# Patient Record
Sex: MALE | Race: WHITE | NOT HISPANIC OR LATINO | Employment: FULL TIME | URBAN - METROPOLITAN AREA
[De-identification: names, ages, dates, MRNs, and addresses within clinical notes are randomized per-mention and may not be internally consistent; named-entity substitution may affect disease eponyms.]

---

## 2017-01-20 ENCOUNTER — OFFICE VISIT (OUTPATIENT)
Dept: FAMILY MEDICINE | Facility: OTHER | Age: 47
End: 2017-01-20
Attending: NURSE PRACTITIONER
Payer: COMMERCIAL

## 2017-01-20 VITALS
TEMPERATURE: 97.8 F | HEIGHT: 72 IN | WEIGHT: 277 LBS | DIASTOLIC BLOOD PRESSURE: 80 MMHG | BODY MASS INDEX: 37.52 KG/M2 | HEART RATE: 100 BPM | RESPIRATION RATE: 14 BRPM | SYSTOLIC BLOOD PRESSURE: 106 MMHG

## 2017-01-20 DIAGNOSIS — I10 BENIGN ESSENTIAL HYPERTENSION: ICD-10-CM

## 2017-01-20 DIAGNOSIS — F17.200 TOBACCO DEPENDENCE SYNDROME: ICD-10-CM

## 2017-01-20 DIAGNOSIS — Z71.89 ACP (ADVANCE CARE PLANNING): Primary | ICD-10-CM

## 2017-01-20 DIAGNOSIS — Z01.818 PREOP GENERAL PHYSICAL EXAM: ICD-10-CM

## 2017-01-20 DIAGNOSIS — K21.9 GASTROESOPHAGEAL REFLUX DISEASE, ESOPHAGITIS PRESENCE NOT SPECIFIED: ICD-10-CM

## 2017-01-20 PROBLEM — M51.26 HERNIATED INTERVERTEBRAL DISC OF LUMBAR SPINE: Status: ACTIVE | Noted: 2017-01-20

## 2017-01-20 LAB
ALBUMIN SERPL-MCNC: 4.1 G/DL (ref 3.4–5)
ALP SERPL-CCNC: 113 U/L (ref 40–150)
ALT SERPL W P-5'-P-CCNC: 202 U/L (ref 0–70)
ANION GAP SERPL CALCULATED.3IONS-SCNC: 11 MMOL/L (ref 3–14)
AST SERPL W P-5'-P-CCNC: 83 U/L (ref 0–45)
BASOPHILS # BLD AUTO: 0.1 10E9/L (ref 0–0.2)
BASOPHILS NFR BLD AUTO: 0.5 %
BILIRUB SERPL-MCNC: 0.7 MG/DL (ref 0.2–1.3)
BUN SERPL-MCNC: 19 MG/DL (ref 7–30)
CALCIUM SERPL-MCNC: 9.3 MG/DL (ref 8.5–10.1)
CHLORIDE SERPL-SCNC: 109 MMOL/L (ref 94–109)
CO2 SERPL-SCNC: 21 MMOL/L (ref 20–32)
CREAT SERPL-MCNC: 0.91 MG/DL (ref 0.66–1.25)
DIFFERENTIAL METHOD BLD: NORMAL
EOSINOPHIL # BLD AUTO: 0.4 10E9/L (ref 0–0.7)
EOSINOPHIL NFR BLD AUTO: 3.9 %
ERYTHROCYTE [DISTWIDTH] IN BLOOD BY AUTOMATED COUNT: 12.2 % (ref 10–15)
GFR SERPL CREATININE-BSD FRML MDRD: 90 ML/MIN/1.7M2
GLUCOSE SERPL-MCNC: 129 MG/DL (ref 70–99)
HCT VFR BLD AUTO: 49.2 % (ref 40–53)
HGB BLD-MCNC: 17.2 G/DL (ref 13.3–17.7)
LYMPHOCYTES # BLD AUTO: 1.7 10E9/L (ref 0.8–5.3)
LYMPHOCYTES NFR BLD AUTO: 16 %
MCH RBC QN AUTO: 31.5 PG (ref 26.5–33)
MCHC RBC AUTO-ENTMCNC: 35 G/DL (ref 31.5–36.5)
MCV RBC AUTO: 90 FL (ref 78–100)
MONOCYTES # BLD AUTO: 1.1 10E9/L (ref 0–1.3)
MONOCYTES NFR BLD AUTO: 10 %
NEUTROPHILS # BLD AUTO: 7.5 10E9/L (ref 1.6–8.3)
NEUTROPHILS NFR BLD AUTO: 69.6 %
PLATELET # BLD AUTO: 261 10E9/L (ref 150–450)
POTASSIUM SERPL-SCNC: 4.3 MMOL/L (ref 3.4–5.3)
PROT SERPL-MCNC: 8.2 G/DL (ref 6.8–8.8)
RBC # BLD AUTO: 5.46 10E12/L (ref 4.4–5.9)
SODIUM SERPL-SCNC: 141 MMOL/L (ref 133–144)
WBC # BLD AUTO: 10.8 10E9/L (ref 4–11)

## 2017-01-20 PROCEDURE — 99214 OFFICE O/P EST MOD 30 MIN: CPT | Performed by: NURSE PRACTITIONER

## 2017-01-20 PROCEDURE — 36415 COLL VENOUS BLD VENIPUNCTURE: CPT | Performed by: NURSE PRACTITIONER

## 2017-01-20 PROCEDURE — 85025 COMPLETE CBC W/AUTO DIFF WBC: CPT | Performed by: NURSE PRACTITIONER

## 2017-01-20 PROCEDURE — 71020 ZZHC CHEST TWO VIEWS, FRONT/LAT: CPT | Mod: TC | Performed by: RADIOLOGY

## 2017-01-20 PROCEDURE — 80053 COMPREHEN METABOLIC PANEL: CPT | Performed by: NURSE PRACTITIONER

## 2017-01-20 PROCEDURE — 93000 ELECTROCARDIOGRAM COMPLETE: CPT | Performed by: INTERNAL MEDICINE

## 2017-01-20 RX ORDER — OMEPRAZOLE 40 MG/1
40 CAPSULE, DELAYED RELEASE ORAL DAILY
Qty: 30 CAPSULE | Refills: 1 | Status: SHIPPED | OUTPATIENT
Start: 2017-01-20 | End: 2017-08-15

## 2017-01-20 RX ORDER — POLYETHYLENE GLYCOL 3350 17 G
2 POWDER IN PACKET (EA) ORAL
Qty: 360 LOZENGE | Refills: 1 | Status: SHIPPED | OUTPATIENT
Start: 2017-01-20 | End: 2017-08-15

## 2017-01-20 ASSESSMENT — ANXIETY QUESTIONNAIRES
1. FEELING NERVOUS, ANXIOUS, OR ON EDGE: MORE THAN HALF THE DAYS
GAD7 TOTAL SCORE: 9
6. BECOMING EASILY ANNOYED OR IRRITABLE: SEVERAL DAYS
2. NOT BEING ABLE TO STOP OR CONTROL WORRYING: NEARLY EVERY DAY
7. FEELING AFRAID AS IF SOMETHING AWFUL MIGHT HAPPEN: NOT AT ALL
IF YOU CHECKED OFF ANY PROBLEMS ON THIS QUESTIONNAIRE, HOW DIFFICULT HAVE THESE PROBLEMS MADE IT FOR YOU TO DO YOUR WORK, TAKE CARE OF THINGS AT HOME, OR GET ALONG WITH OTHER PEOPLE: NOT DIFFICULT AT ALL
3. WORRYING TOO MUCH ABOUT DIFFERENT THINGS: MORE THAN HALF THE DAYS
5. BEING SO RESTLESS THAT IT IS HARD TO SIT STILL: NOT AT ALL

## 2017-01-20 ASSESSMENT — PATIENT HEALTH QUESTIONNAIRE - PHQ9: 5. POOR APPETITE OR OVEREATING: SEVERAL DAYS

## 2017-01-20 ASSESSMENT — PAIN SCALES - GENERAL: PAINLEVEL: MILD PAIN (3)

## 2017-01-20 NOTE — MR AVS SNAPSHOT
After Visit Summary   1/20/2017    Rojelio Juárez    MRN: [id number]           Patient Information     Date Of Birth          1970        Visit Information        Provider Department      1/20/2017 10:30 AM Marion Davis NP [facility]        Today's Diagnoses     ACP (advance care planning)    -  1     Preop general physical exam         Benign essential hypertension         Tobacco dependence syndrome         Gastroesophageal reflux disease, esophagitis presence not specified           Care Instructions      Before Your Surgery      Call your surgeon if there is any change in your health. This includes signs of a cold or flu (such as a sore throat, runny nose, cough, rash or fever).    Do not smoke, drink alcohol or take over the counter medicine (unless your surgeon or primary care doctor tells you to) for the 24 hours before and after surgery.    If you take prescribed drugs: Follow your doctor s orders about which medicines to take and which to stop until after surgery.    Eating and drinking prior to surgery: follow the instructions from your surgeon    Take a shower or bath the night before surgery. Use the soap your surgeon gave you to gently clean your skin. If you do not have soap from your surgeon, use your regular soap. Do not shave or scrub the surgery site.  Wear clean pajamas and have clean sheets on your bed.         Follow-ups after your visit        Who to contact     If you have questions or need follow up information about today's clinic visit or your schedule please contact [facility] directly at [phone].  Normal or non-critical lab and imaging results will be communicated to you by MyChart, letter or phone within 4 business days after the clinic has received the results. If you do not hear from us within 7 days, please contact the clinic through MyChart or phone. If you have a critical or abnormal lab result, we will notify you by  "phone as soon as possible.  Submit refill requests through [employer] or call your pharmacy and they will forward the refill request to us. Please allow 3 business days for your refill to be completed.          Additional Information About Your Visit        [employer] Information     [employer] lets you send messages to your doctor, view your test results, renew your prescriptions, schedule appointments and more. To sign up, go to [email or web address] [employer] . Click on \"Log in\" on the left side of the screen, which will take you to the Welcome page. Then click on \"Sign up Now\" on the right side of the page.     You will be asked to enter the access code listed below, as well as some personal information. Please follow the directions to create your username and password.     Your access code is: [email or web address]  Expires: 3/15/2017  1:57 PM     Your access code will  in 90 days. If you need help or a new code, please call your [place] clinic or [phone].        Care EveryWhere ID     This is your Care EveryWhere ID. This could be used by other organizations to access your [place] medical records  [id number]        Your Vitals Were     Pulse Temperature Respirations Height BMI (Body Mass Index)       100 97.8  F (36.6  C) (Tympanic) 14 6' (1.829 m) 37.56 kg/m2        Blood Pressure from Last 3 Encounters:   17 106/80   16 154/94   12/15/16 160/90    Weight from Last 3 Encounters:   17 277 lb (125.646 kg)   16 270 lb (122.471 kg)   12/15/16 270 lb (122.471 kg)              We Performed the Following     CBC with platelets and differential     Comprehensive metabolic panel     EKG 12-lead complete w/read - (Clinic Performed)     XR CHEST 2 VW (Clinic Performed)          Today's Medication Changes          These changes are accurate as of: 17 10:59 AM.  If you have any questions, ask your nurse or doctor.               Start taking these medicines.        Dose/Directions    nicotine polacrilex 2 " MG lozenge   Commonly known as:  EQL NICOTINE   Used for:  Tobacco dependence syndrome   Started by:  Marion Davis NP        Dose:  2 mg   Place 1 lozenge (2 mg) inside cheek every hour as needed for smoking cessation   Quantity:  360 lozenge   Refills:  1       omeprazole 40 MG capsule   Commonly known as:  priLOSEC   Used for:  Gastroesophageal reflux disease, esophagitis presence not specified   Started by:  Marion Davis NP        Dose:  40 mg   Take 1 capsule (40 mg) by mouth daily Take 30-60 minutes before a meal.   Quantity:  30 capsule   Refills:  1         Stop taking these medicines if you haven't already. Please contact your care team if you have questions.     predniSONE 20 MG tablet   Commonly known as:  DELTASONE   Stopped by:  Marion Davis NP                Where to get your medicines      These medications were sent to [facility] [address] [place] [address]  AT [facility] [address]  [address] [place] , [facility] [address]     Phone:  [phone]    - nicotine polacrilex 2 MG lozenge  - omeprazole 40 MG capsule             Primary Care Provider Office Phone # Fax #    Marion Davis [phone]       [facility] [address] [facility] [address]        Thank you!     Thank you for choosing [facility]  for your care. Our goal is always to provide you with excellent care. Hearing back from our patients is one way we can continue to improve our services. Please take a few minutes to complete the written survey that you may receive in the mail after your visit with us. Thank you!             Your Updated Medication List - Protect others around you: Learn how to safely use, store and throw away your medicines at www.disposemymeds.org.          This list is accurate as of: 1/20/17 10:59 AM.  Always use your most recent med list.                   Brand Name Dispense Instructions  for use    atenolol 50 MG tablet    TENORMIN    30 tablet    Take 1 tablet (50 mg) by mouth daily       cyclobenzaprine 10 MG tablet    FLEXERIL    90 tablet    Take 1 tablet (10 mg) by mouth 3 times daily as needed for muscle spasms       gabapentin 300 MG capsule    NEURONTIN    90 capsule    Take 1 tablet (300 mg) every night for 1-3 days, then 1 tablet twice daily for 1-3 days, then 1 tablet three times daily       ibuprofen 800 MG tablet    ADVIL/MOTRIN    90 tablet    Take 1 tablet (800 mg) by mouth every 8 hours as needed for moderate pain       nicotine polacrilex 2 MG lozenge    EQL NICOTINE    360 lozenge    Place 1 lozenge (2 mg) inside cheek every hour as needed for smoking cessation       omeprazole 40 MG capsule    priLOSEC    30 capsule    Take 1 capsule (40 mg) by mouth daily Take 30-60 minutes before a meal.       oxyCODONE-acetaminophen 5-325 MG per tablet    PERCOCET    90 tablet    Take 1 tablet by mouth 3 times daily as needed for moderate to severe pain (must last one month, no early refills)

## 2017-01-20 NOTE — PROGRESS NOTES
[facility]  [address]  [place] [address]  [phone]  Dept: [phone]    PRE-OP EVALUATION:  Today's date: 2017    Rojelio Juárez (: 1970) presents for pre-operative evaluation assessment as requested by Dr. Hemphill.  He requires evaluation and anesthesia risk assessment prior to undergoing surgery/procedure for treatment of low back pain with right sided radicular pain.  Proposed procedure: right L5-S1 microdiscectomy     Date of Surgery/ Procedure: 17  Time of Surgery/ Procedure: To be determined  Hospital/Surgical Facility: [place], [place]  Fax number for surgical facility:   Primary Physician: Marion Davis  Type of Anesthesia Anticipated: General    Patient has a Health Care Directive or Living Will:  NO    1. NO - Do you have a history of heart attack, stroke, stent, bypass or surgery on an artery in the head, neck, heart or legs?  2. NO - Do you ever have any pain or discomfort in your chest?  3. NO - Do you have a history of  Heart Failure?  4. NO - Are you troubled by shortness of breath when: walking on the level, up a slight hill or at night?  5. NO - Do you currently have a cold, bronchitis or other respiratory infection?  6. YES - DO YOU HAVE A COUGH, SHORTNESS OF BREATH OR WHEEZING? Stopped smoking Monday 1/15/2017.   7. YES - Do you sometimes get pains in the calves of your legs when you walk? due to back discomfort  8. NO - Do you or anyone in your family have previous history of blood clots?  9. NO - Do you or does anyone in your family have a serious bleeding problem such as prolonged bleeding following surgeries or cuts?  10. NO - Have you ever had problems with anemia or been told to take iron pills?  11. NO - Have you had any abnormal blood loss such as black, tarry or bloody stools, or abnormal vaginal bleeding?  12. NO - Have you ever had a blood transfusion?  13. NO - Have you or any of your relatives ever had  problems with anesthesia?  14. YES - DO YOU HAVE SLEEP APNEA, EXCESSIVE SNORING OR DAYTIME DROWSINESS? Snoring, sleep apnea but does not use c-pap machine.   15. NO - Do you have any prosthetic heart valves?  16. NO - Do you have prosthetic joints?  17. NO - Is there any chance that you may be pregnant?      HPI:                                                      Brief HPI related to upcoming procedure: acute low back pain with right sided numbness, tingling and weakness.  MRI was completed with the following results: large disc herniation of right L5S1 causing compression of right S1 nerve root.       HYPERTENSION - new onset of mild-severe HTN most likely related to increased pain, currently denies any symptoms referable to elevated blood pressure. Specifically denies chest pain, palpitations, dyspnea, orthopnea, PND or peripheral edema. Blood pressure readings have been in normal range. Current medication regimen is as listed below. Patient denies any side effects of medication.                                                                                                                                                                                  MEDICAL HISTORY:                                                      Patient Active Problem List    Diagnosis Date Noted     ACP (advance care planning) 01/20/2017     Priority: Medium     Advance Care Planning 1/20/2017: ACP Review of Chart / Resources Provided:  Reviewed chart for advance care plan.  Rojelio Juárez has no plan or code status on file. Discussed available resources and provided with information. Confirmed code status reflects current choices pending further ACP discussions.  Confirmed/documented legally designated decision makers.  Added by LUCIANO OLIVAS               Herniated intervertebral disc of lumbar spine 01/20/2017     Priority: Medium     Benign essential hypertension 01/20/2017     Priority: Medium      Past Medical History    Diagnosis Date     Herniated intervertebral disc of lumbar spine 1/20/2017     Benign essential hypertension 1/20/2017     History reviewed. No pertinent past surgical history.  Current Outpatient Prescriptions   Medication Sig Dispense Refill     oxyCODONE-acetaminophen (PERCOCET) 5-325 MG per tablet Take 1 tablet by mouth 3 times daily as needed for moderate to severe pain (must last one month, no early refills) 90 tablet 0     atenolol (TENORMIN) 50 MG tablet Take 1 tablet (50 mg) by mouth daily 30 tablet 5     ibuprofen (ADVIL/MOTRIN) 800 MG tablet Take 1 tablet (800 mg) by mouth every 8 hours as needed for moderate pain 90 tablet 1     cyclobenzaprine (FLEXERIL) 10 MG tablet Take 1 tablet (10 mg) by mouth 3 times daily as needed for muscle spasms 90 tablet 1     gabapentin (NEURONTIN) 300 MG capsule Take 1 tablet (300 mg) every night for 1-3 days, then 1 tablet twice daily for 1-3 days, then 1 tablet three times daily 90 capsule 0     OTC products: None, except as noted above, no recent use of OTC ASA, NSAIDS or Steroids and no use of herbal medications or other supplements    No Known Allergies   Latex Allergy: NO    Social History   Substance Use Topics     Smoking status: Former Smoker     Types: Cigarettes     Start date: 01/15/2017     Smokeless tobacco: Never Used     Alcohol Use: 0.0 oz/week     0 Standard drinks or equivalent per week     History   Drug Use No       REVIEW OF SYSTEMS:                                                    C: NEGATIVE for fever, chills, change in weight  I: NEGATIVE for worrisome rashes, moles or lesions  E: NEGATIVE for vision changes or irritation  E/M: NEGATIVE for ear, mouth and throat problems  R: NEGATIVE for significant cough or SOB  CV: NEGATIVE for chest pain, palpitations or peripheral edema  GI: NEGATIVE for nausea, abdominal pain, heartburn, or change in bowel habits  : NEGATIVE for frequency, dysuria, or hematuria  M: POSITIVE:  Low back pain as above  N:  POSITIVE:  Radicular pain right leg  E: NEGATIVE for temperature intolerance, skin/hair changes  H: NEGATIVE for bleeding problems  P: NEGATIVE for changes in mood or affect    EXAM:                                                    /80 mmHg  Pulse 100  Temp(Src) 97.8  F (36.6  C) (Tympanic)  Resp 14  Ht 6' (1.829 m)  Wt 277 lb (125.646 kg)  BMI 37.56 kg/m2    GENERAL APPEARANCE: healthy, alert and no distress     HENT: ear canals and TM's normal and nose and mouth without ulcers or lesions     NECK: no adenopathy, no asymmetry, masses, or scars and thyroid normal to palpation     RESP: lungs clear to auscultation - no rales, rhonchi or wheezes     CV: regular rates and rhythm, normal S1 S2, no S3 or S4 and no murmur, click or rub -     ABDOMEN:  soft, nontender, no HSM or masses and bowel sounds normal     MS: extremities normal- no gross deformities noted, no evidence of inflammation in joints, FROM in all extremities.     SKIN: no suspicious lesions or rashes     NEURO: Normal strength and tone, sensory exam grossly normal, mentation intact and speech normal     PSYCH: mentation appears normal. and affect normal/bright     LYMPHATICS: No axillary, cervical, inguinal, or supraclavicular nodes    DIAGNOSTICS:                                                      Results for orders placed or performed in visit on 01/20/17   XR CHEST 2 VW (Clinic Performed)    Narrative    CHEST TWO VIEWS    FINDINGS:  Lungs are clear.  Heart and pulmonary vessels are within  normal limits.    IMPRESSION:  NO EVIDENCE OF ACTIVE CARDIOPULMONARY DISEASE.  Exam Date: Jan 20, 2017 11:07:00 AM  Author: MICHAEL ANTONIO  This report is final and null     Comprehensive metabolic panel   Result Value Ref Range    Sodium 141 133 - 144 mmol/L    Potassium 4.3 3.4 - 5.3 mmol/L    Chloride 109 94 - 109 mmol/L    Carbon Dioxide 21 20 - 32 mmol/L    Anion Gap 11 3 - 14 mmol/L    Glucose 129 (H) 70 - 99 mg/dL    Urea Nitrogen 19 7 - 30  mg/dL    Creatinine 0.91 0.66 - 1.25 mg/dL    GFR Estimate 90 >60 mL/min/1.7m2    GFR Estimate If Black >90   GFR Calc   >60 mL/min/1.7m2    Calcium 9.3 8.5 - 10.1 mg/dL    Bilirubin Total 0.7 0.2 - 1.3 mg/dL    Albumin 4.1 3.4 - 5.0 g/dL    Protein Total 8.2 6.8 - 8.8 g/dL    Alkaline Phosphatase 113 40 - 150 U/L     (H) 0 - 70 U/L    AST 83 (H) 0 - 45 U/L   CBC with platelets and differential   Result Value Ref Range    WBC 10.8 4.0 - 11.0 10e9/L    RBC Count 5.46 4.4 - 5.9 10e12/L    Hemoglobin 17.2 13.3 - 17.7 g/dL    Hematocrit 49.2 40.0 - 53.0 %    MCV 90 78 - 100 fl    MCH 31.5 26.5 - 33.0 pg    MCHC 35.0 31.5 - 36.5 g/dL    RDW 12.2 10.0 - 15.0 %    Platelet Count 261 150 - 450 10e9/L    Diff Method Automated Method     % Neutrophils 69.6 %    % Lymphocytes 16.0 %    % Monocytes 10.0 %    % Eosinophils 3.9 %    % Basophils 0.5 %    Absolute Neutrophil 7.5 1.6 - 8.3 10e9/L    Absolute Lymphocytes 1.7 0.8 - 5.3 10e9/L    Absolute Monocytes 1.1 0.0 - 1.3 10e9/L    Absolute Eosinophils 0.4 0.0 - 0.7 10e9/L    Absolute Basophils 0.1 0.0 - 0.2 10e9/L          EKG Interpretation:      Interpreted by Marion Davis    Symptoms at time of EKG: None   Rhythm: Normal sinus   Rate: Normal  Axis: Normal  Ectopy: None  Conduction: Normal  ST Segments/ T Waves: No ST-T wave changes and No acute ischemic changes  Q Waves: None  Comparison to prior: No old EKG available    Clinical Impression: normal EKG        No results for input(s): HGB, PLT, INR, NA, POTASSIUM, CR, A1C in the last 07346 hours.     IMPRESSION:                                                    Reason for surgery/procedure: low back pain with right sided radicular pain  Diagnosis/reason for consult: anesthesia risk assessment.      The proposed surgical procedure is considered INTERMEDIATE risk.    REVISED CARDIAC RISK INDEX  The patient has the following serious cardiovascular risks for perioperative complications such as  (MI, PE, VFib and 3  AV Block):  No serious cardiac risks  INTERPRETATION: 0 risks: Class I (very low risk - 0.4% complication rate)    The patient has the following additional risks for perioperative complications:  Elevated Liver function, most likely due to tylenol use.  Will follow-up postoperatively.        ICD-10-CM    1. ACP (advance care planning) Z71.89    2. Preop general physical exam Z01.818    3. Benign essential hypertension I10        RECOMMENDATIONS:                                                        Cardiovascular Risk  Performs 4 METs exercise without symptoms (Walk on level ground at 15 minutes per mile (4 miles/hour)) .       --Patient is to take all scheduled medications on the day of surgery EXCEPT for modifications listed below.  Stop all tylenol use due to elevated LFT's.  Will follow-up postoperatively as above    APPROVAL GIVEN to proceed with proposed procedure, without further diagnostic evaluation       Signed Electronically by: Marion Davis NP    Copy of this evaluation report is provided to requesting physician.    [facility] Preop Guidelines

## 2017-01-20 NOTE — NURSING NOTE
Chief Complaint   Patient presents with     Pre-Op Exam     1/25/17 discectomy with Dr. Hemphill at [facility]     *_* Health Care Directive *_*     declined        Initial /80 mmHg  Pulse 100  Temp(Src) 97.8  F (36.6  C) (Tympanic)  Resp 14  Ht 6' (1.829 m)  Wt 277 lb (125.646 kg)  BMI 37.56 kg/m2 Estimated body mass index is 37.56 kg/(m^2) as calculated from the following:    Height as of this encounter: 6' (1.829 m).    Weight as of this encounter: 277 lb (125.646 kg).  BP completed using cuff size: vannessa OLIVAS

## 2017-01-21 ASSESSMENT — ANXIETY QUESTIONNAIRES: GAD7 TOTAL SCORE: 9

## 2017-01-21 ASSESSMENT — PATIENT HEALTH QUESTIONNAIRE - PHQ9: SUM OF ALL RESPONSES TO PHQ QUESTIONS 1-9: 7

## 2017-08-15 ENCOUNTER — OFFICE VISIT (OUTPATIENT)
Dept: FAMILY MEDICINE | Facility: OTHER | Age: 47
End: 2017-08-15
Attending: NURSE PRACTITIONER
Payer: COMMERCIAL

## 2017-08-15 VITALS
BODY MASS INDEX: 38.74 KG/M2 | WEIGHT: 286 LBS | HEIGHT: 72 IN | TEMPERATURE: 96.8 F | HEART RATE: 74 BPM | DIASTOLIC BLOOD PRESSURE: 78 MMHG | SYSTOLIC BLOOD PRESSURE: 128 MMHG

## 2017-08-15 DIAGNOSIS — F41.9 ANXIETY: ICD-10-CM

## 2017-08-15 DIAGNOSIS — M54.41 LOW BACK PAIN WITH RIGHT-SIDED SCIATICA: Primary | ICD-10-CM

## 2017-08-15 DIAGNOSIS — Z72.0 TOBACCO ABUSE: Primary | ICD-10-CM

## 2017-08-15 DIAGNOSIS — M54.41 RIGHT-SIDED LOW BACK PAIN WITH RIGHT-SIDED SCIATICA, UNSPECIFIED CHRONICITY: ICD-10-CM

## 2017-08-15 DIAGNOSIS — M54.41 ACUTE RIGHT-SIDED LOW BACK PAIN WITH RIGHT-SIDED SCIATICA: ICD-10-CM

## 2017-08-15 PROBLEM — I10 BENIGN ESSENTIAL HYPERTENSION: Status: RESOLVED | Noted: 2017-01-20 | Resolved: 2017-08-15

## 2017-08-15 PROCEDURE — 99214 OFFICE O/P EST MOD 30 MIN: CPT | Performed by: NURSE PRACTITIONER

## 2017-08-15 RX ORDER — IBUPROFEN 800 MG/1
800 TABLET, FILM COATED ORAL EVERY 8 HOURS PRN
Qty: 90 TABLET | Refills: 1 | Status: SHIPPED | OUTPATIENT
Start: 2017-08-15 | End: 2017-12-08

## 2017-08-15 RX ORDER — DIAZEPAM 5 MG
5 TABLET ORAL EVERY 12 HOURS PRN
Qty: 30 TABLET | Refills: 1 | Status: SHIPPED | OUTPATIENT
Start: 2017-08-15 | End: 2018-06-22

## 2017-08-15 RX ORDER — PREDNISONE 20 MG/1
20 TABLET ORAL 2 TIMES DAILY
Qty: 10 TABLET | Refills: 0 | Status: SHIPPED | OUTPATIENT
Start: 2017-08-15 | End: 2017-09-14

## 2017-08-15 ASSESSMENT — ANXIETY QUESTIONNAIRES
3. WORRYING TOO MUCH ABOUT DIFFERENT THINGS: MORE THAN HALF THE DAYS
IF YOU CHECKED OFF ANY PROBLEMS ON THIS QUESTIONNAIRE, HOW DIFFICULT HAVE THESE PROBLEMS MADE IT FOR YOU TO DO YOUR WORK, TAKE CARE OF THINGS AT HOME, OR GET ALONG WITH OTHER PEOPLE: VERY DIFFICULT
1. FEELING NERVOUS, ANXIOUS, OR ON EDGE: MORE THAN HALF THE DAYS
7. FEELING AFRAID AS IF SOMETHING AWFUL MIGHT HAPPEN: MORE THAN HALF THE DAYS
6. BECOMING EASILY ANNOYED OR IRRITABLE: MORE THAN HALF THE DAYS
2. NOT BEING ABLE TO STOP OR CONTROL WORRYING: NEARLY EVERY DAY

## 2017-08-15 ASSESSMENT — PATIENT HEALTH QUESTIONNAIRE - PHQ9
SUM OF ALL RESPONSES TO PHQ QUESTIONS 1-9: 10
5. POOR APPETITE OR OVEREATING: MORE THAN HALF THE DAYS

## 2017-08-15 NOTE — PROGRESS NOTES
SUBJECTIVE:                                                    Rojelio Juárez is a 47 year old male who presents to clinic today for the following health issues:      Lumbar back pain, Right SI joint pain, RLE sciatica, and lateral neuropathy.  L-4 - L-5 discectomy, Dr. Hemphill, [facility], 1/25/17.      Onset:  Pain really never went away post op in terms of sciatica and neuropathy.  Last Friday, he began to feel a pain similar to when he herniated a disc previously.  He had twisted to the left, and felt a pop - low lumbar. Pain steady since that time.        Description:   Location: right lumbar - L4, 5-S1  Character: Sharp and Dull ache, radiates from hip to ankle, outer aspect.  Plantar fascitis also, both feet.    Intensity: severe    Progression of Symptoms: worse    Accompanying Signs & Symptoms:  Other symptoms: radiation of pain to ankle and weakness of right leg    History:   Previous similar pain: YES - but not at the exact same spot / level    Precipitating factors:   Trauma or overuse: no     Alleviating factors:  Improved by: nothing    Therapies Tried and outcome: Ibuprofen, Ice, rest, stretches, and percocet      Anxeity  Symptoms have been present for -  Long term, worse over 1 year  Aggravating factors  - back pain  Relieving factors -  no  Recent stressors  - work, pain  Drug or alcohol use -  no  Past medications  - no  Therapy  - no  Psychiatric history -  no  Prior hospitalization for mental health concerns -  no  Suicidal Ideation or plan  - no    Mental Status Assessment:  -Appearance/Behavior:No apparent distress and Casually groomed, attitude:pleasant and cooperative  -Motor: normal or unremarkable.  -Abnormal involuntary movements: None.  -Mood: description consistent with anxiety  -Affect: Reactive/Full range.  -Speech: Normal, clear, regular rate and volume.   -Thought process/associations: Logical and Goal directed.  -Thought content: normal .  -Perceptual disturbances: No  hallucinations..   -Suicidal/Homicidal Ideation: Denies  -Judgment: Good.  -Insight: Good.  *Orientation: time, place and person.  *Memory: intact immediate, short and long term.  *Attention: Adequate for interview  *Language: fluent, no aphasias, able to repeat phrases and name objects. Vocab intact.  *Fund of information: appropriate for education  *Cognitive functioning estimate: 0 - independent.    Problem list and histories reviewed & adjusted, as indicated.  Additional history: as documented    Patient Active Problem List   Diagnosis     ACP (advance care planning)     Herniated intervertebral disc of lumbar spine     Tobacco dependence syndrome     No past surgical history on file.    Social History   Substance Use Topics     Smoking status: Current Every Day Smoker     Types: Cigarettes     Start date: 1/15/2017     Smokeless tobacco: Never Used     Alcohol use 0.0 oz/week     0 Standard drinks or equivalent per week     No family history on file.      Current Outpatient Prescriptions   Medication Sig Dispense Refill     oxyCODONE-acetaminophen (PERCOCET) 5-325 MG per tablet Take 1 tablet by mouth 3 times daily as needed for moderate to severe pain (must last one month, no early refills) 90 tablet 0     ibuprofen (ADVIL/MOTRIN) 800 MG tablet Take 1 tablet (800 mg) by mouth every 8 hours as needed for moderate pain 90 tablet 1     No Known Allergies  Recent Labs   Lab Test  01/20/17   1101   ALT  202*   CR  0.91   GFRESTIMATED  90   GFRESTBLACK  >90   GFR Calc     POTASSIUM  4.3      BP Readings from Last 3 Encounters:   08/15/17 128/78   01/20/17 106/80   12/19/16 (!) 154/94    Wt Readings from Last 3 Encounters:   08/15/17 286 lb (129.7 kg)   01/20/17 277 lb (125.6 kg)   12/19/16 270 lb (122.5 kg)                  Labs reviewed in EPIC          Reviewed and updated as needed this visit by clinical staffTobacco  Allergies  Meds  Soc Hx      Reviewed and updated as needed this visit by  Provider         ROS:  Constitutional, HEENT, cardiovascular, pulmonary, gi and gu systems are negative, except as otherwise noted.      OBJECTIVE:   /78 (BP Location: Right arm, Patient Position: Sitting, Cuff Size: Adult Large)  Pulse 74  Temp 96.8  F (36  C) (Tympanic)  Ht 6' (1.829 m)  Wt 286 lb (129.7 kg)  BMI 38.79 kg/m2  Body mass index is 38.79 kg/(m^2).     GENERAL: healthy, alert and no distress  NECK: no adenopathy, no asymmetry, masses, or scars and thyroid normal to palpation  RESP: lungs clear to auscultation - no rales, rhonchi or wheezes  CV: regular rate and rhythm, normal S1 S2, no S3 or S4, no murmur, click or rub, no peripheral edema and peripheral pulses strong  MS:    SKIN: no suspicious lesions or rashes  PSYCH: see above      Visit time:   30 Minutes  Time spent with patient, including examination, face to face patient education - 15 mn  Visit Content: During our face to face time, patient education was provided regarding diagnosis, and treatment pan. Patient counseled regarding disease process  All diagnosis and treatment plan are reviewed with the patient, 50 % of face to face time is directed at patient education  Record review completed        ASSESSMENT/PLAN:     Tobacco Cessation:   reports that he has been smoking Cigarettes.  He started smoking about 6 months ago. He has never used smokeless tobacco.  Tobacco Cessation Action Plan: Information offered: Patient not interested at this time        1. Acute right-sided low back pain with right-sided sciatica  - ibuprofen (ADVIL/MOTRIN) 800 MG tablet; Take 1 tablet (800 mg) by mouth every 8 hours as needed for moderate pain  Dispense: 90 tablet; Refill: 1  - MR Lumbar Spine w/o Contrast; Future  - predniSONE (DELTASONE) 20 MG tablet; Take 1 tablet (20 mg) by mouth 2 times daily  Dispense: 10 tablet; Refill: 0  - MR Lumbar Spine w/o Contrast  - Ice  - Rest  - Side sleep with pillow between knees  - Asper cream with  lidocaine    2. Right-sided low back pain with right-sided sciatica, unspecified chronicity  - ibuprofen (ADVIL/MOTRIN) 800 MG tablet; Take 1 tablet (800 mg) by mouth every 8 hours as needed for moderate pain  Dispense: 90 tablet; Refill: 1  - MR Lumbar Spine w/o Contrast; Future  - MR Lumbar Spine w/o Contrast    3. Anxiety  - diazepam (VALIUM) 5 MG tablet; Take 1 tablet (5 mg) by mouth every 12 hours as needed for anxiety or sleep  Dispense: 30 tablet; Refill: 1    4. Tobacco abuse  - Tobacco Cessation - for Health Maintenance  - TOBACCO CESSATION - FOR HEALTH MAINTENANCE        Dania Watson NP  [facility]

## 2017-08-15 NOTE — MR AVS SNAPSHOT
After Visit Summary   8/15/2017    Rojelio Juárez    MRN: [id number]           Patient Information     Date Of Birth          1970        Visit Information        Provider Department      8/15/2017 1:30 PM Dania Watson NP [facility]        Today's Diagnoses     Tobacco abuse    -  1    Acute right-sided low back pain with right-sided sciatica        Right-sided low back pain with right-sided sciatica, unspecified chronicity        Anxiety          Care Instructions        1. Acute right-sided low back pain with right-sided sciatica  - ibuprofen (ADVIL/MOTRIN) 800 MG tablet; Take 1 tablet (800 mg) by mouth every 8 hours as needed for moderate pain  Dispense: 90 tablet; Refill: 1  - MR Lumbar Spine w/o Contrast; Future  - predniSONE (DELTASONE) 20 MG tablet; Take 1 tablet (20 mg) by mouth 2 times daily  Dispense: 10 tablet; Refill: 0  - MR Lumbar Spine w/o Contrast  - Ice  - Rest  - Side sleep with pillow between knees  - Asper cream with lidocaine    2. Right-sided low back pain with right-sided sciatica, unspecified chronicity  - ibuprofen (ADVIL/MOTRIN) 800 MG tablet; Take 1 tablet (800 mg) by mouth every 8 hours as needed for moderate pain  Dispense: 90 tablet; Refill: 1  - MR Lumbar Spine w/o Contrast; Future  - MR Lumbar Spine w/o Contrast    3. Anxiety  - diazepam (VALIUM) 5 MG tablet; Take 1 tablet (5 mg) by mouth every 12 hours as needed for anxiety or sleep  Dispense: 30 tablet; Refill: 1    4. Tobacco abuse  - Tobacco Cessation - for Health Maintenance  - TOBACCO CESSATION - FOR HEALTH MAINTENANCE        Dania Watson NP  [facility]      HOW TO QUIT SMOKING  Smoking is one of the hardest habits to break. About half of all those who have ever smoked have been able to quit, and most of those (about 70%) who still smoke want to quit. Here are some of the best ways to stop smoking.     KEEP TRYING:  It takes most smokers about 8 tries before they are finally able to  fully quit. So, the more often you try and fail, the better your chance of quitting the next time! So, don't give up!    GO COLD TURKEY:  Most ex-smokers quit cold turkey. Trying to cut back gradually doesn't seem to work as well, perhaps because it continues the smoking habit. Also, it is possible to fool yourself by inhaling more while smoking fewer cigarettes. This results in the same amount of nicotine in your body!    GET SUPPORT:  Support programs can make an important difference, especially for the heavy smoker. These groups offer lectures, methods to change your behavior and peer support. Call the free national Quitline for more information. 800-QUIT-NOW (696-720-8313). Low-cost or free programs are offered by many hospitals, local chapters of the American Lung Association (890-139-6177) and the American Cancer Society (880-511-4588). Support at home is important too. Non-smokers can help by offering praise and encouragement. If the smoker fails to quit, encourage them to try again!    OVER-THE-COUNTER MEDICINES:  For those who can't quit on their own, Nicotine Replacement Therapy (NRT) may make quitting much easier. Certain aids such as the nicotine patch, gum and lozenge are available without a prescription. However, it is best to use these under the guidance of your doctor. The skin patch provides a steady supply of nicotine to the body. Nicotine gum and lozenge gives temporary bursts of low levels of nicotine. Both methods take the edge off the craving for cigarettes. WARNING: If you feel symptoms of nicotine overdose, such as nausea, vomiting, dizziness, weakness, or fast heartbeat, stop using these and see your doctor.    PRESCRIPTION MEDICINES:  After evaluating your smoking patterns and prior attempts at quitting, your doctor may offer a prescription medicine such as bupropion (Zyban, Wellbutrin), varenicline (Chantix, Champix), a niocotine inhaler or nasal spray. Each has its unique advantage and  side effects which your doctor can review with you.    HEALTH BENEFITS OF QUITTING:  The benefits of quitting start right away and keep improving the longer you go without smokin minutes: blood pressure and pulse return to normal  8 hours: oxygen levels return to normal  2 days: ability to smell and taste begins to improve as damaged nerves start to regrow  2-3 weeks: circulation and lung function improves  1-9 months: decreased cough, congestion and shortness of breath; less tired  1 year: risk of heart attack decreases by half  5 years: risk of lung cancer decreases by half; risk of stroke becomes the same as a non-smoker  For information about how to quit smoking, visit the following links:  National Cancer [place] ,   Clearing the Air, Quit Smoking Today   - an online booklet. http://www.smokefree.gov/pubs/clearing_the_air.pdf  Smokefree.gov http://smokefree.gov/  QuitNet http://www.quitnet.com/    6660-4881 [place] [address]. All rights reserved. This information is not intended as a substitute for professional medical care. Always follow your healthcare professional's instructions.    The Benefits of Living Smoke Free  What do you want to gain from quitting? Check off some reasons to quit.  Health Benefits  ___ Reduce my risk of lung cancer, heart disease, chronic lung disease  ___ Have fewer wrinkles and softer skin  ___ Improve my sense of taste and smell  ___ For pregnant women--reduce the risk of having a miscarriage, stillbirth, premature birth, or low-birth-weight baby  Personal Benefits  ___ Feel more in control of my life  ___ Have better-smelling hair, breath, clothes, home, and car  ___ Save time by not having to take smoke breaks, buy cigarettes, or hunt for a light  ___ Have whiter teeth  Family Benefits  ___ Reduce my children s respiratory tract infections  ___ Set a good example for my children  ___ Reduce my family s cancer risk  Financial  Benefits  ___ Save hundreds of dollars each year that would be spent on cigarettes  ___ Save money on medical bills  ___ Save on life, health, and car insurance premiums    Those Dollars Add Up!  Cigarettes are expensive, and getting more expensive all the time. Do you realize how much money you are spending on cigarettes per year? What is the average amount you spend on a pack of cigarettes? What is the average number of packs that you smoke per day? Using your answers to these questions, fill in this formula to help you find out:  ($ _____ per pack) ×  ( _____ number of packs per day) × (365 days) =  $ _____ yearly cost of smoking  Besides tobacco, there are other costs, including extra cleaning bills and replacement costs for clothing and furniture; medical expenses for smoking-related illnesses; and higher health, life, and car insurance premiums.    Cigars and Pipes Count Too!  Cigars and pipes are also dangerous. So are smokeless (chewing) tobacco and snuff. All of these products contain nicotine, a highly addictive substance that has harmful effects on your body. Quitting smoking means giving up all tobacco products.      5934-7731 [facility], [address] [place] [address], [place] [address]. All rights reserved. This information is not intended as a substitute for professional medical care. Always follow your healthcare professional's instructions.          Follow-ups after your visit        Who to contact     If you have questions or need follow up information about today's clinic visit or your schedule please contact [facility] directly at [phone].  Normal or non-critical lab and imaging results will be communicated to you by MyChart, letter or phone within 4 business days after the clinic has received the results. If you do not hear from us within 7 days, please contact the clinic through MyChart or phone. If you have a critical or abnormal lab result, we will notify you by phone as soon  "as possible.  Submit refill requests through bookletmobile or call your pharmacy and they will forward the refill request to us. Please allow 3 business days for your refill to be completed.          Additional Information About Your Visit        [employer] Information     bookletmobile lets you send messages to your doctor, view your test results, renew your prescriptions, schedule appointments and more. To sign up, go to [email or web address] . Click on \"Log in\" on the left side of the screen, which will take you to the Welcome page. Then click on \"Sign up Now\" on the right side of the page.     You will be asked to enter the access code listed below, as well as some personal information. Please follow the directions to create your username and password.     Your access code is: [email or web address]  Expires: 2017  2:38 PM     Your access code will  in 90 days. If you need help or a new code, please call your [place] clinic or [phone].        Care EveryWhere ID     This is your Care EveryWhere ID. This could be used by other organizations to access your [place] medical records  [id number]        Your Vitals Were     Pulse Temperature Height BMI (Body Mass Index)          74 96.8  F (36  C) (Tympanic) 6' (1.829 m) 38.79 kg/m2         Blood Pressure from Last 3 Encounters:   08/15/17 128/78   17 106/80   16 (!) 154/94    Weight from Last 3 Encounters:   08/15/17 286 lb (129.7 kg)   17 277 lb (125.6 kg)   16 270 lb (122.5 kg)              We Performed the Following     Tobacco Cessation - for Health Maintenance     TOBACCO CESSATION - FOR HEALTH MAINTENANCE          Today's Medication Changes          These changes are accurate as of: 8/15/17  2:38 PM.  If you have any questions, ask your nurse or doctor.               Start taking these medicines.        Dose/Directions    diazepam 5 MG tablet   Commonly known as:  VALIUM   Used for:  Anxiety   Started by:  Dania Watson NP        Dose:  5 mg "   Take 1 tablet (5 mg) by mouth every 12 hours as needed for anxiety or sleep   Quantity:  30 tablet   Refills:  1       predniSONE 20 MG tablet   Commonly known as:  DELTASONE   Used for:  Acute right-sided low back pain with right-sided sciatica   Started by:  Dania Watson NP        Dose:  20 mg   Take 1 tablet (20 mg) by mouth 2 times daily   Quantity:  10 tablet   Refills:  0            Where to get your medicines      These medications were sent to [facility] [address] [place] [facility] [address] [place] [address]     Phone:  [phone]     ibuprofen 800 MG tablet    predniSONE 20 MG tablet         Some of these will need a paper prescription and others can be bought over the counter.  Ask your nurse if you have questions.     Bring a paper prescription for each of these medications     diazepam 5 MG tablet                Primary Care Provider Office Phone # Fax #    Marion Davis [phone]       [facility] [address]  [facility] [address]        Equal Access to Services     [facility] HospitalRAHEEL AH: Hadii aad ku hadasho Soomaali, waaxda luqadaha, qaybta kaalmada adeegyada, waxay jeff holly. So [facility] [phone].    ATENCIÓN: Si habla español, tiene a gruber disposición servicios gratuitos de asistencia lingüística. Curt al [phone].    We comply with applicable federal civil rights laws and [place] laws. We do not discriminate on the basis of race, color, national origin, age, disability sex, sexual orientation or gender identity.            Thank you!     Thank you for choosing [facility]  for your care. Our goal is always to provide you with excellent care. Hearing back from our patients is one way we can continue to improve our services. Please take a few minutes to complete the written survey that you may receive in the mail after your visit with us.  Thank you!             Your Updated Medication List - Protect others around you: Learn how to safely use, store and throw away your medicines at www.disposemymeds.org.          This list is accurate as of: 8/15/17  2:38 PM.  Always use your most recent med list.                   Brand Name Dispense Instructions for use Diagnosis    diazepam 5 MG tablet    VALIUM    30 tablet    Take 1 tablet (5 mg) by mouth every 12 hours as needed for anxiety or sleep    Anxiety       ibuprofen 800 MG tablet    ADVIL/MOTRIN    90 tablet    Take 1 tablet (800 mg) by mouth every 8 hours as needed for moderate pain    Acute right-sided low back pain with right-sided sciatica, Right-sided low back pain with right-sided sciatica, unspecified chronicity       oxyCODONE-acetaminophen 5-325 MG per tablet    PERCOCET    90 tablet    Take 1 tablet by mouth 3 times daily as needed for moderate to severe pain (must last one month, no early refills)    Right-sided low back pain with right-sided sciatica, unspecified chronicity       predniSONE 20 MG tablet    DELTASONE    10 tablet    Take 1 tablet (20 mg) by mouth 2 times daily    Acute right-sided low back pain with right-sided sciatica

## 2017-08-15 NOTE — LETTER
[facility]  [address]  [facility] [address]  Phone: [phone]    August 15, 2017        Rojelio Juárez  [address] [place] [id number]          To whom it may concern:    RE: Rojelio Juárez    Please excuse from work the remainder of this week due to health concern.    Please contact me for questions or concerns.      Sincerely,        KATYA Méndez

## 2017-08-15 NOTE — NURSING NOTE
Chief Complaint   Patient presents with     Back Pain     Patient had back surgery last winter and now believes his disc may have slipped again.       Initial /78 (BP Location: Right arm, Patient Position: Sitting, Cuff Size: Adult Large)  Pulse 74  Temp 96.8  F (36  C) (Tympanic)  Ht 6' (1.829 m)  Wt 286 lb (129.7 kg)  BMI 38.79 kg/m2 Estimated body mass index is 38.79 kg/(m^2) as calculated from the following:    Height as of this encounter: 6' (1.829 m).    Weight as of this encounter: 286 lb (129.7 kg).  Medication Reconciliation: complete   Pavithra Koroma

## 2017-08-15 NOTE — PATIENT INSTRUCTIONS
1. Acute right-sided low back pain with right-sided sciatica  - ibuprofen (ADVIL/MOTRIN) 800 MG tablet; Take 1 tablet (800 mg) by mouth every 8 hours as needed for moderate pain  Dispense: 90 tablet; Refill: 1  - MR Lumbar Spine w/o Contrast; Future  - predniSONE (DELTASONE) 20 MG tablet; Take 1 tablet (20 mg) by mouth 2 times daily  Dispense: 10 tablet; Refill: 0  - MR Lumbar Spine w/o Contrast  - Ice  - Rest  - Side sleep with pillow between knees  - Asper cream with lidocaine    2. Right-sided low back pain with right-sided sciatica, unspecified chronicity  - ibuprofen (ADVIL/MOTRIN) 800 MG tablet; Take 1 tablet (800 mg) by mouth every 8 hours as needed for moderate pain  Dispense: 90 tablet; Refill: 1  - MR Lumbar Spine w/o Contrast; Future  - MR Lumbar Spine w/o Contrast    3. Anxiety  - diazepam (VALIUM) 5 MG tablet; Take 1 tablet (5 mg) by mouth every 12 hours as needed for anxiety or sleep  Dispense: 30 tablet; Refill: 1    4. Tobacco abuse  - Tobacco Cessation - for Health Maintenance  - TOBACCO CESSATION - FOR HEALTH MAINTENANCE        Dania Watson NP  [facility]      HOW TO QUIT SMOKING  Smoking is one of the hardest habits to break. About half of all those who have ever smoked have been able to quit, and most of those (about 70%) who still smoke want to quit. Here are some of the best ways to stop smoking.     KEEP TRYING:  It takes most smokers about 8 tries before they are finally able to fully quit. So, the more often you try and fail, the better your chance of quitting the next time! So, don't give up!    GO COLD TURKEY:  Most ex-smokers quit cold turkey. Trying to cut back gradually doesn't seem to work as well, perhaps because it continues the smoking habit. Also, it is possible to fool yourself by inhaling more while smoking fewer cigarettes. This results in the same amount of nicotine in your body!    GET SUPPORT:  Support programs can make an important difference, especially for  the heavy smoker. These groups offer lectures, methods to change your behavior and peer support. Call the free national Quitline for more information. 800-QUIT-NOW (706-213-8903). Low-cost or free programs are offered by many hospitals, local chapters of the American Lung Association (693-678-3716) and the American Cancer Society (303-640-2158). Support at home is important too. Non-smokers can help by offering praise and encouragement. If the smoker fails to quit, encourage them to try again!    OVER-THE-COUNTER MEDICINES:  For those who can't quit on their own, Nicotine Replacement Therapy (NRT) may make quitting much easier. Certain aids such as the nicotine patch, gum and lozenge are available without a prescription. However, it is best to use these under the guidance of your doctor. The skin patch provides a steady supply of nicotine to the body. Nicotine gum and lozenge gives temporary bursts of low levels of nicotine. Both methods take the edge off the craving for cigarettes. WARNING: If you feel symptoms of nicotine overdose, such as nausea, vomiting, dizziness, weakness, or fast heartbeat, stop using these and see your doctor.    PRESCRIPTION MEDICINES:  After evaluating your smoking patterns and prior attempts at quitting, your doctor may offer a prescription medicine such as bupropion (Zyban, Wellbutrin), varenicline (Chantix, Champix), a niocotine inhaler or nasal spray. Each has its unique advantage and side effects which your doctor can review with you.    HEALTH BENEFITS OF QUITTING:  The benefits of quitting start right away and keep improving the longer you go without smokin minutes: blood pressure and pulse return to normal  8 hours: oxygen levels return to normal  2 days: ability to smell and taste begins to improve as damaged nerves start to regrow  2-3 weeks: circulation and lung function improves  1-9 months: decreased cough, congestion and shortness of breath; less tired  1 year: risk of  heart attack decreases by half  5 years: risk of lung cancer decreases by half; risk of stroke becomes the same as a non-smoker  For information about how to quit smoking, visit the following links:  National Cancer [place] ,   Clearing the Air, Quit Smoking Today   - an online booklet. http://www.smokefree.gov/pubs/clearing_the_air.pdf  Smokefree.gov http://smokefree.gov/  QuitNet http://www.quitnet.com/    4828-2050 [employer]ukumar Dhillon, [address] [place] [address], [place] [id number]. All rights reserved. This information is not intended as a substitute for professional medical care. Always follow your healthcare professional's instructions.    The Benefits of Living Smoke Free  What do you want to gain from quitting? Check off some reasons to quit.  Health Benefits  ___ Reduce my risk of lung cancer, heart disease, chronic lung disease  ___ Have fewer wrinkles and softer skin  ___ Improve my sense of taste and smell  ___ For pregnant women reduce the risk of having a miscarriage, stillbirth, premature birth, or low-birth-weight baby  Personal Benefits  ___ Feel more in control of my life  ___ Have better-smelling hair, breath, clothes, home, and car  ___ Save time by not having to take smoke breaks, buy cigarettes, or hunt for a light  ___ Have whiter teeth  Family Benefits  ___ Reduce my children s respiratory tract infections  ___ Set a good example for my children  ___ Reduce my family s cancer risk  Financial Benefits  ___ Save hundreds of dollars each year that would be spent on cigarettes  ___ Save money on medical bills  ___ Save on life, health, and car insurance premiums    Those Dollars Add Up!  Cigarettes are expensive, and getting more expensive all the time. Do you realize how much money you are spending on cigarettes per year? What is the average amount you spend on a pack of cigarettes? What is the average number of packs that you smoke per day? Using your answers to these questions, fill in this formula  to help you find out:  ($ _____ per pack) ×  ( _____ number of packs per day) × (365 days) =  $ _____ yearly cost of smoking  Besides tobacco, there are other costs, including extra cleaning bills and replacement costs for clothing and furniture; medical expenses for smoking-related illnesses; and higher health, life, and car insurance premiums.    Cigars and Pipes Count Too!  Cigars and pipes are also dangerous. So are smokeless (chewing) tobacco and snuff. All of these products contain nicotine, a highly addictive substance that has harmful effects on your body. Quitting smoking means giving up all tobacco products.      9505-0598 [facility], [address] [place] [address], [place] [address]. All rights reserved. This information is not intended as a substitute for professional medical care. Always follow your healthcare professional's instructions.

## 2017-08-15 NOTE — Clinical Note
Here with back pain, severe, sees you - I ordered a MRI - he had surgery last January with Joby. Ill be out, can you watch for MRI and address it?  Dania ALEJANDRO [phone]

## 2017-08-16 ENCOUNTER — HOSPITAL ENCOUNTER (OUTPATIENT)
Dept: MRI IMAGING | Facility: HOSPITAL | Age: 47
Discharge: HOME | End: 2017-08-16
Attending: NURSE PRACTITIONER | Admitting: NURSE PRACTITIONER
Payer: COMMERCIAL

## 2017-08-16 PROCEDURE — 72158 MRI LUMBAR SPINE W/O & W/DYE: CPT | Mod: TC

## 2017-08-16 PROCEDURE — A9585 GADOBUTROL INJECTION: HCPCS | Mod: TC

## 2017-08-16 RX ORDER — GADOBUTROL 604.72 MG/ML
10 INJECTION INTRAVENOUS ONCE
Status: COMPLETED | OUTPATIENT
Start: 2017-08-16 | End: 2017-08-16

## 2017-08-16 RX ADMIN — GADOBUTROL 10 ML: 604.72 INJECTION INTRAVENOUS at 12:23

## 2017-08-16 NOTE — PROGRESS NOTES
It appears that his S1 nerve root is impinged.    Please copy his note, this report, and have images pushed to the surgeon who operated on his back previously.  We can find out what the surgeon thinks and go from there.    Dania ALDANA  [phone]

## 2017-09-14 ENCOUNTER — OFFICE VISIT (OUTPATIENT)
Dept: FAMILY MEDICINE | Facility: OTHER | Age: 47
End: 2017-09-14
Attending: NURSE PRACTITIONER
Payer: COMMERCIAL

## 2017-09-14 VITALS
DIASTOLIC BLOOD PRESSURE: 80 MMHG | WEIGHT: 282 LBS | BODY MASS INDEX: 38.25 KG/M2 | HEART RATE: 100 BPM | TEMPERATURE: 99.6 F | RESPIRATION RATE: 14 BRPM | SYSTOLIC BLOOD PRESSURE: 106 MMHG

## 2017-09-14 DIAGNOSIS — M25.572 LEFT ANKLE PAIN, UNSPECIFIED CHRONICITY: Primary | ICD-10-CM

## 2017-09-14 DIAGNOSIS — L30.9 ECZEMA, UNSPECIFIED TYPE: ICD-10-CM

## 2017-09-14 DIAGNOSIS — J01.00 ACUTE NON-RECURRENT MAXILLARY SINUSITIS: ICD-10-CM

## 2017-09-14 PROCEDURE — 99214 OFFICE O/P EST MOD 30 MIN: CPT | Performed by: NURSE PRACTITIONER

## 2017-09-14 PROCEDURE — 73610 X-RAY EXAM OF ANKLE: CPT | Mod: TC | Performed by: RADIOLOGY

## 2017-09-14 RX ORDER — CODEINE PHOSPHATE AND GUAIFENESIN 10; 100 MG/5ML; MG/5ML
1-2 SOLUTION ORAL EVERY 6 HOURS PRN
Qty: 180 ML | Refills: 0 | Status: SHIPPED | OUTPATIENT
Start: 2017-09-14 | End: 2018-06-22

## 2017-09-14 RX ORDER — TRIAMCINOLONE ACETONIDE 1 MG/G
CREAM TOPICAL
Qty: 80 G | Refills: 0 | Status: SHIPPED | OUTPATIENT
Start: 2017-09-14 | End: 2018-06-22

## 2017-09-14 RX ORDER — ALBUTEROL SULFATE 90 UG/1
2 AEROSOL, METERED RESPIRATORY (INHALATION) EVERY 4 HOURS PRN
Qty: 1 INHALER | Refills: 0 | Status: SHIPPED | OUTPATIENT
Start: 2017-09-14 | End: 2017-10-03

## 2017-09-14 ASSESSMENT — ANXIETY QUESTIONNAIRES
GAD7 TOTAL SCORE: 19
1. FEELING NERVOUS, ANXIOUS, OR ON EDGE: NEARLY EVERY DAY
6. BECOMING EASILY ANNOYED OR IRRITABLE: NEARLY EVERY DAY
2. NOT BEING ABLE TO STOP OR CONTROL WORRYING: NEARLY EVERY DAY
IF YOU CHECKED OFF ANY PROBLEMS ON THIS QUESTIONNAIRE, HOW DIFFICULT HAVE THESE PROBLEMS MADE IT FOR YOU TO DO YOUR WORK, TAKE CARE OF THINGS AT HOME, OR GET ALONG WITH OTHER PEOPLE: VERY DIFFICULT
4. TROUBLE RELAXING: MORE THAN HALF THE DAYS
5. BEING SO RESTLESS THAT IT IS HARD TO SIT STILL: MORE THAN HALF THE DAYS
7. FEELING AFRAID AS IF SOMETHING AWFUL MIGHT HAPPEN: NEARLY EVERY DAY
3. WORRYING TOO MUCH ABOUT DIFFERENT THINGS: NEARLY EVERY DAY

## 2017-09-14 ASSESSMENT — PATIENT HEALTH QUESTIONNAIRE - PHQ9: SUM OF ALL RESPONSES TO PHQ QUESTIONS 1-9: 15

## 2017-09-14 NOTE — PATIENT INSTRUCTIONS
ASSESSMENT/PLAN:       1. Left ankle pain, unspecified chronicity  symptomatic  - XR ANKLE LT G/E 3 VW (Clinic Performed); Future - appears normal, will await final report from radiology.    - XR ANKLE LT G/E 3 VW (Clinic Performed)  - MR Ankle Left w/o Contrast; Future  - MR Ankle Left w/o Contrast    2. Eczema, unspecified type  symptomatic  - triamcinolone (KENALOG) 0.1 % cream; Apply sparingly to affected area three times daily as needed  Dispense: 80 g; Refill: 0    3. Acute non-recurrent maxillary sinusitis  symptomatic  - albuterol (PROAIR HFA/PROVENTIL HFA/VENTOLIN HFA) 108 (90 BASE) MCG/ACT Inhaler; Inhale 2 puffs into the lungs every 4 hours as needed  Dispense: 1 Inhaler; Refill: 0  - amoxicillin-clavulanate (AUGMENTIN) 875-125 MG per tablet; Take 1 tablet by mouth 2 times daily  Dispense: 28 tablet; Refill: 0  - guaiFENesin-codeine (ROBITUSSIN AC) 100-10 MG/5ML SOLN solution; Take 5-10 mLs by mouth every 6 hours as needed  Dispense: 180 mL; Refill: 0    FUTURE APPOINTMENTS:       - Follow-up visit in 1-2 weeks     Marion Davis NP  [facility]

## 2017-09-14 NOTE — NURSING NOTE
No chief complaint on file.      Initial /80 (BP Location: Left arm, Patient Position: Sitting, Cuff Size: Adult Large)  Pulse 100  Temp 99.6  F (37.6  C)  Resp 14  Wt 282 lb (127.9 kg)  BMI 38.25 kg/m2 Estimated body mass index is 38.25 kg/(m^2) as calculated from the following:    Height as of 8/15/17: 6' (1.829 m).    Weight as of this encounter: 282 lb (127.9 kg).  Medication Reconciliation: complete     Chasity Ty

## 2017-09-14 NOTE — PROGRESS NOTES
SUBJECTIVE:   Rojelio Juárez is a 47 year old male who presents to clinic today for the following health issues:    Musculoskeletal problem/pain      Duration: 5 months    Description  Location: left ankle pain, was walking, stepped in a pothole and twisted ankle     Intensity:  moderate, 9/10    Accompanying signs and symptoms: tingling, weakness of ankle and swelling    History  Previous similar problem: no   Previous evaluation:  none    Precipitating or alleviating factors:  Trauma or overuse: YES, twisted  Aggravating factors include: sitting    Therapies tried and outcome: heat, ice and Ibuprofen   not helping.  He thought it would get better but has not.       Rash      Duration: 5 yrs    Description  Location: left lower leg  Itching:  Yes    Intensity:  Moderate to severe    Accompanying signs and symptoms: None    History (similar episodes/previous evaluation): None    Precipitating or alleviating factors:  New exposures:  None  Recent travel: no      Therapies tried and outcome: hydrocortisone cream -  not effective    uri      Duration: over one week    Description (location/character/radiation): sinus pressure, headache , sore throat,     Intensity:  moderate    Accompanying signs and symptoms: cough, dry and hacking during the day, productive at night, hard to sleep    History (similar episodes/previous evaluation): None    Precipitating or alleviating factors: None    Therapies tried and outcome: None           Problem list and histories reviewed & adjusted, as indicated.  Additional history: as documented    Patient Active Problem List   Diagnosis     ACP (advance care planning)     Herniated intervertebral disc of lumbar spine     Tobacco dependence syndrome     Anxiety     History reviewed. No pertinent surgical history.    Social History   Substance Use Topics     Smoking status: Current Every Day Smoker     Types: Cigarettes     Start date: 1/15/2017     Smokeless tobacco: Never Used     Alcohol  use 0.0 oz/week     0 Standard drinks or equivalent per week     History reviewed. No pertinent family history.      Current Outpatient Prescriptions   Medication Sig Dispense Refill     ibuprofen (ADVIL/MOTRIN) 800 MG tablet Take 1 tablet (800 mg) by mouth every 8 hours as needed for moderate pain 90 tablet 1     diazepam (VALIUM) 5 MG tablet Take 1 tablet (5 mg) by mouth every 12 hours as needed for anxiety or sleep 30 tablet 1     oxyCODONE-acetaminophen (PERCOCET) 5-325 MG per tablet Take 1 tablet by mouth 3 times daily as needed for moderate to severe pain (must last one month, no early refills) 90 tablet 0     No Known Allergies  Recent Labs   Lab Test  01/20/17   1101   ALT  202*   CR  0.91   GFRESTIMATED  90   GFRESTBLACK  >90   GFR Calc     POTASSIUM  4.3      BP Readings from Last 3 Encounters:   09/14/17 106/80   08/15/17 128/78   01/20/17 106/80    Wt Readings from Last 3 Encounters:   09/14/17 282 lb (127.9 kg)   08/15/17 286 lb (129.7 kg)   01/20/17 277 lb (125.6 kg)          Reviewed and updated as needed this visit by clinical staffAllergies       Reviewed and updated as needed this visit by Provider         ROS:  Constitutional, HEENT, cardiovascular, pulmonary, gi and gu systems are negative, except as otherwise noted.      OBJECTIVE:   /80 (BP Location: Left arm, Patient Position: Sitting, Cuff Size: Adult Large)  Pulse 100  Temp 99.6  F (37.6  C)  Resp 14  Wt 282 lb (127.9 kg)  BMI 38.25 kg/m2  Body mass index is 38.25 kg/(m^2).  GENERAL: healthy, alert and no distress  EYES: Eyes grossly normal to inspection, PERRL and conjunctivae and sclerae normal  HENT: normal cephalic/atraumatic, both ears: dull, nose and mouth without ulcers or lesions, nasal mucosa edematous , rhinorrhea purulent and oral mucous membranes moist throat erythematous with post nasal drip and cobblestoning.    NECK: no adenopathy, no asymmetry, masses, or scars, thyroid normal to palpation and no  carotid bruits  RESP: very mild wheezing in upper lobes otherwise clear.    CV: regular rate and rhythm, normal S1 S2, no S3 or S4, no murmur, click or rub, no peripheral edema and peripheral pulses strong  MS: no gross musculoskeletal defects noted, no edema  SKIN: patches of scaly erythematous areas of lower extremities bilateral.   PSYCH: mentation appears normal, affect normal/bright    ASSESSMENT/PLAN:       1. Left ankle pain, unspecified chronicity  symptomatic  - XR ANKLE LT G/E 3 VW (Clinic Performed); Future - appears normal, will await final report from radiology.    - XR ANKLE LT G/E 3 VW (Clinic Performed)  - MR Ankle Left w/o Contrast; Future  - MR Ankle Left w/o Contrast    2. Eczema, unspecified type  symptomatic  - triamcinolone (KENALOG) 0.1 % cream; Apply sparingly to affected area three times daily as needed  Dispense: 80 g; Refill: 0    3. Acute non-recurrent maxillary sinusitis  symptomatic  - albuterol (PROAIR HFA/PROVENTIL HFA/VENTOLIN HFA) 108 (90 BASE) MCG/ACT Inhaler; Inhale 2 puffs into the lungs every 4 hours as needed  Dispense: 1 Inhaler; Refill: 0  - amoxicillin-clavulanate (AUGMENTIN) 875-125 MG per tablet; Take 1 tablet by mouth 2 times daily  Dispense: 28 tablet; Refill: 0  - guaiFENesin-codeine (ROBITUSSIN AC) 100-10 MG/5ML SOLN solution; Take 5-10 mLs by mouth every 6 hours as needed  Dispense: 180 mL; Refill: 0    FUTURE APPOINTMENTS:       - Follow-up visit in 1-2 weeks     Marion Davis NP  [facility]

## 2017-09-14 NOTE — MR AVS SNAPSHOT
After Visit Summary   9/14/2017    Rojelio Juárez    MRN: [id number]           Patient Information     Date Of Birth          1970        Visit Information        Provider Department      9/14/2017 2:15 PM Marion Davis NP [facility]        Today's Diagnoses     Left ankle pain, unspecified chronicity    -  1    Eczema, unspecified type        Acute non-recurrent maxillary sinusitis          Care Instructions      ASSESSMENT/PLAN:       1. Left ankle pain, unspecified chronicity  symptomatic  - XR ANKLE LT G/E 3 VW (Clinic Performed); Future - appears normal, will await final report from radiology.    - XR ANKLE LT G/E 3 VW (Clinic Performed)  - MR Ankle Left w/o Contrast; Future  - MR Ankle Left w/o Contrast    2. Eczema, unspecified type  symptomatic  - triamcinolone (KENALOG) 0.1 % cream; Apply sparingly to affected area three times daily as needed  Dispense: 80 g; Refill: 0    3. Acute non-recurrent maxillary sinusitis  symptomatic  - albuterol (PROAIR HFA/PROVENTIL HFA/VENTOLIN HFA) 108 (90 BASE) MCG/ACT Inhaler; Inhale 2 puffs into the lungs every 4 hours as needed  Dispense: 1 Inhaler; Refill: 0  - amoxicillin-clavulanate (AUGMENTIN) 875-125 MG per tablet; Take 1 tablet by mouth 2 times daily  Dispense: 28 tablet; Refill: 0  - guaiFENesin-codeine (ROBITUSSIN AC) 100-10 MG/5ML SOLN solution; Take 5-10 mLs by mouth every 6 hours as needed  Dispense: 180 mL; Refill: 0    FUTURE APPOINTMENTS:       - Follow-up visit in 1-2 weeks     Marion Davis NP  [facility]            Follow-ups after your visit        Who to contact     If you have questions or need follow up information about today's clinic visit or your schedule please contact [facility] directly at [phone].  Normal or non-critical lab and imaging results will be communicated to you by MyChart, letter or phone within 4 business days after the clinic has received the  "results. If you do not hear from us within 7 days, please contact the clinic through [employer] or phone. If you have a critical or abnormal lab result, we will notify you by phone as soon as possible.  Submit refill requests through [employer] or call your pharmacy and they will forward the refill request to us. Please allow 3 business days for your refill to be completed.          Additional Information About Your Visit        [employer] Information     [employer] lets you send messages to your doctor, view your test results, renew your prescriptions, schedule appointments and more. To sign up, go to [email or web address] . Click on \"Log in\" on the left side of the screen, which will take you to the Welcome page. Then click on \"Sign up Now\" on the right side of the page.     You will be asked to enter the access code listed below, as well as some personal information. Please follow the directions to create your username and password.     Your access code is: [email or web address]  Expires: 2017  2:38 PM     Your access code will  in 90 days. If you need help or a new code, please call your [place] clinic or [phone].        Care EveryWhere ID     This is your Care EveryWhere ID. This could be used by other organizations to access your [place] medical records  [id number]        Your Vitals Were     Pulse Temperature Respirations BMI (Body Mass Index)          100 99.6  F (37.6  C) 14 38.25 kg/m2         Blood Pressure from Last 3 Encounters:   17 106/80   08/15/17 128/78   17 106/80    Weight from Last 3 Encounters:   17 282 lb (127.9 kg)   08/15/17 286 lb (129.7 kg)   17 277 lb (125.6 kg)              We Performed the Following     XR ANKLE LT G/E 3 VW (Clinic Performed)          Today's Medication Changes          These changes are accurate as of: 17  3:06 PM.  If you have any questions, ask your nurse or doctor.               Start taking these medicines.        Dose/Directions    " albuterol 108 (90 BASE) MCG/ACT Inhaler   Commonly known as:  PROAIR HFA/PROVENTIL HFA/VENTOLIN HFA   Used for:  Acute non-recurrent maxillary sinusitis   Started by:  Marion Davis NP        Dose:  2 puff   Inhale 2 puffs into the lungs every 4 hours as needed   Quantity:  1 Inhaler   Refills:  0       amoxicillin-clavulanate 875-125 MG per tablet   Commonly known as:  AUGMENTIN   Used for:  Acute non-recurrent maxillary sinusitis   Started by:  Marion Davis NP        Dose:  1 tablet   Take 1 tablet by mouth 2 times daily   Quantity:  28 tablet   Refills:  0       guaiFENesin-codeine 100-10 MG/5ML Soln solution   Commonly known as:  ROBITUSSIN AC   Used for:  Acute non-recurrent maxillary sinusitis   Started by:  Marion Davis NP        Dose:  1-2 tsp.   Take 5-10 mLs by mouth every 6 hours as needed   Quantity:  180 mL   Refills:  0       triamcinolone 0.1 % cream   Commonly known as:  KENALOG   Used for:  Eczema, unspecified type   Started by:  Marion Davis NP        Apply sparingly to affected area three times daily as needed   Quantity:  80 g   Refills:  0         Stop taking these medicines if you haven't already. Please contact your care team if you have questions.     oxyCODONE-acetaminophen 5-325 MG per tablet   Commonly known as:  PERCOCET   Stopped by:  Marion Davis NP           predniSONE 20 MG tablet   Commonly known as:  DELTASONE   Stopped by:  Marion Davis NP                Where to get your medicines      These medications were sent to [id number] - [place], [place] [address] AT [facility] [address]  [address], [place] [address]     Phone:  [phone]     albuterol 108 (90 BASE) MCG/ACT Inhaler    amoxicillin-clavulanate 875-125 MG per tablet    triamcinolone 0.1 % cream         Some of these will need a paper prescription and others can be bought over the counter.  Ask your nurse  if you have questions.     Bring a paper prescription for each of these medications     guaiFENesin-codeine 100-10 MG/5ML Soln solution                Primary Care Provider Office Phone # Fax #    Marionvaleriy Davis [phone]       [facility] [address] [place] [address]        Equal Access to Services     [facility]: Hadii aad ku hadasho Soomaali, waaxda luqadaha, qaybta kaalmada adeegyada, waxay idiin hayaan adeeg kharash la'aan ah. So [facility] [phone].    ATENCIÓN: Si habla español, tiene a gruber disposición servicios gratuitos de asistencia lingüística. Curt al [phone].    We comply with applicable federal civil rights laws and [place] laws. We do not discriminate on the basis of race, color, national origin, age, disability sex, sexual orientation or gender identity.            Thank you!     Thank you for choosing [facility]  for your care. Our goal is always to provide you with excellent care. Hearing back from our patients is one way we can continue to improve our services. Please take a few minutes to complete the written survey that you may receive in the mail after your visit with us. Thank you!             Your Updated Medication List - Protect others around you: Learn how to safely use, store and throw away your medicines at www.disposemymeds.org.          This list is accurate as of: 9/14/17  3:06 PM.  Always use your most recent med list.                   Brand Name Dispense Instructions for use Diagnosis    albuterol 108 (90 BASE) MCG/ACT Inhaler    PROAIR HFA/PROVENTIL HFA/VENTOLIN HFA    1 Inhaler    Inhale 2 puffs into the lungs every 4 hours as needed    Acute non-recurrent maxillary sinusitis       amoxicillin-clavulanate 875-125 MG per tablet    AUGMENTIN    28 tablet    Take 1 tablet by mouth 2 times daily    Acute non-recurrent maxillary sinusitis       diazepam 5 MG tablet    VALIUM    30 tablet    Take 1 tablet (5 mg)  by mouth every 12 hours as needed for anxiety or sleep    Anxiety       guaiFENesin-codeine 100-10 MG/5ML Soln solution    ROBITUSSIN AC    180 mL    Take 5-10 mLs by mouth every 6 hours as needed    Acute non-recurrent maxillary sinusitis       ibuprofen 800 MG tablet    ADVIL/MOTRIN    90 tablet    Take 1 tablet (800 mg) by mouth every 8 hours as needed for moderate pain    Acute right-sided low back pain with right-sided sciatica, Right-sided low back pain with right-sided sciatica, unspecified chronicity       triamcinolone 0.1 % cream    KENALOG    80 g    Apply sparingly to affected area three times daily as needed    Eczema, unspecified type

## 2017-09-15 ASSESSMENT — ANXIETY QUESTIONNAIRES: GAD7 TOTAL SCORE: 19

## 2017-09-21 ENCOUNTER — HOSPITAL ENCOUNTER (OUTPATIENT)
Dept: MRI IMAGING | Facility: HOSPITAL | Age: 47
Discharge: HOME | End: 2017-09-21
Attending: NURSE PRACTITIONER | Admitting: NURSE PRACTITIONER
Payer: COMMERCIAL

## 2017-09-21 PROCEDURE — 73721 MRI JNT OF LWR EXTRE W/O DYE: CPT | Mod: TC,LT

## 2017-10-03 ENCOUNTER — OFFICE VISIT (OUTPATIENT)
Dept: FAMILY MEDICINE | Facility: OTHER | Age: 47
End: 2017-10-03
Attending: NURSE PRACTITIONER
Payer: COMMERCIAL

## 2017-10-03 VITALS
RESPIRATION RATE: 16 BRPM | WEIGHT: 280 LBS | TEMPERATURE: 98 F | DIASTOLIC BLOOD PRESSURE: 80 MMHG | HEART RATE: 72 BPM | HEIGHT: 72 IN | BODY MASS INDEX: 37.93 KG/M2 | OXYGEN SATURATION: 97 % | SYSTOLIC BLOOD PRESSURE: 128 MMHG

## 2017-10-03 DIAGNOSIS — F41.9 ANXIETY: Primary | ICD-10-CM

## 2017-10-03 DIAGNOSIS — M25.572 PAIN IN JOINT, ANKLE AND FOOT, LEFT: ICD-10-CM

## 2017-10-03 DIAGNOSIS — M77.52 LEFT ANKLE TENDINITIS: ICD-10-CM

## 2017-10-03 DIAGNOSIS — J01.00 ACUTE NON-RECURRENT MAXILLARY SINUSITIS: ICD-10-CM

## 2017-10-03 PROCEDURE — 99214 OFFICE O/P EST MOD 30 MIN: CPT | Performed by: NURSE PRACTITIONER

## 2017-10-03 RX ORDER — PAROXETINE 10 MG/1
10 TABLET, FILM COATED ORAL AT BEDTIME
Qty: 30 TABLET | Refills: 1 | Status: SHIPPED | OUTPATIENT
Start: 2017-10-03 | End: 2018-06-22

## 2017-10-03 RX ORDER — ALBUTEROL SULFATE 90 UG/1
2 AEROSOL, METERED RESPIRATORY (INHALATION) EVERY 4 HOURS PRN
Qty: 1 INHALER | Refills: 1 | Status: SHIPPED | OUTPATIENT
Start: 2017-10-03 | End: 2018-06-22

## 2017-10-03 ASSESSMENT — ANXIETY QUESTIONNAIRES
3. WORRYING TOO MUCH ABOUT DIFFERENT THINGS: NEARLY EVERY DAY
1. FEELING NERVOUS, ANXIOUS, OR ON EDGE: NEARLY EVERY DAY
IF YOU CHECKED OFF ANY PROBLEMS ON THIS QUESTIONNAIRE, HOW DIFFICULT HAVE THESE PROBLEMS MADE IT FOR YOU TO DO YOUR WORK, TAKE CARE OF THINGS AT HOME, OR GET ALONG WITH OTHER PEOPLE: VERY DIFFICULT
7. FEELING AFRAID AS IF SOMETHING AWFUL MIGHT HAPPEN: NEARLY EVERY DAY
2. NOT BEING ABLE TO STOP OR CONTROL WORRYING: NEARLY EVERY DAY
6. BECOMING EASILY ANNOYED OR IRRITABLE: NEARLY EVERY DAY
5. BEING SO RESTLESS THAT IT IS HARD TO SIT STILL: SEVERAL DAYS
GAD7 TOTAL SCORE: 18

## 2017-10-03 ASSESSMENT — PATIENT HEALTH QUESTIONNAIRE - PHQ9
5. POOR APPETITE OR OVEREATING: MORE THAN HALF THE DAYS
SUM OF ALL RESPONSES TO PHQ QUESTIONS 1-9: 14

## 2017-10-03 NOTE — MR AVS SNAPSHOT
After Visit Summary   10/3/2017    Rojelio Juárez    MRN: [id number]           Patient Information     Date Of Birth          1970        Visit Information        Provider Department      10/3/2017 11:30 AM Marion Davis NP [facility]        Today's Diagnoses     Anxiety    -  1    Pain in joint, ankle and foot, left        Left ankle tendinitis        Acute non-recurrent maxillary sinusitis          Care Instructions      ASSESSMENT/PLAN:       1. Anxiety  New onset  - PARoxetine (PAXIL) 10 MG tablet; Take 1 tablet (10 mg) by mouth At Bedtime  Dispense: 30 tablet; Refill: 1    2. Pain in joint, ankle and foot, left  Symptomatic  - continue ice, ibuprofen, elevation  - ORTHOPEDICS ADULT REFERRAL    3. Left ankle tendinitis  Declined PT for now  As above  - ORTHOPEDICS ADULT REFERRAL    4. Acute non-recurrent maxillary sinusitis  - albuterol (PROAIR HFA/PROVENTIL HFA/VENTOLIN HFA) 108 (90 BASE) MCG/ACT Inhaler; Inhale 2 puffs into the lungs every 4 hours as needed  Dispense: 1 Inhaler; Refill: 1    Consider smoking cessation  Declined flu vaccine today    FUTURE APPOINTMENTS:       - Follow-up visit in 2 weeks or as needed for acute concerns    Marion Davis NP  [facility]    Psychologists/ counselors  Nasima Rai  [phone]  Dr. Clifford Thomson [phone]  [facility]  [phone]  [facility] [phone]  Jono Loving  [phone]   [employer]  [phone]  (kids)  [employer] [phone]  (teens)  [facility]      [phone]  [facility] [phone]    [facility]     [phone]   [facility] counseling [phone]  Rojelio Regan [phone]  Pat Diaz [phone]  [facility]     [phone]  [facility]     [phone]  [facility]     [phone]  Mónica Betancourt  "Riky  [phone]  [facility]     [phone]  [facility] Dr. RAHEEL Cortes     [phone]                        Follow-ups after your visit        Additional Services     ORTHOPEDICS ADULT REFERRAL       Your provider has referred you to: ortho - Dr Willson    Please be aware that coverage of these services is subject to the terms and limitations of your health insurance plan.  Call member services at your health plan with any benefit or coverage questions.      Please bring the following to your appointment:    >>   Any x-rays, CTs or MRIs which have been performed.  Contact the facility where they were done to arrange for  prior to your scheduled appointment.    >>   List of current medications   >>   This referral request   >>   Any documents/labs given to you for this referral                  Follow-up notes from your care team     Return in about 2 weeks (around 10/17/2017).      Who to contact     If you have questions or need follow up information about today's clinic visit or your schedule please contact [facility] directly at [phone].  Normal or non-critical lab and imaging results will be communicated to you by MyChart, letter or phone within 4 business days after the clinic has received the results. If you do not hear from us within 7 days, please contact the clinic through MyChart or phone. If you have a critical or abnormal lab result, we will notify you by phone as soon as possible.  Submit refill requests through [employer] or call your pharmacy and they will forward the refill request to us. Please allow 3 business days for your refill to be completed.          Additional Information About Your Visit        [employer] Information     [employer] lets you send messages to your doctor, view your test results, renew your prescriptions, schedule appointments and more. To sign up, go to [email or web address] . Click on \"Log in\" on the left side of the " "screen, which will take you to the Welcome page. Then click on \"Sign up Now\" on the right side of the page.     You will be asked to enter the access code listed below, as well as some personal information. Please follow the directions to create your username and password.     Your access code is: [email or web address]  Expires: 2017  2:38 PM     Your access code will  in 90 days. If you need help or a new code, please call your [facility] or [phone].        Care EveryWhere ID     This is your Care EveryWhere ID. This could be used by other organizations to access your [place] medical records  [id number]        Your Vitals Were     Pulse Temperature Respirations Height Pulse Oximetry BMI (Body Mass Index)    72 98  F (36.7  C) (Tympanic) 16 6' (1.829 m) 97% 37.97 kg/m2       Blood Pressure from Last 3 Encounters:   10/03/17 128/80   17 106/80   08/15/17 128/78    Weight from Last 3 Encounters:   10/03/17 280 lb (127 kg)   17 282 lb (127.9 kg)   08/15/17 286 lb (129.7 kg)              We Performed the Following     ORTHOPEDICS ADULT REFERRAL          Today's Medication Changes          These changes are accurate as of: 10/3/17  9:07 PM.  If you have any questions, ask your nurse or doctor.               Start taking these medicines.        Dose/Directions    PARoxetine 10 MG tablet   Commonly known as:  PAXIL   Used for:  Anxiety   Started by:  Marion Davis NP        Dose:  10 mg   Take 1 tablet (10 mg) by mouth At Bedtime   Quantity:  30 tablet   Refills:  1            Where to get your medicines      These medications were sent to [id number] - [place] [address] AT [facility] [address]   [address], [place] [address]     Phone:  [phone]     albuterol 108 (90 BASE) MCG/ACT Inhaler    PARoxetine 10 MG tablet                Primary Care Provider Office Phone # Fax #    Marion Davis [phone] "       [facility] [address]  [place] [address]        Equal Access to Services     LUC TRIMBLE : Hadii patricia ragland haddasho Somaikali, waaxda luqadaha, qaybta kaalmada anabeldoravance, ekta powellkerakey holly. So [facility] [phone].    ATENCIÓN: Si habla español, tiene a gruber disposición servicios gratuitos de asistencia lingüística. Llame al [phone].    We comply with applicable federal civil rights laws and [place] laws. We do not discriminate on the basis of race, color, national origin, age, disability, sex, sexual orientation, or gender identity.            Thank you!     Thank you for choosing [facility]  for your care. Our goal is always to provide you with excellent care. Hearing back from our patients is one way we can continue to improve our services. Please take a few minutes to complete the written survey that you may receive in the mail after your visit with us. Thank you!             Your Updated Medication List - Protect others around you: Learn how to safely use, store and throw away your medicines at www.disposemymeds.org.          This list is accurate as of: 10/3/17  9:07 PM.  Always use your most recent med list.                   Brand Name Dispense Instructions for use Diagnosis    albuterol 108 (90 BASE) MCG/ACT Inhaler    PROAIR HFA/PROVENTIL HFA/VENTOLIN HFA    1 Inhaler    Inhale 2 puffs into the lungs every 4 hours as needed    Acute non-recurrent maxillary sinusitis       diazepam 5 MG tablet    VALIUM    30 tablet    Take 1 tablet (5 mg) by mouth every 12 hours as needed for anxiety or sleep    Anxiety       guaiFENesin-codeine 100-10 MG/5ML Soln solution    ROBITUSSIN AC    180 mL    Take 5-10 mLs by mouth every 6 hours as needed    Acute non-recurrent maxillary sinusitis       ibuprofen 800 MG tablet    ADVIL/MOTRIN    90 tablet    Take 1 tablet (800 mg) by mouth every 8 hours as needed for moderate pain    Acute right-sided low back pain  with right-sided sciatica, Right-sided low back pain with right-sided sciatica, unspecified chronicity       PARoxetine 10 MG tablet    PAXIL    30 tablet    Take 1 tablet (10 mg) by mouth At Bedtime    Anxiety       triamcinolone 0.1 % cream    KENALOG    80 g    Apply sparingly to affected area three times daily as needed    Eczema, unspecified type

## 2017-10-03 NOTE — NURSING NOTE
Chief Complaint   Patient presents with     Other     MRI results     Anxiety       Initial /80 (BP Location: Left arm, Patient Position: Sitting, Cuff Size: Adult Large)  Pulse 72  Temp 98  F (36.7  C) (Tympanic)  Resp 16  Ht 6' (1.829 m)  Wt 280 lb (127 kg)  SpO2 97%  BMI 37.97 kg/m2 Estimated body mass index is 37.97 kg/(m^2) as calculated from the following:    Height as of this encounter: 6' (1.829 m).    Weight as of this encounter: 280 lb (127 kg).  Medication Reconciliation: complete   Pavithra Koroma
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
## 2017-10-03 NOTE — PATIENT INSTRUCTIONS
ASSESSMENT/PLAN:       1. Anxiety  New onset  - PARoxetine (PAXIL) 10 MG tablet; Take 1 tablet (10 mg) by mouth At Bedtime  Dispense: 30 tablet; Refill: 1    2. Pain in joint, ankle and foot, left  Symptomatic  - continue ice, ibuprofen, elevation  - ORTHOPEDICS ADULT REFERRAL    3. Left ankle tendinitis  Declined PT for now  As above  - ORTHOPEDICS ADULT REFERRAL    4. Acute non-recurrent maxillary sinusitis  - albuterol (PROAIR HFA/PROVENTIL HFA/VENTOLIN HFA) 108 (90 BASE) MCG/ACT Inhaler; Inhale 2 puffs into the lungs every 4 hours as needed  Dispense: 1 Inhaler; Refill: 1    Consider smoking cessation  Declined flu vaccine today    FUTURE APPOINTMENTS:       - Follow-up visit in 2 weeks or as needed for acute concerns    Marion Davis, NP  [facility]    Psychologists/ counselors  [place]  [phone]  Dr. Clifford Thomson [phone]  [facility]  [phone]  [facility] [phone]  Jono Loving  [phone]   [facility]  [phone]  (kids)  [facility] [phone]  (teens)  [facility]      [phone]  [facility] [place] [facility] [phone]    [facility]     [phone]   [facility] counseling [phone]  Rojelio Regan [phone]  Pat Diza [phone]  [facility]     [phone]  [facility] family     [phone]  [facility]     [phone]  Mónica Lan  [phone]  [facility]     [phone]  [facility] Dr. RAHEEL Cortes     [phone]

## 2017-10-04 ENCOUNTER — TRANSFERRED RECORDS (OUTPATIENT)
Dept: HEALTH INFORMATION MANAGEMENT | Facility: HOSPITAL | Age: 47
End: 2017-10-04

## 2017-10-04 ASSESSMENT — ANXIETY QUESTIONNAIRES: GAD7 TOTAL SCORE: 18

## 2018-02-07 ENCOUNTER — TELEPHONE (OUTPATIENT)
Dept: FAMILY MEDICINE | Facility: OTHER | Age: 48
End: 2018-02-07

## 2018-06-22 ENCOUNTER — OFFICE VISIT (OUTPATIENT)
Dept: FAMILY MEDICINE | Facility: OTHER | Age: 48
End: 2018-06-22
Attending: NURSE PRACTITIONER
Payer: MEDICAID

## 2018-06-22 VITALS
TEMPERATURE: 98.9 F | OXYGEN SATURATION: 98 % | HEART RATE: 109 BPM | HEIGHT: 72 IN | BODY MASS INDEX: 36.84 KG/M2 | RESPIRATION RATE: 18 BRPM | SYSTOLIC BLOOD PRESSURE: 130 MMHG | DIASTOLIC BLOOD PRESSURE: 98 MMHG | WEIGHT: 272 LBS

## 2018-06-22 DIAGNOSIS — I10 BENIGN ESSENTIAL HYPERTENSION: ICD-10-CM

## 2018-06-22 DIAGNOSIS — E66.01 MORBID OBESITY (H): ICD-10-CM

## 2018-06-22 DIAGNOSIS — Z71.6 TOBACCO ABUSE COUNSELING: ICD-10-CM

## 2018-06-22 DIAGNOSIS — Z72.0 TOBACCO ABUSE: ICD-10-CM

## 2018-06-22 DIAGNOSIS — E11.9 TYPE 2 DIABETES MELLITUS WITHOUT COMPLICATION, WITHOUT LONG-TERM CURRENT USE OF INSULIN (H): Primary | ICD-10-CM

## 2018-06-22 DIAGNOSIS — E78.5 HYPERLIPIDEMIA LDL GOAL <100: ICD-10-CM

## 2018-06-22 PROCEDURE — 99214 OFFICE O/P EST MOD 30 MIN: CPT | Performed by: NURSE PRACTITIONER

## 2018-06-22 PROCEDURE — 40000788 ZZHCL STATISTIC ESTIMATED AVERAGE GLUCOSE: Mod: ZL | Performed by: NURSE PRACTITIONER

## 2018-06-22 PROCEDURE — 83036 HEMOGLOBIN GLYCOSYLATED A1C: CPT | Mod: ZL | Performed by: NURSE PRACTITIONER

## 2018-06-22 PROCEDURE — 80048 BASIC METABOLIC PNL TOTAL CA: CPT | Mod: ZL | Performed by: NURSE PRACTITIONER

## 2018-06-22 PROCEDURE — 82043 UR ALBUMIN QUANTITATIVE: CPT | Mod: ZL | Performed by: NURSE PRACTITIONER

## 2018-06-22 PROCEDURE — 80061 LIPID PANEL: CPT | Mod: ZL | Performed by: NURSE PRACTITIONER

## 2018-06-22 PROCEDURE — 36415 COLL VENOUS BLD VENIPUNCTURE: CPT | Mod: ZL | Performed by: NURSE PRACTITIONER

## 2018-06-22 PROCEDURE — G0463 HOSPITAL OUTPT CLINIC VISIT: HCPCS

## 2018-06-22 PROCEDURE — 84443 ASSAY THYROID STIM HORMONE: CPT | Mod: ZL | Performed by: NURSE PRACTITIONER

## 2018-06-22 RX ORDER — LOSARTAN POTASSIUM 25 MG/1
12.5 TABLET ORAL DAILY
Qty: 15 TABLET | Refills: 1 | Status: SHIPPED | OUTPATIENT
Start: 2018-06-22 | End: 2018-09-07

## 2018-06-22 ASSESSMENT — ANXIETY QUESTIONNAIRES
7. FEELING AFRAID AS IF SOMETHING AWFUL MIGHT HAPPEN: NOT AT ALL
6. BECOMING EASILY ANNOYED OR IRRITABLE: SEVERAL DAYS
2. NOT BEING ABLE TO STOP OR CONTROL WORRYING: SEVERAL DAYS
5. BEING SO RESTLESS THAT IT IS HARD TO SIT STILL: NOT AT ALL
IF YOU CHECKED OFF ANY PROBLEMS ON THIS QUESTIONNAIRE, HOW DIFFICULT HAVE THESE PROBLEMS MADE IT FOR YOU TO DO YOUR WORK, TAKE CARE OF THINGS AT HOME, OR GET ALONG WITH OTHER PEOPLE: NOT DIFFICULT AT ALL
3. WORRYING TOO MUCH ABOUT DIFFERENT THINGS: SEVERAL DAYS
GAD7 TOTAL SCORE: 4
1. FEELING NERVOUS, ANXIOUS, OR ON EDGE: SEVERAL DAYS
4. TROUBLE RELAXING: NOT AT ALL

## 2018-06-22 ASSESSMENT — PAIN SCALES - GENERAL: PAINLEVEL: NO PAIN (0)

## 2018-06-22 NOTE — MR AVS SNAPSHOT
After Visit Summary   6/22/2018    Rojelio Juárez    MRN: [id number]           Patient Information     Date Of Birth          1970        Visit Information        Provider Department      6/22/2018 3:45 PM Marion Davis NP [facility]        Today's Diagnoses     Type 2 diabetes mellitus without complication, without long-term current use of insulin (H)    -  1    Tobacco abuse        Tobacco abuse counseling        Benign essential hypertension        Morbid obesity (H)          Care Instructions      ASSESSMENT/PLAN:       1. Type 2 diabetes mellitus without complication, without long-term current use of insulin (H)  chronic  - TSH with free T4 reflex  - LIPID PANEL  - Albumin Random Urine Quantitative with Creat Ratio  - BASIC METABOLIC PANEL  - DIABETES EDUCATION REFERRAL ([place])  - metFORMIN (GLUCOPHAGE) 500 MG tablet; Take 1 tablet (500 mg) by mouth 2 times daily (with meals)  Dispense: 180 tablet; Refill: 1  Metformin start:   Week 1:  One tablet (500mg) with breakfast   Week 2:  One tablet (500mg) with breakfast and supper   Week 3:  Two tablets (1000mg) with breakfast and one tablet (500mg) with supper   Week 4:  Two tablets (1000mg) with breakfast and supper - please stay on this dose.    - aspirin 81 MG EC tablet; Take 1 tablet (81 mg) by mouth daily  Dispense: 90 tablet; Refill: 3  - Hemoglobin A1c    2. Tobacco abuse  Cessation ecnrouaged  - Tobacco Cessation - Order to Satisfy Health Maintenance    3. Tobacco abuse counseling    4. Benign essential hypertension  New onset  - aspirin 81 MG EC tablet; Take 1 tablet (81 mg) by mouth daily  Dispense: 90 tablet; Refill: 3  - losartan (COZAAR) 25 MG tablet; Take 0.5 tablets (12.5 mg) by mouth daily  Dispense: 15 tablet; Refill: 1    5. Morbid obesity (H)  Weight loss tips reviewed      FUTURE APPOINTMENTS:       - Follow-up visit in 1 month or as needed for acute concerns    Marion Davis, DUSTY  [place]  [facility]        HOW TO QUIT SMOKING  Smoking is one of the hardest habits to break. About half of all those who have ever smoked have been able to quit, and most of those (about 70%) who still smoke want to quit. Here are some of the best ways to stop smoking.     KEEP TRYING:  It takes most smokers about 8 tries before they are finally able to fully quit. So, the more often you try and fail, the better your chance of quitting the next time! So, don't give up!    GO COLD TURKEY:  Most ex-smokers quit cold turkey. Trying to cut back gradually doesn't seem to work as well, perhaps because it continues the smoking habit. Also, it is possible to fool yourself by inhaling more while smoking fewer cigarettes. This results in the same amount of nicotine in your body!    GET SUPPORT:  Support programs can make an important difference, especially for the heavy smoker. These groups offer lectures, methods to change your behavior and peer support. Call the free national Quitline for more information. 800-QUIT-NOW (484-313-3512). Low-cost or free programs are offered by many hospitals, local chapters of the American Lung Association (852-706-0539) and the American Cancer Society (960-948-5309). Support at home is important too. Non-smokers can help by offering praise and encouragement. If the smoker fails to quit, encourage them to try again!    OVER-THE-COUNTER MEDICINES:  For those who can't quit on their own, Nicotine Replacement Therapy (NRT) may make quitting much easier. Certain aids such as the nicotine patch, gum and lozenge are available without a prescription. However, it is best to use these under the guidance of your doctor. The skin patch provides a steady supply of nicotine to the body. Nicotine gum and lozenge gives temporary bursts of low levels of nicotine. Both methods take the edge off the craving for cigarettes. WARNING: If you feel symptoms of nicotine overdose, such as nausea, vomiting, dizziness,  weakness, or fast heartbeat, stop using these and see your doctor.    PRESCRIPTION MEDICINES:  After evaluating your smoking patterns and prior attempts at quitting, your doctor may offer a prescription medicine such as bupropion (Zyban, Wellbutrin), varenicline (Chantix, Champix), a niocotine inhaler or nasal spray. Each has its unique advantage and side effects which your doctor can review with you.    HEALTH BENEFITS OF QUITTING:  The benefits of quitting start right away and keep improving the longer you go without smokin minutes: blood pressure and pulse return to normal  8 hours: oxygen levels return to normal  2 days: ability to smell and taste begins to improve as damaged nerves start to regrow  2-3 weeks: circulation and lung function improves  1-9 months: decreased cough, congestion and shortness of breath; less tired  1 year: risk of heart attack decreases by half  5 years: risk of lung cancer decreases by half; risk of stroke becomes the same as a non-smoker  For information about how to quit smoking, visit the following links:  [employer] [place] ,   Clearing the Air, Quit Smoking Today   - an online booklet. http://www.smokefree.gov/pubs/clearing_the_air.pdf  Smokefree.gov http://smokefree.gov/  QuitNet http://www.quitnet.com/    5016-8530 [employer]uk[employer]hill[employer], [address] [place] [address], [place] [address]. All rights reserved. This information is not intended as a substitute for professional medical care. Always follow your healthcare professional's instructions.    The Benefits of Living Smoke Free  What do you want to gain from quitting? Check off some reasons to quit.  Health Benefits  ___ Reduce my risk of lung cancer, heart disease, chronic lung disease  ___ Have fewer wrinkles and softer skin  ___ Improve my sense of taste and smell  ___ For pregnant women--reduce the risk of having a miscarriage, stillbirth, premature birth, or low-birth-weight baby  Personal Benefits  ___ Feel more in  control of my life  ___ Have better-smelling hair, breath, clothes, home, and car  ___ Save time by not having to take smoke breaks, buy cigarettes, or hunt for a light  ___ Have whiter teeth  Family Benefits  ___ Reduce my children s respiratory tract infections  ___ Set a good example for my children  ___ Reduce my family s cancer risk  Financial Benefits  ___ Save hundreds of dollars each year that would be spent on cigarettes  ___ Save money on medical bills  ___ Save on life, health, and car insurance premiums    Those Dollars Add Up!  Cigarettes are expensive, and getting more expensive all the time. Do you realize how much money you are spending on cigarettes per year? What is the average amount you spend on a pack of cigarettes? What is the average number of packs that you smoke per day? Using your answers to these questions, fill in this formula to help you find out:  ($ _____ per pack) ×  ( _____ number of packs per day) × (365 days) =  $ _____ yearly cost of smoking  Besides tobacco, there are other costs, including extra cleaning bills and replacement costs for clothing and furniture; medical expenses for smoking-related illnesses; and higher health, life, and car insurance premiums.    Cigars and Pipes Count Too!  Cigars and pipes are also dangerous. So are smokeless (chewing) tobacco and snuff. All of these products contain nicotine, a highly addictive substance that has harmful effects on your body. Quitting smoking means giving up all tobacco products.      4762-9659 [facility], [address] [place] [address], [place] [address]. All rights reserved. This information is not intended as a substitute for professional medical care. Always follow your healthcare professional's instructions.What Is Diabetes?  If you have diabetes, your body does not make enough insulin (a hormone), or the insulin it makes does not work the way it should. Diabetes is a lifelong disease.  Glucose (sugar) is your body's main  source of energy. Insulin carries glucose from the bloodstream into your body's cells. But if you have diabetes, glucose builds up in your blood. This is called high blood glucose (high blood sugar).  High blood glucose can lead to damage in many parts of your body, including your eyes, kidneys, heart, blood vessels, nerves and skin.  What are the symptoms of diabetes?  Symptoms include:    Extreme thirst    Needing to urinate more often    Headache    Hunger    Blurred vision    Feeling drowsy or tired    Slow healing after an illness or injury    Frequent infections.  What should I do if I have diabetes?  Your first step is to learn how to manage your diabetes. The goal is to keep your blood glucose as close to normal as possible. (A normal level is 70 to 100.) By doing this, you can prevent or control damage to your body.  To manage your diabetes, you will need to:    Eat a wide range of healthy foods.    Manage your weight.    Be physically active.    Test your blood glucose as prescribed.    Control your blood pressure and cholesterol.    Take your medicines as prescribed.  Are there different kinds of diabetes?  There are two basic kinds of diabetes: type 1 and type 2.  Type 1 diabetes  Type 1 diabetes occurs when the cells that make insulin are destroyed by your body's immune system. Your body can no longer make insulin on its own. People who have type 1 diabetes must take insulin to manage it.  Type 2 diabetes  Type 2 diabetes occurs when the cells of your body cannot use insulin or glucose normally. Over time, your body cannot make enough insulin to meet your body's needs. This is the most common kind of diabetes.  You are more likely to develop type 2 diabetes if you:    Are overweight    Have high blood pressure    Have high cholesterol    Are not physically active    Have a family history of type 2 diabetes    Are , /, ,  American or     Are  a woman who has given birth to a baby weighing over 9 pounds, or you have had gestational diabetes (diabetes that occurs in pregnancy).  For informational purposes only. Not to replace the advice of your health care provider.  Copyright   2006 [facility]. All rights reserved. [employer] 571958 - REV 12/15.    Understanding Carbohydrates, Fats, and Protein  Food is a source of fuel and nourishment for your body. It s also a source of pleasure. Having diabetes doesn t mean you have to eat special foods or give up desserts. Instead, your dietitian can show you how to plan meals to suit your body. To start, learn how different foods affect blood sugar.  Carbohydrates  Carbohydrates are the main source of fuel for the body. Carbohydrates raise blood sugar. Many people think carbohydrates are only found in pasta or bread. But carbohydrates are actually in many kinds of foods:    Sugars occur naturally in foods such as fruit, milk, honey, and molasses. Sugars can also be added to many foods, from cereals and yogurt to candy and desserts. Sugars raise blood sugar.    Starches are found in bread, cereals, pasta, and dried beans. They re also found in corn, peas, potatoes, yam, acorn squash, and butternut squash. Starches also raise blood sugar.     Fiber is found in foods such as vegetables, fruits, beans, and whole grains. Unlike other carbs, fiber isn t digested or absorbed. So it doesn t raise blood sugar. In fact, fiber can help keep blood sugar from rising too fast. It also helps keep blood cholesterol at a healthy level.  Did you know?  Even though carbohydrates raise blood sugar, it s best to have some in every meal. They are an important part of a healthy diet.   Fat  Fat is an energy source that can be stored until needed. Fat does not raise blood sugar. However, it can raise blood cholesterol, increasing the risk of heart disease. Fat is also high in calories, which can cause weight gain. Not all  types of fat are the same.  More Healthy:    Monounsaturated fats are mostly found in vegetable oils, such as olive, canola, and peanut oils. They are also found in avocados and some nuts. Monounsaturated fats are healthy for your heart. That s because they lower LDL (unhealthy) cholesterol.    Polyunsaturated fats are mostly found in vegetable oils, such as corn, safflower, and soybean oils. They are also found in some seeds, nuts, and fish. Polyunsaturated fats lower LDL (unhealthy) cholesterol. So, choosing them instead of saturated fats is healthy for your heart. Certain unsaturated fats can help lower triglycerides.   Less Healthy:    Saturated fats are found in animal products, such as meat, poultry, whole milk, lard, and butter. Saturated fats raise LDL cholesterol and are not healthy for your heart.    Hydrogenated oils and trans fats are formed when vegetable oils are processed into solid fats. They are found in many processed foods. Hydrogenated oils and trans fats raise LDL cholesterol and lower HDL (healthy) cholesterol. They are not healthy for your heart.  Protein  Protein helps the body build and repair muscle and other tissue. Protein has little or no effect on blood sugar. However, many foods that contain protein also contain saturated fat. By choosing low-fat protein sources, you can get the benefits of protein without the extra fat:    Plant protein is found in dry beans and peas, nuts, and soy products, such as tofu and soymilk. These sources tend to be cholesterol-free and low in saturated fat.    Animal protein is found in fish, poultry, meat, cheese, milk, and eggs. These contain cholesterol and can be high in saturated fat. Aim for lean, lower-fat choices.  Date Last Reviewed: 3/1/2016    7268-2483 [employer]. [address] [place] [address], [place] [address]. All rights reserved. This information is not intended as a substitute for professional medical care. Always follow your  healthcare professional's instructions.        Healthy Meals for Diabetes     A healthcare provider will help you develop a meal plan that fits your needs.   Ask your healthcare team to help you make a meal plan that fits your needs. Your meal plan tells you when to eat your meals and snacks, what kinds of foods to eat, and how much of each food to eat. You don t have to give up all the foods you like. But you do need to follow some guidelines.  Choose healthy carbohydrates  Starches, sugars, and fiber are all types of carbohydrates. Fiber can help lower your cholesterol and triglycerides. Fiber is also healthy for your heart. You should have 20 to 35 grams of total fiber each day. Fiber-rich foods include:    Whole-grain breads and cereals    Bulgur wheat    Brown rice       Whole-wheat pasta    Fruits and vegetables    Dry beans, and peas   Keep track of the amount of carbohydrates you eat. This can help you keep the right balance of physical activity and medicine. The amount of carbohydrates needed will vary for each person. It depends on many things such as your health, the medicines you take, and how active you are. Your healthcare team will help you figure out the right amount of carbohydrates for you. You may start with around 45 to 60 grams of carbohydrates per meal, depending on your situation.   Here are some examples of foods containing about 15 grams of carbohydrates (1 serving of carbohydrates):    1/2 cup of canned or frozen fruit    A small piece of fresh fruit (4 ounces)    1 slice of bread    1/2 cup of oatmeal    1/3 cup of rice    4 to 6 crackers    1/2 English muffin    1/2 cup of black beans    1/4 of a large baked potato (3 ounces)    2/3 cup of plain fat-free yogurt    1 cup of soup    1/2 cup of casserole    6 chicken nuggets    2-inch-square brownie or cake without frosting    2 small cookies    1/2 cup of ice cream or sherbet  Choose healthy protein foods  Eating protein that is low in fat  can help you control your weight. It also helps keep your heart healthy. Low-fat protein foods include:    Fish    Plant proteins, such as dry beans and peas, nuts, and soy products like tofu and soymilk    Lean meat with all visible fat removed    Poultry with the skin removed    Low-fat or nonfat milk, cheese, and yogurt  Limit unhealthy fats and sugar  Saturated and trans fats are unhealthy for your heart. They raise LDL (bad) cholesterol. Fat is also high in calories, so it can make you gain weight. To cut down on unhealthy fats and sugar, limit these foods:    Butter or margarine    Palm and palm kernel oils and coconut oil    Cream    Cheese    Pride    Lunch meats       Ice cream    Sweet bakery goods such as pies, muffins, and donuts    Jams and jellies    Candy bars    Regular sodas   How much to eat  The amount of food you eat affects your blood sugar. It also affects your weight. Your healthcare team will tell you how much of each type of food you should eat.    Use measuring cups and spoons and a food scale to measure serving sizes.    Learn what a correct serving size looks like on your plate. This will help when you are away from home and can t measure your servings.    Eat only the number of servings given on your meal plan for each food. Don t take seconds.    Learn to read food labels. Be sure to look at serving size, total carbohydrates, fiber, calories, sugar, and saturated and trans fats. Look for healthier alternatives to foods that have added sugar.    Plan ahead for parties so you can still have a good time without going overboard with unhealthy food choices. Set a good example yourself by bringing a healthy dish to pot lucks.   Choose healthy snacks  When it comes to snacks, we usually think about foods with added sugar and fats. But there are many other options for healthier snack choices. Here are a few snack ideas to choose from:  Snacks with less than 5 grams of carbohydrates    1 piece  of string cheese    3 celery sticks plus 1 tablespoon of peanut butter    5 cherry tomatoes plus 1 tablespoon of ranch dressing    1 hard-boiled egg    1/4 cup of fresh blueberries     5 baby carrots    1 cup of light popcorn    1/2 cup of sugar-free gelatin    15 almonds  Snacks with about 10 to 20 grams of carbohydrates    1/3 cup of hummus plus 1 cup of fresh cut nonstarchy vegetables (carrots, green peppers, broccoli, celery, or a combination)    1/2 cup of fresh or canned fruit plus 1/4 cup of cottage cheese    1/2 cup of tuna salad with 4 crackers    2 rice cakes and a tablespoon of peanut butter    1 small apple or orange    3 cups light popcorn    1/2 of a turkey sandwich (1 slice of whole-wheat bread, 2 ounces of turkey, and mustard)  Portion sizes are important to controlling your blood sugar and staying at a healthy weight. Stock up on healthy snack items so you always have them on hand.  When to eat  Your meal plan will likely include breakfast, lunch, dinner, and some snacks.    Try to eat your meals and snacks at about the same times each day.    Eat all your meals and snacks. Skipping a meal or snack can make your blood sugar drop too low. It can also cause you to eat too much at the next meal or snack. Then your blood sugar could get too high.  Date Last Reviewed: 7/1/2016 2000-2017 [employer]. [address] [place] [address]. All rights reserved. This information is not intended as a substitute for professional medical care. Always follow your healthcare professional's instructions.        Diabetes: Meal Planning    You can help keep your blood sugar level in your target range by eating healthy foods. Your healthcare team can help you create a low-fat, nutritious meal plan. Take an active role in your diabetes management by following your meal plan and working with your healthcare team.  Make your meal plan  A meal plan gives guidelines for the types and amounts of food  you should eat. The goal is to balance food and insulin (or other diabetes medications) so your blood sugars will be in your target range. Your dietitian will help you make a flexible meal plan that includes many foods that you like.  Watch serving sizes  Your meal plan will group foods by servings. To learn how much a serving is, start by measuring food portions at each meal. Soon you ll know what a serving looks like on your plate. Ask your healthcare provider about how to balance servings of different foods.  Eat from all the food groups  The basis of a healthy meal plan is variety (eating lots of different foods). Choose lean meats, fresh fruits and vegetables, whole grains, and low-fat or nonfat dairy products. Eating a wide variety of foods provides the nutrients your body needs. It can also keep you from getting bored with your meal plan.  Learn about carbohydrates, fats, and protein    Carbohydrates are starches, sugars, and fiber. They are found in many foods, including fruit, bread, pasta, milk, and sweets. Of all the foods you eat, carbohydrates have the most effect on your blood sugar. Your dietitian may teach you about carb counting, a way to figure out the number of carbohydrates in a meal.    Fats have the most calories. They also have the most effect on your weight and your risk of heart disease. When you have diabetes, it s important to control your weight and protect your heart. Foods that are high in fat include whole milk, cheese, snack foods, and desserts.    Protein is important for building and repairing muscles and bones. Choose low-fat protein sources, such as fish, egg whites, and skinless chicken.  Reduce liquid sugars  Extra calories from sodas, sports drinks, and fruit drinks make it hard to keep blood sugar in range. Cut as many liquid sugars from your meal plan as you can.  This includes most fruit juices, which are often high in natural or added sugar. Instead, drink plenty of water  and other sugar-free beverages.  Eat less fat  If you need to lose weight, try to reduce the amount of fat in your diet. This can also help lower your cholesterol level to keep blood vessels healthier. Cut fat by using only small amounts of liquid oil for cooking. Read food labels carefully to avoid foods with unhealthy trans fats.  Timing your meals  When it comes to blood sugar control, when you eat is as important as what you eat. You may need to eat several small meals spaced evenly throughout the day to stay in your target range. So don t skip breakfast or wait until late in the day to get most of your calories. Doing so can cause your blood sugar to rise too high or fall too low.   Date Last Reviewed: 3/1/2016    1663-6275 [employer]. [address] [place] [address], [place] [address]. All rights reserved. This information is not intended as a substitute for professional medical care. Always follow your healthcare professional's instructions.                Follow-ups after your visit        Additional Services     DIABETES EDUCATION REFERRAL ([place])       DIABETES SELF-MANAGEMENT TRAINING ([facility])  Type of training and number of hours requested:  Initial Group DMST; 10    (Medicare will cover:10 hours initial DSMT in 12-month period, plus 2 hours follow-up DSMT annually)    Please add if the patient has special educational need: None  (Patients with special needs requiring individual DSMT)    Please include the following DMST content: All ten content areas, as appropriate    Patient has the following:    Please begin Medical Nutritional Therapy.  Annual Follow Up Medical Nutitional Therapy (MNT)  ([facility] allows 3 hours initial MNT in the first calendar year, plus two hours follow up MNT annually.  Additional MNT hours are available for change in medical condition treatment and/or diagnosis)    Additional Services Provided:  >>A1c will be completed upon referral and completion of program unless  completed in clinic.  >>Influenza vaccination assessment (form #[id number]) as applicable.  >>Order for diabetes supplies will be faxed to patient's pharmacy.  >>If on insulin: Insulin dose adjustment per staged Diabetes Mgmt. Protocols    [facility] RESOURCE [facility]ENTER  [facility]  Telephone:  [phone]   Fax:  [phone]                  Follow-up notes from your care team     Return in about 4 weeks (around 7/20/2018).      Who to contact     If you have questions or need follow up information about today's clinic visit or your schedule please contact [facility] [place] directly at [phone].  Normal or non-critical lab and imaging results will be communicated to you by MyChart, letter or phone within 4 business days after the clinic has received the results. If you do not hear from us within 7 days, please contact the clinic through MyChart or phone. If you have a critical or abnormal lab result, we will notify you by phone as soon as possible.  Submit refill requests through [employer] Call or call your pharmacy and they will forward the refill request to us. Please allow 3 business days for your refill to be completed.          Additional Information About Your Visit        Care EveryWhere ID     This is your Care EveryWhere ID. This could be used by other organizations to access your [place] medical records  [id number]        Your Vitals Were     Pulse Temperature Respirations Height Pulse Oximetry BMI (Body Mass Index)    109 98.9  F (37.2  C) (Tympanic) 18 6' (1.829 m) 98% 36.89 kg/m2       Blood Pressure from Last 3 Encounters:   06/22/18 (!) 130/98   10/03/17 128/80   09/14/17 106/80    Weight from Last 3 Encounters:   06/22/18 272 lb (123.4 kg)   10/03/17 280 lb (127 kg)   09/14/17 282 lb (127.9 kg)              We Performed the Following     Albumin Random Urine Quantitative with Creat Ratio     BASIC METABOLIC PANEL     DIABETES EDUCATION REFERRAL ([place])     Hemoglobin A1c      LIPID PANEL     Tobacco Cessation - Order to Satisfy Health Maintenance     TSH with free T4 reflex          Today's Medication Changes          These changes are accurate as of 6/22/18  4:27 PM.  If you have any questions, ask your nurse or doctor.               Start taking these medicines.        Dose/Directions    aspirin 81 MG EC tablet   Used for:  Type 2 diabetes mellitus without complication, without long-term current use of insulin (H), Benign essential hypertension   Started by:  Marion Davis NP        Dose:  81 mg   Take 1 tablet (81 mg) by mouth daily   Quantity:  90 tablet   Refills:  3       losartan 25 MG tablet   Commonly known as:  COZAAR   Used for:  Benign essential hypertension   Started by:  Marion Davis NP        Dose:  12.5 mg   Take 0.5 tablets (12.5 mg) by mouth daily   Quantity:  15 tablet   Refills:  1       metFORMIN 500 MG tablet   Commonly known as:  GLUCOPHAGE   Used for:  Type 2 diabetes mellitus without complication, without long-term current use of insulin (H)   Started by:  Marion Davis NP        Dose:  500 mg   Take 1 tablet (500 mg) by mouth 2 times daily (with meals)   Quantity:  180 tablet   Refills:  1            Where to get your medicines      These medications were sent to [facility] [address] [place] [address]  AT [facility] [address]  [address] [place] [address] [address] [facility] [address]     Phone:  [phone]     aspirin 81 MG EC tablet    losartan 25 MG tablet    metFORMIN 500 MG tablet                Primary Care Provider Office Phone # Fax #    Marion Davis [phone] [address] [facility] [address]        Equal Access to Services     PILLO TRIMBLE AH: Henri Nash, leland chau, qaekta goldman. So [facility] [phone].    ATENCIÓN: Si habla español, tiene a gruber disposición servicios  katia de asistencia lingüística. Curt crespo [phone].    We comply with applicable federal civil rights laws and [place] laws. We do not discriminate on the basis of race, color, national origin, age, disability, sex, sexual orientation, or gender identity.            Thank you!     Thank you for choosing [facility]  for your care. Our goal is always to provide you with excellent care. Hearing back from our patients is one way we can continue to improve our services. Please take a few minutes to complete the written survey that you may receive in the mail after your visit with us. Thank you!             Your Updated Medication List - Protect others around you: Learn how to safely use, store and throw away your medicines at www.disposemymeds.org.          This list is accurate as of 6/22/18  4:27 PM.  Always use your most recent med list.                   Brand Name Dispense Instructions for use Diagnosis    aspirin 81 MG EC tablet     90 tablet    Take 1 tablet (81 mg) by mouth daily    Type 2 diabetes mellitus without complication, without long-term current use of insulin (H), Benign essential hypertension       ibuprofen 800 MG tablet    ADVIL/MOTRIN    90 tablet    TAKE 1 TABLET(800 MG) BY MOUTH EVERY 8 HOURS AS NEEDED FOR MODERATE PAIN    Acute right-sided low back pain with right-sided sciatica, Right-sided low back pain with right-sided sciatica, unspecified chronicity       losartan 25 MG tablet    COZAAR    15 tablet    Take 0.5 tablets (12.5 mg) by mouth daily    Benign essential hypertension       metFORMIN 500 MG tablet    GLUCOPHAGE    180 tablet    Take 1 tablet (500 mg) by mouth 2 times daily (with meals)    Type 2 diabetes mellitus without complication, without long-term current use of insulin (H)

## 2018-06-22 NOTE — NURSING NOTE
Chief Complaint   Patient presents with     Diabetes     new onset dot a1c 9.9       Initial BP (!) 130/98 (BP Location: Right arm, Patient Position: Chair, Cuff Size: Adult Large)  Pulse 109  Temp 98.9  F (37.2  C) (Tympanic)  Resp 18  Ht 6' (1.829 m)  Wt 272 lb (123.4 kg)  SpO2 98%  BMI 36.89 kg/m2 Estimated body mass index is 36.89 kg/(m^2) as calculated from the following:    Height as of this encounter: 6' (1.829 m).    Weight as of this encounter: 272 lb (123.4 kg).  Medication Reconciliation: complete    Pamela M. Lechevalier, LPN

## 2018-06-22 NOTE — PROGRESS NOTES
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  He has been quite sedentary for the past three months after his back surgery.      He is here today to review treatment options.     Problem list and histories reviewed & adjusted, as indicated.  Additional history: as documented    Patient Active Problem List   Diagnosis     ACP (advance care planning)     Herniated intervertebral disc of lumbar spine     Tobacco dependence syndrome     Anxiety     No past surgical history on file.    Social History   Substance Use Topics     Smoking status: Current Every Day Smoker     Types: Cigarettes     Start date: 1/15/2017     Smokeless tobacco: Never Used     Alcohol use 0.0 oz/week     0 Standard drinks or equivalent per week     No family history on file.      Current Outpatient Prescriptions   Medication Sig Dispense Refill     ibuprofen (ADVIL/MOTRIN) 800 MG tablet TAKE 1 TABLET(800 MG) BY MOUTH EVERY 8 HOURS AS NEEDED FOR MODERATE PAIN 90 tablet 0     Allergies   Allergen Reactions     Tdap [Daptacel]      Flue like symptoms      Recent Labs   Lab Test  01/20/17   1101   ALT  202*   CR  0.91   GFRESTIMATED  90   GFRESTBLACK  >90   GFR Calc     POTASSIUM  4.3      BP Readings from Last 3 Encounters:   06/22/18 (!) 130/98   10/03/17 128/80   09/14/17 106/80    Wt Readings from Last 3 Encounters:   06/22/18 272 lb (123.4 kg)   10/03/17 280 lb (127 kg)   09/14/17 282 lb (127.9 kg)                    Reviewed and updated as needed this visit by clinical staff  Tobacco  Allergies       Reviewed and updated as needed this visit by Provider         ROS:  Constitutional, HEENT, cardiovascular, pulmonary, gi and gu systems are negative, except as otherwise noted.    OBJECTIVE:     BP (!) 130/98 (BP Location: Right arm, Patient Position: Chair, Cuff Size: Adult Large)  Pulse 109   Temp 98.9  F (37.2  C) (Tympanic)  Resp 18  Ht 6' (1.829 m)  Wt 272 lb (123.4 kg)  SpO2 98%  BMI 36.89 kg/m2  Body mass index is 36.89 kg/(m^2).  GENERAL: healthy, alert and no distress  NECK: no adenopathy, no asymmetry, masses, or scars, thyroid normal to palpation and no carotid bruits  RESP: lungs clear to auscultation - no rales, rhonchi or wheezes  CV: regular rate and rhythm, normal S1 S2, no S3 or S4, no murmur, click or rub, no peripheral edema and peripheral pulses strong  MS: no gross musculoskeletal defects noted, no edema  NEURO: Normal strength and tone, mentation intact and speech normal  PSYCH: mentation appears normal, affect normal/bright        ASSESSMENT/PLAN:       1. Type 2 diabetes mellitus without complication, without long-term current use of insulin (H)  chronic  - TSH with free T4 reflex  - LIPID PANEL  - Albumin Random Urine Quantitative with Creat Ratio  - BASIC METABOLIC PANEL  - DIABETES EDUCATION REFERRAL ([place])  - metFORMIN (GLUCOPHAGE) 500 MG tablet; Take 1 tablet (500 mg) by mouth 2 times daily (with meals)  Dispense: 180 tablet; Refill: 1  Metformin start:   Week 1:  One tablet (500mg) with breakfast   Week 2:  One tablet (500mg) with breakfast and supper   Week 3:  Two tablets (1000mg) with breakfast and one tablet (500mg) with supper   Week 4:  Two tablets (1000mg) with breakfast and supper - please stay on this dose.    - aspirin 81 MG EC tablet; Take 1 tablet (81 mg) by mouth daily  Dispense: 90 tablet; Refill: 3  - Hemoglobin A1c    2. Tobacco abuse  Cessation ecnrouaged  - Tobacco Cessation - Order to Satisfy Health Maintenance    3. Tobacco abuse counseling    4. Benign essential hypertension  New onset  - aspirin 81 MG EC tablet; Take 1 tablet (81 mg) by mouth daily  Dispense: 90 tablet; Refill: 3  - losartan (COZAAR) 25 MG tablet; Take 0.5 tablets (12.5 mg) by mouth daily  Dispense: 15 tablet; Refill: 1    5. Morbid obesity (H)  Weight loss tips  reviewed      FUTURE APPOINTMENTS:       - Follow-up visit in 1 month or as needed for acute concerns    30 minute visit today with greater than 50% in counseling on type 2 diabetes, diagnoses, insulin resistance, A1c, metformin - onset, action, duration and dosing.  Answered all questions to the best of my ability, verbalized understanding.       Marion Davis, NP  [facility]

## 2018-06-22 NOTE — PATIENT INSTRUCTIONS
ASSESSMENT/PLAN:       1. Type 2 diabetes mellitus without complication, without long-term current use of insulin (H)  chronic  - TSH with free T4 reflex  - LIPID PANEL  - Albumin Random Urine Quantitative with Creat Ratio  - BASIC METABOLIC PANEL  - DIABETES EDUCATION REFERRAL ([place])  - metFORMIN (GLUCOPHAGE) 500 MG tablet; Take 1 tablet (500 mg) by mouth 2 times daily (with meals)  Dispense: 180 tablet; Refill: 1  Metformin start:   Week 1:  One tablet (500mg) with breakfast   Week 2:  One tablet (500mg) with breakfast and supper   Week 3:  Two tablets (1000mg) with breakfast and one tablet (500mg) with supper   Week 4:  Two tablets (1000mg) with breakfast and supper - please stay on this dose.    - aspirin 81 MG EC tablet; Take 1 tablet (81 mg) by mouth daily  Dispense: 90 tablet; Refill: 3  - Hemoglobin A1c    2. Tobacco abuse  Cessation ecnrouaged  - Tobacco Cessation - Order to Satisfy Health Maintenance    3. Tobacco abuse counseling    4. Benign essential hypertension  New onset  - aspirin 81 MG EC tablet; Take 1 tablet (81 mg) by mouth daily  Dispense: 90 tablet; Refill: 3  - losartan (COZAAR) 25 MG tablet; Take 0.5 tablets (12.5 mg) by mouth daily  Dispense: 15 tablet; Refill: 1    5. Morbid obesity (H)  Weight loss tips reviewed      FUTURE APPOINTMENTS:       - Follow-up visit in 1 month or as needed for acute concerns    Marion Davis, NP  [facility]        HOW TO QUIT SMOKING  Smoking is one of the hardest habits to break. About half of all those who have ever smoked have been able to quit, and most of those (about 70%) who still smoke want to quit. Here are some of the best ways to stop smoking.     KEEP TRYING:  It takes most smokers about 8 tries before they are finally able to fully quit. So, the more often you try and fail, the better your chance of quitting the next time! So, don't give up!    GO COLD TURKEY:  Most ex-smokers quit cold turkey. Trying to cut back  gradually doesn't seem to work as well, perhaps because it continues the smoking habit. Also, it is possible to fool yourself by inhaling more while smoking fewer cigarettes. This results in the same amount of nicotine in your body!    GET SUPPORT:  Support programs can make an important difference, especially for the heavy smoker. These groups offer lectures, methods to change your behavior and peer support. Call the free national Quitline for more information. 800-QUIT-NOW (132-473-8705). Low-cost or free programs are offered by many hospitals, local chapters of the American Lung Association (871-558-1807) and the American Cancer Society (095-033-9489). Support at home is important too. Non-smokers can help by offering praise and encouragement. If the smoker fails to quit, encourage them to try again!    OVER-THE-COUNTER MEDICINES:  For those who can't quit on their own, Nicotine Replacement Therapy (NRT) may make quitting much easier. Certain aids such as the nicotine patch, gum and lozenge are available without a prescription. However, it is best to use these under the guidance of your doctor. The skin patch provides a steady supply of nicotine to the body. Nicotine gum and lozenge gives temporary bursts of low levels of nicotine. Both methods take the edge off the craving for cigarettes. WARNING: If you feel symptoms of nicotine overdose, such as nausea, vomiting, dizziness, weakness, or fast heartbeat, stop using these and see your doctor.    PRESCRIPTION MEDICINES:  After evaluating your smoking patterns and prior attempts at quitting, your doctor may offer a prescription medicine such as bupropion (Zyban, Wellbutrin), varenicline (Chantix, Champix), a niocotine inhaler or nasal spray. Each has its unique advantage and side effects which your doctor can review with you.    HEALTH BENEFITS OF QUITTING:  The benefits of quitting start right away and keep improving the longer you go without smokin minutes:  blood pressure and pulse return to normal  8 hours: oxygen levels return to normal  2 days: ability to smell and taste begins to improve as damaged nerves start to regrow  2-3 weeks: circulation and lung function improves  1-9 months: decreased cough, congestion and shortness of breath; less tired  1 year: risk of heart attack decreases by half  5 years: risk of lung cancer decreases by half; risk of stroke becomes the same as a non-smoker  For information about how to quit smoking, visit the following links:  National [employer] ,   Clearing the Air, Quit Smoking Today   - an online booklet. http://www.smokefree.gov/pubs/clearing_the_air.pdf  Smokefree.gov http://smokefree.gov/  QuitNet http://www.quitnet.com/    3963-0613 Sukumar Dhillon, [address] [place] [address]. All rights reserved. This information is not intended as a substitute for professional medical care. Always follow your healthcare professional's instructions.    The Benefits of Living Smoke Free  What do you want to gain from quitting? Check off some reasons to quit.  Health Benefits  ___ Reduce my risk of lung cancer, heart disease, chronic lung disease  ___ Have fewer wrinkles and softer skin  ___ Improve my sense of taste and smell  ___ For pregnant women reduce the risk of having a miscarriage, stillbirth, premature birth, or low-birth-weight baby  Personal Benefits  ___ Feel more in control of my life  ___ Have better-smelling hair, breath, clothes, home, and car  ___ Save time by not having to take smoke breaks, buy cigarettes, or hunt for a light  ___ Have whiter teeth  Family Benefits  ___ Reduce my children s respiratory tract infections  ___ Set a good example for my children  ___ Reduce my family s cancer risk  Financial Benefits  ___ Save hundreds of dollars each year that would be spent on cigarettes  ___ Save money on medical bills  ___ Save on life, health, and car insurance premiums    Those Dollars Add  Up!  Cigarettes are expensive, and getting more expensive all the time. Do you realize how much money you are spending on cigarettes per year? What is the average amount you spend on a pack of cigarettes? What is the average number of packs that you smoke per day? Using your answers to these questions, fill in this formula to help you find out:  ($ _____ per pack) ×  ( _____ number of packs per day) × (365 days) =  $ _____ yearly cost of smoking  Besides tobacco, there are other costs, including extra cleaning bills and replacement costs for clothing and furniture; medical expenses for smoking-related illnesses; and higher health, life, and car insurance premiums.    Cigars and Pipes Count Too!  Cigars and pipes are also dangerous. So are smokeless (chewing) tobacco and snuff. All of these products contain nicotine, a highly addictive substance that has harmful effects on your body. Quitting smoking means giving up all tobacco products.      7503-2492 [address], [place] [address]. All rights reserved. This information is not intended as a substitute for professional medical care. Always follow your healthcare professional's instructions.What Is Diabetes?  If you have diabetes, your body does not make enough insulin (a hormone), or the insulin it makes does not work the way it should. Diabetes is a lifelong disease.  Glucose (sugar) is your body's main source of energy. Insulin carries glucose from the bloodstream into your body's cells. But if you have diabetes, glucose builds up in your blood. This is called high blood glucose (high blood sugar).  High blood glucose can lead to damage in many parts of your body, including your eyes, kidneys, heart, blood vessels, nerves and skin.  What are the symptoms of diabetes?  Symptoms include:    Extreme thirst    Needing to urinate more often    Headache    Hunger    Blurred vision    Feeling drowsy or tired    Slow healing after an illness or  injury    Frequent infections.  What should I do if I have diabetes?  Your first step is to learn how to manage your diabetes. The goal is to keep your blood glucose as close to normal as possible. (A normal level is 70 to 100.) By doing this, you can prevent or control damage to your body.  To manage your diabetes, you will need to:    Eat a wide range of healthy foods.    Manage your weight.    Be physically active.    Test your blood glucose as prescribed.    Control your blood pressure and cholesterol.    Take your medicines as prescribed.  Are there different kinds of diabetes?  There are two basic kinds of diabetes: type 1 and type 2.  Type 1 diabetes  Type 1 diabetes occurs when the cells that make insulin are destroyed by your body's immune system. Your body can no longer make insulin on its own. People who have type 1 diabetes must take insulin to manage it.  Type 2 diabetes  Type 2 diabetes occurs when the cells of your body cannot use insulin or glucose normally. Over time, your body cannot make enough insulin to meet your body's needs. This is the most common kind of diabetes.  You are more likely to develop type 2 diabetes if you:    Are overweight    Have high blood pressure    Have high cholesterol    Are not physically active    Have a family history of type 2 diabetes    Are , /, ,  American or     Are a woman who has given birth to a baby weighing over 9 pounds, or you have had gestational diabetes (diabetes that occurs in pregnancy).  For informational purposes only. Not to replace the advice of your health care provider.  Copyright   2006 [facility]. All rights reserved. [employer] [id number]   REV 12/15.    Understanding Carbohydrates, Fats, and Protein  Food is a source of fuel and nourishment for your body. It s also a source of pleasure. Having diabetes doesn t mean you have to eat special foods or give up desserts.  Instead, your dietitian can show you how to plan meals to suit your body. To start, learn how different foods affect blood sugar.  Carbohydrates  Carbohydrates are the main source of fuel for the body. Carbohydrates raise blood sugar. Many people think carbohydrates are only found in pasta or bread. But carbohydrates are actually in many kinds of foods:    Sugars occur naturally in foods such as fruit, milk, honey, and molasses. Sugars can also be added to many foods, from cereals and yogurt to candy and desserts. Sugars raise blood sugar.    Starches are found in bread, cereals, pasta, and dried beans. They re also found in corn, peas, potatoes, yam, acorn squash, and butternut squash. Starches also raise blood sugar.     Fiber is found in foods such as vegetables, fruits, beans, and whole grains. Unlike other carbs, fiber isn t digested or absorbed. So it doesn t raise blood sugar. In fact, fiber can help keep blood sugar from rising too fast. It also helps keep blood cholesterol at a healthy level.  Did you know?  Even though carbohydrates raise blood sugar, it s best to have some in every meal. They are an important part of a healthy diet.   Fat  Fat is an energy source that can be stored until needed. Fat does not raise blood sugar. However, it can raise blood cholesterol, increasing the risk of heart disease. Fat is also high in calories, which can cause weight gain. Not all types of fat are the same.  More Healthy:    Monounsaturated fats are mostly found in vegetable oils, such as olive, canola, and peanut oils. They are also found in avocados and some nuts. Monounsaturated fats are healthy for your heart. That s because they lower LDL (unhealthy) cholesterol.    Polyunsaturated fats are mostly found in vegetable oils, such as corn, safflower, and soybean oils. They are also found in some seeds, nuts, and fish. Polyunsaturated fats lower LDL (unhealthy) cholesterol. So, choosing them instead of saturated  fats is healthy for your heart. Certain unsaturated fats can help lower triglycerides.   Less Healthy:    Saturated fats are found in animal products, such as meat, poultry, whole milk, lard, and butter. Saturated fats raise LDL cholesterol and are not healthy for your heart.    Hydrogenated oils and trans fats are formed when vegetable oils are processed into solid fats. They are found in many processed foods. Hydrogenated oils and trans fats raise LDL cholesterol and lower HDL (healthy) cholesterol. They are not healthy for your heart.  Protein  Protein helps the body build and repair muscle and other tissue. Protein has little or no effect on blood sugar. However, many foods that contain protein also contain saturated fat. By choosing low-fat protein sources, you can get the benefits of protein without the extra fat:    Plant protein is found in dry beans and peas, nuts, and soy products, such as tofu and soymilk. These sources tend to be cholesterol-free and low in saturated fat.    Animal protein is found in fish, poultry, meat, cheese, milk, and eggs. These contain cholesterol and can be high in saturated fat. Aim for lean, lower-fat choices.  Date Last Reviewed: 3/1/2016    0915-1139 [employer]. [address] [place] [address]. All rights reserved. This information is not intended as a substitute for professional medical care. Always follow your healthcare professional's instructions.        Healthy Meals for Diabetes     A healthcare provider will help you develop a meal plan that fits your needs.   Ask your healthcare team to help you make a meal plan that fits your needs. Your meal plan tells you when to eat your meals and snacks, what kinds of foods to eat, and how much of each food to eat. You don t have to give up all the foods you like. But you do need to follow some guidelines.  Choose healthy carbohydrates  Starches, sugars, and fiber are all types of carbohydrates. Fiber can  help lower your cholesterol and triglycerides. Fiber is also healthy for your heart. You should have 20 to 35 grams of total fiber each day. Fiber-rich foods include:    Whole-grain breads and cereals    Bulgur wheat    Brown rice       Whole-wheat pasta    Fruits and vegetables    Dry beans, and peas   Keep track of the amount of carbohydrates you eat. This can help you keep the right balance of physical activity and medicine. The amount of carbohydrates needed will vary for each person. It depends on many things such as your health, the medicines you take, and how active you are. Your healthcare team will help you figure out the right amount of carbohydrates for you. You may start with around 45 to 60 grams of carbohydrates per meal, depending on your situation.   Here are some examples of foods containing about 15 grams of carbohydrates (1 serving of carbohydrates):    1/2 cup of canned or frozen fruit    A small piece of fresh fruit (4 ounces)    1 slice of bread    1/2 cup of oatmeal    1/3 cup of rice    4 to 6 crackers    1/2 English muffin    1/2 cup of black beans    1/4 of a large baked potato (3 ounces)    2/3 cup of plain fat-free yogurt    1 cup of soup    1/2 cup of casserole    6 chicken nuggets    2-inch-square brownie or cake without frosting    2 small cookies    1/2 cup of ice cream or sherbet  Choose healthy protein foods  Eating protein that is low in fat can help you control your weight. It also helps keep your heart healthy. Low-fat protein foods include:    Fish    Plant proteins, such as dry beans and peas, nuts, and soy products like tofu and soymilk    Lean meat with all visible fat removed    Poultry with the skin removed    Low-fat or nonfat milk, cheese, and yogurt  Limit unhealthy fats and sugar  Saturated and trans fats are unhealthy for your heart. They raise LDL (bad) cholesterol. Fat is also high in calories, so it can make you gain weight. To cut down on unhealthy fats and sugar,  limit these foods:    Butter or margarine    Palm and palm kernel oils and coconut oil    Cream    Cheese    Pride    Lunch meats       Ice cream    Sweet bakery goods such as pies, muffins, and donuts    Jams and jellies    Candy bars    Regular sodas   How much to eat  The amount of food you eat affects your blood sugar. It also affects your weight. Your healthcare team will tell you how much of each type of food you should eat.    Use measuring cups and spoons and a food scale to measure serving sizes.    Learn what a correct serving size looks like on your plate. This will help when you are away from home and can t measure your servings.    Eat only the number of servings given on your meal plan for each food. Don t take seconds.    Learn to read food labels. Be sure to look at serving size, total carbohydrates, fiber, calories, sugar, and saturated and trans fats. Look for healthier alternatives to foods that have added sugar.    Plan ahead for parties so you can still have a good time without going overboard with unhealthy food choices. Set a good example yourself by bringing a healthy dish to pot lucks.   Choose healthy snacks  When it comes to snacks, we usually think about foods with added sugar and fats. But there are many other options for healthier snack choices. Here are a few snack ideas to choose from:  Snacks with less than 5 grams of carbohydrates    1 piece of string cheese    3 celery sticks plus 1 tablespoon of peanut butter    5 cherry tomatoes plus 1 tablespoon of ranch dressing    1 hard-boiled egg    1/4 cup of fresh blueberries     5 baby carrots    1 cup of light popcorn    1/2 cup of sugar-free gelatin    15 almonds  Snacks with about 10 to 20 grams of carbohydrates    1/3 cup of hummus plus 1 cup of fresh cut nonstarchy vegetables (carrots, green peppers, broccoli, celery, or a combination)    1/2 cup of fresh or canned fruit plus 1/4 cup of cottage cheese    1/2 cup of tuna salad with 4  crackers    2 rice cakes and a tablespoon of peanut butter    1 small apple or orange    3 cups light popcorn    1/2 of a turkey sandwich (1 slice of whole-wheat bread, 2 ounces of turkey, and mustard)  Portion sizes are important to controlling your blood sugar and staying at a healthy weight. Stock up on healthy snack items so you always have them on hand.  When to eat  Your meal plan will likely include breakfast, lunch, dinner, and some snacks.    Try to eat your meals and snacks at about the same times each day.    Eat all your meals and snacks. Skipping a meal or snack can make your blood sugar drop too low. It can also cause you to eat too much at the next meal or snack. Then your blood sugar could get too high.  Date Last Reviewed: 7/1/2016 2000-2017 [employer]. [address] [place] [address]. All rights reserved. This information is not intended as a substitute for professional medical care. Always follow your healthcare professional's instructions.        Diabetes: Meal Planning    You can help keep your blood sugar level in your target range by eating healthy foods. Your healthcare team can help you create a low-fat, nutritious meal plan. Take an active role in your diabetes management by following your meal plan and working with your healthcare team.  Make your meal plan  A meal plan gives guidelines for the types and amounts of food you should eat. The goal is to balance food and insulin (or other diabetes medications) so your blood sugars will be in your target range. Your dietitian will help you make a flexible meal plan that includes many foods that you like.  Watch serving sizes  Your meal plan will group foods by servings. To learn how much a serving is, start by measuring food portions at each meal. Soon you ll know what a serving looks like on your plate. Ask your healthcare provider about how to balance servings of different foods.  Eat from all the food groups  The  basis of a healthy meal plan is variety (eating lots of different foods). Choose lean meats, fresh fruits and vegetables, whole grains, and low-fat or nonfat dairy products. Eating a wide variety of foods provides the nutrients your body needs. It can also keep you from getting bored with your meal plan.  Learn about carbohydrates, fats, and protein    Carbohydrates are starches, sugars, and fiber. They are found in many foods, including fruit, bread, pasta, milk, and sweets. Of all the foods you eat, carbohydrates have the most effect on your blood sugar. Your dietitian may teach you about carb counting, a way to figure out the number of carbohydrates in a meal.    Fats have the most calories. They also have the most effect on your weight and your risk of heart disease. When you have diabetes, it s important to control your weight and protect your heart. Foods that are high in fat include whole milk, cheese, snack foods, and desserts.    Protein is important for building and repairing muscles and bones. Choose low-fat protein sources, such as fish, egg whites, and skinless chicken.  Reduce liquid sugars  Extra calories from sodas, sports drinks, and fruit drinks make it hard to keep blood sugar in range. Cut as many liquid sugars from your meal plan as you can.  This includes most fruit juices, which are often high in natural or added sugar. Instead, drink plenty of water and other sugar-free beverages.  Eat less fat  If you need to lose weight, try to reduce the amount of fat in your diet. This can also help lower your cholesterol level to keep blood vessels healthier. Cut fat by using only small amounts of liquid oil for cooking. Read food labels carefully to avoid foods with unhealthy trans fats.  Timing your meals  When it comes to blood sugar control, when you eat is as important as what you eat. You may need to eat several small meals spaced evenly throughout the day to stay in your target range. So don t  skip breakfast or wait until late in the day to get most of your calories. Doing so can cause your blood sugar to rise too high or fall too low.   Date Last Reviewed: 3/1/2016    1916-4206 [employer]. [address] [place] [address], [place] [address]. All rights reserved. This information is not intended as a substitute for professional medical care. Always follow your healthcare professional's instructions.

## 2018-06-23 ASSESSMENT — PATIENT HEALTH QUESTIONNAIRE - PHQ9: SUM OF ALL RESPONSES TO PHQ QUESTIONS 1-9: 5

## 2018-06-23 ASSESSMENT — ANXIETY QUESTIONNAIRES: GAD7 TOTAL SCORE: 4

## 2018-06-25 ENCOUNTER — TELEPHONE (OUTPATIENT)
Dept: FAMILY MEDICINE | Facility: OTHER | Age: 48
End: 2018-06-25

## 2018-06-25 LAB
ANION GAP SERPL CALCULATED.3IONS-SCNC: 10 MMOL/L (ref 3–14)
BUN SERPL-MCNC: 14 MG/DL (ref 7–30)
CALCIUM SERPL-MCNC: 9.4 MG/DL (ref 8.5–10.1)
CHLORIDE SERPL-SCNC: 106 MMOL/L (ref 94–109)
CHOLEST SERPL-MCNC: 219 MG/DL
CO2 SERPL-SCNC: 22 MMOL/L (ref 20–32)
CREAT SERPL-MCNC: 0.75 MG/DL (ref 0.66–1.25)
CREAT UR-MCNC: 207 MG/DL
EST. AVERAGE GLUCOSE BLD GHB EST-MCNC: 223 MG/DL
GFR SERPL CREATININE-BSD FRML MDRD: >90 ML/MIN/1.7M2
GLUCOSE SERPL-MCNC: 251 MG/DL (ref 70–99)
HBA1C MFR BLD: 9.4 % (ref 0–5.6)
HDLC SERPL-MCNC: 31 MG/DL
LDLC SERPL CALC-MCNC: 143 MG/DL
MICROALBUMIN UR-MCNC: 26 MG/L
MICROALBUMIN/CREAT UR: 12.8 MG/G CR (ref 0–17)
NONHDLC SERPL-MCNC: 188 MG/DL
POTASSIUM SERPL-SCNC: 4.2 MMOL/L (ref 3.4–5.3)
SODIUM SERPL-SCNC: 138 MMOL/L (ref 133–144)
TRIGL SERPL-MCNC: 226 MG/DL
TSH SERPL DL<=0.005 MIU/L-ACNC: 1.05 MU/L (ref 0.4–4)

## 2018-06-25 RX ORDER — ATORVASTATIN CALCIUM 10 MG/1
10 TABLET, FILM COATED ORAL AT BEDTIME
Qty: 90 TABLET | Refills: 1 | Status: SHIPPED | OUTPATIENT
Start: 2018-06-25 | End: 2018-11-19 | Stop reason: SINTOL

## 2018-06-28 ENCOUNTER — TELEPHONE (OUTPATIENT)
Dept: FAMILY MEDICINE | Facility: OTHER | Age: 48
End: 2018-06-28

## 2018-06-28 NOTE — LETTER
June 28, 2018      Rojelio Juárez  [address] [place] [id number]        To Whom It May Concern:    Rojelio Juárez  was seen on 6/22/2018.  Please excuse him from work 6/25/2018-6/28/2018, returning to work 6/29/2018 without restrictions.         Sincerely,        Marion Davis, NP

## 2018-06-29 ENCOUNTER — TELEPHONE (OUTPATIENT)
Dept: FAMILY MEDICINE | Facility: OTHER | Age: 48
End: 2018-06-29

## 2018-06-29 DIAGNOSIS — E11.9 TYPE 2 DIABETES MELLITUS WITHOUT COMPLICATION, WITHOUT LONG-TERM CURRENT USE OF INSULIN (H): Primary | ICD-10-CM

## 2018-06-29 RX ORDER — METFORMIN HCL 500 MG
500 TABLET, EXTENDED RELEASE 24 HR ORAL 2 TIMES DAILY WITH MEALS
Qty: 180 TABLET | Refills: 1 | Status: SHIPPED | OUTPATIENT
Start: 2018-06-29 | End: 2018-09-21 | Stop reason: ALTCHOICE

## 2018-06-29 NOTE — TELEPHONE ENCOUNTER
Pt notified of medication changes and new work note printed and faxed off to [phone] per patients request   Pamela M Lechevalier LPN

## 2018-06-29 NOTE — TELEPHONE ENCOUNTER
Pt called is still having diarrhea and emesis from metformin noted to stop medication till talk to pcp   Missed work again today   Pamela M Lechevalier LPN

## 2018-06-29 NOTE — TELEPHONE ENCOUNTER
Stop metformin, will change to extended release.  Please notify of any diarrhea.      Start Metformin ER with one tablet at breakfast for one week, then increase to one with breakfast and one with supper.

## 2018-07-23 ENCOUNTER — OFFICE VISIT (OUTPATIENT)
Dept: FAMILY MEDICINE | Facility: OTHER | Age: 48
End: 2018-07-23
Attending: NURSE PRACTITIONER
Payer: MEDICAID

## 2018-07-23 VITALS
HEIGHT: 72 IN | HEART RATE: 109 BPM | BODY MASS INDEX: 36.16 KG/M2 | OXYGEN SATURATION: 96 % | TEMPERATURE: 98.9 F | SYSTOLIC BLOOD PRESSURE: 120 MMHG | WEIGHT: 267 LBS | DIASTOLIC BLOOD PRESSURE: 98 MMHG

## 2018-07-23 DIAGNOSIS — L30.9 ECZEMA, UNSPECIFIED TYPE: ICD-10-CM

## 2018-07-23 DIAGNOSIS — E66.01 MORBID OBESITY (H): ICD-10-CM

## 2018-07-23 DIAGNOSIS — E78.5 HYPERLIPIDEMIA LDL GOAL <100: ICD-10-CM

## 2018-07-23 DIAGNOSIS — I10 BENIGN ESSENTIAL HYPERTENSION: ICD-10-CM

## 2018-07-23 DIAGNOSIS — R13.19 OTHER DYSPHAGIA: ICD-10-CM

## 2018-07-23 DIAGNOSIS — Z71.6 TOBACCO ABUSE COUNSELING: ICD-10-CM

## 2018-07-23 DIAGNOSIS — E11.9 TYPE 2 DIABETES MELLITUS WITHOUT COMPLICATION, WITHOUT LONG-TERM CURRENT USE OF INSULIN (H): Primary | ICD-10-CM

## 2018-07-23 PROCEDURE — G0463 HOSPITAL OUTPT CLINIC VISIT: HCPCS

## 2018-07-23 PROCEDURE — 99214 OFFICE O/P EST MOD 30 MIN: CPT | Performed by: NURSE PRACTITIONER

## 2018-07-23 RX ORDER — TRIAMCINOLONE ACETONIDE 1 MG/G
CREAM TOPICAL
Qty: 80 G | Refills: 0 | Status: SHIPPED | OUTPATIENT
Start: 2018-07-23 | End: 2019-10-28

## 2018-07-23 ASSESSMENT — ANXIETY QUESTIONNAIRES
2. NOT BEING ABLE TO STOP OR CONTROL WORRYING: NOT AT ALL
GAD7 TOTAL SCORE: 0
4. TROUBLE RELAXING: NOT AT ALL
1. FEELING NERVOUS, ANXIOUS, OR ON EDGE: NOT AT ALL
3. WORRYING TOO MUCH ABOUT DIFFERENT THINGS: NOT AT ALL
IF YOU CHECKED OFF ANY PROBLEMS ON THIS QUESTIONNAIRE, HOW DIFFICULT HAVE THESE PROBLEMS MADE IT FOR YOU TO DO YOUR WORK, TAKE CARE OF THINGS AT HOME, OR GET ALONG WITH OTHER PEOPLE: NOT DIFFICULT AT ALL
6. BECOMING EASILY ANNOYED OR IRRITABLE: NOT AT ALL
7. FEELING AFRAID AS IF SOMETHING AWFUL MIGHT HAPPEN: NOT AT ALL
5. BEING SO RESTLESS THAT IT IS HARD TO SIT STILL: NOT AT ALL

## 2018-07-23 ASSESSMENT — PAIN SCALES - GENERAL: PAINLEVEL: NO PAIN (0)

## 2018-07-23 NOTE — PATIENT INSTRUCTIONS
ASSESSMENT/PLAN:     1. Type 2 diabetes mellitus without complication, without long-term current use of insulin (H)  Continue metformin  Diabetes education as scheduled    2. Hyperlipidemia LDL goal <100  Restart lipitor    3. Morbid obesity (H)  Weight loss encouraged    4. Tobacco abuse counseling  Cessation encouraged    5. Benign essential hypertension  Restart losartan  Continue aspirin    6. Other dysphagia  symptomatic  - GENERAL SURG ADULT REFERRAL - Mtn iron    7. Eczema, unspecified type  - CeraVe tub lotion, apply after shower  - bleach bath as discussed  - triamcinolone (KENALOG) 0.1 % cream; Apply sparingly to affected area three times daily as needed  Dispense: 80 g; Refill: 0      FUTURE APPOINTMENTS:       - Follow-up visit in 3 months or as needed for acute concerns.     Marion Davis NP  [facility]

## 2018-07-23 NOTE — PROGRESS NOTES
SUBJECTIVE:   Rojelio Juárez is a 48 year old male who presents to clinic today for the following health issues:      Diabetes Follow-up      Patient is checking blood sugars: Haven't started yet [facility] [place]    Diabetic concerns: None     Symptoms of hypoglycemia (low blood sugar): shaky, dizzy, weak, lethargy, blurred vision     Paresthesias (numbness or burning in feet) or sores: No     Date of last diabetic eye exam: Due    BP Readings from Last 2 Encounters:   07/23/18 (!) 120/98   06/22/18 (!) 130/98     Hemoglobin A1C (%)   Date Value   06/22/2018 9.4 (H)     LDL Cholesterol Calculated (mg/dL)   Date Value   06/22/2018 143 (H)       Diabetes Management Resources  Hyperlipidemia Follow-Up      Rate your low fat/cholesterol diet?: not monitoring fat    Taking statin?  No    Other lipid medications/supplements?:  None  The 10-year ASCVD risk score (Elizabeth SCOTT Jr, et al., 2013) is: 23.3%    Values used to calculate the score:      Age: 48 years      Sex: Male      Is Non- : No      Diabetic: Yes      Tobacco smoker: Yes      Systolic Blood Pressure: 120 mmHg      Is BP treated: Yes      HDL Cholesterol: 31 mg/dL        Total Cholesterol: 219 mg/dL          Amount of exercise or physical activity: walking    Problems taking medications regularly: No    Medication side effects: none    Diet: regular (no restrictions)        Hypertension Follow-up      Outpatient blood pressures are not being checked.    Low Salt Diet: no added salt    Has not been taking losartan for the past week.      Difficulty swallowing  Onset of symptoms: 1 year  Location of symptoms:  Feels food gets stuck in throat  Timing of symptoms: after every meal  Cause/Injury:  NA - feels he has a hiatal hernia   Quality of symptoms: worsening  Associated symptoms: vomiting at times          Problem list and histories reviewed & adjusted, as indicated.  Additional history: as documented    Patient  Active Problem List   Diagnosis     ACP (advance care planning)     Herniated intervertebral disc of lumbar spine     Tobacco dependence syndrome     Anxiety     Type 2 diabetes mellitus without complication, without long-term current use of insulin (H)     Morbid obesity (H)     Hyperlipidemia LDL goal <100     History reviewed. No pertinent surgical history.    Social History   Substance Use Topics     Smoking status: Current Every Day Smoker     Packs/day: 1.50     Types: Cigarettes     Start date: 1/15/2017     Smokeless tobacco: Never Used     Alcohol use 0.0 oz/week     0 Standard drinks or equivalent per week     History reviewed. No pertinent family history.      Current Outpatient Prescriptions   Medication Sig Dispense Refill     aspirin 81 MG EC tablet Take 1 tablet (81 mg) by mouth daily 90 tablet 3     metFORMIN (GLUCOPHAGE-XR) 500 MG 24 hr tablet Take 1 tablet (500 mg) by mouth 2 times daily (with meals) 180 tablet 1     atorvastatin (LIPITOR) 10 MG tablet Take 1 tablet (10 mg) by mouth At Bedtime (Patient not taking: Reported on 7/23/2018) 90 tablet 1     ibuprofen (ADVIL/MOTRIN) 800 MG tablet TAKE 1 TABLET(800 MG) BY MOUTH EVERY 8 HOURS AS NEEDED FOR MODERATE PAIN (Patient not taking: Reported on 7/23/2018) 90 tablet 0     losartan (COZAAR) 25 MG tablet Take 0.5 tablets (12.5 mg) by mouth daily (Patient not taking: Reported on 7/23/2018) 15 tablet 1     Allergies   Allergen Reactions     Tdap [Daptacel]      Flue like symptoms      Recent Labs   Lab Test  06/22/18   1634  01/20/17   1101   A1C  9.4*   --    LDL  143*   --    HDL  31*   --    TRIG  226*   --    ALT   --   202*   CR  0.75  0.91   GFRESTIMATED  >90  90   GFRESTBLACK  >90  >90  African American GFR Calc     POTASSIUM  4.2  4.3   TSH  1.05   --       BP Readings from Last 3 Encounters:   07/23/18 (!) 120/98   06/22/18 (!) 130/98   10/03/17 128/80    Wt Readings from Last 3 Encounters:   07/23/18 267 lb (121.1 kg)   06/22/18 272 lb (123.4  kg)   10/03/17 280 lb (127 kg)                    Reviewed and updated as needed this visit by clinical staff  Tobacco  Allergies  Meds  Med Hx  Surg Hx  Fam Hx  Soc Hx      Reviewed and updated as needed this visit by Provider         ROS:  Constitutional, HEENT, cardiovascular, pulmonary, gi and gu systems are negative, except as otherwise noted.    OBJECTIVE:     BP (!) 120/98  Pulse 109  Temp 98.9  F (37.2  C)  Ht 6' (1.829 m)  Wt 267 lb (121.1 kg)  SpO2 96%  BMI 36.21 kg/m2  Body mass index is 36.21 kg/(m^2).  GENERAL: healthy, alert and no distress  NECK: no adenopathy, no asymmetry, masses, or scars and thyroid normal to palpation  RESP: lungs clear to auscultation - no rales, rhonchi or wheezes  CV: regular rate and rhythm, normal S1 S2, no S3 or S4, no murmur, click or rub, no peripheral edema and peripheral pulses strong  ABDOMEN: soft, nontender, no hepatosplenomegaly, no masses and bowel sounds normal  MS: no gross musculoskeletal defects noted, no edema  NEURO: Normal strength and tone, mentation intact and speech normal  PSYCH: mentation appears normal, affect normal/bright        ASSESSMENT/PLAN:     1. Type 2 diabetes mellitus without complication, without long-term current use of insulin (H)  Continue metformin  Diabetes education as scheduled    2. Hyperlipidemia LDL goal <100  Restart lipitor    3. Morbid obesity (H)  Weight loss encouraged    4. Tobacco abuse counseling  Cessation encouraged    5. Benign essential hypertension  Restart losartan  Continue aspirin    6. Other dysphagia  Symptomatic - EGD  - GENERAL SURG ADULT REFERRAL - [facility] iron    7. Eczema, unspecified type  - CeraVe tub lotion, apply after shower  - bleach bath as discussed  - triamcinolone (KENALOG) 0.1 % cream; Apply sparingly to affected area three times daily as needed  Dispense: 80 g; Refill: 0      FUTURE APPOINTMENTS:       - Follow-up visit in 3 months or as needed for acute concerns.     Marion COSTA  DUSTY Davis  [facility]

## 2018-07-23 NOTE — MR AVS SNAPSHOT
After Visit Summary   7/23/2018    Rojelio Juárez    MRN: [id number]           Patient Information     Date Of Birth          1970        Visit Information        Provider Department      7/23/2018 4:00 PM Marion Davis NP [facility]        Today's Diagnoses     Type 2 diabetes mellitus without complication, without long-term current use of insulin (H)    -  1    Hyperlipidemia LDL goal <100        Morbid obesity (H)        Tobacco abuse counseling        Benign essential hypertension        Other dysphagia        Eczema, unspecified type          Care Instructions      ASSESSMENT/PLAN:     1. Type 2 diabetes mellitus without complication, without long-term current use of insulin (H)  Continue metformin  Diabetes education as scheduled    2. Hyperlipidemia LDL goal <100  Restart lipitor    3. Morbid obesity (H)  Weight loss encouraged    4. Tobacco abuse counseling  Cessation encouraged    5. Benign essential hypertension  Restart losartan  Continue aspirin    6. Other dysphagia  symptomatic  - GENERAL SURG ADULT REFERRAL - [facility] iron    7. Eczema, unspecified type  - CeraVe tub lotion, apply after shower  - bleach bath as discussed  - triamcinolone (KENALOG) 0.1 % cream; Apply sparingly to affected area three times daily as needed  Dispense: 80 g; Refill: 0      FUTURE APPOINTMENTS:       - Follow-up visit in 3 months or as needed for acute concerns.     Marion Davis NP  [facility]          Follow-ups after your visit        Additional Services     GENERAL SURG ADULT REFERRAL       Your provider has referred you to: Georgina Taylor    Please be aware that coverage of these services is subject to the terms and limitations of your health insurance plan.  Call member services at your health plan with any benefit or coverage questions.      Please bring the following with you to your appointment:    (1) Any X-Rays, CTs or MRIs which have been performed.   Contact the facility where they were done to arrange for  prior to your scheduled appointment.   (2) List of current medications   (3) This referral request   (4) Any documents/labs given to you for this referral                  Your next 10 appointments already scheduled     Jul 25, 2018  9:00 AM CDT   (Arrive by 8:45 AM)   Diabetic Education with Lela Mejia RD   [facility] ([facility] )    [address] [place] [address] [phone]              Who to contact     If you have questions or need follow up information about today's clinic visit or your schedule please contact [facility] directly at [phone].  Normal or non-critical lab and imaging results will be communicated to you by MyChart, letter or phone within 4 business days after the clinic has received the results. If you do not hear from us within 7 days, please contact the clinic through MyChart or phone. If you have a critical or abnormal lab result, we will notify you by phone as soon as possible.  Submit refill requests through Prompt.ly or call your pharmacy and they will forward the refill request to us. Please allow 3 business days for your refill to be completed.          Additional Information About Your Visit        Care EveryWhere ID     This is your Care EveryWhere ID. This could be used by other organizations to access your [place] medical records  [id number]        Your Vitals Were     Pulse Temperature Height Pulse Oximetry BMI (Body Mass Index)       109 98.9  F (37.2  C) 6' (1.829 m) 96% 36.21 kg/m2        Blood Pressure from Last 3 Encounters:   07/23/18 (!) 120/98   06/22/18 (!) 130/98   10/03/17 128/80    Weight from Last 3 Encounters:   07/23/18 267 lb (121.1 kg)   06/22/18 272 lb (123.4 kg)   10/03/17 280 lb (127 kg)              We Performed the Following     GENERAL SURG ADULT REFERRAL          Today's Medication Changes          These changes are accurate as of  7/23/18  4:39 PM.  If you have any questions, ask your nurse or doctor.               Start taking these medicines.        Dose/Directions    triamcinolone 0.1 % cream   Commonly known as:  KENALOG   Used for:  Eczema, unspecified type   Started by:  Marion Davis NP        Apply sparingly to affected area three times daily as needed   Quantity:  80 g   Refills:  0            Where to get your medicines      These medications were sent to [facility] [address]  AT [facility] [address] [place] [address]     Phone:  [phone]     triamcinolone 0.1 % cream                Primary Care Provider Office Phone # Fax #    Marion Davis [phone] [address] [facility] [address]        Equal Access to Services     [facility] Hospital[facility]HEEL : Hadii aad ku hadasho Soomaali, waaxda luqadaha, qaybta kaalmada adeegyada, waxay idiin hayhosseinn anabel rand . So [facility] [phone].    ATENCIÓN: Si habla español, tiene a gruber disposición servicios gratuitos de asistencia lingüística. [facility] [phone].    We comply with applicable federal civil rights laws and [place] laws. We do not discriminate on the basis of race, color, national origin, age, disability, sex, sexual orientation, or gender identity.            Thank you!     Thank you for choosing [facility]  for your care. Our goal is always to provide you with excellent care. Hearing back from our patients is one way we can continue to improve our services. Please take a few minutes to complete the written survey that you may receive in the mail after your visit with us. Thank you!             Your Updated Medication List - Protect others around you: Learn how to safely use, store and throw away your medicines at www.disposemymeds.org.          This list is accurate as of 7/23/18  4:39 PM.  Always use your most recent med list.                    Brand Name Dispense Instructions for use Diagnosis    aspirin 81 MG EC tablet     90 tablet    Take 1 tablet (81 mg) by mouth daily    Type 2 diabetes mellitus without complication, without long-term current use of insulin (H), Benign essential hypertension       atorvastatin 10 MG tablet    LIPITOR    90 tablet    Take 1 tablet (10 mg) by mouth At Bedtime    Hyperlipidemia LDL goal <100       ibuprofen 800 MG tablet    ADVIL/MOTRIN    90 tablet    TAKE 1 TABLET(800 MG) BY MOUTH EVERY 8 HOURS AS NEEDED FOR MODERATE PAIN    Acute right-sided low back pain with right-sided sciatica, Right-sided low back pain with right-sided sciatica, unspecified chronicity       losartan 25 MG tablet    COZAAR    15 tablet    Take 0.5 tablets (12.5 mg) by mouth daily    Benign essential hypertension       metFORMIN 500 MG 24 hr tablet    GLUCOPHAGE-XR    180 tablet    Take 1 tablet (500 mg) by mouth 2 times daily (with meals)    Type 2 diabetes mellitus without complication, without long-term current use of insulin (H)       triamcinolone 0.1 % cream    KENALOG    80 g    Apply sparingly to affected area three times daily as needed    Eczema, unspecified type

## 2018-07-23 NOTE — NURSING NOTE
Chief Complaint   Patient presents with     Diabetes     Lipids     Nicotine Dependence       Initial BP (!) 120/98  Pulse 109  Temp 98.9  F (37.2  C)  Ht 6' (1.829 m)  Wt 267 lb (121.1 kg)  SpO2 96%  BMI 36.21 kg/m2 Estimated body mass index is 36.21 kg/(m^2) as calculated from the following:    Height as of this encounter: 6' (1.829 m).    Weight as of this encounter: 267 lb (121.1 kg).  Medication Reconciliation: chris Rodriguez MA

## 2018-07-24 ENCOUNTER — OFFICE VISIT (OUTPATIENT)
Dept: SURGERY | Facility: OTHER | Age: 48
End: 2018-07-24
Attending: NURSE PRACTITIONER
Payer: MEDICAID

## 2018-07-24 VITALS
SYSTOLIC BLOOD PRESSURE: 124 MMHG | RESPIRATION RATE: 14 BRPM | DIASTOLIC BLOOD PRESSURE: 100 MMHG | HEART RATE: 120 BPM | HEIGHT: 72 IN | OXYGEN SATURATION: 97 % | BODY MASS INDEX: 36.16 KG/M2 | TEMPERATURE: 98.1 F | WEIGHT: 267 LBS

## 2018-07-24 DIAGNOSIS — E11.9 TYPE 2 DIABETES MELLITUS WITHOUT COMPLICATION, WITHOUT LONG-TERM CURRENT USE OF INSULIN (H): ICD-10-CM

## 2018-07-24 DIAGNOSIS — F17.200 TOBACCO DEPENDENCE SYNDROME: ICD-10-CM

## 2018-07-24 DIAGNOSIS — I10 BENIGN ESSENTIAL HYPERTENSION: Primary | ICD-10-CM

## 2018-07-24 DIAGNOSIS — E66.01 MORBID OBESITY (H): ICD-10-CM

## 2018-07-24 DIAGNOSIS — R13.19 OTHER DYSPHAGIA: ICD-10-CM

## 2018-07-24 PROBLEM — Z98.890 STATUS POST LUMBAR MICRODISCECTOMY: Status: ACTIVE | Noted: 2017-03-13

## 2018-07-24 LAB
ERYTHROCYTE [DISTWIDTH] IN BLOOD BY AUTOMATED COUNT: 11.9 % (ref 10–15)
HCT VFR BLD AUTO: 51.2 % (ref 40–53)
HGB BLD-MCNC: 18.1 G/DL (ref 13.3–17.7)
MCH RBC QN AUTO: 32.4 PG (ref 26.5–33)
MCHC RBC AUTO-ENTMCNC: 35.4 G/DL (ref 31.5–36.5)
MCV RBC AUTO: 92 FL (ref 78–100)
PLATELET # BLD AUTO: 192 10E9/L (ref 150–450)
RBC # BLD AUTO: 5.59 10E12/L (ref 4.4–5.9)
WBC # BLD AUTO: 6.5 10E9/L (ref 4–11)

## 2018-07-24 PROCEDURE — G0463 HOSPITAL OUTPT CLINIC VISIT: HCPCS

## 2018-07-24 PROCEDURE — 93005 ELECTROCARDIOGRAM TRACING: CPT

## 2018-07-24 PROCEDURE — 99204 OFFICE O/P NEW MOD 45 MIN: CPT | Performed by: SURGERY

## 2018-07-24 PROCEDURE — 36415 COLL VENOUS BLD VENIPUNCTURE: CPT | Mod: ZL | Performed by: SURGERY

## 2018-07-24 PROCEDURE — G0463 HOSPITAL OUTPT CLINIC VISIT: HCPCS | Mod: 25

## 2018-07-24 PROCEDURE — 85027 COMPLETE CBC AUTOMATED: CPT | Mod: ZL | Performed by: SURGERY

## 2018-07-24 PROCEDURE — 93010 ELECTROCARDIOGRAM REPORT: CPT | Performed by: INTERNAL MEDICINE

## 2018-07-24 ASSESSMENT — PAIN SCALES - GENERAL: PAINLEVEL: NO PAIN (0)

## 2018-07-24 ASSESSMENT — PATIENT HEALTH QUESTIONNAIRE - PHQ9: SUM OF ALL RESPONSES TO PHQ QUESTIONS 1-9: 0

## 2018-07-24 ASSESSMENT — ANXIETY QUESTIONNAIRES: GAD7 TOTAL SCORE: 0

## 2018-07-24 NOTE — MR AVS SNAPSHOT
After Visit Summary   7/24/2018    Rojelio Juárez    MRN: [id number]           Patient Information     Date Of Birth          1970        Visit Information        Provider Department      7/24/2018 3:30 PM Serafin Maynard MD [facility]        Today's Diagnoses     Benign essential hypertension    -  1    Other dysphagia        Type 2 diabetes mellitus without complication, without long-term current use of insulin (H)        Morbid obesity (H)        Tobacco dependence syndrome          Care Instructions    Thank you for allowing Dr. Maynard and our surgical team to participate in your care.  If you have a scheduling or an appointment question please contact our Health Unit Coordinator at her direct line [phone].   ALL nursing questions or concerns can be directed to your surgical nurse at: [phone]    You are scheduled for : upper endoscopy (EGD)   Your procedure date is: 8/2/18    [facility] will contact you 1-2 days prior to your surgery with your arrival time.     Your procedure will be at the [facility] in [facility] [address] [place] [address]   [phone]  Fax Number [phone]    You need a responsible adult available to drive you home and stay with you for 24 hours after you leave the hospital. You will not be allowed to drive yourself. IF you need to take a taxi or the bus you must have a responsible person to ride with you. YOUR PROCEDURE WILL BE CANCELLED IF YOU DO NOT HAVE A RESPONSIBLE ADULT TO DRIVE YOU HOME.       Stop your aspirin or other NSAIDs(Ibuprofen, Motrin, Aleve, Celebrex, Naproxen, etc...) 7 days before your surgery. If you are on blood thinners, please call your primary care provider for instruction.       [facility] will call you the day before your surgery with your arrival time 1 to 2 days prior to surgery.      Please call your primary care physician if you should  become ill within 24 hours of scheduled surgery. (ex.vomiting, diarrhea, fever)      Lab and X-ray needed today.    You CANNOT have any solid food after 10:00pm the night before your surgery.    You may drink clear liquids up until 4 hours prior to your arrival time the day of surgery.              Follow-ups after your visit        Your next 10 appointments already scheduled     Jul 25, 2018  9:00 AM CDT   (Arrive by 8:45 AM)   Diabetic Education with Lela Mejia RD   [facility] ([facility] )    [address] [place] [address] [phone]            Oct 22, 2018  2:30 PM CDT   (Arrive by 2:15 PM)   SHORT with Marion Davis NP   [facility]    [address]  [facility] [address] [phone]              Who to contact     If you have questions or need follow up information about today's clinic visit or your schedule please contact [facility] directly at [phone].  Normal or non-critical lab and imaging results will be communicated to you by MyChart, letter or phone within 4 business days after the clinic has received the results. If you do not hear from us within 7 days, please contact the clinic through MyChart or phone. If you have a critical or abnormal lab result, we will notify you by phone as soon as possible.  Submit refill requests through MyWobile or call your pharmacy and they will forward the refill request to us. Please allow 3 business days for your refill to be completed.          Additional Information About Your Visit        Care EveryWhere ID     This is your Care EveryWhere ID. This could be used by other organizations to access your [place] medical records  [id number]        Your Vitals Were     Pulse Temperature Respirations Height Pulse Oximetry BMI (Body Mass Index)    120 98.1  F (36.7  C) (Tympanic) 14 6' (1.829 m) 97% 36.21 kg/m2       Blood Pressure from  Last 3 Encounters:   07/24/18 (!) 124/100   07/23/18 (!) 120/98   06/22/18 (!) 130/98    Weight from Last 3 Encounters:   07/24/18 267 lb (121.1 kg)   07/23/18 267 lb (121.1 kg)   06/22/18 272 lb (123.4 kg)              We Performed the Following     CBC with platelets     EKG 12-lead complete w/read - (Clinic Performed)        Primary Care Provider Office Phone # Fax #    Marion FALCONNicholas Davis [phone] [address] [facility] [address]        Equal Access to Services     LUC TRIMBLE : Henri Nash, wapedro pablo chau, qakeshav kaalmavance kirkland, ekta holly. So [facility] [phone].    ATENCIÓN: Si habla español, tiene a gruber disposición servicios gratuitos de asistencia lingüística. Llame al [phone].    We comply with applicable federal civil rights laws and [place] laws. We do not discriminate on the basis of race, color, national origin, age, disability, sex, sexual orientation, or gender identity.            Thank you!     Thank you for choosing [facility]  for your care. Our goal is always to provide you with excellent care. Hearing back from our patients is one way we can continue to improve our services. Please take a few minutes to complete the written survey that you may receive in the mail after your visit with us. Thank you!             Your Updated Medication List - Protect others around you: Learn how to safely use, store and throw away your medicines at www.disposemymeds.org.          This list is accurate as of 7/24/18  3:54 PM.  Always use your most recent med list.                   Brand Name Dispense Instructions for use Diagnosis    aspirin 81 MG EC tablet     90 tablet    Take 1 tablet (81 mg) by mouth daily    Type 2 diabetes mellitus without complication, without long-term current use of insulin (H), Benign essential hypertension       atorvastatin 10 MG tablet    LIPITOR    90 tablet     Take 1 tablet (10 mg) by mouth At Bedtime    Hyperlipidemia LDL goal <100       ibuprofen 800 MG tablet    ADVIL/MOTRIN    90 tablet    TAKE 1 TABLET(800 MG) BY MOUTH EVERY 8 HOURS AS NEEDED FOR MODERATE PAIN    Acute right-sided low back pain with right-sided sciatica, Right-sided low back pain with right-sided sciatica, unspecified chronicity       losartan 25 MG tablet    COZAAR    15 tablet    Take 0.5 tablets (12.5 mg) by mouth daily    Benign essential hypertension       metFORMIN 500 MG 24 hr tablet    GLUCOPHAGE-XR    180 tablet    Take 1 tablet (500 mg) by mouth 2 times daily (with meals)    Type 2 diabetes mellitus without complication, without long-term current use of insulin (H)       triamcinolone 0.1 % cream    KENALOG    80 g    Apply sparingly to affected area three times daily as needed    Eczema, unspecified type

## 2018-07-24 NOTE — PATIENT INSTRUCTIONS
Thank you for allowing Dr. Maynard and our surgical team to participate in your care.  If you have a scheduling or an appointment question please contact our Health Unit Coordinator at her direct line [phone].   ALL nursing questions or concerns can be directed to your surgical nurse at: [phone]    You are scheduled for : upper endoscopy (EGD)   Your procedure date is: 8/2/18    [facility] will contact you 1-2 days prior to your surgery with your arrival time.     Your procedure will be at the [facility] in [facility] [address] [facility] [address]   [phone]  Fax Number [phone]    You need a responsible adult available to drive you home and stay with you for 24 hours after you leave the hospital. You will not be allowed to drive yourself. IF you need to take a taxi or the bus you must have a responsible person to ride with you. YOUR PROCEDURE WILL BE CANCELLED IF YOU DO NOT HAVE A RESPONSIBLE ADULT TO DRIVE YOU HOME.       Stop your aspirin or other NSAIDs(Ibuprofen, Motrin, Aleve, Celebrex, Naproxen, etc...) 7 days before your surgery. If you are on blood thinners, please call your primary care provider for instruction.       [facility] will call you the day before your surgery with your arrival time 1 to 2 days prior to surgery.      Please call your primary care physician if you should become ill within 24 hours of scheduled surgery. (ex.vomiting, diarrhea, fever)      Lab and X-ray needed today.    You CANNOT have any solid food after 10:00pm the night before your surgery.    You may drink clear liquids up until 4 hours prior to your arrival time the day of surgery.

## 2018-07-24 NOTE — PROGRESS NOTES
[facility] Surgery Consultation    CC:  Dysphagia    HPI:  This 48 year old year old male is seen at the request of Hemant Davis for evaluation of dysphagia.  The history is obtained from the patient, and reviewing the medical record.  He is good medical historian.  Mr. Juárez states that he has had a long history of dysphasia.  He says he has had problems over the past year and a half for food will get stuck his lower esophagus.  He will then have to have water to try and wash it down.  He has not had any pain with swallowing.  He does not have any regurgitation, reflux, atypical gastroesophageal reflux symptoms.  He has noted over the past few weeks that this dysphagia has been getting worse and is concerned that he has a hiatal hernia.  He has never undergone endoscopy in the past.  He has no lower abdominal symptoms including constipation or diarrhea.    Past Medical History:   Diagnosis Date     Anxiety 8/15/2017     Benign essential hypertension 1/20/2017     Herniated intervertebral disc of lumbar spine 1/20/2017     Tobacco dependence syndrome 1/20/2017       History reviewed. No pertinent surgical history.    Pt denied problems with bleeding or anesthesia    Prior to Admission medications    Medication Sig Start Date End Date Taking? Authorizing Provider   ibuprofen (ADVIL/MOTRIN) 800 MG tablet TAKE 1 TABLET(800 MG) BY MOUTH EVERY 8 HOURS AS NEEDED FOR MODERATE PAIN 12/8/17  Yes Marion Davis NP   losartan (COZAAR) 25 MG tablet Take 0.5 tablets (12.5 mg) by mouth daily 6/22/18  Yes Marion Davis NP   metFORMIN (GLUCOPHAGE-XR) 500 MG 24 hr tablet Take 1 tablet (500 mg) by mouth 2 times daily (with meals) 6/29/18  Yes Marion Davis NP   triamcinolone (KENALOG) 0.1 % cream Apply sparingly to affected area three times daily as needed 7/23/18  Yes Marion Davis NP   aspirin 81 MG EC tablet Take 1 tablet (81 mg) by mouth daily 6/22/18   Marion Davis  IGOR, NP   atorvastatin (LIPITOR) 10 MG tablet Take 1 tablet (10 mg) by mouth At Bedtime  Patient not taking: Reported on 7/23/2018 6/25/18   Kaci-Marion Sanchez NP          Allergies   Allergen Reactions     Tdap [Daptacel]      Flue like symptoms          HABITS:    Social History   Substance Use Topics     Smoking status: Current Some Day Smoker     Packs/day: 1.50     Types: Cigarettes     Start date: 1/15/2017     Smokeless tobacco: Never Used     Alcohol use 0.0 oz/week     0 Standard drinks or equivalent per week     No mood altering drug use.    History reviewed. No pertinent family history.    REVIEW OF SYSTEMS:  Ten point review of systems negative except those mentioned in the HPI.     The patient denies sleep apnea, latex allergies or MRSA    OBJECTIVE:    BP (!) 124/100 (BP Location: Right arm, Patient Position: Sitting, Cuff Size: Adult Large)  Pulse 120  Temp 98.1  F (36.7  C) (Tympanic)  Resp 14  Ht 6' (1.829 m)  Wt 267 lb (121.1 kg)  SpO2 97%  BMI 36.21 kg/m2    GENERAL: Generally appears well, in no distress with appropriate affect.  HEENT:   Sclerae anicteric - No cervical, supra/infraclavicular lymphadenopathy, no thyroid masses  Respiratory:  Lungs clear to ausculation bilaterally with good air excursion  Cardiovascular:  Regular Rate and Rhythm with no murmurs gallops or rubs, normal   Abdomen: soft, NT/ND  :  deferred  Extremities:  Extremities normal. No deformities, edema, or skin discoloration.  Skin:  no suspicious lesions or rashes  Neurological: grossly intact    Psych:  Alert, oriented, affect appropriate with normal decision making ability.      IMPRESSION:  48-year-old male with dysphagia    PLAN:  We will proceed forward with upper endoscopy for this patient's of dysphasia.  An informed consent was obtained documenting the risks including but not limited to bleeding, perforation, need for operative intervention.  All questions concerns were addressed.  We will proceed  forward at a mutually convenient date and time.        Serafin Maynard MD    7/24/2018  5:08 PM    cc:  Hemant Davis

## 2018-07-24 NOTE — NURSING NOTE
Chief Complaint   Patient presents with     Consult     referred by Hemant Sanchez NP for difficulty swalowing for one year,  feels like food getting stuck half-way, sometimes comes back up       Initial BP (!) 124/100 (BP Location: Right arm, Patient Position: Sitting, Cuff Size: Adult Large)  Pulse 120  Temp 98.1  F (36.7  C) (Tympanic)  Resp 14  Ht 6' (1.829 m)  Wt 267 lb (121.1 kg)  SpO2 97%  BMI 36.21 kg/m2 Estimated body mass index is 36.21 kg/(m^2) as calculated from the following:    Height as of this encounter: 6' (1.829 m).    Weight as of this encounter: 267 lb (121.1 kg).  Medication Reconciliation: complete    Maureen Dejesus LPN

## 2018-07-25 ENCOUNTER — TELEPHONE (OUTPATIENT)
Dept: EDUCATION SERVICES | Facility: HOSPITAL | Age: 48
End: 2018-07-25

## 2018-07-25 ENCOUNTER — HOSPITAL ENCOUNTER (OUTPATIENT)
Dept: EDUCATION SERVICES | Facility: HOSPITAL | Age: 48
Discharge: HOME | End: 2018-07-25
Attending: NURSE PRACTITIONER | Admitting: NURSE PRACTITIONER
Payer: MEDICAID

## 2018-07-25 VITALS
HEART RATE: 118 BPM | HEIGHT: 72 IN | WEIGHT: 270 LBS | SYSTOLIC BLOOD PRESSURE: 128 MMHG | OXYGEN SATURATION: 98 % | BODY MASS INDEX: 36.57 KG/M2 | DIASTOLIC BLOOD PRESSURE: 88 MMHG | RESPIRATION RATE: 18 BRPM

## 2018-07-25 DIAGNOSIS — E11.65 TYPE 2 DIABETES MELLITUS WITH HYPERGLYCEMIA, WITHOUT LONG-TERM CURRENT USE OF INSULIN (H): Primary | ICD-10-CM

## 2018-07-25 PROCEDURE — 97802 MEDICAL NUTRITION INDIV IN: CPT | Performed by: DIETITIAN, REGISTERED

## 2018-07-25 ASSESSMENT — PAIN SCALES - GENERAL: PAINLEVEL: NO PAIN (0)

## 2018-07-25 NOTE — NURSING NOTE
Chief Complaint   Patient presents with     Diabetes       Initial /88  Pulse 118  Resp 18  Ht 1.829 m (6')  Wt 122.5 kg (270 lb)  SpO2 98%  BMI 36.62 kg/m2 Estimated body mass index is 36.62 kg/(m^2) as calculated from the following:    Height as of this encounter: 1.829 m (6').    Weight as of this encounter: 122.5 kg (270 lb).  Medication Reconciliation: complete    Marci Serrano LPN

## 2018-07-25 NOTE — IP AVS SNAPSHOT
MRN:[id number]                      After Visit Summary   7/25/2018    Rojelio Juárez    MRN: [id number]           Thank you!     Thank you for choosing [facility] for your care. Our goal is always to provide you with excellent care. Hearing back from our patients is one way we can continue to improve our services. Please take a few minutes to complete the written survey that you may receive in the mail after you visit with us. Thank you!        Patient Information     Date Of Birth          1970        About your hospital stay     You were admitted on:  July 25, 2018 You last received care in the:  [facility]    You were discharged on:  July 25, 2018       Who to Call     For medical emergencies, please call 911.  For non-urgent questions about your medical care, please call your primary care provider or clinic, [phone]          Attending Provider     Provider Specialty    Marion Davis NP Family Practice       Primary Care Provider Office Phone # Fax #    Marion Davis [phone]      Your next 10 appointments already scheduled     Oct 22, 2018  2:30 PM CDT   (Arrive by 2:15 PM)   SHORT with Marion Davis NP   [facility] ([facility] )    [address]  [facility] [address]   [phone]              Further instructions from your care team       -Follow carbohydrate counting meal plan - 60 grams/meal, 15-30 grams/snack.   -Try to begin some routine walking for exercise - goal is 30 minutes most days of the week.   -Test your glucose 2x/day - fasting and 2 hours after supper meal.   -Target levels are fasting  and 2 hours after supper meal less than 180.   -Increase your Metformin to 1000 mg 2x/day.   -Bring your meter and book to follow up session.   -Follow up in 2 weeks.   -Call with any concerns - MARGARETH Saenz, [phone].     Pending Results     No  orders found from 7/23/2018 to 7/26/2018.            Admission Information     Date & Time Provider Department Dept. Phone    7/25/2018 Marion Davis NP [facility] [phone]      Your Vitals Were     Blood Pressure Pulse Respirations Height Weight Pulse Oximetry    128/88 118 18 1.829 m (6') 122.5 kg (270 lb) 98%    BMI (Body Mass Index)                   36.62 kg/m2           Care EveryWhere ID     This is your Care EveryWhere ID. This could be used by other organizations to access your [facility] medical records  [id number]        Equal Access to Services     [facility] : Haddiego Nash, leland chau, ilda kirkland, ekta rand . [facility] [phone].    ATENCIÓN: Si habla español, tiene a gruber disposición servicios gratuitos de asistencia lingüística. Ll[facility] [phone].    We comply with applicable federal civil rights laws and [place] laws. We do not discriminate on the basis of race, color, national origin, age, disability, sex, sexual orientation, or gender identity.               Review of your medicines      UNREVIEWED medicines. Ask your doctor about these medicines        Dose / Directions    aspirin 81 MG EC tablet   Used for:  Type 2 diabetes mellitus without complication, without long-term current use of insulin (H), Benign essential hypertension        Dose:  81 mg   Take 1 tablet (81 mg) by mouth daily   Quantity:  90 tablet   Refills:  3       atorvastatin 10 MG tablet   Commonly known as:  LIPITOR   Used for:  Hyperlipidemia LDL goal <100        Dose:  10 mg   Take 1 tablet (10 mg) by mouth At Bedtime   Quantity:  90 tablet   Refills:  1       ibuprofen 800 MG tablet   Commonly known as:  ADVIL/MOTRIN   Used for:  Acute right-sided low back pain with right-sided sciatica, Right-sided low back pain with right-sided sciatica, unspecified chronicity        TAKE 1 TABLET(800 MG) BY MOUTH EVERY 8 HOURS AS NEEDED  FOR MODERATE PAIN   Quantity:  90 tablet   Refills:  0       losartan 25 MG tablet   Commonly known as:  COZAAR   Used for:  Benign essential hypertension        Dose:  12.5 mg   Take 0.5 tablets (12.5 mg) by mouth daily   Quantity:  15 tablet   Refills:  1       metFORMIN 500 MG 24 hr tablet   Commonly known as:  GLUCOPHAGE-XR   Used for:  Type 2 diabetes mellitus without complication, without long-term current use of insulin (H)        Dose:  500 mg   Take 1 tablet (500 mg) by mouth 2 times daily (with meals)   Quantity:  180 tablet   Refills:  1       triamcinolone 0.1 % cream   Commonly known as:  KENALOG   Used for:  Eczema, unspecified type        Apply sparingly to affected area three times daily as needed   Quantity:  80 g   Refills:  0                Protect others around you: Learn how to safely use, store and throw away your medicines at www.disposemymeds.org.             Medication List: This is a list of all your medications and when to take them. Check marks below indicate your daily home schedule. Keep this list as a reference.      Medications           Morning Afternoon Evening Bedtime As Needed    aspirin 81 MG EC tablet   Take 1 tablet (81 mg) by mouth daily                                atorvastatin 10 MG tablet   Commonly known as:  LIPITOR   Take 1 tablet (10 mg) by mouth At Bedtime                                ibuprofen 800 MG tablet   Commonly known as:  ADVIL/MOTRIN   TAKE 1 TABLET(800 MG) BY MOUTH EVERY 8 HOURS AS NEEDED FOR MODERATE PAIN                                losartan 25 MG tablet   Commonly known as:  COZAAR   Take 0.5 tablets (12.5 mg) by mouth daily                                metFORMIN 500 MG 24 hr tablet   Commonly known as:  GLUCOPHAGE-XR   Take 1 tablet (500 mg) by mouth 2 times daily (with meals)                                triamcinolone 0.1 % cream   Commonly known as:  KENALOG   Apply sparingly to affected area three times daily as needed

## 2018-07-25 NOTE — DISCHARGE INSTRUCTIONS
-Follow carbohydrate counting meal plan - 60 grams/meal, 15-30 grams/snack.   -Try to begin some routine walking for exercise - goal is 30 minutes most days of the week.   -Test your glucose 2x/day - fasting and 2 hours after supper meal.   -Target levels are fasting  and 2 hours after supper meal less than 180.   -Increase your Metformin to 1000 mg 2x/day.   -Bring your meter and book to follow up session.   -Follow up in 2 weeks.   -Call with any concerns - MARGARETH Saenz, [phone].

## 2018-07-25 NOTE — TELEPHONE ENCOUNTER
Pt was here today for session 1 visit.  He is taking Metformin  mg bid.  Glucose level today 2.5 hours post breakfast was 312.  Okay to increase to 1000 mg bid?  Thanks!

## 2018-07-25 NOTE — IP AVS SNAPSHOT
[facility]    [address] [place] [id number]    Phone:  [phone]    Fax:  [phone]                                       After Visit Summary   7/25/2018    Rojelio Juárez    MRN: [id number]           After Visit Summary Signature Page     I have received my discharge instructions, and my questions have been answered. I have discussed any challenges I see with this plan with the nurse or doctor.    ..........................................................................................................................................  Patient/Patient Representative Signature      ..........................................................................................................................................  Patient Representative Print Name and Relationship to Patient    ..................................................               ................................................  Date                                            Time    ..........................................................................................................................................  Reviewed by Signature/Title    ...................................................              ..............................................  Date                                                            Time

## 2018-07-26 NOTE — PROGRESS NOTES
Pt here for Session 1.  New dx of diabetes.  A1c 9.4%.     Blood pressure 128/88, pulse 118, resp. rate 18, height 1.829 m (6'), weight 122.5 kg (270 lb), SpO2 98 %.  Pt reports weight gain since back injury Dec 2016.  Weight was up to 290# at one time - states he has lost about 20# in the past 6 weeks and would like to get down to 220#.      Current diabetes medications: Metformin  mg bid.  No side effects reported.     Current glucose levels: not testing.     Readiness to learn: very willing.     Barriers to learning: none.      Pt states that since dx he has been eating less bread, juice, reading labels and has stopped eating chips and candy.      Topics covered: diabetes pathophysiology, symptoms, diagnosis, treatments, medication actions, BG self-monitoring, HgbA1c, targets (blood sugar/A1c), sharps disposal, complications and risk factors,  food planning/carbohydrate counting meal plan, portion control, label reading and benefits of physical activity.    Pt actively participate and demonstrated adequate understanding of topics discussed.    Pt instructed on One Touch Verio glucose meter and appropriately demonstrated use with minimal cueing. Random BG 2.5 hours post parandial = 312.  Testing supplies ordered.     Plan: Follow carbohydrate counting meal plan provided.  Try to begin walking for exercise - pt noted he has no restrictions r/t hx of back surgery.  Test glucose 2x/day - fasting and 2 hours after supper meal.  Increase Metformin XR to 1000 mg bid per discussion with provider.      Follow up: Session 2 - two weeks.     Total time spent with patient: 75 minutes.      Lela Mejia, MARGARETH, CDE

## 2018-07-31 DIAGNOSIS — Z01.818 PREOP EXAMINATION: Primary | ICD-10-CM

## 2018-07-31 DIAGNOSIS — Z01.818 PREOP EXAMINATION: ICD-10-CM

## 2018-07-31 LAB
ERYTHROCYTE [DISTWIDTH] IN BLOOD BY AUTOMATED COUNT: 11.9 % (ref 10–15)
EST. AVERAGE GLUCOSE BLD GHB EST-MCNC: 220 MG/DL
HBA1C MFR BLD: 9.3 % (ref 0–5.6)
HCT VFR BLD AUTO: 50.1 % (ref 40–53)
HGB BLD-MCNC: 17.7 G/DL (ref 13.3–17.7)
MCH RBC QN AUTO: 33.2 PG (ref 26.5–33)
MCHC RBC AUTO-ENTMCNC: 35.3 G/DL (ref 31.5–36.5)
MCV RBC AUTO: 94 FL (ref 78–100)
PLATELET # BLD AUTO: 160 10E9/L (ref 150–450)
RBC # BLD AUTO: 5.33 10E12/L (ref 4.4–5.9)
WBC # BLD AUTO: 5.4 10E9/L (ref 4–11)

## 2018-07-31 PROCEDURE — 83036 HEMOGLOBIN GLYCOSYLATED A1C: CPT | Mod: ZL | Performed by: ANESTHESIOLOGY

## 2018-07-31 PROCEDURE — 40000788 ZZHCL STATISTIC ESTIMATED AVERAGE GLUCOSE: Mod: ZL | Performed by: ANESTHESIOLOGY

## 2018-07-31 PROCEDURE — 36415 COLL VENOUS BLD VENIPUNCTURE: CPT | Mod: ZL | Performed by: ANESTHESIOLOGY

## 2018-07-31 PROCEDURE — 85027 COMPLETE CBC AUTOMATED: CPT | Mod: ZL | Performed by: ANESTHESIOLOGY

## 2018-08-02 ENCOUNTER — RESULTS ONLY (OUTPATIENT)
Dept: LAB | Age: 48
End: 2018-08-02

## 2018-08-02 ENCOUNTER — OFFICE VISIT (OUTPATIENT)
Dept: ANESTHESIOLOGY | Facility: HOSPITAL | Age: 48
End: 2018-08-02
Attending: SURGERY
Payer: COMMERCIAL

## 2018-08-02 ENCOUNTER — TRANSFERRED RECORDS (OUTPATIENT)
Dept: HEALTH INFORMATION MANAGEMENT | Facility: CLINIC | Age: 48
End: 2018-08-02

## 2018-08-02 PROCEDURE — 43239 EGD BIOPSY SINGLE/MULTIPLE: CPT | Performed by: SURGERY

## 2018-08-02 PROCEDURE — 00731 ANES UPR GI NDSC PX NOS: CPT | Mod: QZ | Performed by: NURSE ANESTHETIST, CERTIFIED REGISTERED

## 2018-08-06 LAB — COPATH REPORT: NORMAL

## 2018-08-08 ENCOUNTER — TELEPHONE (OUTPATIENT)
Dept: EDUCATION SERVICES | Facility: HOSPITAL | Age: 48
End: 2018-08-08

## 2018-08-08 ENCOUNTER — HOSPITAL ENCOUNTER (OUTPATIENT)
Dept: EDUCATION SERVICES | Facility: HOSPITAL | Age: 48
Discharge: HOME | End: 2018-08-08
Attending: NURSE PRACTITIONER | Admitting: NURSE PRACTITIONER
Payer: COMMERCIAL

## 2018-08-08 VITALS
HEART RATE: 108 BPM | SYSTOLIC BLOOD PRESSURE: 133 MMHG | HEIGHT: 72 IN | DIASTOLIC BLOOD PRESSURE: 98 MMHG | OXYGEN SATURATION: 97 % | WEIGHT: 271.8 LBS | RESPIRATION RATE: 18 BRPM | BODY MASS INDEX: 36.82 KG/M2

## 2018-08-08 DIAGNOSIS — E11.65 TYPE 2 DIABETES MELLITUS WITH HYPERGLYCEMIA, WITHOUT LONG-TERM CURRENT USE OF INSULIN (H): Primary | ICD-10-CM

## 2018-08-08 PROCEDURE — 97803 MED NUTRITION INDIV SUBSEQ: CPT | Performed by: DIETITIAN, REGISTERED

## 2018-08-08 RX ORDER — LIRAGLUTIDE 6 MG/ML
INJECTION SUBCUTANEOUS
Qty: 6 ML | Refills: 3 | Status: SHIPPED | OUTPATIENT
Start: 2018-08-08 | End: 2018-09-07 | Stop reason: SINTOL

## 2018-08-08 ASSESSMENT — PAIN SCALES - GENERAL: PAINLEVEL: NO PAIN (0)

## 2018-08-08 NOTE — NURSING NOTE
Chief Complaint   Patient presents with     Diabetes     Session 2       Initial /98 (BP Location: Right arm, Patient Position: Sitting, Cuff Size: Adult Large)  Pulse 108  Resp 18  Ht 1.829 m (6')  Wt 123.3 kg (271 lb 12.8 oz)  SpO2 97%  BMI 36.86 kg/m2 Estimated body mass index is 36.86 kg/(m^2) as calculated from the following:    Height as of this encounter: 1.829 m (6').    Weight as of this encounter: 123.3 kg (271 lb 12.8 oz).  Medication Reconciliation: complete    GLENNA MARTINEZ LPN

## 2018-08-08 NOTE — DISCHARGE INSTRUCTIONS
-Continue to limit foods high in carbohydrates in your diet.   -Be as active as you can - exercise goal is 30 minutes most days of the week.   -Test your glucose 2x/day - fasting and 2 hours after supper meal.   -Target levels are fasting  and 2 hours after supper less than 180.   -I will call you regarding Victoza.  Your dose will be 0.6 mg daily x 1 week.  If you have no side effects then increase to 1.2 mg daily and stay on that dose.   -Keep taking your Metformin.   -Follow up in one month.   -Call with any concerns - MARGARETH Saenz, [phone].

## 2018-08-08 NOTE — IP AVS SNAPSHOT
[facility]    [address] [place] [id number]    Phone:  [phone]    Fax:  [phone]                                       After Visit Summary   8/8/2018    Rojelio Juárez    MRN: [id number]           After Visit Summary Signature Page     I have received my discharge instructions, and my questions have been answered. I have discussed any challenges I see with this plan with the nurse or doctor.    ..........................................................................................................................................  Patient/Patient Representative Signature      ..........................................................................................................................................  Patient Representative Print Name and Relationship to Patient    ..................................................               ................................................  Date                                            Time    ..........................................................................................................................................  Reviewed by Signature/Title    ...................................................              ..............................................  Date                                                            Time

## 2018-08-08 NOTE — IP AVS SNAPSHOT
MRN:[id number]                      After Visit Summary   8/8/2018    Rojelio Juárez    MRN: [id number]           Thank you!     Thank you for choosing [facility] for your care. Our goal is always to provide you with excellent care. Hearing back from our patients is one way we can continue to improve our services. Please take a few minutes to complete the written survey that you may receive in the mail after you visit with us. Thank you!        Patient Information     Date Of Birth          1970        About your hospital stay     You were admitted on:  August 8, 2018 You last received care in the:  [facility]    You were discharged on:  August 8, 2018       Who to Call     For medical emergencies, please call 911.  For non-urgent questions about your medical care, please call your primary care provider or clinic, [phone]          Attending Provider     Provider Specialty    Charity Lamb NP [facility]       Primary Care Provider Office Phone # Fax #    Marion Davis [phone]      Your next 10 appointments already scheduled     Oct 22, 2018  2:30 PM CDT   (Arrive by 2:15 PM)   SHORT with Marion Davis NP   [facility] ([facility] )    [address]  [facility] [address]   [phone]              Further instructions from your care team       -Continue to limit foods high in carbohydrates in your diet.   -Be as active as you can - exercise goal is 30 minutes most days of the week.   -Test your glucose 2x/day - fasting and 2 hours after supper meal.   -Target levels are fasting  and 2 hours after supper less than 180.   -I will call you regarding Victoza.  Your dose will be 0.6 mg daily x 1 week.  If you have no side effects then increase to 1.2 mg daily and stay on that dose.   -Keep taking your Metformin.   -Follow up in one month.   -Call with any concerns - Lela Mejia,  MARGARETH, [facility] [phone].     Pending Results     No orders found from 8/6/2018 to 8/9/2018.            Admission Information     Date & Time Provider Department Dept. Phone    8/8/2018 Charity Lamb NP [facility] [phone]      Your Vitals Were     Blood Pressure Pulse Respirations Height Weight Pulse Oximetry    133/98 (BP Location: Right arm, Patient Position: Sitting, Cuff Size: Adult Large) 108 18 1.829 m (6') 123.3 kg (271 lb 12.8 oz) 97%    BMI (Body Mass Index)                   36.86 kg/m2           Care EveryWhere ID     This is your Care EveryWhere ID. This could be used by other organizations to access your [place] medical records  [id number]        Equal Access to Services     LUC TRIMBLE : Henri Nash, waaxda luqadaha, qaybta kaalmada adeeveliayavance, ekta holly. So [facility] [phone].    ATENCIÓN: Si habla español, tiene a gruber disposición servicios gratuitos de asistencia lingüística. Llame al [phone].    We comply with applicable federal civil rights laws and [place] laws. We do not discriminate on the basis of race, color, national origin, age, disability, sex, sexual orientation, or gender identity.               Review of your medicines      UNREVIEWED medicines. Ask your doctor about these medicines        Dose / Directions    aspirin 81 MG EC tablet   Used for:  Type 2 diabetes mellitus without complication, without long-term current use of insulin (H), Benign essential hypertension        Dose:  81 mg   Take 1 tablet (81 mg) by mouth daily   Quantity:  90 tablet   Refills:  3       atorvastatin 10 MG tablet   Commonly known as:  LIPITOR   Used for:  Hyperlipidemia LDL goal <100        Dose:  10 mg   Take 1 tablet (10 mg) by mouth At Bedtime   Quantity:  90 tablet   Refills:  1       ibuprofen 800 MG tablet   Commonly known as:  ADVIL/MOTRIN   Used for:  Acute right-sided low back pain with right-sided sciatica, Right-sided low  back pain with right-sided sciatica, unspecified chronicity        TAKE 1 TABLET(800 MG) BY MOUTH EVERY 8 HOURS AS NEEDED FOR MODERATE PAIN   Quantity:  90 tablet   Refills:  0       losartan 25 MG tablet   Commonly known as:  COZAAR   Used for:  Benign essential hypertension        Dose:  12.5 mg   Take 0.5 tablets (12.5 mg) by mouth daily   Quantity:  15 tablet   Refills:  1       metFORMIN 500 MG 24 hr tablet   Commonly known as:  GLUCOPHAGE-XR   Used for:  Type 2 diabetes mellitus without complication, without long-term current use of insulin (H)        Dose:  500 mg   Take 1 tablet (500 mg) by mouth 2 times daily (with meals)   Quantity:  180 tablet   Refills:  1       triamcinolone 0.1 % cream   Commonly known as:  KENALOG   Used for:  Eczema, unspecified type        Apply sparingly to affected area three times daily as needed   Quantity:  80 g   Refills:  0         CONTINUE these medicines which have NOT CHANGED        Dose / Directions    blood glucose monitoring lancets   Used for:  Type 2 diabetes mellitus with hyperglycemia, without long-term current use of insulin (H)        Use to test blood sugar 2 times daily.   Quantity:  100 each   Refills:  3       blood glucose monitoring test strip   Commonly known as:  ONETOUCH VERIO IQ   Used for:  Type 2 diabetes mellitus with hyperglycemia, without long-term current use of insulin (H)        Use to test blood sugar 2 times daily.   Quantity:  100 each   Refills:  3                Protect others around you: Learn how to safely use, store and throw away your medicines at www.disposemymeds.org.             Medication List: This is a list of all your medications and when to take them. Check marks below indicate your daily home schedule. Keep this list as a reference.      Medications           Morning Afternoon Evening Bedtime As Needed    aspirin 81 MG EC tablet   Take 1 tablet (81 mg) by mouth daily                                atorvastatin 10 MG tablet    Commonly known as:  LIPITOR   Take 1 tablet (10 mg) by mouth At Bedtime                                blood glucose monitoring lancets   Use to test blood sugar 2 times daily.                                blood glucose monitoring test strip   Commonly known as:  ONETOUCH VERIO IQ   Use to test blood sugar 2 times daily.                                ibuprofen 800 MG tablet   Commonly known as:  ADVIL/MOTRIN   TAKE 1 TABLET(800 MG) BY MOUTH EVERY 8 HOURS AS NEEDED FOR MODERATE PAIN                                losartan 25 MG tablet   Commonly known as:  COZAAR   Take 0.5 tablets (12.5 mg) by mouth daily                                metFORMIN 500 MG 24 hr tablet   Commonly known as:  GLUCOPHAGE-XR   Take 1 tablet (500 mg) by mouth 2 times daily (with meals)                                triamcinolone 0.1 % cream   Commonly known as:  KENALOG   Apply sparingly to affected area three times daily as needed

## 2018-08-08 NOTE — TELEPHONE ENCOUNTER
Pt is here today for diabetes follow up.  He was unable to tolerate maximum dose of Metformin XR.  He is able to tolerate 750 mg bid.  Glucose levels remain in 200's - some 300's throughout the day.  Okay to begin Victoza 0.6 mg daily and titrate dose?  Thanks!

## 2018-08-20 ENCOUNTER — TELEPHONE (OUTPATIENT)
Dept: FAMILY MEDICINE | Facility: OTHER | Age: 48
End: 2018-08-20

## 2018-08-20 DIAGNOSIS — K21.9 GASTROESOPHAGEAL REFLUX DISEASE, ESOPHAGITIS PRESENCE NOT SPECIFIED: Primary | ICD-10-CM

## 2018-08-20 NOTE — TELEPHONE ENCOUNTER
12:03 PM    Reason for Call: Phone Call    Description: pt has apt on Aug 29th for throat issues, he would like a referral and wants to know if he still needs this apt.    Was an appointment offered for this call? {YES /   If yes : Appointment type               Date08/29/18    Preferred method for responding to this message: telephone  What is your phone number ?[phone]    If we cannot reach you directly, may we leave a detailed response at the number you provided? no    Can this message wait until your PCP/provider returns, if available today? no    Orquidea Orta

## 2018-08-20 NOTE — TELEPHONE ENCOUNTER
Pt notified referral for gastro is in cancelled appointment for 8/29/18 and he will follow up after gastro appointment  Pamela M Lechevalier LPN

## 2018-09-07 ENCOUNTER — HOSPITAL ENCOUNTER (OUTPATIENT)
Dept: EDUCATION SERVICES | Facility: HOSPITAL | Age: 48
Discharge: HOME | End: 2018-09-07
Attending: NURSE PRACTITIONER | Admitting: NURSE PRACTITIONER
Payer: COMMERCIAL

## 2018-09-07 ENCOUNTER — TELEPHONE (OUTPATIENT)
Dept: EDUCATION SERVICES | Facility: HOSPITAL | Age: 48
End: 2018-09-07

## 2018-09-07 VITALS
OXYGEN SATURATION: 98 % | HEART RATE: 110 BPM | DIASTOLIC BLOOD PRESSURE: 86 MMHG | SYSTOLIC BLOOD PRESSURE: 136 MMHG | BODY MASS INDEX: 36.96 KG/M2 | HEIGHT: 72 IN | WEIGHT: 272.9 LBS

## 2018-09-07 DIAGNOSIS — I10 BENIGN ESSENTIAL HYPERTENSION: ICD-10-CM

## 2018-09-07 DIAGNOSIS — E11.65 TYPE 2 DIABETES MELLITUS WITH HYPERGLYCEMIA, WITHOUT LONG-TERM CURRENT USE OF INSULIN (H): Primary | ICD-10-CM

## 2018-09-07 PROCEDURE — G0108 DIAB MANAGE TRN  PER INDIV: HCPCS | Performed by: DIETITIAN, REGISTERED

## 2018-09-07 ASSESSMENT — PAIN SCALES - GENERAL: PAINLEVEL: NO PAIN (0)

## 2018-09-07 NOTE — IP AVS SNAPSHOT
[facility]    [address] [place] [address]    Phone:  [phone]    Fax:  [phone]                                       After Visit Summary   9/7/2018    Rojelio Juárez    MRN: [id number]           After Visit Summary Signature Page     I have received my discharge instructions, and my questions have been answered. I have discussed any challenges I see with this plan with the nurse or doctor.    ..........................................................................................................................................  Patient/Patient Representative Signature      ..........................................................................................................................................  Patient Representative Print Name and Relationship to Patient    ..................................................               ................................................  Date                                            Time    ..........................................................................................................................................  Reviewed by Signature/Title    ...................................................              ..............................................  Date                                                            Time          22EPIC Rev 08/18

## 2018-09-07 NOTE — PATIENT INSTRUCTIONS
-Continue to limit foods high in carbohydrates.   -Try to get some exercise on most days.  Exercise will help lower your glucose levels.   -Test glucose 2x/day - fasting and 2 hours after supper meal.   -Target levels are fasting  and 2 hours after supper less than 180.   -Keep taking your Metformin XR 1000 mg 2x/day.    -Stop Victoza.   -I will call you regarding Jardiance.  Your starting dose will be 10 mg with breakfast.   -Follow up in one month.   -Call with any concerns - MARGARETH Saenz, [phone].

## 2018-09-07 NOTE — PROGRESS NOTES
Diabetes Self-Management Education & Support    Diabetes Education Self Management & Training    SUBJECTIVE/OBJECTIVE:  Presents for: Follow- - Session 3.   Accompanied by: Self  Diabetes education in the past 24mo: Yes  Focus of Visit: Monitoring  Diabetes type: Type 2  Date of diagnosis: 06/22/18  Disease course: Stable (Glucose levels remain elevated)  How confident are you filling out medical forms by yourself:: Extremely  Diabetes management related comments/concerns: Pt wants to get glucose levels down.    Transportation concerns: No  Other concerns:: None  Cultural Influences/Ethnic Background:  American    Diabetes Symptoms & Complications  Weight loss: No  Weight trend: Stable     Patient Problem List and Family Medical History reviewed for relevant medical history, current medical status, and diabetes risk factors.    Vitals:  /86  Pulse 110  Ht 1.829 m (6')  Wt 123.8 kg (272 lb 14.4 oz)  SpO2 98%  BMI 37.01 kg/m2  Estimated body mass index is 37.01 kg/(m^2) as calculated from the following:    Height as of this encounter: 1.829 m (6').    Weight as of this encounter: 123.8 kg (272 lb 14.4 oz).   Last 3 BP:   BP Readings from Last 3 Encounters:   09/07/18 136/86   08/08/18 133/98   07/25/18 128/88       History   Smoking Status     Current Some Day Smoker     Packs/day: 1.50     Types: Cigarettes     Start date: 1/15/2017   Smokeless Tobacco     Never Used     Comment: will quit some day       Labs:  Lab Results   Component Value Date    A1C 9.3 07/31/2018     Lab Results   Component Value Date     06/22/2018     Lab Results   Component Value Date     06/22/2018     HDL Cholesterol   Date Value Ref Range Status   06/22/2018 31 (L) >39 mg/dL Final   ]  GFR Estimate   Date Value Ref Range Status   06/22/2018 >90 >60 mL/min/1.7m2 Final     Comment:     Non  GFR Calc     GFR Estimate If Black   Date Value Ref Range Status   06/22/2018 >90 >60 mL/min/1.7m2 Final      Comment:      GFR Calc     Lab Results   Component Value Date    CR 0.75 2018     No results found for: MICROALBUMIN    Healthy Eating  Cultural/[employer] diet restrictions?: No  Patient on a regular basis:  (Pt is limiting foods high in carbohydrates)    Being Active  Exercise:: Yes  Days per week of moderate to strenuous exercise (like a brisk walk): 3  On average, minutes per day of exercise at this level: 30  How intense was your typical exercise? : Moderate (like brisk walking)  Exercise Minutes per Week: 90  Barrier to exercise: None    Monitoring  Did patient bring glucose meter to appointment? : Yes  Blood Glucose Meter: One Touch  Home Glucose (Sugar) Monitorin-2 times per day  Blood glucose trend: No change  [id number] with one at 135  Two hours post supper-312, 236, 250, 270  Two week average is 224.     Taking Medications  Diabetes Medication(s)     Biguanides Sig    metFORMIN (GLUCOPHAGE-XR) 500 MG 24 hr tablet Take 1 tablet (500 mg) by mouth 2 times daily (with meals)    Incretin Mimetic Agents (GLP-1 Receptor Agonists) Sig    liraglutide (VICTOZA PEN) 18 MG/3ML soln Inject 0.6 mg daily x 1 week.  If no side effects after 1 week inject 1.2 mg daily.          Current Treatments: Non-insulin Injectables, Oral Agent (monotherapy) (Metformin XR 1000 mg bid.  Stopped Victoza r/t nausea. )  Problems taking diabetes medications regularly?: No  Diabetes medication side effects?: Yes (Nausea from Victoza. )  Treatment Compliance: All of the time    Problem Solving  Hypoglycemia Frequency: Never     Reducing Risks  Diabetes Risks: Age over 45 years  CAD Risks: Diabetes Mellitus, Male sex, Tobacco exposure  Has dilated eye exam at least once a year?: No    Healthy Coping  Emotional response to diabetes: Concern for health and well-being, Ready to learn  Informal Support system:: Significant other  Stage of change: ACTION (Actively working towards change)  Difficulty affording  diabetes management supplies?: No  Support resources: None  Patient Activation Measure Survey Score:  QUANG Score (Last Two) 6/22/2018 7/23/2018   QUANG Raw Score 32 30   Activation Score 62.6 56   QUANG Level 3 3       ASSESSMENT:  Pt was unable to tolerate Victoza r/t nausea.  He tried reduced dose and still did not tolerate it.  Glucose levels remain elevated.      Goals        General    Reducing Risks (pt-stated)     Notes - Note created  9/7/2018 12:40 PM by Lela Mejia RD    Goal Statement: I will have lower glucose levels at next visit.     Measure of Success: meter download    Strengths: Pt is very compliant with recommendations.     Date to Achieve By: next visit.             Patient's most recent   Lab Results   Component Value Date    A1C 9.3 07/31/2018    is not meeting goal of <7.0    INTERVENTION:   Diabetes knowledge and skills assessment:     Patient is knowledgeable in diabetes management concepts related to: Healthy Eating, Being Active, Monitoring and Taking Medication    Patient needs further education on the following diabetes management concepts: Healthy Coping    Based on learning assessment above, most appropriate setting for further diabetes education would be: Individual setting.    Education provided today on:  AADE Self-Care Behaviors:  Healthy Eating: consistency in amount, composition, and timing of food intake and portion control  Being Active: relationship to blood glucose  Taking Medication: action of prescribed medication, side effects of prescribed medications and when to take medications  Reducing Risks: major complications of diabetes, prevention, early diagnostic measures and treatment of complications, foot care, appropriate dental care, annual eye exam, smoking cessation, A1C - goals, relating to blood glucose levels, how often to check and blood pressure and goals    Opportunities for ongoing education and support in diabetes-self management were discussed.    Pt verbalized  understanding of concepts discussed and recommendations provided today.       Education Materials Provided:  None    PLAN:  Continue to limit foods high in carbohydrates in diet.   Try to get some exercise most days of the week.   Test glucose 2x/day - fasting and 2 hours post supper.   Keep taking Metformin XR 1000 mg bid.   Discontinue Victoza r/t side effects.   Await provider response regarding Jardiance 10 mg daily.    See Patient Instructions for co-developed, patient-stated behavior change goals.  AVS printed and provided to patient today.  Follow up in one month.  Will speak with pt in two weeks via phone if Jardiance is ordered to check effectiveness and adjust dose if needed.     Time Spent: 60 minutes  Encounter Type: Individual    Any diabetes medication dose changes were made via the CDE Protocol and Collaborative Practice Agreement with the patient's referring provider. A copy of this encounter was shared with the provider.

## 2018-09-07 NOTE — TELEPHONE ENCOUNTER
Losartan  Last Written Prescription Date: 6/22/18  Last Fill Quantity: 15 # of Refills: 1  Last Office Visit: 7/23/18

## 2018-09-07 NOTE — LETTER
9/7/2018        RE: Rojelio Juárez  [address] [facility] [address]        Diabetes Self-Management Education & Support    Diabetes Education Self Management & Training    SUBJECTIVE/OBJECTIVE:  Presents for: Follow- - Session 3.   Accompanied by: Self  Diabetes education in the past 24mo: Yes  Focus of Visit: Monitoring  Diabetes type: Type 2  Date of diagnosis: 06/22/18  Disease course: Stable (Glucose levels remain elevated)  How confident are you filling out medical forms by yourself:: Extremely  Diabetes management related comments/concerns: Pt wants to get glucose levels down.    Transportation concerns: No  Other concerns:: None  Cultural Influences/Ethnic Background:  American    Diabetes Symptoms & Complications  Weight loss: No  Weight trend: Stable     Patient Problem List and Family Medical History reviewed for relevant medical history, current medical status, and diabetes risk factors.    Vitals:  /86  Pulse 110  Ht 1.829 m (6')  Wt 123.8 kg (272 lb 14.4 oz)  SpO2 98%  BMI 37.01 kg/m2  Estimated body mass index is 37.01 kg/(m^2) as calculated from the following:    Height as of this encounter: 1.829 m (6').    Weight as of this encounter: 123.8 kg (272 lb 14.4 oz).   Last 3 BP:   BP Readings from Last 3 Encounters:   09/07/18 136/86   08/08/18 133/98   07/25/18 128/88       History   Smoking Status     Current Some Day Smoker     Packs/day: 1.50     Types: Cigarettes     Start date: 1/15/2017   Smokeless Tobacco     Never Used     Comment: will quit some day       Labs:  Lab Results   Component Value Date    A1C 9.3 07/31/2018     Lab Results   Component Value Date     06/22/2018     Lab Results   Component Value Date     06/22/2018     HDL Cholesterol   Date Value Ref Range Status   06/22/2018 31 (L) >39 mg/dL Final   ]  GFR Estimate   Date Value Ref Range Status   06/22/2018 >90 >60 mL/min/1.7m2 Final     Comment:     Non  GFR Calc     GFR Estimate If Black    Date Value Ref Range Status   2018 >90 >60 mL/min/1.7m2 Final     Comment:      GFR Calc     Lab Results   Component Value Date    CR 0.75 2018     No results found for: MICROALBUMIN    Healthy Eating  Cultural/Evangel[employer] diet restrictions?: No  Patient on a regular basis:  (Pt is limiting foods high in carbohydrates)    Being Active  Exercise:: Yes  Days per week of moderate to strenuous exercise (like a brisk walk): 3  On average, minutes per day of exercise at this level: 30  How intense was your typical exercise? : Moderate (like brisk walking)  Exercise Minutes per Week: 90  Barrier to exercise: None    Monitoring  Did patient bring glucose meter to appointment? : Yes  Blood Glucose Meter: One Touch  Home Glucose (Sugar) Monitorin-2 times per day  Blood glucose trend: No change  [id number] with one at 135  Two hours post supper-312, 236, 250, 270  Two week average is 224.     Taking Medications  Diabetes Medication(s)     Biguanides Sig    metFORMIN (GLUCOPHAGE-XR) 500 MG 24 hr tablet Take 1 tablet (500 mg) by mouth 2 times daily (with meals)    Incretin Mimetic Agents (GLP-1 Receptor Agonists) Sig    liraglutide (VICTOZA PEN) 18 MG/3ML soln Inject 0.6 mg daily x 1 week.  If no side effects after 1 week inject 1.2 mg daily.          Current Treatments: Non-insulin Injectables, Oral Agent (monotherapy) (Metformin XR 1000 mg bid.  Stopped Victoza r/t nausea. )  Problems taking diabetes medications regularly?: No  Diabetes medication side effects?: Yes (Nausea from Victoza. )  Treatment Compliance: All of the time    Problem Solving  Hypoglycemia Frequency: Never     Reducing Risks  Diabetes Risks: Age over 45 years  CAD Risks: Diabetes Mellitus, Male sex, Tobacco exposure  Has dilated eye exam at least once a year?: No    Healthy Coping  Emotional response to diabetes: Concern for health and well-being, Ready to learn  Informal Support system:: Significant other  Stage of  change: ACTION (Actively working towards change)  Difficulty affording diabetes management supplies?: No  Support resources: None  Patient Activation Measure Survey Score:  QUANG Score (Last Two) 6/22/2018 7/23/2018   QUANG Raw Score 32 30   Activation Score 62.6 56   QUANG Level 3 3       ASSESSMENT:  Pt was unable to tolerate Victoza r/t nausea.  He tried reduced dose and still did not tolerate it.  Glucose levels remain elevated.      Goals        General    Reducing Risks (pt-stated)     Notes - Note created  9/7/2018 12:40 PM by Lela Mejia RD    Goal Statement: I will have lower glucose levels at next visit.     Measure of Success: meter download    Strengths: Pt is very compliant with recommendations.     Date to Achieve By: next visit.             Patient's most recent   Lab Results   Component Value Date    A1C 9.3 07/31/2018    is not meeting goal of <7.0    INTERVENTION:   Diabetes knowledge and skills assessment:     Patient is knowledgeable in diabetes management concepts related to: Healthy Eating, Being Active, Monitoring and Taking Medication    Patient needs further education on the following diabetes management concepts: Healthy Coping    Based on learning assessment above, most appropriate setting for further diabetes education would be: Individual setting.    Education provided today on:  AADE Self-Care Behaviors:  Healthy Eating: consistency in amount, composition, and timing of food intake and portion control  Being Active: relationship to blood glucose  Taking Medication: action of prescribed medication, side effects of prescribed medications and when to take medications  Reducing Risks: major complications of diabetes, prevention, early diagnostic measures and treatment of complications, foot care, appropriate dental care, annual eye exam, smoking cessation, A1C - goals, relating to blood glucose levels, how often to check and blood pressure and goals    Opportunities for ongoing education and  support in diabetes-self management were discussed.    Pt verbalized understanding of concepts discussed and recommendations provided today.       Education Materials Provided:  None    PLAN:  Continue to limit foods high in carbohydrates in diet.   Try to get some exercise most days of the week.   Test glucose 2x/day - fasting and 2 hours post supper.   Keep taking Metformin XR 1000 mg bid.   Discontinue Victoza r/t side effects.   Await provider response regarding Jardiance 10 mg daily.    See Patient Instructions for co-developed, patient-stated behavior change goals.  AVS printed and provided to patient today.  Follow up in one month.  Will speak with pt in two weeks via phone if Jardiance is ordered to check effectiveness and adjust dose if needed.     Time Spent: 60 minutes  Encounter Type: Individual    Any diabetes medication dose changes were made via the CDE Protocol and Collaborative Practice Agreement with the patient's referring provider. A copy of this encounter was shared with the provider.        Sincerely,        Lela Mejia RD

## 2018-09-07 NOTE — IP AVS SNAPSHOT
MRN:[id number]                      After Visit Summary   9/7/2018    Rojelio Juárez    MRN: [id number]           Thank you!     Thank you for choosing [facility] for your care. Our goal is always to provide you with excellent care. Hearing back from our patients is one way we can continue to improve our services. Please take a few minutes to complete the written survey that you may receive in the mail after you visit with us. Thank you!        Patient Information     Date Of Birth          1970        About your hospital stay     You were admitted on:  September 7, 2018 You last received care in the:  [facility]    You were discharged on:  September 7, 2018       Who to Call     For medical emergencies, please call 911.  For non-urgent questions about your medical care, please call your primary care provider or clinic, [phone]          Attending Provider     Provider Specialty    Marion Davis NP Family Practice       Primary Care Provider Office Phone # Fax #    Marion Davis [phone]      Your next 10 appointments already scheduled     Oct 22, 2018  2:30 PM CDT   (Arrive by 2:15 PM)   SHORT with Marion Davis NP   [facility] ([facility] )    [address]  [facility] [address]   [phone]              Follow-up notes from your care team     Return in about 4 weeks (around 10/5/2018).      Care Instructions    -Continue to limit foods high in carbohydrates.   -Try to get some exercise on most days.  Exercise will help lower your glucose levels.   -Test glucose 2x/day - fasting and 2 hours after supper meal.   -Target levels are fasting  and 2 hours after supper less than 180.   -Keep taking your Metformin XR 1000 mg 2x/day.    -Stop Victoza.   -I will call you regarding Jardiance.  Your starting dose will be 10 mg with breakfast.   -Follow up in one month.   -Call  with any concerns - MARGARETH Saenz, [phone].          Pending Results     No orders found from 9/5/2018 to 9/8/2018.            Admission Information     Date & Time Provider Department Dept. Phone    9/7/2018 Kaci-Marion Sanchez NP [facility] [phone]      Your Vitals Were     Blood Pressure Pulse Height Weight Pulse Oximetry BMI (Body Mass Index)    141/94 (BP Location: Right arm, Patient Position: Sitting, Cuff Size: Adult Large) 110 1.829 m (6') 123.8 kg (272 lb 14.4 oz) 98% 37.01 kg/m2      Care EveryWhere ID     This is your Care EveryWhere ID. This could be used by other organizations to access your [facility] medical records  [id number]        Equal Access to Services     LUC TRIMBLE AH: Henri sears Sodaphne, waaxda luqadaha, qaybta kaalmada anabelyavance, ekta rand . So [facility] [phone].    ATENCIÓN: Si habla español, tiene a gruber disposición servicios gratuitos de asistencia lingüística. Llame al [phone].    We comply with applicable federal civil rights laws and [place] laws. We do not discriminate on the basis of race, color, national origin, age, disability, sex, sexual orientation, or gender identity.               Review of your medicines      UNREVIEWED medicines. Ask your doctor about these medicines        Dose / Directions    aspirin 81 MG EC tablet   Used for:  Type 2 diabetes mellitus without complication, without long-term current use of insulin (H), Benign essential hypertension        Dose:  81 mg   Take 1 tablet (81 mg) by mouth daily   Quantity:  90 tablet   Refills:  3       atorvastatin 10 MG tablet   Commonly known as:  LIPITOR   Used for:  Hyperlipidemia LDL goal <100        Dose:  10 mg   Take 1 tablet (10 mg) by mouth At Bedtime   Quantity:  90 tablet   Refills:  1       ibuprofen 800 MG tablet   Commonly known as:  ADVIL/MOTRIN   Used for:  Acute right-sided low back pain with right-sided sciatica, Right-sided  low back pain with right-sided sciatica, unspecified chronicity        TAKE 1 TABLET(800 MG) BY MOUTH EVERY 8 HOURS AS NEEDED FOR MODERATE PAIN   Quantity:  90 tablet   Refills:  0       liraglutide 18 MG/3ML soln   Commonly known as:  VICTOZA PEN   Used for:  Type 2 diabetes mellitus with hyperglycemia, without long-term current use of insulin (H)        Inject 0.6 mg daily x 1 week.  If no side effects after 1 week inject 1.2 mg daily.   Quantity:  6 mL   Refills:  3       losartan 25 MG tablet   Commonly known as:  COZAAR   Used for:  Benign essential hypertension        Dose:  12.5 mg   Take 0.5 tablets (12.5 mg) by mouth daily   Quantity:  15 tablet   Refills:  1       metFORMIN 500 MG 24 hr tablet   Commonly known as:  GLUCOPHAGE-XR   Used for:  Type 2 diabetes mellitus without complication, without long-term current use of insulin (H)        Dose:  500 mg   Take 1 tablet (500 mg) by mouth 2 times daily (with meals)   Quantity:  180 tablet   Refills:  1       triamcinolone 0.1 % cream   Commonly known as:  KENALOG   Used for:  Eczema, unspecified type        Apply sparingly to affected area three times daily as needed   Quantity:  80 g   Refills:  0         CONTINUE these medicines which have NOT CHANGED        Dose / Directions    blood glucose monitoring lancets   Used for:  Type 2 diabetes mellitus with hyperglycemia, without long-term current use of insulin (H)        Use to test blood sugar 2 times daily.   Quantity:  100 each   Refills:  3       blood glucose monitoring test strip   Commonly known as:  ONETOUCH VERIO IQ   Used for:  Type 2 diabetes mellitus with hyperglycemia, without long-term current use of insulin (H)        Use to test blood sugar 2 times daily.   Quantity:  100 each   Refills:  3       insulin pen needle 32G X 4 MM   Used for:  Type 2 diabetes mellitus with hyperglycemia, without long-term current use of insulin (H)        Use 1 pen needles daily.   Quantity:  100 each   Refills:  3                 Protect others around you: Learn how to safely use, store and throw away your medicines at www.disposemymeds.org.             Medication List: This is a list of all your medications and when to take them. Check marks below indicate your daily home schedule. Keep this list as a reference.      Medications           Morning Afternoon Evening Bedtime As Needed    aspirin 81 MG EC tablet   Take 1 tablet (81 mg) by mouth daily                                atorvastatin 10 MG tablet   Commonly known as:  LIPITOR   Take 1 tablet (10 mg) by mouth At Bedtime                                blood glucose monitoring lancets   Use to test blood sugar 2 times daily.                                blood glucose monitoring test strip   Commonly known as:  ONETOUCH VERIO IQ   Use to test blood sugar 2 times daily.                                ibuprofen 800 MG tablet   Commonly known as:  ADVIL/MOTRIN   TAKE 1 TABLET(800 MG) BY MOUTH EVERY 8 HOURS AS NEEDED FOR MODERATE PAIN                                insulin pen needle 32G X 4 MM   Use 1 pen needles daily.                                liraglutide 18 MG/3ML soln   Commonly known as:  VICTOZA PEN   Inject 0.6 mg daily x 1 week.  If no side effects after 1 week inject 1.2 mg daily.                                losartan 25 MG tablet   Commonly known as:  COZAAR   Take 0.5 tablets (12.5 mg) by mouth daily                                metFORMIN 500 MG 24 hr tablet   Commonly known as:  GLUCOPHAGE-XR   Take 1 tablet (500 mg) by mouth 2 times daily (with meals)                                triamcinolone 0.1 % cream   Commonly known as:  KENALOG   Apply sparingly to affected area three times daily as needed

## 2018-09-07 NOTE — TELEPHONE ENCOUNTER
Pt was here today for diabetes visit.  Pt was unable to tolerate Victoza - much nausea.  Tried reducing to lower dose and still had nausea.  Okay to discontinue Victoza and try Jardiance 10 mg daily and titrate to higher dose if needed?   Thanks!

## 2018-09-10 RX ORDER — LOSARTAN POTASSIUM 25 MG/1
TABLET ORAL
Qty: 15 TABLET | Refills: 0 | Status: SHIPPED | OUTPATIENT
Start: 2018-09-10 | End: 2018-10-24

## 2018-09-19 ENCOUNTER — TELEPHONE (OUTPATIENT)
Dept: EDUCATION SERVICES | Facility: HOSPITAL | Age: 48
End: 2018-09-19

## 2018-09-20 ENCOUNTER — TELEPHONE (OUTPATIENT)
Dept: EDUCATION SERVICES | Facility: HOSPITAL | Age: 48
End: 2018-09-20

## 2018-09-20 NOTE — TELEPHONE ENCOUNTER
Pt called today.  He was on Metformin ER 1000 mg bid and Victoza and did not tolerate r/t nausea and feelings of being ill.  He stopped his Victoza prior to our last visit and was feeling better. We then tried Metformin ER 1000 bid and Jardiance 10 mg.  He states when he started the Jardiance he felt worse than when he was on the Victoza.  He states he stopped ALL of his medications for three days and felt the best he's felt in a long time.  He has appointment with you scheduled at the end of October.  I told him to reschedule that for an earlier time.  He is agreeable to insulin therapy. Okay to discontinue Metformin ER and Jardiance and try Basaglar 10 units daily and titrate dose as needed?  Basaglar is covered by his insurance.

## 2018-09-21 ENCOUNTER — HOSPITAL ENCOUNTER (OUTPATIENT)
Dept: EDUCATION SERVICES | Facility: HOSPITAL | Age: 48
Discharge: HOME | End: 2018-09-21
Attending: NURSE PRACTITIONER | Admitting: NURSE PRACTITIONER
Payer: COMMERCIAL

## 2018-09-21 VITALS
HEIGHT: 72 IN | OXYGEN SATURATION: 98 % | HEART RATE: 123 BPM | BODY MASS INDEX: 37.03 KG/M2 | WEIGHT: 273.4 LBS | SYSTOLIC BLOOD PRESSURE: 116 MMHG | DIASTOLIC BLOOD PRESSURE: 88 MMHG

## 2018-09-21 DIAGNOSIS — E11.65 TYPE 2 DIABETES MELLITUS WITH HYPERGLYCEMIA, WITHOUT LONG-TERM CURRENT USE OF INSULIN (H): Primary | ICD-10-CM

## 2018-09-21 PROCEDURE — G0108 DIAB MANAGE TRN  PER INDIV: HCPCS | Performed by: DIETITIAN, REGISTERED

## 2018-09-21 RX ORDER — INSULIN GLARGINE 100 [IU]/ML
10 INJECTION, SOLUTION SUBCUTANEOUS DAILY
Qty: 15 ML | Refills: 3 | Status: SHIPPED | OUTPATIENT
Start: 2018-09-21 | End: 2018-10-17

## 2018-09-21 ASSESSMENT — PAIN SCALES - GENERAL: PAINLEVEL: NO PAIN (0)

## 2018-09-21 NOTE — IP AVS SNAPSHOT
MRN:[id number]                      After Visit Summary   9/21/2018    Rojelio Juárez    MRN: [id number]           Thank you!     Thank you for choosing [facility] for your care. Our goal is always to provide you with excellent care. Hearing back from our patients is one way we can continue to improve our services. Please take a few minutes to complete the written survey that you may receive in the mail after you visit with us. Thank you!        Patient Information     Date Of Birth          1970        About your hospital stay     You were admitted on:  September 21, 2018 You last received care in the:  [facility]    You were discharged on:  September 21, 2018       Who to Call     For medical emergencies, please call 911.  For non-urgent questions about your medical care, please call your primary care provider or clinic, [phone]          Attending Provider     Provider Specialty    Marion Davis NP Family Practice       Primary Care Provider Office Phone # Fax #    Marion Davis [phone]      Your next 10 appointments already scheduled     Oct 08, 2018  1:30 PM CDT   (Arrive by 1:15 PM)   Diabetes Education with Lela Mejia RD   [facility] ([facility] )    [address] [place] [id number] [phone]            Oct 22, 2018  2:30 PM CDT   (Arrive by 2:15 PM)   SHORT with Marion Davis NP   [facility] ([facility] )    [address]  [facility] [address] [phone]              Care Instructions    -Keep limiting carbohydrates in your diet.   -Be as active as you can.   -Test your glucose 2-3x/day - fasting, before supper and 2 hours after supper.   -Target levels are fasting and before meals , 2 hours after supper less than 180.   -Stop your Metformin.   -Begin Basaglar 10 units daily.    -I will call you on Tuesday  "for your glucose numbers.   -Call with any questions - MARGARETH Saenz, [phone].          Pending Results     No orders found from 2018 to 2018.            Admission Information     Date & Time Provider Department Dept. Phone    2018 Marion Davis NP [facility] [phone]      Your Vitals Were     Blood Pressure Pulse Height Weight Pulse Oximetry BMI (Body Mass Index)    116/88 (BP Location: Left arm, Patient Position: Sitting, Cuff Size: Adult Large) 123 1.829 m (6') 124 kg (273 lb 6.4 oz) 98% 37.08 kg/m2      Blink for iPhone and Androidhart Information     [facility] lets you send messages to your doctor, view your test results, renew your prescriptions, schedule appointments and more. To sign up, go to [email or web address] . Click on \"Log in\" on the left side of the screen, which will take you to the Welcome page. Then click on \"Sign up Now\" on the right side of the page.     You will be asked to enter the access code listed below, as well as some personal information. Please follow the directions to create your username and password.     Your access code is: [email or web address]  Expires: 2018  3:37 PM     Your access code will  in 90 days. If you need help or a new code, please call your [facility] clinic or [phone].        Care EveryWhere ID     This is your Care EveryWhere ID. This could be used by other organizations to access your [facility] medical records  [id number]        Equal Access to Services     [facility] : Hadii patricia sears Sodaphne, waaxda luqadaha, qaybta kaalmaekta kemp . So [facility] [phone].    ATENCIÓN: Si habla español, tiene a gruber disposición servicios gratuitos de asistencia lingüística. Llame al [phone].    We comply with applicable federal civil rights laws and [place] laws. We do not discriminate on the basis of race, color, national origin, age, disability, sex, sexual orientation, or gender " identity.               Review of your medicines      UNREVIEWED medicines. Ask your doctor about these medicines        Dose / Directions    aspirin 81 MG EC tablet   Used for:  Type 2 diabetes mellitus without complication, without long-term current use of insulin (H), Benign essential hypertension        Dose:  81 mg   Take 1 tablet (81 mg) by mouth daily   Quantity:  90 tablet   Refills:  3       atorvastatin 10 MG tablet   Commonly known as:  LIPITOR   Used for:  Hyperlipidemia LDL goal <100        Dose:  10 mg   Take 1 tablet (10 mg) by mouth At Bedtime   Quantity:  90 tablet   Refills:  1       ibuprofen 800 MG tablet   Commonly known as:  ADVIL/MOTRIN   Used for:  Acute right-sided low back pain with right-sided sciatica, Right-sided low back pain with right-sided sciatica, unspecified chronicity        TAKE 1 TABLET(800 MG) BY MOUTH EVERY 8 HOURS AS NEEDED FOR MODERATE PAIN   Quantity:  90 tablet   Refills:  0       losartan 25 MG tablet   Commonly known as:  COZAAR   Used for:  Benign essential hypertension        TAKE 1/2 TABLET(12.5 MG) BY MOUTH DAILY   Quantity:  15 tablet   Refills:  0       metFORMIN 500 MG 24 hr tablet   Commonly known as:  GLUCOPHAGE-XR   Used for:  Type 2 diabetes mellitus without complication, without long-term current use of insulin (H)        Dose:  500 mg   Take 1 tablet (500 mg) by mouth 2 times daily (with meals)   Quantity:  180 tablet   Refills:  1       triamcinolone 0.1 % cream   Commonly known as:  KENALOG   Used for:  Eczema, unspecified type        Apply sparingly to affected area three times daily as needed   Quantity:  80 g   Refills:  0         CONTINUE these medicines which have NOT CHANGED        Dose / Directions    blood glucose monitoring lancets   Used for:  Type 2 diabetes mellitus with hyperglycemia, without long-term current use of insulin (H)        Use to test blood sugar 2 times daily.   Quantity:  100 each   Refills:  3       blood glucose monitoring  test strip   Commonly known as:  ONETOUCH VERIO IQ   Used for:  Type 2 diabetes mellitus with hyperglycemia, without long-term current use of insulin (H)        Use to test blood sugar 2 times daily.   Quantity:  100 each   Refills:  3                Protect others around you: Learn how to safely use, store and throw away your medicines at www.disposemymeds.org.             Medication List: This is a list of all your medications and when to take them. Check marks below indicate your daily home schedule. Keep this list as a reference.      Medications           Morning Afternoon Evening Bedtime As Needed    aspirin 81 MG EC tablet   Take 1 tablet (81 mg) by mouth daily                                atorvastatin 10 MG tablet   Commonly known as:  LIPITOR   Take 1 tablet (10 mg) by mouth At Bedtime                                blood glucose monitoring lancets   Use to test blood sugar 2 times daily.                                blood glucose monitoring test strip   Commonly known as:  ONETOUCH VERIO IQ   Use to test blood sugar 2 times daily.                                ibuprofen 800 MG tablet   Commonly known as:  ADVIL/MOTRIN   TAKE 1 TABLET(800 MG) BY MOUTH EVERY 8 HOURS AS NEEDED FOR MODERATE PAIN                                losartan 25 MG tablet   Commonly known as:  COZAAR   TAKE 1/2 TABLET(12.5 MG) BY MOUTH DAILY                                metFORMIN 500 MG 24 hr tablet   Commonly known as:  GLUCOPHAGE-XR   Take 1 tablet (500 mg) by mouth 2 times daily (with meals)                                triamcinolone 0.1 % cream   Commonly known as:  KENALOG   Apply sparingly to affected area three times daily as needed

## 2018-09-21 NOTE — LETTER
9/21/2018        RE: Rojelio Juárez  [address] [facility] [address]        Diabetes Self-Management Education & Support    Diabetes Education Self Management & Training    SUBJECTIVE/OBJECTIVE  Presents for: Follow-up - Insulin Start  Accompanied by: Self  Diabetes education in the past 24mo: Yes  Focus of Visit: Monitoring  Diabetes type: Type 2  Date of diagnosis: 06/22/18  Disease course: Getting harder to manage (Glucose levels remain elevated)  How confident are you filling out medical forms by yourself:: Extremely  Diabetes management related comments/concerns: Pt wants to get glucose levels down.    Transportation concerns: No  Other concerns:: None  Cultural Influences/Ethnic Background:  American    Diabetes Symptoms & Complications  Weight loss: No  Weight trend: Stable     Patient Problem List and Family Medical History reviewed for relevant medical history, current medical status, and diabetes risk factors.    Vitals:  /88 (BP Location: Left arm, Patient Position: Sitting, Cuff Size: Adult Large)  Pulse (!) 123  Ht 1.829 m (6')  Wt 124 kg (273 lb 6.4 oz)  SpO2 98%  BMI 37.08 kg/m2  Estimated body mass index is 37.08 kg/(m^2) as calculated from the following:    Height as of this encounter: 1.829 m (6').    Weight as of this encounter: 124 kg (273 lb 6.4 oz).   Last 3 BP:   BP Readings from Last 3 Encounters:   09/21/18 116/88   09/07/18 136/86   08/08/18 133/98       History   Smoking Status     Current Some Day Smoker     Packs/day: 1.50     Types: Cigarettes     Start date: 1/15/2017   Smokeless Tobacco     Never Used     Comment: will quit some day       Labs:  Lab Results   Component Value Date    A1C 9.3 07/31/2018     Lab Results   Component Value Date     06/22/2018     Lab Results   Component Value Date     06/22/2018     HDL Cholesterol   Date Value Ref Range Status   06/22/2018 31 (L) >39 mg/dL Final   ]  GFR Estimate   Date Value Ref Range Status   06/22/2018  >90 >60 mL/min/1.7m2 Final     Comment:     Non  GFR Calc     GFR Estimate If Black   Date Value Ref Range Status   2018 >90 >60 mL/min/1.7m2 Final     Comment:      GFR Calc     Lab Results   Component Value Date    CR 0.75 2018     No results found for: MICROALBUMIN    Healthy Eating  Healthy Eating Assessed Today: No    Being Active  Being Active Assessed Today: No    Monitoring  Monitoring Assessed Today: No  Did patient bring glucose meter to appointment? : No  Blood Glucose Meter: One Touch  Home Glucose (Sugar) Monitorin-2 times per day  Blood glucose trend: No change  Pt did not bring meter today.  Reports glucose levels did improve with Jardiance but he did not tolerate it r/t side effects.        Taking Medications  Diabetes Medication(s)     Biguanides Sig    metFORMIN (GLUCOPHAGE-XR) 500 MG 24 hr tablet Take 1 tablet (500 mg) by mouth 2 times daily (with meals)        Taking Medication Assessed Today: Yes  Current Treatments: Oral Agent (monotherapy) (Metformin  mg bid.  Stopped Jardiance r/t feelings of nausea , overall feeling ill. ) Pt is still feeling ill.    Problems taking diabetes medications regularly?: No  Diabetes medication side effects?: Yes (Nausea from Jardiance.)  Treatment Compliance: All of the time    Problem Solving  Problem Solving Assessed Today: No    Reducing Risks  Reducing Risks Assessed Today: No  Diabetes Risks: Age over 45 years  CAD Risks: Diabetes Mellitus, Male sex, Tobacco exposure  Has dilated eye exam at least once a year?: No    Healthy Coping  Healthy Coping Assessed Today: Yes  Emotional response to diabetes: Concern for health and well-being, Ready to learn  Informal Support system:: Significant other  Stage of change: ACTION (Actively working towards change)  Difficulty affording diabetes management supplies?: No  Support resources: None  Patient Activation Measure Survey Score:  QUANG Score (Last Two) 2018  7/23/2018   QUANG Raw Score 32 30   Activation Score 62.6 56   QUANG Level 3 3       ASSESSMENT:  Struggle to find medication that will work for pt.  We first tried Metformin.  Pt did not tolerate so changed to Metformin XR.  Glucose levels still high so added Victoza.  Pt did not tolerate Victoza r/t GI issues/nausea.  We then discontinued Victoza and tried Jardiance.  Pt reported same side effects with Jardiance.  Stopped Jardiance.  He is still feeling unwell on Metformin XR alone.  He did not bring meter but states levels are still high.  He feels run down.  Sees gastroenterology for unrelated esophageal issue next week.  Pt and provider agreeable to stopping Metformin XR and begin Basaglar insulin.      Goals        General    Reducing Risks (pt-stated)     Notes - Note created  9/7/2018 12:40 PM by Lela Mejia RD    Goal Statement: I will have lower glucose levels at next visit.     Measure of Success: meter download    Strengths: Pt is very compliant with recommendations.     Date to Achieve By: next visit.             Patient's most recent   Lab Results   Component Value Date    A1C 9.3 07/31/2018    is not meeting goal of <7.0    INTERVENTION:   Diabetes knowledge and skills assessment:     Patient is knowledgeable in diabetes management concepts related to: Healthy Eating, Being Active, Monitoring, Taking Medication and Reducing Risks    Patient needs further education on the following diabetes management concepts: insulin start.     Based on learning assessment above, most appropriate setting for further diabetes education would be: Individual setting.    Education provided today on:  AADE Self-Care Behaviors:  Insulin administration technique taught today. Patient verbalized understanding and was able to perform an accurate return demonstration of administration technique.    Opportunities for ongoing education and support in diabetes-self management were discussed.    Pt verbalized understanding of  concepts discussed and recommendations provided today.       Education Materials Provided:  Sample Basaglar pen.  Educated pt on action of insulin, dosing, injection technique/sites, sharps disposal, side effects.      PLAN:  Continue meal planning and exercise efforts.   Test glucose fasting, before supper and 2 hours post supper if able.   Stop Metformin XR.    Begin 10 units Basaglar daily.    See Patient Instructions for co-developed, patient-stated behavior change goals.  AVS printed and provided to patient today.  Follow up via phone on Tuesday.     Time Spent: 30 minutes  Encounter Type: Individual    Any diabetes medication dose changes were made via the CDE Protocol and Collaborative Practice Agreement with the patient's referring provider. A copy of this encounter was shared with the provider.        Sincerely,        Lela Mejia RD

## 2018-09-21 NOTE — IP AVS SNAPSHOT
[facility]    [address] [place] [id number]    Phone:  [phone]    Fax:  [phone]                                       After Visit Summary   9/21/2018    Rojelio Juárez    MRN: [id number]           After Visit Summary Signature Page     I have received my discharge instructions, and my questions have been answered. I have discussed any challenges I see with this plan with the nurse or doctor.    ..........................................................................................................................................  Patient/Patient Representative Signature      ..........................................................................................................................................  Patient Representative Print Name and Relationship to Patient    ..................................................               ................................................  Date                                   Time    ..........................................................................................................................................  Reviewed by Signature/Title    ...................................................              ..............................................  Date                                               Time          22EPIC Rev 08/18

## 2018-09-21 NOTE — PATIENT INSTRUCTIONS
-Keep limiting carbohydrates in your diet.   -Be as active as you can.   -Test your glucose 2-3x/day - fasting, before supper and 2 hours after supper.   -Target levels are fasting and before meals , 2 hours after supper less than 180.   -Stop your Metformin.   -Begin Basaglar 10 units daily.    -I will call you on Tuesday for your glucose numbers.   -Call with any questions - MARGARETH Saenz, [phone].

## 2018-09-21 NOTE — PROGRESS NOTES
Diabetes Self-Management Education & Support    Diabetes Education Self Management & Training    SUBJECTIVE/OBJECTIVE  Presents for: Follow-up - Insulin Start  Accompanied by: Self  Diabetes education in the past 24mo: Yes  Focus of Visit: Monitoring  Diabetes type: Type 2  Date of diagnosis: 06/22/18  Disease course: Getting harder to manage (Glucose levels remain elevated)  How confident are you filling out medical forms by yourself:: Extremely  Diabetes management related comments/concerns: Pt wants to get glucose levels down.    Transportation concerns: No  Other concerns:: None  Cultural Influences/Ethnic Background:  American    Diabetes Symptoms & Complications  Weight loss: No  Weight trend: Stable     Patient Problem List and Family Medical History reviewed for relevant medical history, current medical status, and diabetes risk factors.    Vitals:  /88 (BP Location: Left arm, Patient Position: Sitting, Cuff Size: Adult Large)  Pulse (!) 123  Ht 1.829 m (6')  Wt 124 kg (273 lb 6.4 oz)  SpO2 98%  BMI 37.08 kg/m2  Estimated body mass index is 37.08 kg/(m^2) as calculated from the following:    Height as of this encounter: 1.829 m (6').    Weight as of this encounter: 124 kg (273 lb 6.4 oz).   Last 3 BP:   BP Readings from Last 3 Encounters:   09/21/18 116/88   09/07/18 136/86   08/08/18 133/98       History   Smoking Status     Current Some Day Smoker     Packs/day: 1.50     Types: Cigarettes     Start date: 1/15/2017   Smokeless Tobacco     Never Used     Comment: will quit some day       Labs:  Lab Results   Component Value Date    A1C 9.3 07/31/2018     Lab Results   Component Value Date     06/22/2018     Lab Results   Component Value Date     06/22/2018     HDL Cholesterol   Date Value Ref Range Status   06/22/2018 31 (L) >39 mg/dL Final   ]  GFR Estimate   Date Value Ref Range Status   06/22/2018 >90 >60 mL/min/1.7m2 Final     Comment:     Non  GFR Calc     GFR  Estimate If Black   Date Value Ref Range Status   2018 >90 >60 mL/min/1.7m2 Final     Comment:      GFR Calc     Lab Results   Component Value Date    CR 0.75 2018     No results found for: MICROALBUMIN    Healthy Eating  Healthy Eating Assessed Today: No    Being Active  Being Active Assessed Today: No    Monitoring  Monitoring Assessed Today: No  Did patient bring glucose meter to appointment? : No  Blood Glucose Meter: One Touch  Home Glucose (Sugar) Monitorin-2 times per day  Blood glucose trend: No change  Pt did not bring meter today.  Reports glucose levels did improve with Jardiance but he did not tolerate it r/t side effects.        Taking Medications  Diabetes Medication(s)     Biguanides Sig    metFORMIN (GLUCOPHAGE-XR) 500 MG 24 hr tablet Take 1 tablet (500 mg) by mouth 2 times daily (with meals)        Taking Medication Assessed Today: Yes  Current Treatments: Oral Agent (monotherapy) (Metformin  mg bid.  Stopped Jardiance r/t feelings of nausea , overall feeling ill. ) Pt is still feeling ill.    Problems taking diabetes medications regularly?: No  Diabetes medication side effects?: Yes (Nausea from Jardiance.)  Treatment Compliance: All of the time    Problem Solving  Problem Solving Assessed Today: No    Reducing Risks  Reducing Risks Assessed Today: No  Diabetes Risks: Age over 45 years  CAD Risks: Diabetes Mellitus, Male sex, Tobacco exposure  Has dilated eye exam at least once a year?: No    Healthy Coping  Healthy Coping Assessed Today: Yes  Emotional response to diabetes: Concern for health and well-being, Ready to learn  Informal Support system:: Significant other  Stage of change: ACTION (Actively working towards change)  Difficulty affording diabetes management supplies?: No  Support resources: None  Patient Activation Measure Survey Score:  QUANG Score (Last Two) 2018   QUANG Raw Score 32 30   Activation Score 62.6 56   QUANG Level 3 3        ASSESSMENT:  Struggle to find medication that will work for pt.  We first tried Metformin.  Pt did not tolerate so changed to Metformin XR.  Glucose levels still high so added Victoza.  Pt did not tolerate Victoza r/t GI issues/nausea.  We then discontinued Victoza and tried Jardiance.  Pt reported same side effects with Jardiance.  Stopped Jardiance.  He is still feeling unwell on Metformin XR alone.  He did not bring meter but states levels are still high.  He feels run down.  Sees gastroenterology for unrelated esophageal issue next week.  Pt and provider agreeable to stopping Metformin XR and begin Basaglar insulin.      Goals        General    Reducing Risks (pt-stated)     Notes - Note created  9/7/2018 12:40 PM by Lela Mejia RD    Goal Statement: I will have lower glucose levels at next visit.     Measure of Success: meter download    Strengths: Pt is very compliant with recommendations.     Date to Achieve By: next visit.             Patient's most recent   Lab Results   Component Value Date    A1C 9.3 07/31/2018    is not meeting goal of <7.0    INTERVENTION:   Diabetes knowledge and skills assessment:     Patient is knowledgeable in diabetes management concepts related to: Healthy Eating, Being Active, Monitoring, Taking Medication and Reducing Risks    Patient needs further education on the following diabetes management concepts: insulin start.     Based on learning assessment above, most appropriate setting for further diabetes education would be: Individual setting.    Education provided today on:  AADE Self-Care Behaviors:  Insulin administration technique taught today. Patient verbalized understanding and was able to perform an accurate return demonstration of administration technique.    Opportunities for ongoing education and support in diabetes-self management were discussed.    Pt verbalized understanding of concepts discussed and recommendations provided today.       Education Materials  Provided:  Sample Basaglar pen.  Educated pt on action of insulin, dosing, injection technique/sites, sharps disposal, side effects.      PLAN:  Continue meal planning and exercise efforts.   Test glucose fasting, before supper and 2 hours post supper if able.   Stop Metformin XR.    Begin 10 units Basaglar daily.    See Patient Instructions for co-developed, patient-stated behavior change goals.  AVS printed and provided to patient today.  Follow up via phone on Tuesday.     Time Spent: 30 minutes  Encounter Type: Individual    Any diabetes medication dose changes were made via the CDE Protocol and Collaborative Practice Agreement with the patient's referring provider. A copy of this encounter was shared with the provider.

## 2018-09-25 ENCOUNTER — PATIENT OUTREACH (OUTPATIENT)
Dept: EDUCATION SERVICES | Facility: HOSPITAL | Age: 48
End: 2018-09-25

## 2018-09-25 NOTE — PROGRESS NOTES
Called pt today regarding glucose levels.  Current diabetes medications:  Basaglar 10 units daily.  Current glucose levels:  Fasting-350, 246, 238, 246, 210  Before supper-165, 161, 438, 216  2 hours post supper-363, 244, 297  Pt will increase Basaglar to 20 units and we will speak again on Friday for glucose review.

## 2018-09-28 ENCOUNTER — PATIENT OUTREACH (OUTPATIENT)
Dept: EDUCATION SERVICES | Facility: HOSPITAL | Age: 48
End: 2018-09-28

## 2018-10-02 ENCOUNTER — TELEPHONE (OUTPATIENT)
Dept: EDUCATION SERVICES | Facility: HOSPITAL | Age: 48
End: 2018-10-02

## 2018-10-02 NOTE — TELEPHONE ENCOUNTER
Salvatore missed Lela's call on Friday.  He would appreciate a message forwarded to Lela to return his call.  [phone].  Chasity Alcala

## 2018-10-03 ENCOUNTER — PATIENT OUTREACH (OUTPATIENT)
Dept: EDUCATION SERVICES | Facility: HOSPITAL | Age: 48
End: 2018-10-03

## 2018-10-03 NOTE — PROGRESS NOTES
Called pt today regarding glucose levels.  Current diabetes medications:  Basaglar 20 units daily.  Current glucose levels:  Fasting-227, 233, 257, 214, 202, 214, 234, 217  Before supper-230, 197, 240, 195, 246, 229, 333  2 hours post supper-308, 245, 240, 269, 241  Pt would like to try adding Jardiance back.  Instructed him to increase Basaglar to 25 units and add 10 mg Jardiance daily.  Pt will stop Jardiance if he becomes nauseated as this happened in the past.  Will speak with him again on Monday.

## 2018-10-08 ENCOUNTER — PATIENT OUTREACH (OUTPATIENT)
Dept: EDUCATION SERVICES | Facility: HOSPITAL | Age: 48
End: 2018-10-08

## 2018-10-08 DIAGNOSIS — E11.65 TYPE 2 DIABETES MELLITUS WITH HYPERGLYCEMIA, WITHOUT LONG-TERM CURRENT USE OF INSULIN (H): Primary | ICD-10-CM

## 2018-10-08 NOTE — PROGRESS NOTES
Called pt today regarding glucose levels.  Current diabetes medications:  Basaglar 25 units daily, Jardiance 10 mg am.  Pt reports he ran out of test strips and was unable to get any over the weekend because of a problem at the pharmacy so he has not tested since increasing Basaglar from 20 units to 25 units and adding back the Jardiance.  No side effects from Jardiance reported.  Placed order for Sarahi Contour Next test strips and microlet lancets to [facility] in [place] - three tests daily.  Pt will call if he is unable to obtain test strips otherwise I will speak with him on Friday regarding glucose levels.

## 2018-10-09 ENCOUNTER — TELEPHONE (OUTPATIENT)
Dept: EDUCATION SERVICES | Facility: HOSPITAL | Age: 48
End: 2018-10-09

## 2018-10-09 NOTE — TELEPHONE ENCOUNTER
10/09/2018 - received PA from [employer] for contour next test strips.  Submitted thru [facility] as an urgent request.  Waiting for response.  Samantha Melgar, [facility] Specialist.

## 2018-10-09 NOTE — TELEPHONE ENCOUNTER
Pt called in, leaving message for Lela.  He still is unable to get his testing strips; not sure if this is an insurance issue.  Asked for a call back.  Chasity Alcala

## 2018-10-10 ENCOUNTER — PATIENT OUTREACH (OUTPATIENT)
Dept: EDUCATION SERVICES | Facility: HOSPITAL | Age: 48
End: 2018-10-10

## 2018-10-10 DIAGNOSIS — E11.65 TYPE 2 DIABETES MELLITUS WITH HYPERGLYCEMIA, WITH LONG-TERM CURRENT USE OF INSULIN (H): Primary | ICD-10-CM

## 2018-10-10 DIAGNOSIS — Z79.4 TYPE 2 DIABETES MELLITUS WITH HYPERGLYCEMIA, WITH LONG-TERM CURRENT USE OF INSULIN (H): Primary | ICD-10-CM

## 2018-10-10 RX ORDER — LANCETS
EACH MISCELLANEOUS
Qty: 102 EACH | Refills: 3 | Status: SHIPPED | OUTPATIENT
Start: 2018-10-10 | End: 2020-08-31

## 2018-10-10 RX ORDER — BLOOD-GLUCOSE METER
1 EACH MISCELLANEOUS DAILY
Qty: 1 KIT | Refills: 0 | Status: SHIPPED | OUTPATIENT
Start: 2018-10-10 | End: 2020-08-31

## 2018-10-10 NOTE — PROGRESS NOTES
Pt having difficulty obtaining strips for his meter.  Spoke with Samantha Melgar and insurance now requests Accu-Chek meter and supplies.  Will place order to [facility] in [place].  Pt notified.

## 2018-10-10 NOTE — TELEPHONE ENCOUNTER
Returned pt's call.  Insurance required an Accu-chek meter and supplies.  Order placed to pt's pharmacy and pt notified.

## 2018-10-12 ENCOUNTER — PATIENT OUTREACH (OUTPATIENT)
Dept: EDUCATION SERVICES | Facility: HOSPITAL | Age: 48
End: 2018-10-12

## 2018-10-12 NOTE — PROGRESS NOTES
Called pt today regarding glucose levels.  Current diabetes medications:  Basaglar 25 units daily, Jardiance 10 mg daily.  Pt did receive meter and test strips.  He does not have his glucose log book with him but states he has fasting levels of 148 and 115 and levels post supper still in 240 range.  No changes today with minimal data.  Will speak with pt again next Wednesday.

## 2018-10-17 ENCOUNTER — PATIENT OUTREACH (OUTPATIENT)
Dept: EDUCATION SERVICES | Facility: HOSPITAL | Age: 48
End: 2018-10-17

## 2018-10-17 DIAGNOSIS — E11.65 TYPE 2 DIABETES MELLITUS WITH HYPERGLYCEMIA, WITH LONG-TERM CURRENT USE OF INSULIN (H): ICD-10-CM

## 2018-10-17 DIAGNOSIS — Z79.4 TYPE 2 DIABETES MELLITUS WITH HYPERGLYCEMIA, WITH LONG-TERM CURRENT USE OF INSULIN (H): ICD-10-CM

## 2018-10-17 RX ORDER — INSULIN GLARGINE 100 [IU]/ML
25 INJECTION, SOLUTION SUBCUTANEOUS DAILY
Qty: 15 ML | Refills: 3 | Status: SHIPPED | OUTPATIENT
Start: 2018-10-17 | End: 2019-02-22

## 2018-10-17 NOTE — PROGRESS NOTES
Called pt today regarding glucose levels.  Current diabetes medications:  Basaglar 25 units daily, Jardiance 10 mg daily.  Current glucose levels:  Fasting-133, 116, 172, 119, 117  Before supper-208, 207, 107  2 hours post supper-161, 99, 180  Levels much improved.  No side effects from medications.  Will speak with pt again next Thursday.

## 2018-10-19 NOTE — PROGRESS NOTES
SUBJECTIVE:   Rojelio Juárez is a 48 year old male who presents to clinic today for the following health issues:      Diabetes Follow-up    Patient is checking blood sugars: three times daily.   Blood sugar testing frequency justification: Uncontrolled diabetes  Results are as follows:         Running within in normal limits    Diabetic concerns: None     Symptoms of hypoglycemia (low blood sugar): none     Paresthesias (numbness or burning in feet) or sores: Yes      Date of last diabetic eye exam: 3 years ago    Diabetes Management Resources    Hyperlipidemia Follow-Up      Rate your low fat/cholesterol diet?: good    Taking statin?  No, stopped several months ago thought was due to side effects.  He is willing to re-start and see if similar symptoms occur - he started several new meds at the same time and is not sure if it was due to lipitor.      Other lipid medications/supplements?:  None  The 10-year ASCVD risk score (Raleighdorian SCOTT Jr, et al., 2013) is: 25.8%    Values used to calculate the score:      Age: 48 years      Sex: Male      Is Non- : No      Diabetic: Yes      Tobacco smoker: Yes      Systolic Blood Pressure: 128 mmHg      Is BP treated: Yes      HDL Cholesterol: 31 mg/dL        Total Cholesterol: 219 mg/dL        Hypertension Follow-up      Outpatient blood pressures are not being checked.    Low Salt Diet: low salt    BP Readings from Last 2 Encounters:   10/22/18 128/88   09/21/18 116/88     Hemoglobin A1C (%)   Date Value   07/31/2018 9.3 (H)   06/22/2018 9.4 (H)     LDL Cholesterol Calculated (mg/dL)   Date Value   06/22/2018 143 (H)         PHQ 6/22/2018 7/23/2018 10/22/2018   PHQ-9 Total Score 5 0 1   Q9: Suicide Ideation Not at all Not at all Not at all       PHQ-9  English  PHQ-9   Any Language  Suicide Assessment Five-step Evaluation and Treatment (SAFE-T)    Amount of exercise or physical activity:3-4 days/week for an average of 15-30 minutes    Problems taking  medications regularly: No    Medication side effects: little nausea from jardiance    Diet: regular (no restrictions)      Had flu vaccine last week.    Left ankle pain has returned, he did have a cortisone injection by Dr Willson last year and would like to return.        Problem list and histories reviewed & adjusted, as indicated.  Additional history: as documented    Patient Active Problem List   Diagnosis     ACP (advance care planning)     Herniated intervertebral disc of lumbar spine     Benign essential hypertension     Tobacco dependence syndrome     Anxiety     Type 2 diabetes mellitus without complication, without long-term current use of insulin (H)     Morbid obesity (H)     Hyperlipidemia LDL goal <100     Status post lumbar microdiscectomy     Major depression     History reviewed. No pertinent surgical history.    Social History   Substance Use Topics     Smoking status: Current Some Day Smoker     Packs/day: 1.50     Types: Cigarettes     Start date: 1/15/2017     Smokeless tobacco: Never Used      Comment: will quit some day     Alcohol use 0.0 oz/week     0 Standard drinks or equivalent per week     History reviewed. No pertinent family history.      Current Outpatient Prescriptions   Medication Sig Dispense Refill     aspirin 81 MG EC tablet Take 1 tablet (81 mg) by mouth daily 90 tablet 3     BASAGLAR 100 UNIT/ML injection Inject 25 Units Subcutaneous daily 15 mL 3     blood glucose monitoring (ACCU-CHEK FASTCLIX) lancets Use to test blood sugar 3 times daily. 102 each 3     blood glucose monitoring (ACCU-CHEK GUIDE) test strip Use to test blood sugar 3 times daily. 100 each 3     Blood Glucose Monitoring Suppl (ACCU-CHEK GUIDE) w/Device KIT 1 each daily 1 kit 0     empagliflozin (JARDIANCE) 10 MG TABS tablet Take 1 tablet (10 mg) by mouth daily 30 tablet 3     ibuprofen (ADVIL/MOTRIN) 800 MG tablet TAKE 1 TABLET(800 MG) BY MOUTH EVERY 8 HOURS AS NEEDED FOR MODERATE PAIN 90 tablet 0     insulin pen  needle 32G X 4 MM Use 1 pen needle daily. 100 each 3     losartan (COZAAR) 25 MG tablet TAKE 1/2 TABLET(12.5 MG) BY MOUTH DAILY 15 tablet 0     triamcinolone (KENALOG) 0.1 % cream Apply sparingly to affected area three times daily as needed 80 g 0     atorvastatin (LIPITOR) 10 MG tablet Take 1 tablet (10 mg) by mouth At Bedtime (Patient not taking: Reported on 10/22/2018) 90 tablet 1     Allergies   Allergen Reactions     Tdap [Daptacel]      Flue like symptoms      Recent Labs   Lab Test  07/31/18   1013  06/22/18   1634  01/20/17   1101   A1C  9.3*  9.4*   --    LDL   --   143*   --    HDL   --   31*   --    TRIG   --   226*   --    ALT   --    --   202*   CR   --   0.75  0.91   GFRESTIMATED   --   >90  90   GFRESTBLACK   --   >90  >90  African American GFR Calc     POTASSIUM   --   4.2  4.3   TSH   --   1.05   --       BP Readings from Last 3 Encounters:   10/22/18 128/88   09/21/18 116/88   09/07/18 136/86    Wt Readings from Last 3 Encounters:   10/22/18 260 lb (117.9 kg)   09/21/18 273 lb 6.4 oz (124 kg)   09/07/18 272 lb 14.4 oz (123.8 kg)                    Reviewed and updated as needed this visit by clinical staff  Tobacco  Allergies  Meds  Problems  Med Hx  Surg Hx  Fam Hx  Soc Hx        Reviewed and updated as needed this visit by Provider         ROS:  Constitutional, HEENT, cardiovascular, pulmonary, gi and gu systems are negative, except as otherwise noted.    OBJECTIVE:     /88  Pulse 106  Temp 97.8  F (36.6  C) (Tympanic)  Resp 16  Ht 6' (1.829 m)  Wt 260 lb (117.9 kg)  SpO2 96%  BMI 35.26 kg/m2  Body mass index is 35.26 kg/(m^2).  GENERAL: healthy, alert and no distress  NECK: no adenopathy, no asymmetry, masses, or scars, thyroid normal to palpation and no carotid bruits  RESP: lungs clear to auscultation - no rales, rhonchi or wheezes  CV: regular rate and rhythm, normal S1 S2, no S3 or S4, no murmur, click or rub, no peripheral edema and peripheral pulses strong  MS: no gross  musculoskeletal defects noted, no edema  PSYCH: mentation appears normal, affect normal/bright      ASSESSMENT/PLAN:       1. Tobacco abuse  Cessation encouraged  - QUITPLAN  Referral; Future  - Tobacco Cessation - Order to Satisfy Health Maintenance    2. Tobacco abuse counseling  - QUITPLAN  Referral; Future    3. Type 2 diabetes mellitus without complication, without long-term current use of insulin (H)  chronic  - HEMOGLOBIN A1C; Future    4. Hyperlipidemia LDL goal <100  chronic  - LIPID PANEL; Future  - restart lipitor, if nausea or side effects, will change to crestor.     5. Benign essential hypertension  chronic  - TSH with free T4 reflex; Future  - BASIC METABOLIC PANEL; Future    6. Chronic pain of left ankle  chronic  - ORTHOPEDICS ADULT REFERRAL - Dr hickey  - if no improvement, will refer to podiatry    7. On statin therapy  - ALT; Future  - AST; Future    Pneumovax updated today    FUTURE APPOINTMENTS:       - Follow-up visit in 4 months or as needed for acute concerns     Marion Davis, NP  [facility]

## 2018-10-19 NOTE — PATIENT INSTRUCTIONS
ASSESSMENT/PLAN:       1. Tobacco abuse  Cessation encouraged  - QUITPLAN  Referral; Future  - Tobacco Cessation - Order to Satisfy Health Maintenance    2. Tobacco abuse counseling  - QUITPLAN  Referral; Future    3. Type 2 diabetes mellitus without complication, without long-term current use of insulin (H)  chronic  - HEMOGLOBIN A1C; Future    4. Hyperlipidemia LDL goal <100  chronic  - LIPID PANEL; Future  - restart lipitor, if nausea or side effects, will change to crestor.     5. Benign essential hypertension  chronic  - TSH with free T4 reflex; Future  - BASIC METABOLIC PANEL; Future    6. Chronic pain of left ankle  chronic  - ORTHOPEDICS ADULT REFERRAL - Dr hickey  - if no improvement, will refer to podiatry    7. On statin therapy  - ALT; Future  - AST; Future    Pneumovax updated today    FUTURE APPOINTMENTS:       - Follow-up visit in 4 months or as needed for acute concerns     Marion Davis, NP  [facility]        HOW TO QUIT SMOKING  Smoking is one of the hardest habits to break. About half of all those who have ever smoked have been able to quit, and most of those (about 70%) who still smoke want to quit. Here are some of the best ways to stop smoking.     KEEP TRYING:  It takes most smokers about 8 tries before they are finally able to fully quit. So, the more often you try and fail, the better your chance of quitting the next time! So, don't give up!    GO COLD TURKEY:  Most ex-smokers quit cold turkey. Trying to cut back gradually doesn't seem to work as well, perhaps because it continues the smoking habit. Also, it is possible to fool yourself by inhaling more while smoking fewer cigarettes. This results in the same amount of nicotine in your body!    GET SUPPORT:  Support programs can make an important difference, especially for the heavy smoker. These groups offer lectures, methods to change your behavior and peer support. Call the free national Quitline for  more information. 243-EFAD-UZT (041-469-8514). Low-cost or free programs are offered by many hospitals, local chapters of the American Lung Association (463-341-8109) and the American Cancer Society (510-666-1670). Support at home is important too. Non-smokers can help by offering praise and encouragement. If the smoker fails to quit, encourage them to try again!    OVER-THE-COUNTER MEDICINES:  For those who can't quit on their own, Nicotine Replacement Therapy (NRT) may make quitting much easier. Certain aids such as the nicotine patch, gum and lozenge are available without a prescription. However, it is best to use these under the guidance of your doctor. The skin patch provides a steady supply of nicotine to the body. Nicotine gum and lozenge gives temporary bursts of low levels of nicotine. Both methods take the edge off the craving for cigarettes. WARNING: If you feel symptoms of nicotine overdose, such as nausea, vomiting, dizziness, weakness, or fast heartbeat, stop using these and see your doctor.    PRESCRIPTION MEDICINES:  After evaluating your smoking patterns and prior attempts at quitting, your doctor may offer a prescription medicine such as bupropion (Zyban, Wellbutrin), varenicline (Chantix, Champix), a niocotine inhaler or nasal spray. Each has its unique advantage and side effects which your doctor can review with you.    HEALTH BENEFITS OF QUITTING:  The benefits of quitting start right away and keep improving the longer you go without smokin minutes: blood pressure and pulse return to normal  8 hours: oxygen levels return to normal  2 days: ability to smell and taste begins to improve as damaged nerves start to regrow  2-3 weeks: circulation and lung function improves  1-9 months: decreased cough, congestion and shortness of breath; less tired  1 year: risk of heart attack decreases by half  5 years: risk of lung cancer decreases by half; risk of stroke becomes the same as a non-smoker  For  information about how to quit smoking, visit the following links:  National Cancer [place] ,   Clearing the Air, Quit Smoking Today   - an online booklet. http://www.smokefree.gov/pubs/clearing_the_air.pdf  Smokefree.gov http://smokefree.gov/  QuitNet http://www.quitnet.com/    8353-6963 Sukumar Dhillon, [address] [place] [address], [place] [address]. All rights reserved. This information is not intended as a substitute for professional medical care. Always follow your healthcare professional's instructions.    The Benefits of Living Smoke Free  What do you want to gain from quitting? Check off some reasons to quit.  Health Benefits  ___ Reduce my risk of lung cancer, heart disease, chronic lung disease  ___ Have fewer wrinkles and softer skin  ___ Improve my sense of taste and smell  ___ For pregnant women--reduce the risk of having a miscarriage, stillbirth, premature birth, or low-birth-weight baby  Personal Benefits  ___ Feel more in control of my life  ___ Have better-smelling hair, breath, clothes, home, and car  ___ Save time by not having to take smoke breaks, buy cigarettes, or hunt for a light  ___ Have whiter teeth  Family Benefits  ___ Reduce my children s respiratory tract infections  ___ Set a good example for my children  ___ Reduce my family s cancer risk  Financial Benefits  ___ Save hundreds of dollars each year that would be spent on cigarettes  ___ Save money on medical bills  ___ Save on life, health, and car insurance premiums    Those Dollars Add Up!  Cigarettes are expensive, and getting more expensive all the time. Do you realize how much money you are spending on cigarettes per year? What is the average amount you spend on a pack of cigarettes? What is the average number of packs that you smoke per day? Using your answers to these questions, fill in this formula to help you find out:  ($ _____ per pack) ×  ( _____ number of packs per day) × (365 days) =  $ _____ yearly cost of smoking  Besides  tobacco, there are other costs, including extra cleaning bills and replacement costs for clothing and furniture; medical expenses for smoking-related illnesses; and higher health, life, and car insurance premiums.    Cigars and Pipes Count Too!  Cigars and pipes are also dangerous. So are smokeless (chewing) tobacco and snuff. All of these products contain nicotine, a highly addictive substance that has harmful effects on your body. Quitting smoking means giving up all tobacco products.      [id number] Sukumar [facility], [address] [place] [address], [place] [id number]. All rights reserved. This information is not intended as a substitute for professional medical care. Always follow your healthcare professional's instructions.

## 2018-10-22 ENCOUNTER — OFFICE VISIT (OUTPATIENT)
Dept: FAMILY MEDICINE | Facility: OTHER | Age: 48
End: 2018-10-22
Attending: NURSE PRACTITIONER
Payer: COMMERCIAL

## 2018-10-22 VITALS
TEMPERATURE: 97.8 F | RESPIRATION RATE: 16 BRPM | HEIGHT: 72 IN | BODY MASS INDEX: 35.21 KG/M2 | OXYGEN SATURATION: 96 % | HEART RATE: 106 BPM | WEIGHT: 260 LBS | DIASTOLIC BLOOD PRESSURE: 88 MMHG | SYSTOLIC BLOOD PRESSURE: 128 MMHG

## 2018-10-22 DIAGNOSIS — G89.29 CHRONIC PAIN OF LEFT ANKLE: ICD-10-CM

## 2018-10-22 DIAGNOSIS — M25.572 CHRONIC PAIN OF LEFT ANKLE: ICD-10-CM

## 2018-10-22 DIAGNOSIS — Z23 NEED FOR VACCINATION: ICD-10-CM

## 2018-10-22 DIAGNOSIS — I10 BENIGN ESSENTIAL HYPERTENSION: ICD-10-CM

## 2018-10-22 DIAGNOSIS — R79.89 ELEVATED LFTS: ICD-10-CM

## 2018-10-22 DIAGNOSIS — E11.9 TYPE 2 DIABETES MELLITUS WITHOUT COMPLICATION, WITHOUT LONG-TERM CURRENT USE OF INSULIN (H): Primary | ICD-10-CM

## 2018-10-22 DIAGNOSIS — Z71.6 TOBACCO ABUSE COUNSELING: ICD-10-CM

## 2018-10-22 DIAGNOSIS — Z72.0 TOBACCO ABUSE: ICD-10-CM

## 2018-10-22 DIAGNOSIS — E78.5 HYPERLIPIDEMIA LDL GOAL <100: ICD-10-CM

## 2018-10-22 DIAGNOSIS — Z79.899 ON STATIN THERAPY: ICD-10-CM

## 2018-10-22 PROCEDURE — 90471 IMMUNIZATION ADMIN: CPT | Performed by: NURSE PRACTITIONER

## 2018-10-22 PROCEDURE — 99214 OFFICE O/P EST MOD 30 MIN: CPT | Performed by: NURSE PRACTITIONER

## 2018-10-22 PROCEDURE — G0463 HOSPITAL OUTPT CLINIC VISIT: HCPCS | Mod: 25

## 2018-10-22 PROCEDURE — 90732 PPSV23 VACC 2 YRS+ SUBQ/IM: CPT | Performed by: NURSE PRACTITIONER

## 2018-10-22 PROCEDURE — G0463 HOSPITAL OUTPT CLINIC VISIT: HCPCS

## 2018-10-22 ASSESSMENT — ANXIETY QUESTIONNAIRES
IF YOU CHECKED OFF ANY PROBLEMS ON THIS QUESTIONNAIRE, HOW DIFFICULT HAVE THESE PROBLEMS MADE IT FOR YOU TO DO YOUR WORK, TAKE CARE OF THINGS AT HOME, OR GET ALONG WITH OTHER PEOPLE: NOT DIFFICULT AT ALL
3. WORRYING TOO MUCH ABOUT DIFFERENT THINGS: NOT AT ALL
4. TROUBLE RELAXING: NOT AT ALL
7. FEELING AFRAID AS IF SOMETHING AWFUL MIGHT HAPPEN: NOT AT ALL
6. BECOMING EASILY ANNOYED OR IRRITABLE: NOT AT ALL
5. BEING SO RESTLESS THAT IT IS HARD TO SIT STILL: NOT AT ALL
1. FEELING NERVOUS, ANXIOUS, OR ON EDGE: NOT AT ALL
2. NOT BEING ABLE TO STOP OR CONTROL WORRYING: NOT AT ALL
GAD7 TOTAL SCORE: 0

## 2018-10-22 ASSESSMENT — PAIN SCALES - GENERAL: PAINLEVEL: NO PAIN (0)

## 2018-10-22 NOTE — MR AVS SNAPSHOT
After Visit Summary   10/22/2018    Rojelio Juárez    MRN: [id number]           Patient Information     Date Of Birth          1970        Visit Information        Provider Department      10/22/2018 2:30 PM Marion Davis NP [facility]        Today's Diagnoses     Type 2 diabetes mellitus without complication, without long-term current use of insulin (H)    -  1    Tobacco abuse        Tobacco abuse counseling        Hyperlipidemia LDL goal <100        Benign essential hypertension        Chronic pain of left ankle        On statin therapy          Care Instructions        ASSESSMENT/PLAN:       1. Tobacco abuse  Cessation encouraged  - QUITPLAN  Referral; Future  - Tobacco Cessation - Order to Satisfy Health Maintenance    2. Tobacco abuse counseling  - QUITPLAN  Referral; Future    3. Type 2 diabetes mellitus without complication, without long-term current use of insulin (H)  chronic  - HEMOGLOBIN A1C; Future    4. Hyperlipidemia LDL goal <100  chronic  - LIPID PANEL; Future  - restart lipitor, if nausea or side effects, will change to crestor.     5. Benign essential hypertension  chronic  - TSH with free T4 reflex; Future  - BASIC METABOLIC PANEL; Future    6. Chronic pain of left ankle  chronic  - ORTHOPEDICS ADULT REFERRAL - Dr hickey  - if no improvement, will refer to podiatry    7. On statin therapy  - ALT; Future  - AST; Future    Pneumovax updated today    FUTURE APPOINTMENTS:       - Follow-up visit in 4 months or as needed for acute concerns     Marion Davis NP  [facility]        HOW TO QUIT SMOKING  Smoking is one of the hardest habits to break. About half of all those who have ever smoked have been able to quit, and most of those (about 70%) who still smoke want to quit. Here are some of the best ways to stop smoking.     KEEP TRYING:  It takes most smokers about 8 tries before they are finally able to fully quit.  So, the more often you try and fail, the better your chance of quitting the next time! So, don't give up!    GO COLD TURKEY:  Most ex-smokers quit cold turkey. Trying to cut back gradually doesn't seem to work as well, perhaps because it continues the smoking habit. Also, it is possible to fool yourself by inhaling more while smoking fewer cigarettes. This results in the same amount of nicotine in your body!    GET SUPPORT:  Support programs can make an important difference, especially for the heavy smoker. These groups offer lectures, methods to change your behavior and peer support. Call the free national Quitline for more information. 800-QUIT-NOW (679-649-9345). Low-cost or free programs are offered by many hospitals, local chapters of the American Lung Association (829-959-2319) and the American Cancer Society (622-799-6541). Support at home is important too. Non-smokers can help by offering praise and encouragement. If the smoker fails to quit, encourage them to try again!    OVER-THE-COUNTER MEDICINES:  For those who can't quit on their own, Nicotine Replacement Therapy (NRT) may make quitting much easier. Certain aids such as the nicotine patch, gum and lozenge are available without a prescription. However, it is best to use these under the guidance of your doctor. The skin patch provides a steady supply of nicotine to the body. Nicotine gum and lozenge gives temporary bursts of low levels of nicotine. Both methods take the edge off the craving for cigarettes. WARNING: If you feel symptoms of nicotine overdose, such as nausea, vomiting, dizziness, weakness, or fast heartbeat, stop using these and see your doctor.    PRESCRIPTION MEDICINES:  After evaluating your smoking patterns and prior attempts at quitting, your doctor may offer a prescription medicine such as bupropion (Zyban, Wellbutrin), varenicline (Chantix, Champix), a niocotine inhaler or nasal spray. Each has its unique advantage and side effects  which your doctor can review with you.    HEALTH BENEFITS OF QUITTING:  The benefits of quitting start right away and keep improving the longer you go without smokin minutes: blood pressure and pulse return to normal  8 hours: oxygen levels return to normal  2 days: ability to smell and taste begins to improve as damaged nerves start to regrow  2-3 weeks: circulation and lung function improves  1-9 months: decreased cough, congestion and shortness of breath; less tired  1 year: risk of heart attack decreases by half  5 years: risk of lung cancer decreases by half; risk of stroke becomes the same as a non-smoker  For information about how to quit smoking, visit the following links:  National Cancer [place] ,   Clearing the Air, Quit Smoking Today   - an online booklet. http://www.smokefree.gov/pubs/clearing_the_air.pdf  Smokefree.gov http://smokefree.gov/  QuitNet http://www.quitnet.com/    8106-9150 Sukumar [address], [place] [address]. All rights reserved. This information is not intended as a substitute for professional medical care. Always follow your healthcare professional's instructions.    The Benefits of Living Smoke Free  What do you want to gain from quitting? Check off some reasons to quit.  Health Benefits  ___ Reduce my risk of lung cancer, heart disease, chronic lung disease  ___ Have fewer wrinkles and softer skin  ___ Improve my sense of taste and smell  ___ For pregnant women--reduce the risk of having a miscarriage, stillbirth, premature birth, or low-birth-weight baby  Personal Benefits  ___ Feel more in control of my life  ___ Have better-smelling hair, breath, clothes, home, and car  ___ Save time by not having to take smoke breaks, buy cigarettes, or hunt for a light  ___ Have whiter teeth  Family Benefits  ___ Reduce my children s respiratory tract infections  ___ Set a good example for my children  ___ Reduce my family s cancer risk  Financial Benefits  ___ Save  hundreds of dollars each year that would be spent on cigarettes  ___ Save money on medical bills  ___ Save on life, health, and car insurance premiums    Those Dollars Add Up!  Cigarettes are expensive, and getting more expensive all the time. Do you realize how much money you are spending on cigarettes per year? What is the average amount you spend on a pack of cigarettes? What is the average number of packs that you smoke per day? Using your answers to these questions, fill in this formula to help you find out:  ($ _____ per pack) ×  ( _____ number of packs per day) × (365 days) =  $ _____ yearly cost of smoking  Besides tobacco, there are other costs, including extra cleaning bills and replacement costs for clothing and furniture; medical expenses for smoking-related illnesses; and higher health, life, and car insurance premiums.    Cigars and Pipes Count Too!  Cigars and pipes are also dangerous. So are smokeless (chewing) tobacco and snuff. All of these products contain nicotine, a highly addictive substance that has harmful effects on your body. Quitting smoking means giving up all tobacco products.      7925-0333 [place] [address]. All rights reserved. This information is not intended as a substitute for professional medical care. Always follow your healthcare professional's instructions.          Follow-ups after your visit        Additional Services     ORTHOPEDICS ADULT REFERRAL       Your provider has referred you to: Dr hickey    Please be aware that coverage of these services is subject to the terms and limitations of your health insurance plan.  Call member services at your health plan with any benefit or coverage questions.      Please bring the following to your appointment:    >>   Any x-rays, CTs or MRIs which have been performed.  Contact the facility where they were done to arrange for  prior to your scheduled appointment.    >>   List of current  medications   >>   This referral request   >>   Any documents/labs given to you for this referral            QUITPLAN  Referral       [place] TO[employer]AC[employer] QUITLINES FAX FORM  Fax form to: [phone]    The clinic will facilitate the referral to the quitline.    Provider Information:  ===============================================================  Marion Davis NP  ID#: [id number] - [facility] [phone] Fax: [phone]   [email or web address]  Payor: [facility] / Plan: [facility] [place] / Product Type: HMO /   ===============================================================    The Public Health Service Guideline does not recommend providing over-the-counter nicotine replacement therapy products without physician authorization to patients with the following conditions: pregnancy, uncontrolled high blood pressure, or cardiovascular diseases.     I authorize the [place] Tobacco Quitlines to provide over-the-counter nicotine replacement products for the patient listed below if the patient's health plan benefits cover NRT or if the patient is eligible for QUITPLAN services.    Patient Consented to:  ===============================================================  - YES - I am ready to quit tobacco and request the above information be given to the quitline so they may contact me.  I understand that one of [place]'s Tobacco Quitlines will inform my provider about my participation.  ===============================================================  Please check the BEST 3-hour call window for them to reach you: 11am - 2pm  May we leave a message?  YES  Language Preference:  English  Phone Number: Home Phone      [phone]  Mobile          [phone]     E-mail Address: No e-mail address on record    ========================================================================  FOR QUITLINE USE ONLY:  THIS INFORMATION WILL BE  PROVIDED BACK TO THE PROVIDER  Contact date: __/ __/__ or ____ Did not reach after three attempts.    Outcome:  __ Enrolled in telephone counseling program  __ Declined  __ Not Reached    Stage of readiness: _______________________  Planned Quit Date: ___/ ___/ ___  Comments:      2011 [facility]   This message funded by [employer] [place], an independent licensee of the Blue Cross and Blue [employer] Association. Rev. 11/1/12                  Follow-up notes from your care team     Return in about 4 months (around 2/22/2019), or if symptoms worsen or fail to improve.      Future tests that were ordered for you today     Open Future Orders        Priority Expected Expires Ordered    AST Routine 11/1/2018 11/22/2018 10/22/2018    QUITPLAN  Referral Routine  12/18/2018 10/22/2018    TSH with free T4 reflex Routine 11/1/2018 11/22/2018 10/22/2018    HEMOGLOBIN A1C Routine 11/1/2018 11/22/2018 10/22/2018    LIPID PANEL Routine 11/1/2018 11/22/2018 10/22/2018    BASIC METABOLIC PANEL Routine 11/1/2018 11/22/2018 10/22/2018    ALT Routine 11/1/2018 11/22/2018 10/22/2018            Who to contact     If you have questions or need follow up information about today's clinic visit or your schedule please contact [facility] directly at [phone].  Normal or non-critical lab and imaging results will be communicated to you by MyChart, letter or phone within 4 business days after the clinic has received the results. If you do not hear from us within 7 days, please contact the clinic through MyChart or phone. If you have a critical or abnormal lab result, we will notify you by phone as soon as possible.  Submit refill requests through [employer] or call your pharmacy and they will forward the refill request to us. Please allow 3 business days for your refill to be completed.          Additional Information About Your Visit        Care EveryWhere ID     This is your Care  EveryWhere ID. This could be used by other organizations to access your [place] medical records  [id number]        Your Vitals Were     Pulse Temperature Respirations Height Pulse Oximetry BMI (Body Mass Index)    106 97.8  F (36.6  C) (Tympanic) 16 6' (1.829 m) 96% 35.26 kg/m2       Blood Pressure from Last 3 Encounters:   10/22/18 128/88   09/21/18 116/88   09/07/18 136/86    Weight from Last 3 Encounters:   10/22/18 260 lb (117.9 kg)   09/21/18 273 lb 6.4 oz (124 kg)   09/07/18 272 lb 14.4 oz (123.8 kg)              We Performed the Following     ORTHOPEDICS ADULT REFERRAL     Tobacco Cessation - Order to Satisfy Health Maintenance        Primary Care Provider Office Phone # Fax #    Marion IGOR Davis [phone] [address] [facility] [address]        Goals        General    Reducing Risks (pt-stated)     Notes - Note created  9/7/2018 12:40 PM by Lela Mejia RD    Goal Statement: I will have lower glucose levels at next visit.     Measure of Success: meter download    Strengths: Pt is very compliant with recommendations.     Date to Achieve By: next visit.         Equal Access to Services     LUC TRIMBLE AH: Hadii patricia ragland hadasho Soomaali, waaxda luqadaha, qaybta kaalmada adeegyada, waxay jeff holly. So [facility] [phone].    ATENCIÓN: Si habla español, tiene a gruber disposición servicios gratuitos de asistencia lingüística. Llliana al [phone].    We comply with applicable federal civil rights laws and [place] laws. We do not discriminate on the basis of race, color, national origin, age, disability, sex, sexual orientation, or gender identity.            Thank you!     Thank you for choosing [facility]  for your care. Our goal is always to provide you with excellent care. Hearing back from our patients is one way we can continue to improve our services. Please take a few minutes to complete the written  survey that you may receive in the mail after your visit with us. Thank you!             Your Updated Medication List - Protect others around you: Learn how to safely use, store and throw away your medicines at www.disposemymeds.org.          This list is accurate as of 10/22/18  3:05 PM.  Always use your most recent med list.                   Brand Name Dispense Instructions for use Diagnosis    ACCU-CHEK GUIDE w/Device Kit     1 kit    1 each daily    Type 2 diabetes mellitus with hyperglycemia, with long-term current use of insulin (H)       aspirin 81 MG EC tablet     90 tablet    Take 1 tablet (81 mg) by mouth daily    Type 2 diabetes mellitus without complication, without long-term current use of insulin (H), Benign essential hypertension       atorvastatin 10 MG tablet    LIPITOR    90 tablet    Take 1 tablet (10 mg) by mouth At Bedtime    Hyperlipidemia LDL goal <100       BASAGLAR 100 UNIT/ML injection     15 mL    Inject 25 Units Subcutaneous daily    Type 2 diabetes mellitus with hyperglycemia, with long-term current use of insulin (H)       blood glucose monitoring lancets     102 each    Use to test blood sugar 3 times daily.    Type 2 diabetes mellitus with hyperglycemia, with long-term current use of insulin (H)       blood glucose monitoring test strip    ACCU-CHEK GUIDE    100 each    Use to test blood sugar 3 times daily.    Type 2 diabetes mellitus with hyperglycemia, with long-term current use of insulin (H)       empagliflozin 10 MG Tabs tablet    JARDIANCE    30 tablet    Take 1 tablet (10 mg) by mouth daily    Type 2 diabetes mellitus with hyperglycemia, with long-term current use of insulin (H)       ibuprofen 800 MG tablet    ADVIL/MOTRIN    90 tablet    TAKE 1 TABLET(800 MG) BY MOUTH EVERY 8 HOURS AS NEEDED FOR MODERATE PAIN    Acute right-sided low back pain with right-sided sciatica, Right-sided low back pain with right-sided sciatica, unspecified chronicity       insulin pen needle 32G  X 4 MM     100 each    Use 1 pen needle daily.    Type 2 diabetes mellitus with hyperglycemia, without long-term current use of insulin (H)       losartan 25 MG tablet    COZAAR    15 tablet    TAKE 1/2 TABLET(12.5 MG) BY MOUTH DAILY    Benign essential hypertension       triamcinolone 0.1 % cream    KENALOG    80 g    Apply sparingly to affected area three times daily as needed    Eczema, unspecified type

## 2018-10-22 NOTE — NURSING NOTE
Chief Complaint   Patient presents with     Well Child     Diabetes     Lipids     Depression     Anxiety     Nicotine Dependence       Initial /88  Pulse 106  Temp 97.8  F (36.6  C) (Tympanic)  Resp 16  Ht 6' (1.829 m)  Wt 260 lb (117.9 kg)  SpO2 96%  BMI 35.26 kg/m2 Estimated body mass index is 35.26 kg/(m^2) as calculated from the following:    Height as of this encounter: 6' (1.829 m).    Weight as of this encounter: 260 lb (117.9 kg).  Medication Reconciliation: complete    Eryn Larry LPN

## 2018-10-22 NOTE — LETTER
My Depression Action Plan  Name: Rojelio Juárez   Date of Birth 1970  Date: 10/19/2018    My doctor: Marion Davis   My clinic: [facility]  [address]  [place] [address]  [phone]          GREEN    ZONE   Good Control    What it looks like:     Things are going generally well. You have normal up s and down s. You may even feel depressed from time to time, but bad moods usually last less than a day.   What you need to do:  1. Continue to care for yourself (see self care plan)  2. Check your depression survival kit and update it as needed  3. Follow your physician s recommendations including any medication.  4. Do not stop taking medication unless you consult with your physician first.           YELLOW         ZONE Getting Worse    What it looks like:     Depression is starting to interfere with your life.     It may be hard to get out of bed; you may be starting to isolate yourself from others.    Symptoms of depression are starting to last most all day and this has happened for several days.     You may have suicidal thoughts but they are not constant.   What you need to do:     1. Call your care team, your response to treatment will improve if you keep your care team informed of your progress. Yellow periods are signs an adjustment may need to be made.     2. Continue your self-care, even if you have to fake it!    3. Talk to someone in your support network    4. Open up your depression survival kit           RED    ZONE Medical Alert - Get Help    What it looks like:     Depression is seriously interfering with your life.     You may experience these or other symptoms: You can t get out of bed most days, can t work or engage in other necessary activities, you have trouble taking care of basic hygiene, or basic responsibilities, thoughts of suicide or death that will not go away, self-injurious behavior.     What you need to do:  1. Call your  care team and request a same-day appointment. If they are not available (weekends or after hours) call your local crisis line, emergency room or 911.            Depression Self Care Plan / Survival Kit    Self-Care for Depression  Here s the deal. Your body and mind are really not as separate as most people think.  What you do and think affects how you feel and how you feel influences what you do and think. This means if you do things that people who feel good do, it will help you feel better.  Sometimes this is all it takes.  There is also a place for medication and therapy depending on how severe your depression is, so be sure to consult with your medical provider and/ or Behavioral Health Consultant if your symptoms are worsening or not improving.     In order to better manage my stress, I will:    Exercise  Get some form of exercise, every day. This will help reduce pain and release endorphins, the  feel good  chemicals in your brain. This is almost as good as taking antidepressants!  This is not the same as joining a gym and then never going! (they count on that by the way ) It can be as simple as just going for a walk or doing some gardening, anything that will get you moving.      Hygiene   Maintain good hygiene (Get out of bed in the morning, Make your bed, Brush your teeth, Take a shower, and Get dressed like you were going to work, even if you are unemployed).  If your clothes don't fit try to get ones that do.    Diet  I will strive to eat foods that are good for me, drink plenty of water, and avoid excessive sugar, caffeine, alcohol, and other mood-altering substances.  Some foods that are helpful in depression are: complex carbohydrates, B vitamins, flaxseed, fish or fish oil, fresh fruits and vegetables.    Psychotherapy  I agree to participate in Individual Therapy (if recommended).    Medication  If prescribed medications, I agree to take them.  Missing doses can result in serious side effects.  I  understand that drinking alcohol, or other illicit drug use, may cause potential side effects.  I will not stop my medication abruptly without first discussing it with my provider.    Staying Connected With Others  I will stay in touch with my friends, family members, and my primary care provider/team.    Use your imagination  Be creative.  We all have a creative side; it doesn t matter if it s oil painting, sand castles, or mud pies! This will also kick up the endorphins.    Witness Beauty  (AKA stop and smell the roses) Take a look outside, even in mid-winter. Notice colors, textures. Watch the squirrels and birds.     Service to others  Be of service to others.  There is always someone else in need.  By helping others we can  get out of ourselves  and remember the really important things.  This also provides opportunities for practicing all the other parts of the program.    Humor  Laugh and be silly!  Adjust your TV habits for less news and crime-drama and more comedy.    Control your stress  Try breathing deep, massage therapy, biofeedback, and meditation. Find time to relax each day.     My support system    Clinic Contact:  Phone number:    Contact 1:  Phone number:    Contact 2:  Phone number:    [employer]/:  Phone number:    Therapist:  Phone number:    Local crisis center:    Phone number:    Other community support:  Phone number:

## 2018-10-23 ASSESSMENT — ANXIETY QUESTIONNAIRES: GAD7 TOTAL SCORE: 0

## 2018-10-23 ASSESSMENT — PATIENT HEALTH QUESTIONNAIRE - PHQ9: SUM OF ALL RESPONSES TO PHQ QUESTIONS 1-9: 1

## 2018-10-24 ENCOUNTER — TRANSFERRED RECORDS (OUTPATIENT)
Dept: HEALTH INFORMATION MANAGEMENT | Facility: CLINIC | Age: 48
End: 2018-10-24

## 2018-10-24 DIAGNOSIS — I10 BENIGN ESSENTIAL HYPERTENSION: ICD-10-CM

## 2018-10-24 RX ORDER — LOSARTAN POTASSIUM 25 MG/1
TABLET ORAL
Qty: 15 TABLET | Refills: 3 | Status: SHIPPED | OUTPATIENT
Start: 2018-10-24 | End: 2019-02-22

## 2018-10-25 ENCOUNTER — PATIENT OUTREACH (OUTPATIENT)
Dept: EDUCATION SERVICES | Facility: HOSPITAL | Age: 48
End: 2018-10-25

## 2018-10-26 ENCOUNTER — PATIENT OUTREACH (OUTPATIENT)
Dept: EDUCATION SERVICES | Facility: HOSPITAL | Age: 48
End: 2018-10-26

## 2018-10-26 NOTE — PROGRESS NOTES
Called pt today regarding glucose levels.  Current diabetes medications:  Basaglar 25 units daily, Jardiance 10 mg am.  Current glucose levels:  Fasting-122, 140, 111, 114, 134, 105, 134, 130  Before supper-110, 125, 119, 223, 193, 148, 224  2 hours post supper-170. 180, 166, 150, 152, 199  Glucose levels have trended down nicely.  No changes to medications today.  Pt plans to have A1c check in November.  Will speak with him again next week.

## 2018-11-01 ENCOUNTER — PATIENT OUTREACH (OUTPATIENT)
Dept: EDUCATION SERVICES | Facility: HOSPITAL | Age: 48
End: 2018-11-01

## 2018-11-01 NOTE — PROGRESS NOTES
Called pt today regarding glucose levels.  Current diabetes medications:  Basaglar 25 units daily, Jardiance 10 mg am.  Current glucose levels:  Fasting-103, 128, 115, 185, 110, 123  Before supper-130, 117, 140, 106, 100  2 hours after supper-199, 203, 203, 230  Pt is unsure why 2 hour post supper levels are trending higher.  He does not feel he has been consuming excess carbohydrates.  No changes today. Will speak with him again in one week.  A1c later this month.

## 2018-11-08 ENCOUNTER — PATIENT OUTREACH (OUTPATIENT)
Dept: EDUCATION SERVICES | Facility: HOSPITAL | Age: 48
End: 2018-11-08

## 2018-11-15 ENCOUNTER — TRANSFERRED RECORDS (OUTPATIENT)
Dept: HEALTH INFORMATION MANAGEMENT | Facility: CLINIC | Age: 48
End: 2018-11-15

## 2018-11-16 ENCOUNTER — PATIENT OUTREACH (OUTPATIENT)
Dept: EDUCATION SERVICES | Facility: HOSPITAL | Age: 48
End: 2018-11-16

## 2018-11-16 ENCOUNTER — ALLIED HEALTH/NURSE VISIT (OUTPATIENT)
Dept: FAMILY MEDICINE | Facility: OTHER | Age: 48
End: 2018-11-16
Attending: NURSE PRACTITIONER
Payer: COMMERCIAL

## 2018-11-16 DIAGNOSIS — I10 BENIGN ESSENTIAL HYPERTENSION: ICD-10-CM

## 2018-11-16 DIAGNOSIS — E78.5 HYPERLIPIDEMIA LDL GOAL <100: ICD-10-CM

## 2018-11-16 DIAGNOSIS — Z79.4 TYPE 2 DIABETES MELLITUS WITH HYPERGLYCEMIA, WITH LONG-TERM CURRENT USE OF INSULIN (H): ICD-10-CM

## 2018-11-16 DIAGNOSIS — E11.9 TYPE 2 DIABETES MELLITUS WITHOUT COMPLICATION, WITHOUT LONG-TERM CURRENT USE OF INSULIN (H): ICD-10-CM

## 2018-11-16 DIAGNOSIS — E11.9 TYPE 2 DIABETES MELLITUS WITHOUT COMPLICATION, WITHOUT LONG-TERM CURRENT USE OF INSULIN (H): Primary | ICD-10-CM

## 2018-11-16 DIAGNOSIS — E11.65 TYPE 2 DIABETES MELLITUS WITH HYPERGLYCEMIA, WITH LONG-TERM CURRENT USE OF INSULIN (H): ICD-10-CM

## 2018-11-16 DIAGNOSIS — Z79.899 ON STATIN THERAPY: ICD-10-CM

## 2018-11-16 LAB
ALT SERPL W P-5'-P-CCNC: 419 U/L (ref 0–70)
ANION GAP SERPL CALCULATED.3IONS-SCNC: 10 MMOL/L (ref 3–14)
AST SERPL W P-5'-P-CCNC: 330 U/L (ref 0–45)
BUN SERPL-MCNC: 14 MG/DL (ref 7–30)
CALCIUM SERPL-MCNC: 9 MG/DL (ref 8.5–10.1)
CHLORIDE SERPL-SCNC: 106 MMOL/L (ref 94–109)
CHOLEST SERPL-MCNC: 135 MG/DL
CO2 SERPL-SCNC: 24 MMOL/L (ref 20–32)
CREAT SERPL-MCNC: 0.73 MG/DL (ref 0.66–1.25)
EST. AVERAGE GLUCOSE BLD GHB EST-MCNC: 146 MG/DL
GFR SERPL CREATININE-BSD FRML MDRD: >90 ML/MIN/1.7M2
GLUCOSE SERPL-MCNC: 103 MG/DL (ref 70–99)
HBA1C MFR BLD: 6.7 % (ref 0–5.6)
HDLC SERPL-MCNC: 48 MG/DL
LDLC SERPL CALC-MCNC: 57 MG/DL
NONHDLC SERPL-MCNC: 87 MG/DL
POTASSIUM SERPL-SCNC: 4.3 MMOL/L (ref 3.4–5.3)
SODIUM SERPL-SCNC: 140 MMOL/L (ref 133–144)
TRIGL SERPL-MCNC: 151 MG/DL
TSH SERPL DL<=0.005 MIU/L-ACNC: 0.98 MU/L (ref 0.4–4)

## 2018-11-16 PROCEDURE — 80061 LIPID PANEL: CPT | Mod: ZL | Performed by: NURSE PRACTITIONER

## 2018-11-16 PROCEDURE — 40000788 ZZHCL STATISTIC ESTIMATED AVERAGE GLUCOSE: Mod: ZL | Performed by: NURSE PRACTITIONER

## 2018-11-16 PROCEDURE — 83036 HEMOGLOBIN GLYCOSYLATED A1C: CPT | Mod: ZL | Performed by: NURSE PRACTITIONER

## 2018-11-16 PROCEDURE — 84460 ALANINE AMINO (ALT) (SGPT): CPT | Mod: ZL | Performed by: NURSE PRACTITIONER

## 2018-11-16 PROCEDURE — 84450 TRANSFERASE (AST) (SGOT): CPT | Mod: ZL | Performed by: NURSE PRACTITIONER

## 2018-11-16 PROCEDURE — 36415 COLL VENOUS BLD VENIPUNCTURE: CPT | Mod: ZL | Performed by: NURSE PRACTITIONER

## 2018-11-16 PROCEDURE — 80048 BASIC METABOLIC PNL TOTAL CA: CPT | Mod: ZL | Performed by: NURSE PRACTITIONER

## 2018-11-16 PROCEDURE — 84443 ASSAY THYROID STIM HORMONE: CPT | Mod: ZL | Performed by: NURSE PRACTITIONER

## 2018-11-16 NOTE — MR AVS SNAPSHOT
After Visit Summary   11/16/2018    Rojelio Juárez    MRN: [id number]           Patient Information     Date Of Birth          1970        Visit Information        Provider Department      11/16/2018 1:30 PM [facility] NURSE [facility]        Today's Diagnoses     Type 2 diabetes mellitus without complication, without long-term current use of insulin (H)    -  1       Follow-ups after your visit        Your next 10 appointments already scheduled     Nov 16, 2018  1:30 PM CST   (Arrive by 1:15 PM)   Nurse Only with [facility] NURSE   [facility] ([facility] )    [address]  [place] [address]   [phone]            Feb 22, 2019 11:15 AM CST   (Arrive by 11:00 AM)   SHORT with Marion Davis NP   [facility] ([facility] )    [address]  [place] [address]   [phone]              Who to contact     If you have questions or need follow up information about today's clinic visit or your schedule please contact [facility] directly at [phone].  Normal or non-critical lab and imaging results will be communicated to you by MyChart, letter or phone within 4 business days after the clinic has received the results. If you do not hear from us within 7 days, please contact the clinic through MyChart or phone. If you have a critical or abnormal lab result, we will notify you by phone as soon as possible.  Submit refill requests through "[employer] or call your pharmacy and they will forward the refill request to us. Please allow 3 business days for your refill to be completed.          Additional Information About Your Visit        Care EveryWhere ID     This is your Care EveryWhere ID. This could be used by other organizations to access your [place] medical records  [id number]         Blood Pressure from Last 3 Encounters:    10/22/18 128/88   09/21/18 116/88   09/07/18 136/86    Weight from Last 3 Encounters:   10/22/18 260 lb (117.9 kg)   09/21/18 273 lb 6.4 oz (124 kg)   09/07/18 272 lb 14.4 oz (123.8 kg)              Today, you had the following     No orders found for display       Primary Care Provider Office Phone # Fax #    Marion DavisDUSTY [phone]       [address] [address]        Goals        General    Reducing Risks (pt-stated)     Notes - Note created  9/7/2018 12:40 PM by Lela Mejia RD    Goal Statement: I will have lower glucose levels at next visit.     Measure of Success: meter download    Strengths: Pt is very compliant with recommendations.     Date to Achieve By: next visit.         Equal Access to Services     [facility] : Hadii aad ku hadashthaddeus Sodaphne, waaxda luqadaha, qaybta kaalmada anabelyada, ekta rand . So [facility] [phone].    ATENCIÓN: Si habla español, tiene a gruber disposición servicios gratuitos de asistencia lingüística. Llame al [phone].    We comply with applicable federal civil rights laws and [place] laws. We do not discriminate on the basis of race, color, national origin, age, disability, sex, sexual orientation, or gender identity.            Thank you!     Thank you for choosing [facility]  for your care. Our goal is always to provide you with excellent care. Hearing back from our patients is one way we can continue to improve our services. Please take a few minutes to complete the written survey that you may receive in the mail after your visit with us. Thank you!             Your Updated Medication List - Protect others around you: Learn how to safely use, store and throw away your medicines at www.disposemymeds.org.          This list is accurate as of 11/16/18 11:21 AM.  Always use your most recent med list.                   Brand Name Dispense Instructions for use  Diagnosis    ACCU-CHEK GUIDE w/Device Kit     1 kit    1 each daily    Type 2 diabetes mellitus with hyperglycemia, with long-term current use of insulin (H)       aspirin 81 MG EC tablet     90 tablet    Take 1 tablet (81 mg) by mouth daily    Type 2 diabetes mellitus without complication, without long-term current use of insulin (H), Benign essential hypertension       atorvastatin 10 MG tablet    LIPITOR    90 tablet    Take 1 tablet (10 mg) by mouth At Bedtime    Hyperlipidemia LDL goal <100       BASAGLAR 100 UNIT/ML injection     15 mL    Inject 25 Units Subcutaneous daily    Type 2 diabetes mellitus with hyperglycemia, with long-term current use of insulin (H)       blood glucose monitoring lancets     102 each    Use to test blood sugar 3 times daily.    Type 2 diabetes mellitus with hyperglycemia, with long-term current use of insulin (H)       blood glucose monitoring test strip    ACCU-CHEK GUIDE    100 each    Use to test blood sugar 3 times daily.    Type 2 diabetes mellitus with hyperglycemia, with long-term current use of insulin (H)       empagliflozin 10 MG Tabs tablet    JARDIANCE    30 tablet    Take 1 tablet (10 mg) by mouth daily    Type 2 diabetes mellitus with hyperglycemia, with long-term current use of insulin (H)       ibuprofen 800 MG tablet    ADVIL/MOTRIN    90 tablet    TAKE 1 TABLET(800 MG) BY MOUTH EVERY 8 HOURS AS NEEDED FOR MODERATE PAIN    Acute right-sided low back pain with right-sided sciatica, Right-sided low back pain with right-sided sciatica, unspecified chronicity       insulin pen needle 32G X 4 MM     100 each    Use 1 pen needle daily.    Type 2 diabetes mellitus with hyperglycemia, without long-term current use of insulin (H)       losartan 25 MG tablet    COZAAR    15 tablet    TAKE 1/2 TABLET(12.5 MG) BY MOUTH DAILY    Benign essential hypertension       triamcinolone 0.1 % cream    KENALOG    80 g    Apply sparingly to affected area three times daily as needed     Eczema, unspecified type

## 2018-11-16 NOTE — NURSING NOTE
Patient is here to have forms filled out. Forms taken and patient will return when forms are completed.

## 2018-11-16 NOTE — PROGRESS NOTES
Called pt today regarding glucose levels.  Current diabetes medications:  Basaglar 25 units daily, Jardiance 20 mg daily (pt is taking 2-10mg pills daily to use up his supply and then we will change to 25 mg daily).  Current glucose levels:  Fasting-97, 121, 88, 118, 134, 105, 127, 94  Before supper-97, 107, 108, 158, 145, 160, 103  2 hours post supper-182, 180, 191, 181, 148, 196  Order placed for 25 mg Jardiance pills.  Pt had labs done today but they are still in process.  He will call with A1c level.

## 2018-11-19 DIAGNOSIS — R79.89 ELEVATED LFTS: Primary | ICD-10-CM

## 2018-11-21 ENCOUNTER — HOSPITAL ENCOUNTER (OUTPATIENT)
Dept: ULTRASOUND IMAGING | Facility: HOSPITAL | Age: 48
Discharge: HOME | End: 2018-11-21
Attending: NURSE PRACTITIONER | Admitting: NURSE PRACTITIONER
Payer: COMMERCIAL

## 2018-11-21 DIAGNOSIS — R79.89 ELEVATED LFTS: ICD-10-CM

## 2018-11-21 PROBLEM — K76.0 FATTY INFILTRATION OF LIVER: Status: ACTIVE | Noted: 2018-11-21

## 2018-11-21 PROCEDURE — 76705 ECHO EXAM OF ABDOMEN: CPT | Mod: TC

## 2019-02-19 NOTE — PROGRESS NOTES
SUBJECTIVE:                                                    Rojelio Juárez is a 48 year old male who presents to clinic today for the following health issues:      Diabetes Follow-up      Patient is checking blood sugars: 4 times a week, runs about 130    Diabetic concerns: None     Symptoms of hypoglycemia (low blood sugar): none     Paresthesias (numbness or burning in feet) or sores: Yes burning     Date of last diabetic eye exam: 11/18    Diabetes Management Resources    Hyperlipidemia Follow-Up      Rate your low fat/cholesterol diet?: fair    Taking statin?  No    Other lipid medications/supplements?:  none    Hypertension Follow-up      Outpatient blood pressures are not being checked.    Low Salt Diet: no added salt    BP Readings from Last 2 Encounters:   02/22/19 128/80   10/22/18 128/88     Hemoglobin A1C (%)   Date Value   11/16/2018 6.7 (H)   07/31/2018 9.3 (H)     LDL Cholesterol Calculated (mg/dL)   Date Value   11/16/2018 57   06/22/2018 143 (H)     Depression and Anxiety Follow-Up    Status since last visit: stable    Other associated symptoms:None    Complicating factors:     Significant life event: No     Current substance abuse: None    PHQ 7/23/2018 10/22/2018 2/22/2019   PHQ-9 Total Score 0 1 0   Q9: Suicide Ideation Not at all Not at all Not at all     JOE-7 SCORE 7/23/2018 10/22/2018 2/22/2019   Total Score 0 0 0       PHQ-9  English  PHQ-9   Any Language  JOE-7  Suicide Assessment Five-step Evaluation and Treatment (SAFE-T)    Amount of exercise or physical activity: work    Problems taking medications regularly: No    Medication side effects: none    Diet: low salt and low fat/cholesterol    His [employer] paper work is due on 8 week, he wouldl like labs completed at that time.  His back has been actin up again, ibuprofen is not helping all that much, pain is worse when sleeping.  He denies numbness tingling of lower extremities that he had prior to surgery.      Problem list and histories  reviewed & adjusted, as indicated.  Additional history: as documented    Patient Active Problem List   Diagnosis     ACP (advance care planning)     Herniated intervertebral disc of lumbar spine     Benign essential hypertension     Tobacco dependence syndrome     Anxiety     Type 2 diabetes mellitus without complication, without long-term current use of insulin (H)     Morbid obesity (H)     Hyperlipidemia LDL goal <100     Status post lumbar microdiscectomy     Major depression     Fatty infiltration of liver     No past surgical history on file.    Social History     Tobacco Use     Smoking status: Current Every Day Smoker     Packs/day: 1.50     Types: Cigarettes     Start date: 1/15/2017     Smokeless tobacco: Never Used     Tobacco comment: will quit some day   Substance Use Topics     Alcohol use: Yes     Alcohol/week: 0.0 oz     No family history on file.      Current Outpatient Medications   Medication Sig Dispense Refill     amitriptyline (ELAVIL) 25 MG tablet Take 1 tablet (25 mg) by mouth At Bedtime 90 tablet 3     aspirin 81 MG EC tablet Take 1 tablet (81 mg) by mouth daily 90 tablet 3     blood glucose monitoring (ACCU-CHEK FASTCLIX) lancets Use to test blood sugar 3 times daily. 102 each 3     blood glucose monitoring (ACCU-CHEK GUIDE) test strip Use to test blood sugar 3 times daily. 100 each 3     Blood Glucose Monitoring Suppl (ACCU-CHEK GUIDE) w/Device KIT 1 each daily 1 kit 0     empagliflozin (JARDIANCE) 25 MG TABS tablet Take 1 tablet (25 mg) by mouth daily 90 tablet 1     ibuprofen (ADVIL/MOTRIN) 800 MG tablet TAKE 1 TABLET(800 MG) BY MOUTH EVERY 8 HOURS AS NEEDED FOR MODERATE PAIN 90 tablet 0     insulin glargine (BASAGLAR KWIKPEN) 100 UNIT/ML pen Inject 25 Units Subcutaneous daily 15 mL 3     insulin pen needle 32G X 4 MM Use 1 pen needle daily. 100 each 3     losartan (COZAAR) 25 MG tablet TAKE 1/2 TABLET(12.5 MG) BY MOUTH DAILY 45 tablet 1     STATIN NOT PRESCRIBED, INTENTIONAL, Please  choose reason not prescribed, below       triamcinolone (KENALOG) 0.1 % cream Apply sparingly to affected area three times daily as needed 80 g 0     Allergies   Allergen Reactions     Tdap [Daptacel]      Flue like symptoms      Recent Labs   Lab Test 11/16/18  1050 07/31/18  1013 06/22/18  1634 01/20/17  1101   A1C 6.7* 9.3* 9.4*  --    LDL 57  --  143*  --    HDL 48  --  31*  --    TRIG 151*  --  226*  --    *  --   --  202*   CR 0.73  --  0.75 0.91   GFRESTIMATED >90  --  >90 90   GFRESTBLACK >90  --  >90 >90  African American GFR Calc     POTASSIUM 4.3  --  4.2 4.3   TSH 0.98  --  1.05  --       BP Readings from Last 3 Encounters:   02/22/19 128/80   10/22/18 128/88   09/21/18 116/88    Wt Readings from Last 3 Encounters:   02/22/19 117.9 kg (260 lb)   10/22/18 117.9 kg (260 lb)   09/21/18 124 kg (273 lb 6.4 oz)                    ROS:  Constitutional, HEENT, cardiovascular, pulmonary, gi and gu systems are negative, except as otherwise noted.    OBJECTIVE:     /80 (BP Location: Left arm, Patient Position: Sitting, Cuff Size: Adult Large)   Pulse 96   Temp 97.6  F (36.4  C) (Tympanic)   Resp 14   Ht 1.829 m (6')   Wt 117.9 kg (260 lb)   SpO2 97%   BMI 35.26 kg/m    Body mass index is 35.26 kg/m .  GENERAL: healthy, alert and no distress  NECK: no adenopathy, no asymmetry, masses, or scars, thyroid normal to palpation and no carotid bruits  RESP: lungs clear to auscultation - no rales, rhonchi or wheezes  CV: regular rate and rhythm, normal S1 S2, no S3 or S4, no murmur, click or rub, no peripheral edema and peripheral pulses strong  MS: no gross musculoskeletal defects noted, no edema  PSYCH: mentation appears normal, affect normal/bright      ASSESSMENT/PLAN:         1. Tobacco abuse  Cessation encouraged   - Tobacco Cessation - Order to Satisfy Health Maintenance; Future    2. Tobacco abuse counseling    3. Benign essential hypertension  chronic  - TSH with free T4 reflex; Future  - BASIC  METABOLIC PANEL; Future  - losartan (COZAAR) 25 MG tablet; TAKE 1/2 TABLET(12.5 MG) BY MOUTH DAILY  Dispense: 45 tablet; Refill: 1    4. Type 2 diabetes mellitus with hyperglycemia, with long-term current use of insulin (H)  chronic  - HEMOGLOBIN A1C; Future  - empagliflozin (JARDIANCE) 25 MG TABS tablet; Take 1 tablet (25 mg) by mouth daily  Dispense: 90 tablet; Refill: 1  - insulin glargine (BASAGLAR KWIKPEN) 100 UNIT/ML pen; Inject 25 Units Subcutaneous daily  Dispense: 15 mL; Refill: 3    5. Hyperlipidemia LDL goal <100  chronic  - LIPID PANEL; Future    6. Chronic bilateral low back pain without sciatica  - amitriptyline (ELAVIL) 25 MG tablet; Take 1 tablet (25 mg) by mouth At Bedtime  Dispense: 90 tablet; Refill: 3    7. Fatty liver  - Hepatic panel; Future    FUTURE APPOINTMENTS:       - Follow-up visit in 2 months or as needed for acute concerns.     Marion Davis NP  [facility]

## 2019-02-19 NOTE — PATIENT INSTRUCTIONS
ASSESSMENT/PLAN:         1. Tobacco abuse  Cessation encouraged   - Tobacco Cessation - Order to Satisfy Health Maintenance; Future    2. Tobacco abuse counseling    3. Benign essential hypertension  chronic  - TSH with free T4 reflex; Future  - BASIC METABOLIC PANEL; Future  - losartan (COZAAR) 25 MG tablet; TAKE 1/2 TABLET(12.5 MG) BY MOUTH DAILY  Dispense: 45 tablet; Refill: 1    4. Type 2 diabetes mellitus with hyperglycemia, with long-term current use of insulin (H)  chronic  - HEMOGLOBIN A1C; Future  - empagliflozin (JARDIANCE) 25 MG TABS tablet; Take 1 tablet (25 mg) by mouth daily  Dispense: 90 tablet; Refill: 1  - insulin glargine (BASAGLAR KWIKPEN) 100 UNIT/ML pen; Inject 25 Units Subcutaneous daily  Dispense: 15 mL; Refill: 3    5. Hyperlipidemia LDL goal <100  chronic  - LIPID PANEL; Future    6. Chronic bilateral low back pain without sciatica  - amitriptyline (ELAVIL) 25 MG tablet; Take 1 tablet (25 mg) by mouth At Bedtime  Dispense: 90 tablet; Refill: 3    7. Fatty liver  - Hepatic panel; Future    FUTURE APPOINTMENTS:       - Follow-up visit in 2 months or as needed for acute concerns.     Marion Davis NP  [facility] [place] [facility]        HOW TO QUIT SMOKING  Smoking is one of the hardest habits to break. About half of all those who have ever smoked have been able to quit, and most of those (about 70%) who still smoke want to quit. Here are some of the best ways to stop smoking.     KEEP TRYING:  It takes most smokers about 8 tries before they are finally able to fully quit. So, the more often you try and fail, the better your chance of quitting the next time! So, don't give up!    GO COLD TURKEY:  Most ex-smokers quit cold turkey. Trying to cut back gradually doesn't seem to work as well, perhaps because it continues the smoking habit. Also, it is possible to fool yourself by inhaling more while smoking fewer cigarettes. This results in the same amount of nicotine in your  body!    GET SUPPORT:  Support programs can make an important difference, especially for the heavy smoker. These groups offer lectures, methods to change your behavior and peer support. Call the free national Quitline for more information. 800-QUIT-NOW (977-315-3183). Low-cost or free programs are offered by many hospitals, local chapters of the American Lung Association (759-475-8686) and the American Cancer Society (919-796-2641). Support at home is important too. Non-smokers can help by offering praise and encouragement. If the smoker fails to quit, encourage them to try again!    OVER-THE-COUNTER MEDICINES:  For those who can't quit on their own, Nicotine Replacement Therapy (NRT) may make quitting much easier. Certain aids such as the nicotine patch, gum and lozenge are available without a prescription. However, it is best to use these under the guidance of your doctor. The skin patch provides a steady supply of nicotine to the body. Nicotine gum and lozenge gives temporary bursts of low levels of nicotine. Both methods take the edge off the craving for cigarettes. WARNING: If you feel symptoms of nicotine overdose, such as nausea, vomiting, dizziness, weakness, or fast heartbeat, stop using these and see your doctor.    PRESCRIPTION MEDICINES:  After evaluating your smoking patterns and prior attempts at quitting, your doctor may offer a prescription medicine such as bupropion (Zyban, Wellbutrin), varenicline (Chantix, Champix), a niocotine inhaler or nasal spray. Each has its unique advantage and side effects which your doctor can review with you.    HEALTH BENEFITS OF QUITTING:  The benefits of quitting start right away and keep improving the longer you go without smokin minutes: blood pressure and pulse return to normal  8 hours: oxygen levels return to normal  2 days: ability to smell and taste begins to improve as damaged nerves start to regrow  2-3 weeks: circulation and lung function improves  1-9  months: decreased cough, congestion and shortness of breath; less tired  1 year: risk of heart attack decreases by half  5 years: risk of lung cancer decreases by half; risk of stroke becomes the same as a non-smoker  For information about how to quit smoking, visit the following links:  National Cancer [place] ,   Clearing the Air, Quit Smoking Today   - an online booklet. http://www.smokefree.gov/pubs/clearing_the_air.pdf  Smokefree.gov http://smokefree.gov/  QuitNet http://www.quitnet.com/    1691-5271 Sukumar Dhillon, [address] [place] [address], [place] [address]. All rights reserved. This information is not intended as a substitute for professional medical care. Always follow your healthcare professional's instructions.    The Benefits of Living Smoke Free  What do you want to gain from quitting? Check off some reasons to quit.  Health Benefits  ___ Reduce my risk of lung cancer, heart disease, chronic lung disease  ___ Have fewer wrinkles and softer skin  ___ Improve my sense of taste and smell  ___ For pregnant women--reduce the risk of having a miscarriage, stillbirth, premature birth, or low-birth-weight baby  Personal Benefits  ___ Feel more in control of my life  ___ Have better-smelling hair, breath, clothes, home, and car  ___ Save time by not having to take smoke breaks, buy cigarettes, or hunt for a light  ___ Have whiter teeth  Family Benefits  ___ Reduce my children s respiratory tract infections  ___ Set a good example for my children  ___ Reduce my family s cancer risk  Financial Benefits  ___ Save hundreds of dollars each year that would be spent on cigarettes  ___ Save money on medical bills  ___ Save on life, health, and car insurance premiums    Those Dollars Add Up!  Cigarettes are expensive, and getting more expensive all the time. Do you realize how much money you are spending on cigarettes per year? What is the average amount you spend on a pack of cigarettes? What is the average number of  packs that you smoke per day? Using your answers to these questions, fill in this formula to help you find out:  ($ _____ per pack) ×  ( _____ number of packs per day) × (365 days) =  $ _____ yearly cost of smoking  Besides tobacco, there are other costs, including extra cleaning bills and replacement costs for clothing and furniture; medical expenses for smoking-related illnesses; and higher health, life, and car insurance premiums.    Cigars and Pipes Count Too!  Cigars and pipes are also dangerous. So are smokeless (chewing) tobacco and snuff. All of these products contain nicotine, a highly addictive substance that has harmful effects on your body. Quitting smoking means giving up all tobacco products.      5387-7117 Sukumar Dhillon, [address] [place] [address], [place] [address]. All rights reserved. This information is not intended as a substitute for professional medical care. Always follow your healthcare professional's instructions.

## 2019-02-22 ENCOUNTER — OFFICE VISIT (OUTPATIENT)
Dept: FAMILY MEDICINE | Facility: OTHER | Age: 49
End: 2019-02-22
Attending: NURSE PRACTITIONER
Payer: COMMERCIAL

## 2019-02-22 VITALS
HEART RATE: 96 BPM | WEIGHT: 260 LBS | OXYGEN SATURATION: 97 % | HEIGHT: 72 IN | DIASTOLIC BLOOD PRESSURE: 80 MMHG | BODY MASS INDEX: 35.21 KG/M2 | TEMPERATURE: 97.6 F | SYSTOLIC BLOOD PRESSURE: 128 MMHG | RESPIRATION RATE: 14 BRPM

## 2019-02-22 DIAGNOSIS — E78.5 HYPERLIPIDEMIA LDL GOAL <100: Primary | ICD-10-CM

## 2019-02-22 DIAGNOSIS — I10 BENIGN ESSENTIAL HYPERTENSION: ICD-10-CM

## 2019-02-22 DIAGNOSIS — Z79.4 TYPE 2 DIABETES MELLITUS WITH HYPERGLYCEMIA, WITH LONG-TERM CURRENT USE OF INSULIN (H): ICD-10-CM

## 2019-02-22 DIAGNOSIS — M54.50 CHRONIC BILATERAL LOW BACK PAIN WITHOUT SCIATICA: ICD-10-CM

## 2019-02-22 DIAGNOSIS — Z71.6 TOBACCO ABUSE COUNSELING: ICD-10-CM

## 2019-02-22 DIAGNOSIS — E11.65 TYPE 2 DIABETES MELLITUS WITH HYPERGLYCEMIA, WITH LONG-TERM CURRENT USE OF INSULIN (H): ICD-10-CM

## 2019-02-22 DIAGNOSIS — Z72.0 TOBACCO ABUSE: ICD-10-CM

## 2019-02-22 DIAGNOSIS — G89.29 CHRONIC BILATERAL LOW BACK PAIN WITHOUT SCIATICA: ICD-10-CM

## 2019-02-22 DIAGNOSIS — Z23 NEED FOR VACCINATION: ICD-10-CM

## 2019-02-22 DIAGNOSIS — K76.0 FATTY LIVER: ICD-10-CM

## 2019-02-22 PROCEDURE — G0463 HOSPITAL OUTPT CLINIC VISIT: HCPCS

## 2019-02-22 PROCEDURE — G0463 HOSPITAL OUTPT CLINIC VISIT: HCPCS | Mod: 25

## 2019-02-22 PROCEDURE — 90715 TDAP VACCINE 7 YRS/> IM: CPT

## 2019-02-22 PROCEDURE — 99214 OFFICE O/P EST MOD 30 MIN: CPT | Performed by: NURSE PRACTITIONER

## 2019-02-22 PROCEDURE — 90471 IMMUNIZATION ADMIN: CPT | Performed by: NURSE PRACTITIONER

## 2019-02-22 RX ORDER — LOSARTAN POTASSIUM 25 MG/1
TABLET ORAL
Qty: 45 TABLET | Refills: 1 | Status: SHIPPED | OUTPATIENT
Start: 2019-02-22 | End: 2019-10-28

## 2019-02-22 RX ORDER — INSULIN GLARGINE 100 [IU]/ML
25 INJECTION, SOLUTION SUBCUTANEOUS DAILY
Qty: 15 ML | Refills: 3 | Status: SHIPPED | OUTPATIENT
Start: 2019-02-22 | End: 2019-10-28

## 2019-02-22 ASSESSMENT — ANXIETY QUESTIONNAIRES
7. FEELING AFRAID AS IF SOMETHING AWFUL MIGHT HAPPEN: NOT AT ALL
6. BECOMING EASILY ANNOYED OR IRRITABLE: NOT AT ALL
3. WORRYING TOO MUCH ABOUT DIFFERENT THINGS: NOT AT ALL
2. NOT BEING ABLE TO STOP OR CONTROL WORRYING: NOT AT ALL
1. FEELING NERVOUS, ANXIOUS, OR ON EDGE: NOT AT ALL
5. BEING SO RESTLESS THAT IT IS HARD TO SIT STILL: NOT AT ALL
GAD7 TOTAL SCORE: 0

## 2019-02-22 ASSESSMENT — PAIN SCALES - GENERAL: PAINLEVEL: MILD PAIN (2)

## 2019-02-22 ASSESSMENT — MIFFLIN-ST. JEOR: SCORE: 2092.35

## 2019-02-22 ASSESSMENT — PATIENT HEALTH QUESTIONNAIRE - PHQ9
5. POOR APPETITE OR OVEREATING: NOT AT ALL
SUM OF ALL RESPONSES TO PHQ QUESTIONS 1-9: 0

## 2019-02-22 NOTE — NURSING NOTE
Chief Complaint   Patient presents with     Diabetes     Hypertension     Hyperlipidemia       Initial /80 (BP Location: Left arm, Patient Position: Sitting, Cuff Size: Adult Large)   Pulse 96   Temp 97.6  F (36.4  C) (Tympanic)   Resp 14   Ht 1.829 m (6')   Wt 117.9 kg (260 lb)   SpO2 97%   BMI 35.26 kg/m   Estimated body mass index is 35.26 kg/m  as calculated from the following:    Height as of this encounter: 1.829 m (6').    Weight as of this encounter: 117.9 kg (260 lb).  Medication Reconciliation: complete    Pavithra Koroma LPN

## 2019-02-22 NOTE — LETTER
My Depression Action Plan  Name: Rojelio Juárez   Date of Birth 1970  Date: 2/19/2019    My doctor: Marion Davis   My clinic: [facility]  [address]  [place] [address]  [phone]          GREEN    ZONE   Good Control    What it looks like:     Things are going generally well. You have normal up s and down s. You may even feel depressed from time to time, but bad moods usually last less than a day.   What you need to do:  1. Continue to care for yourself (see self care plan)  2. Check your depression survival kit and update it as needed  3. Follow your physician s recommendations including any medication.  4. Do not stop taking medication unless you consult with your physician first.           YELLOW         ZONE Getting Worse    What it looks like:     Depression is starting to interfere with your life.     It may be hard to get out of bed; you may be starting to isolate yourself from others.    Symptoms of depression are starting to last most all day and this has happened for several days.     You may have suicidal thoughts but they are not constant.   What you need to do:     1. Call your care team, your response to treatment will improve if you keep your care team informed of your progress. Yellow periods are signs an adjustment may need to be made.     2. Continue your self-care, even if you have to fake it!    3. Talk to someone in your support network    4. Open up your depression survival kit           RED    ZONE Medical Alert - Get Help    What it looks like:     Depression is seriously interfering with your life.     You may experience these or other symptoms: You can t get out of bed most days, can t work or engage in other necessary activities, you have trouble taking care of basic hygiene, or basic responsibilities, thoughts of suicide or death that will not go away, self-injurious behavior.     What you need to do:  1. Call your  care team and request a same-day appointment. If they are not available (weekends or after hours) call your local crisis line, emergency room or 911.            Depression Self Care Plan / Survival Kit    Self-Care for Depression  Here s the deal. Your body and mind are really not as separate as most people think.  What you do and think affects how you feel and how you feel influences what you do and think. This means if you do things that people who feel good do, it will help you feel better.  Sometimes this is all it takes.  There is also a place for medication and therapy depending on how severe your depression is, so be sure to consult with your medical provider and/ or Behavioral Health Consultant if your symptoms are worsening or not improving.     In order to better manage my stress, I will:    Exercise  Get some form of exercise, every day. This will help reduce pain and release endorphins, the  feel good  chemicals in your brain. This is almost as good as taking antidepressants!  This is not the same as joining a gym and then never going! (they count on that by the way ) It can be as simple as just going for a walk or doing some gardening, anything that will get you moving.      Hygiene   Maintain good hygiene (Get out of bed in the morning, Make your bed, Brush your teeth, Take a shower, and Get dressed like you were going to work, even if you are unemployed).  If your clothes don't fit try to get ones that do.    Diet  I will strive to eat foods that are good for me, drink plenty of water, and avoid excessive sugar, caffeine, alcohol, and other mood-altering substances.  Some foods that are helpful in depression are: complex carbohydrates, B vitamins, flaxseed, fish or fish oil, fresh fruits and vegetables.    Psychotherapy  I agree to participate in Individual Therapy (if recommended).    Medication  If prescribed medications, I agree to take them.  Missing doses can result in serious side effects.  I  understand that drinking alcohol, or other illicit drug use, may cause potential side effects.  I will not stop my medication abruptly without first discussing it with my provider.    Staying Connected With Others  I will stay in touch with my friends, family members, and my primary care provider/team.    Use your imagination  Be creative.  We all have a creative side; it doesn t matter if it s oil painting, sand castles, or mud pies! This will also kick up the endorphins.    Witness Beauty  (AKA stop and smell the roses) Take a look outside, even in mid-winter. Notice colors, textures. Watch the squirrels and birds.     Service to others  Be of service to others.  There is always someone else in need.  By helping others we can  get out of ourselves  and remember the really important things.  This also provides opportunities for practicing all the other parts of the program.    Humor  Laugh and be silly!  Adjust your TV habits for less news and crime-drama and more comedy.    Control your stress  Try breathing deep, massage therapy, biofeedback, and meditation. Find time to relax each day.     My support system    Clinic Contact:  Phone number:    Contact 1:  Phone number:    Contact 2:  Phone number:    Religion/:  Phone number:    Therapist:  Phone number:    Local crisis center:    Phone number:    Other community support:  Phone number:

## 2019-02-23 ASSESSMENT — ANXIETY QUESTIONNAIRES: GAD7 TOTAL SCORE: 0

## 2019-03-22 DIAGNOSIS — E78.5 HYPERLIPIDEMIA LDL GOAL <100: ICD-10-CM

## 2019-03-22 DIAGNOSIS — Z79.4 TYPE 2 DIABETES MELLITUS WITH HYPERGLYCEMIA, WITH LONG-TERM CURRENT USE OF INSULIN (H): ICD-10-CM

## 2019-03-22 DIAGNOSIS — K76.0 FATTY LIVER: ICD-10-CM

## 2019-03-22 DIAGNOSIS — E11.65 TYPE 2 DIABETES MELLITUS WITH HYPERGLYCEMIA, WITH LONG-TERM CURRENT USE OF INSULIN (H): ICD-10-CM

## 2019-03-22 DIAGNOSIS — I10 BENIGN ESSENTIAL HYPERTENSION: ICD-10-CM

## 2019-03-22 DIAGNOSIS — R79.89 ELEVATED LFTS: ICD-10-CM

## 2019-03-22 LAB
ALBUMIN SERPL-MCNC: 4.4 G/DL (ref 3.4–5)
ALP SERPL-CCNC: 95 U/L (ref 40–150)
ALT SERPL W P-5'-P-CCNC: 93 U/L (ref 0–70)
ANION GAP SERPL CALCULATED.3IONS-SCNC: 11 MMOL/L (ref 3–14)
AST SERPL W P-5'-P-CCNC: 48 U/L (ref 0–45)
BASOPHILS # BLD AUTO: 0 10E9/L (ref 0–0.2)
BASOPHILS NFR BLD AUTO: 0.4 %
BILIRUB DIRECT SERPL-MCNC: 0.3 MG/DL (ref 0–0.2)
BILIRUB SERPL-MCNC: 1.2 MG/DL (ref 0.2–1.3)
BUN SERPL-MCNC: 18 MG/DL (ref 7–30)
CALCIUM SERPL-MCNC: 9.1 MG/DL (ref 8.5–10.1)
CHLORIDE SERPL-SCNC: 109 MMOL/L (ref 94–109)
CHOLEST SERPL-MCNC: 187 MG/DL
CO2 SERPL-SCNC: 22 MMOL/L (ref 20–32)
CREAT SERPL-MCNC: 0.86 MG/DL (ref 0.66–1.25)
DIFFERENTIAL METHOD BLD: NORMAL
EOSINOPHIL # BLD AUTO: 0.4 10E9/L (ref 0–0.7)
EOSINOPHIL NFR BLD AUTO: 4.8 %
ERYTHROCYTE [DISTWIDTH] IN BLOOD BY AUTOMATED COUNT: 12.2 % (ref 10–15)
EST. AVERAGE GLUCOSE BLD GHB EST-MCNC: 131 MG/DL
GFR SERPL CREATININE-BSD FRML MDRD: >90 ML/MIN/{1.73_M2}
GLUCOSE SERPL-MCNC: 120 MG/DL (ref 70–99)
HBA1C MFR BLD: 6.2 % (ref 0–5.6)
HCT VFR BLD AUTO: 48.9 % (ref 40–53)
HDLC SERPL-MCNC: 52 MG/DL
HGB BLD-MCNC: 17.3 G/DL (ref 13.3–17.7)
LDLC SERPL CALC-MCNC: 104 MG/DL
LYMPHOCYTES # BLD AUTO: 2 10E9/L (ref 0.8–5.3)
LYMPHOCYTES NFR BLD AUTO: 27.5 %
MCH RBC QN AUTO: 32.5 PG (ref 26.5–33)
MCHC RBC AUTO-ENTMCNC: 35.4 G/DL (ref 31.5–36.5)
MCV RBC AUTO: 92 FL (ref 78–100)
MONOCYTES # BLD AUTO: 0.5 10E9/L (ref 0–1.3)
MONOCYTES NFR BLD AUTO: 7.2 %
NEUTROPHILS # BLD AUTO: 4.4 10E9/L (ref 1.6–8.3)
NEUTROPHILS NFR BLD AUTO: 60.1 %
NONHDLC SERPL-MCNC: 135 MG/DL
PLATELET # BLD AUTO: 215 10E9/L (ref 150–450)
POTASSIUM SERPL-SCNC: 3.9 MMOL/L (ref 3.4–5.3)
PROT SERPL-MCNC: 7.9 G/DL (ref 6.8–8.8)
RBC # BLD AUTO: 5.32 10E12/L (ref 4.4–5.9)
SODIUM SERPL-SCNC: 142 MMOL/L (ref 133–144)
TRIGL SERPL-MCNC: 156 MG/DL
TSH SERPL DL<=0.005 MIU/L-ACNC: 1.07 MU/L (ref 0.4–4)
WBC # BLD AUTO: 7.3 10E9/L (ref 4–11)

## 2019-03-22 PROCEDURE — 82105 ALPHA-FETOPROTEIN SERUM: CPT | Mod: ZL | Performed by: NURSE PRACTITIONER

## 2019-03-22 PROCEDURE — 86706 HEP B SURFACE ANTIBODY: CPT | Mod: ZL | Performed by: NURSE PRACTITIONER

## 2019-03-22 PROCEDURE — 80076 HEPATIC FUNCTION PANEL: CPT | Mod: ZL | Performed by: NURSE PRACTITIONER

## 2019-03-22 PROCEDURE — 85025 COMPLETE CBC W/AUTO DIFF WBC: CPT | Mod: ZL | Performed by: NURSE PRACTITIONER

## 2019-03-22 PROCEDURE — 80061 LIPID PANEL: CPT | Mod: ZL | Performed by: NURSE PRACTITIONER

## 2019-03-22 PROCEDURE — 80048 BASIC METABOLIC PNL TOTAL CA: CPT | Mod: ZL | Performed by: NURSE PRACTITIONER

## 2019-03-22 PROCEDURE — 83036 HEMOGLOBIN GLYCOSYLATED A1C: CPT | Mod: ZL | Performed by: NURSE PRACTITIONER

## 2019-03-22 PROCEDURE — 36415 COLL VENOUS BLD VENIPUNCTURE: CPT | Mod: ZL | Performed by: NURSE PRACTITIONER

## 2019-03-22 PROCEDURE — 86803 HEPATITIS C AB TEST: CPT | Mod: ZL | Performed by: NURSE PRACTITIONER

## 2019-03-22 PROCEDURE — 84443 ASSAY THYROID STIM HORMONE: CPT | Mod: ZL | Performed by: NURSE PRACTITIONER

## 2019-03-22 PROCEDURE — 87340 HEPATITIS B SURFACE AG IA: CPT | Mod: ZL | Performed by: NURSE PRACTITIONER

## 2019-03-22 PROCEDURE — 40000788 ZZHCL STATISTIC ESTIMATED AVERAGE GLUCOSE: Mod: ZL | Performed by: NURSE PRACTITIONER

## 2019-03-22 PROCEDURE — 99000 SPECIMEN HANDLING OFFICE-LAB: CPT | Performed by: NURSE PRACTITIONER

## 2019-03-22 PROCEDURE — 86704 HEP B CORE ANTIBODY TOTAL: CPT | Mod: ZL | Performed by: NURSE PRACTITIONER

## 2019-03-22 PROCEDURE — 86708 HEPATITIS A ANTIBODY: CPT | Mod: ZL | Performed by: NURSE PRACTITIONER

## 2019-03-25 LAB — AFP SERPL-MCNC: 1.6 UG/L (ref 0–8)

## 2019-03-26 LAB
HAV IGG SER QL IA: NONREACTIVE
HBV CORE AB SERPL QL IA: NONREACTIVE
HBV SURFACE AB SERPL IA-ACNC: 2.67 M[IU]/ML
HBV SURFACE AG SERPL QL IA: NONREACTIVE
HCV AB SERPL QL IA: NONREACTIVE

## 2019-04-18 ENCOUNTER — OFFICE VISIT (OUTPATIENT)
Dept: FAMILY MEDICINE | Facility: OTHER | Age: 49
End: 2019-04-18
Attending: NURSE PRACTITIONER
Payer: COMMERCIAL

## 2019-04-18 VITALS
WEIGHT: 268.1 LBS | BODY MASS INDEX: 36.31 KG/M2 | DIASTOLIC BLOOD PRESSURE: 80 MMHG | OXYGEN SATURATION: 98 % | RESPIRATION RATE: 16 BRPM | HEIGHT: 72 IN | HEART RATE: 99 BPM | SYSTOLIC BLOOD PRESSURE: 118 MMHG

## 2019-04-18 DIAGNOSIS — K76.0 FATTY INFILTRATION OF LIVER: ICD-10-CM

## 2019-04-18 DIAGNOSIS — Z72.0 TOBACCO ABUSE: ICD-10-CM

## 2019-04-18 DIAGNOSIS — R79.89 ELEVATED LFTS: Primary | ICD-10-CM

## 2019-04-18 DIAGNOSIS — Z71.6 TOBACCO ABUSE COUNSELING: ICD-10-CM

## 2019-04-18 DIAGNOSIS — E78.5 HYPERLIPIDEMIA LDL GOAL <100: ICD-10-CM

## 2019-04-18 DIAGNOSIS — E11.9 TYPE 2 DIABETES MELLITUS WITHOUT COMPLICATION, WITHOUT LONG-TERM CURRENT USE OF INSULIN (H): ICD-10-CM

## 2019-04-18 PROCEDURE — 80076 HEPATIC FUNCTION PANEL: CPT | Mod: ZL | Performed by: NURSE PRACTITIONER

## 2019-04-18 PROCEDURE — 36415 COLL VENOUS BLD VENIPUNCTURE: CPT | Mod: ZL | Performed by: NURSE PRACTITIONER

## 2019-04-18 PROCEDURE — G0463 HOSPITAL OUTPT CLINIC VISIT: HCPCS

## 2019-04-18 PROCEDURE — 99214 OFFICE O/P EST MOD 30 MIN: CPT | Performed by: NURSE PRACTITIONER

## 2019-04-18 ASSESSMENT — ANXIETY QUESTIONNAIRES
6. BECOMING EASILY ANNOYED OR IRRITABLE: NOT AT ALL
3. WORRYING TOO MUCH ABOUT DIFFERENT THINGS: NOT AT ALL
1. FEELING NERVOUS, ANXIOUS, OR ON EDGE: NOT AT ALL
GAD7 TOTAL SCORE: 0
IF YOU CHECKED OFF ANY PROBLEMS ON THIS QUESTIONNAIRE, HOW DIFFICULT HAVE THESE PROBLEMS MADE IT FOR YOU TO DO YOUR WORK, TAKE CARE OF THINGS AT HOME, OR GET ALONG WITH OTHER PEOPLE: NOT DIFFICULT AT ALL
7. FEELING AFRAID AS IF SOMETHING AWFUL MIGHT HAPPEN: NOT AT ALL
5. BEING SO RESTLESS THAT IT IS HARD TO SIT STILL: NOT AT ALL
2. NOT BEING ABLE TO STOP OR CONTROL WORRYING: NOT AT ALL
4. TROUBLE RELAXING: NOT AT ALL

## 2019-04-18 ASSESSMENT — MIFFLIN-ST. JEOR: SCORE: 2129.09

## 2019-04-18 ASSESSMENT — PAIN SCALES - GENERAL: PAINLEVEL: NO PAIN (0)

## 2019-04-18 ASSESSMENT — PATIENT HEALTH QUESTIONNAIRE - PHQ9: SUM OF ALL RESPONSES TO PHQ QUESTIONS 1-9: 0

## 2019-04-18 NOTE — PROGRESS NOTES
SUBJECTIVE:   Rojelio Juárez is a 48 year old male who presents to clinic today for the following   health issues:      Diabetes Follow-up      Patient is checking blood sugars: rarely.  Results range from 80- to 120    Diabetic concerns: None     Symptoms of hypoglycemia (low blood sugar): none     Paresthesias (numbness or burning in feet) or sores: No     Date of last diabetic eye exam: 11/15/18    Diabetes Management Resources    Hyperlipidemia Follow-Up      Rate your low fat/cholesterol diet?: not monitoring fat    Taking statin?  No    Other lipid medications/supplements?:  Fish oil/Omega 3, dose 1000 mg daily without side effects    Hypertension Follow-up      Outpatient blood pressures are not being checked.    Low Salt Diet: low salt    BP Readings from Last 2 Encounters:   04/18/19 118/80   02/22/19 128/80     Hemoglobin A1C (%)   Date Value   03/22/2019 6.2 (H)   11/16/2018 6.7 (H)     LDL Cholesterol Calculated (mg/dL)   Date Value   03/22/2019 104 (H)   11/16/2018 57       Amount of exercise or physical activity: None    Problems taking medications regularly: No    Medication side effects: none    Diet: regular (no restrictions)    He was found to have elevated liver functions, US indicated fatty liver.  He has cut down on alcohol intake and eliminated tylenol.      Additional history: as documented    Reviewed  and updated as needed this visit by clinical staff  Tobacco  Allergies  Meds  Problems  Med Hx  Surg Hx  Fam Hx         Reviewed and updated as needed this visit by Provider         Patient Active Problem List   Diagnosis     ACP (advance care planning)     Herniated intervertebral disc of lumbar spine     Benign essential hypertension     Tobacco dependence syndrome     Anxiety     Type 2 diabetes mellitus without complication, without long-term current use of insulin (H)     Morbid obesity (H)     Hyperlipidemia LDL goal <100     Status post lumbar microdiscectomy     Major depression      Fatty infiltration of liver     History reviewed. No pertinent surgical history.    Social History     Tobacco Use     Smoking status: Current Every Day Smoker     Packs/day: 1.50     Types: Cigarettes     Start date: 1/15/2017     Smokeless tobacco: Never Used     Tobacco comment: will quit some day   Substance Use Topics     Alcohol use: Yes     Alcohol/week: 0.0 oz     History reviewed. No pertinent family history.      Current Outpatient Medications   Medication Sig Dispense Refill     amitriptyline (ELAVIL) 25 MG tablet Take 1 tablet (25 mg) by mouth At Bedtime 90 tablet 3     aspirin 81 MG EC tablet Take 1 tablet (81 mg) by mouth daily 90 tablet 3     blood glucose monitoring (ACCU-CHEK FASTCLIX) lancets Use to test blood sugar 3 times daily. 102 each 3     blood glucose monitoring (ACCU-CHEK GUIDE) test strip Use to test blood sugar 3 times daily. 100 each 3     Blood Glucose Monitoring Suppl (ACCU-CHEK GUIDE) w/Device KIT 1 each daily 1 kit 0     empagliflozin (JARDIANCE) 25 MG TABS tablet Take 1 tablet (25 mg) by mouth daily 90 tablet 1     ibuprofen (ADVIL/MOTRIN) 800 MG tablet TAKE 1 TABLET(800 MG) BY MOUTH EVERY 8 HOURS AS NEEDED FOR MODERATE PAIN 90 tablet 0     insulin glargine (BASAGLAR KWIKPEN) 100 UNIT/ML pen Inject 25 Units Subcutaneous daily 15 mL 3     insulin pen needle 32G X 4 MM Use 1 pen needle daily. 100 each 3     losartan (COZAAR) 25 MG tablet TAKE 1/2 TABLET(12.5 MG) BY MOUTH DAILY 45 tablet 1     STATIN NOT PRESCRIBED, INTENTIONAL, Please choose reason not prescribed, below       triamcinolone (KENALOG) 0.1 % cream Apply sparingly to affected area three times daily as needed 80 g 0     No Known Allergies  Recent Labs   Lab Test 03/22/19  1419 11/16/18  1050 07/31/18  1013 06/22/18  1634  01/20/17  1101   A1C 6.2* 6.7* 9.3* 9.4*   < >  --    * 57  --  143*  --   --    HDL 52 48  --  31*  --   --    TRIG 156* 151*  --  226*  --   --    ALT 93* 419*  --   --   --  202*   CR 0.86  0.73  --  0.75  --  0.91   GFRESTIMATED >90 >90  --  >90  --  90   GFRESTBLACK >90 >90  --  >90  --  >90  African American GFR Calc     POTASSIUM 3.9 4.3  --  4.2  --  4.3   TSH 1.07 0.98  --  1.05   < >  --     < > = values in this interval not displayed.      BP Readings from Last 3 Encounters:   04/18/19 118/80   02/22/19 128/80   10/22/18 128/88    Wt Readings from Last 3 Encounters:   04/18/19 121.6 kg (268 lb 1.6 oz)   02/22/19 117.9 kg (260 lb)   10/22/18 117.9 kg (260 lb)                    ROS:  Constitutional, HEENT, cardiovascular, pulmonary, gi and gu systems are negative, except as otherwise noted.    OBJECTIVE:     /80 (BP Location: Left arm, Patient Position: Chair, Cuff Size: Adult Large)   Pulse 99   Resp 16   Ht 1.829 m (6')   Wt 121.6 kg (268 lb 1.6 oz)   SpO2 98%   BMI 36.36 kg/m    Body mass index is 36.36 kg/m .  GENERAL: healthy, alert and no distress  NECK: no adenopathy, no asymmetry, masses, or scars, thyroid normal to palpation and no carotid bruits  RESP: lungs clear to auscultation - no rales, rhonchi or wheezes  CV: regular rate and rhythm, normal S1 S2, no S3 or S4, no murmur, click or rub, no peripheral edema and peripheral pulses strong  MS: no gross musculoskeletal defects noted, no edema  PSYCH: mentation appears normal, affect normal/bright      ASSESSMENT/PLAN:     1. Tobacco abuse  Cessation encouraged  - Tobacco Cessation - Order to Satisfy Health Maintenance    2. Tobacco abuse counseling    3. Elevated LFTs  - Hepatic panel    4. Type 2 diabetes mellitus without complication, without long-term current use of insulin (H)    5. Hyperlipidemia LDL goal <100    6. Fatty infiltration of liver  Refrain from alcohol use and tylenol intake.     Future lab orders were placed for next visit.     FUTURE APPOINTMENTS:       - Follow-up visit in 3 months or as needed for acute concerns.     Marion Davis NP  [facility]

## 2019-04-18 NOTE — LETTER
4/24/2019     Rojelio Juárez  [address] [place] [id number]      Dear Rojelio:    Thank you for allowing me to participate in your care. Your recent test results were reviewed and listed below.      Your results are provided below for your review        AST is normal   ALT, mildly elevated but improved since last month.     Thank you for choosing [facility]. As a result, please continue with the treatment plan discussed in the office. Return as discussed or sooner if symptoms worsens or fail to improve. If you have any further questions or concerns, please do not hesitate to contact us.      Sincerely,    Hemant Sanchez NP        [facility]  [address]  [facility] [address]  Phone: [phone]

## 2019-04-18 NOTE — NURSING NOTE
Chief Complaint   Patient presents with     Hypertension     Diabetes     Lipids       Initial /80 (BP Location: Left arm, Patient Position: Chair, Cuff Size: Adult Large)   Pulse 99   Resp 16   Ht 1.829 m (6')   Wt 121.6 kg (268 lb 1.6 oz)   SpO2 98%   BMI 36.36 kg/m   Estimated body mass index is 36.36 kg/m  as calculated from the following:    Height as of this encounter: 1.829 m (6').    Weight as of this encounter: 121.6 kg (268 lb 1.6 oz).  Medication Reconciliation: complete    Pamela M. Lechevalier, LPN

## 2019-04-18 NOTE — PATIENT INSTRUCTIONS
ASSESSMENT/PLAN:     1. Tobacco abuse  Cessation encouraged  - Tobacco Cessation - Order to Satisfy Health Maintenance    2. Tobacco abuse counseling    3. Elevated LFTs  - Hepatic panel    4. Type 2 diabetes mellitus without complication, without long-term current use of insulin (H)    5. Hyperlipidemia LDL goal <100    6. Fatty infiltration of liver  Refrain from alcohol use and tylenol intake.     FUTURE APPOINTMENTS:       - Follow-up visit in 3 months or as needed for acute concerns.     Marion Davis NP  [facility] [place] [facility]      HOW TO QUIT SMOKING  Smoking is one of the hardest habits to break. About half of all those who have ever smoked have been able to quit, and most of those (about 70%) who still smoke want to quit. Here are some of the best ways to stop smoking.     KEEP TRYING:  It takes most smokers about 8 tries before they are finally able to fully quit. So, the more often you try and fail, the better your chance of quitting the next time! So, don't give up!    GO COLD TURKEY:  Most ex-smokers quit cold turkey. Trying to cut back gradually doesn't seem to work as well, perhaps because it continues the smoking habit. Also, it is possible to fool yourself by inhaling more while smoking fewer cigarettes. This results in the same amount of nicotine in your body!    GET SUPPORT:  Support programs can make an important difference, especially for the heavy smoker. These groups offer lectures, methods to change your behavior and peer support. Call the free national Quitline for more information. 800-QUIT-NOW (717-040-1612). Low-cost or free programs are offered by many hospitals, local chapters of the American Lung Association (368-016-6752) and the American Cancer Society (777-353-1477). Support at home is important too. Non-smokers can help by offering praise and encouragement. If the smoker fails to quit, encourage them to try again!    OVER-THE-COUNTER MEDICINES:  For those  who can't quit on their own, Nicotine Replacement Therapy (NRT) may make quitting much easier. Certain aids such as the nicotine patch, gum and lozenge are available without a prescription. However, it is best to use these under the guidance of your doctor. The skin patch provides a steady supply of nicotine to the body. Nicotine gum and lozenge gives temporary bursts of low levels of nicotine. Both methods take the edge off the craving for cigarettes. WARNING: If you feel symptoms of nicotine overdose, such as nausea, vomiting, dizziness, weakness, or fast heartbeat, stop using these and see your doctor.    PRESCRIPTION MEDICINES:  After evaluating your smoking patterns and prior attempts at quitting, your doctor may offer a prescription medicine such as bupropion (Zyban, Wellbutrin), varenicline (Chantix, Champix), a niocotine inhaler or nasal spray. Each has its unique advantage and side effects which your doctor can review with you.    HEALTH BENEFITS OF QUITTING:  The benefits of quitting start right away and keep improving the longer you go without smokin minutes: blood pressure and pulse return to normal  8 hours: oxygen levels return to normal  2 days: ability to smell and taste begins to improve as damaged nerves start to regrow  2-3 weeks: circulation and lung function improves  1-9 months: decreased cough, congestion and shortness of breath; less tired  1 year: risk of heart attack decreases by half  5 years: risk of lung cancer decreases by half; risk of stroke becomes the same as a non-smoker  For information about how to quit smoking, visit the following links:  National [employer] [place] ,   Clearing the Air, Quit Smoking Today   - an online booklet. http://www.smokefree.gov/pubs/clearing_the_air.pdf  Smokefree.gov http://smokefree.gov/  QuitNet http://www.quitnet.com/    2219-7854 Sukumar Dhillon, [address] [place] [address], [place] [address]. All rights reserved. This information is not intended as  a substitute for professional medical care. Always follow your healthcare professional's instructions.    The Benefits of Living Smoke Free  What do you want to gain from quitting? Check off some reasons to quit.  Health Benefits  ___ Reduce my risk of lung cancer, heart disease, chronic lung disease  ___ Have fewer wrinkles and softer skin  ___ Improve my sense of taste and smell  ___ For pregnant women--reduce the risk of having a miscarriage, stillbirth, premature birth, or low-birth-weight baby  Personal Benefits  ___ Feel more in control of my life  ___ Have better-smelling hair, breath, clothes, home, and car  ___ Save time by not having to take smoke breaks, buy cigarettes, or hunt for a light  ___ Have whiter teeth  Family Benefits  ___ Reduce my children s respiratory tract infections  ___ Set a good example for my children  ___ Reduce my family s cancer risk  Financial Benefits  ___ Save hundreds of dollars each year that would be spent on cigarettes  ___ Save money on medical bills  ___ Save on life, health, and car insurance premiums    Those Dollars Add Up!  Cigarettes are expensive, and getting more expensive all the time. Do you realize how much money you are spending on cigarettes per year? What is the average amount you spend on a pack of cigarettes? What is the average number of packs that you smoke per day? Using your answers to these questions, fill in this formula to help you find out:  ($ _____ per pack) ×  ( _____ number of packs per day) × (365 days) =  $ _____ yearly cost of smoking  Besides tobacco, there are other costs, including extra cleaning bills and replacement costs for clothing and furniture; medical expenses for smoking-related illnesses; and higher health, life, and car insurance premiums.    Cigars and Pipes Count Too!  Cigars and pipes are also dangerous. So are smokeless (chewing) tobacco and snuff. All of these products contain nicotine, a highly addictive substance that has  harmful effects on your body. Quitting smoking means giving up all tobacco products.      4923-2564 Sukumar Dhillon, [address], [place] [address]. All rights reserved. This information is not intended as a substitute for professional medical care. Always follow your healthcare professional's instructions.

## 2019-04-18 NOTE — LETTER
My Depression Action Plan  Name: Rojelio Juárez   Date of Birth 1970  Date: 4/18/2019    My doctor: Marion Davis   My clinic: [facility]  [address]  [place] [address]  [phone]          GREEN    ZONE   Good Control    What it looks like:     Things are going generally well. You have normal up s and down s. You may even feel depressed from time to time, but bad moods usually last less than a day.   What you need to do:  1. Continue to care for yourself (see self care plan)  2. Check your depression survival kit and update it as needed  3. Follow your physician s recommendations including any medication.  4. Do not stop taking medication unless you consult with your physician first.           YELLOW         ZONE Getting Worse    What it looks like:     Depression is starting to interfere with your life.     It may be hard to get out of bed; you may be starting to isolate yourself from others.    Symptoms of depression are starting to last most all day and this has happened for several days.     You may have suicidal thoughts but they are not constant.   What you need to do:     1. Call your care team, your response to treatment will improve if you keep your care team informed of your progress. Yellow periods are signs an adjustment may need to be made.     2. Continue your self-care, even if you have to fake it!    3. Talk to someone in your support network    4. Open up your depression survival kit           RED    ZONE Medical Alert - Get Help    What it looks like:     Depression is seriously interfering with your life.     You may experience these or other symptoms: You can t get out of bed most days, can t work or engage in other necessary activities, you have trouble taking care of basic hygiene, or basic responsibilities, thoughts of suicide or death that will not go away, self-injurious behavior.     What you need to do:  1. Call your  care team and request a same-day appointment. If they are not available (weekends or after hours) call your local crisis line, emergency room or 911.            Depression Self Care Plan / Survival Kit    Self-Care for Depression  Here s the deal. Your body and mind are really not as separate as most people think.  What you do and think affects how you feel and how you feel influences what you do and think. This means if you do things that people who feel good do, it will help you feel better.  Sometimes this is all it takes.  There is also a place for medication and therapy depending on how severe your depression is, so be sure to consult with your medical provider and/ or Behavioral Health Consultant if your symptoms are worsening or not improving.     In order to better manage my stress, I will:    Exercise  Get some form of exercise, every day. This will help reduce pain and release endorphins, the  feel good  chemicals in your brain. This is almost as good as taking antidepressants!  This is not the same as joining a gym and then never going! (they count on that by the way ) It can be as simple as just going for a walk or doing some gardening, anything that will get you moving.      Hygiene   Maintain good hygiene (Get out of bed in the morning, Make your bed, Brush your teeth, Take a shower, and Get dressed like you were going to work, even if you are unemployed).  If your clothes don't fit try to get ones that do.    Diet  I will strive to eat foods that are good for me, drink plenty of water, and avoid excessive sugar, caffeine, alcohol, and other mood-altering substances.  Some foods that are helpful in depression are: complex carbohydrates, B vitamins, flaxseed, fish or fish oil, fresh fruits and vegetables.    Psychotherapy  I agree to participate in Individual Therapy (if recommended).    Medication  If prescribed medications, I agree to take them.  Missing doses can result in serious side effects.  I  understand that drinking alcohol, or other illicit drug use, may cause potential side effects.  I will not stop my medication abruptly without first discussing it with my provider.    Staying Connected With Others  I will stay in touch with my friends, family members, and my primary care provider/team.    Use your imagination  Be creative.  We all have a creative side; it doesn t matter if it s oil painting, sand castles, or mud pies! This will also kick up the endorphins.    Witness Beauty  (AKA stop and smell the roses) Take a look outside, even in mid-winter. Notice colors, textures. Watch the squirrels and birds.     Service to others  Be of service to others.  There is always someone else in need.  By helping others we can  get out of ourselves  and remember the really important things.  This also provides opportunities for practicing all the other parts of the program.    Humor  Laugh and be silly!  Adjust your TV habits for less news and crime-drama and more comedy.    Control your stress  Try breathing deep, massage therapy, biofeedback, and meditation. Find time to relax each day.     My support system    Clinic Contact:  Phone number:    Contact 1:  Phone number:    Contact 2:  Phone number:    Christianity/:  Phone number:    Therapist:  Phone number:    Local crisis center:    Phone number:    Other community support:  Phone number:

## 2019-04-19 LAB
ALBUMIN SERPL-MCNC: 4.2 G/DL (ref 3.4–5)
ALP SERPL-CCNC: 93 U/L (ref 40–150)
ALT SERPL W P-5'-P-CCNC: 81 U/L (ref 0–70)
AST SERPL W P-5'-P-CCNC: 35 U/L (ref 0–45)
BILIRUB DIRECT SERPL-MCNC: 0.2 MG/DL (ref 0–0.2)
BILIRUB SERPL-MCNC: 0.9 MG/DL (ref 0.2–1.3)
PROT SERPL-MCNC: 7.6 G/DL (ref 6.8–8.8)

## 2019-04-19 ASSESSMENT — ANXIETY QUESTIONNAIRES: GAD7 TOTAL SCORE: 0

## 2019-07-16 DIAGNOSIS — E11.9 TYPE 2 DIABETES MELLITUS WITHOUT COMPLICATION, WITHOUT LONG-TERM CURRENT USE OF INSULIN (H): ICD-10-CM

## 2019-07-16 DIAGNOSIS — E78.5 HYPERLIPIDEMIA LDL GOAL <100: ICD-10-CM

## 2019-07-16 DIAGNOSIS — R79.89 ELEVATED LFTS: ICD-10-CM

## 2019-07-16 LAB
ALBUMIN SERPL-MCNC: 4.2 G/DL (ref 3.4–5)
ALP SERPL-CCNC: 120 U/L (ref 40–150)
ALT SERPL W P-5'-P-CCNC: 155 U/L (ref 0–70)
ANION GAP SERPL CALCULATED.3IONS-SCNC: 6 MMOL/L (ref 3–14)
AST SERPL W P-5'-P-CCNC: 79 U/L (ref 0–45)
BILIRUB DIRECT SERPL-MCNC: 0.2 MG/DL (ref 0–0.2)
BILIRUB SERPL-MCNC: 1 MG/DL (ref 0.2–1.3)
BUN SERPL-MCNC: 18 MG/DL (ref 7–30)
CALCIUM SERPL-MCNC: 9.5 MG/DL (ref 8.5–10.1)
CHLORIDE SERPL-SCNC: 105 MMOL/L (ref 94–109)
CHOLEST SERPL-MCNC: 245 MG/DL
CO2 SERPL-SCNC: 26 MMOL/L (ref 20–32)
CREAT SERPL-MCNC: 0.87 MG/DL (ref 0.66–1.25)
EST. AVERAGE GLUCOSE BLD GHB EST-MCNC: 134 MG/DL
GFR SERPL CREATININE-BSD FRML MDRD: >90 ML/MIN/{1.73_M2}
GLUCOSE SERPL-MCNC: 143 MG/DL (ref 70–99)
HBA1C MFR BLD: 6.3 % (ref 0–5.6)
HDLC SERPL-MCNC: 50 MG/DL
LDLC SERPL CALC-MCNC: 152 MG/DL
NONHDLC SERPL-MCNC: 195 MG/DL
POTASSIUM SERPL-SCNC: 4.1 MMOL/L (ref 3.4–5.3)
PROT SERPL-MCNC: 8.2 G/DL (ref 6.8–8.8)
SODIUM SERPL-SCNC: 137 MMOL/L (ref 133–144)
TRIGL SERPL-MCNC: 213 MG/DL
TSH SERPL DL<=0.005 MIU/L-ACNC: 1.37 MU/L (ref 0.4–4)

## 2019-07-16 PROCEDURE — 80048 BASIC METABOLIC PNL TOTAL CA: CPT | Performed by: NURSE PRACTITIONER

## 2019-07-16 PROCEDURE — 80076 HEPATIC FUNCTION PANEL: CPT | Performed by: NURSE PRACTITIONER

## 2019-07-16 PROCEDURE — 36415 COLL VENOUS BLD VENIPUNCTURE: CPT | Performed by: NURSE PRACTITIONER

## 2019-07-16 PROCEDURE — 80061 LIPID PANEL: CPT | Performed by: NURSE PRACTITIONER

## 2019-07-16 PROCEDURE — 84443 ASSAY THYROID STIM HORMONE: CPT | Performed by: NURSE PRACTITIONER

## 2019-07-16 PROCEDURE — 83036 HEMOGLOBIN GLYCOSYLATED A1C: CPT | Performed by: NURSE PRACTITIONER

## 2019-07-16 NOTE — PROGRESS NOTES
Subjective     Rojelio Juárez is a 49 year old male who presents to clinic today for the following health issues:    HPI   Diabetes Follow-up      How often are you checking your blood sugar? A few times a week    What time of day are you checking your blood sugars (select all that apply)?  Before and after meals    Have you had any blood sugars above 200?  No    Have you had any blood sugars below 70?  No    What symptoms do you notice when your blood sugar is low?  None    What concerns do you have today about your diabetes? Form to be filled out     Do you have any of these symptoms? (Select all that apply)  Numbness in feet, Burning in feet and Weight gain     Have you had a diabetic eye exam in the last 12 months? Yes- Date of last eye exam: 5/19    Diabetes Management Resources    Hyperlipidemia Follow-Up      Are you having any of the following symptoms? (Select all that apply)  No complaints of shortness of breath, chest pain or pressure.  No increased sweating or nausea with activity.  No left-sided neck or arm pain.  No complaints of pain in calves when walking 1-2 blocks.    Are you regularly taking any medication or supplement to lower your cholesterol?   No    Are you having muscle aches or other side effects that you think could be caused by your cholesterol lowering medication?  No   The 10-year ASCVD risk score (Elizabeth SONIA Jr., et al., 2013) is: 19.4%    Values used to calculate the score:      Age: 49 years      Sex: Male      Is Non- : No      Diabetic: Yes      Tobacco smoker: Yes      Systolic Blood Pressure: 128 mmHg      Is BP treated: Yes      HDL Cholesterol: 50 mg/dL      Total Cholesterol: 245 mg/dL        Hypertension Follow-up      Do you check your blood pressure regularly outside of the clinic? No     Are you following a low salt diet? Yes    Are your blood pressures ever more than 140 on the top number (systolic) OR more   than 90 on the bottom number (diastolic),  for example 140/90? NA    BP Readings from Last 2 Encounters:   07/19/19 (!) 128/94   04/18/19 118/80     Hemoglobin A1C (%)   Date Value   07/16/2019 6.3 (H)   03/22/2019 6.2 (H)     LDL Cholesterol Calculated (mg/dL)   Date Value   07/16/2019 152 (H)   03/22/2019 104 (H)       Amount of exercise or physical activity: None    Problems taking medications regularly: No    Medication side effects: none    Diet: low salt      GERD:  Symptoms are not controlled with 20mg omeprazole.  He did have EGD last year.      Patient Active Problem List   Diagnosis     ACP (advance care planning)     Herniated intervertebral disc of lumbar spine     Benign essential hypertension     Tobacco dependence syndrome     Anxiety     Type 2 diabetes mellitus without complication, without long-term current use of insulin (H)     Morbid obesity (H)     Hyperlipidemia LDL goal <100     Status post lumbar microdiscectomy     Major depression     Fatty infiltration of liver     No past surgical history on file.    Social History     Tobacco Use     Smoking status: Current Every Day Smoker     Packs/day: 1.50     Types: Cigarettes     Start date: 1/15/2017     Smokeless tobacco: Never Used     Tobacco comment: will quit some day   Substance Use Topics     Alcohol use: Yes     Alcohol/week: 0.0 oz     No family history on file.      Current Outpatient Medications   Medication Sig Dispense Refill     aspirin 81 MG EC tablet Take 1 tablet (81 mg) by mouth daily 90 tablet 3     blood glucose monitoring (ACCU-CHEK FASTCLIX) lancets Use to test blood sugar 3 times daily. 102 each 3     blood glucose monitoring (ACCU-CHEK GUIDE) test strip Use to test blood sugar 3 times daily. 100 each 3     Blood Glucose Monitoring Suppl (ACCU-CHEK GUIDE) w/Device KIT 1 each daily 1 kit 0     empagliflozin (JARDIANCE) 25 MG TABS tablet Take 1 tablet (25 mg) by mouth daily 90 tablet 1     ibuprofen (ADVIL/MOTRIN) 800 MG tablet TAKE 1 TABLET(800 MG) BY MOUTH EVERY 8  HOURS AS NEEDED FOR MODERATE PAIN 90 tablet 0     insulin glargine (BASAGLAR KWIKPEN) 100 UNIT/ML pen Inject 25 Units Subcutaneous daily 15 mL 3     insulin pen needle 32G X 4 MM Use 1 pen needle daily. 100 each 3     losartan (COZAAR) 25 MG tablet TAKE 1/2 TABLET(12.5 MG) BY MOUTH DAILY 45 tablet 1     STATIN NOT PRESCRIBED, INTENTIONAL, Please choose reason not prescribed, below       amitriptyline (ELAVIL) 25 MG tablet Take 1 tablet (25 mg) by mouth At Bedtime (Patient not taking: Reported on 7/19/2019) 90 tablet 3     triamcinolone (KENALOG) 0.1 % cream Apply sparingly to affected area three times daily as needed (Patient not taking: Reported on 7/19/2019) 80 g 0     No Known Allergies  Recent Labs   Lab Test 07/16/19  0805 04/18/19  1557 03/22/19  1419 11/16/18  1050   A1C 6.3*  --  6.2* 6.7*   *  --  104* 57   HDL 50  --  52 48   TRIG 213*  --  156* 151*   * 81* 93* 419*   CR 0.87  --  0.86 0.73   GFRESTIMATED >90  --  >90 >90   GFRESTBLACK >90  --  >90 >90   POTASSIUM 4.1  --  3.9 4.3   TSH 1.37  --  1.07 0.98      BP Readings from Last 3 Encounters:   07/19/19 (!) 128/94   04/18/19 118/80   02/22/19 128/80    Wt Readings from Last 3 Encounters:   07/19/19 125.2 kg (276 lb)   04/18/19 121.6 kg (268 lb 1.6 oz)   02/22/19 117.9 kg (260 lb)                  Reviewed and updated as needed this visit by Provider         Review of Systems   ROS COMP: Constitutional, HEENT, cardiovascular, pulmonary, gi and gu systems are negative, except as otherwise noted.      Objective    BP (!) 128/94 (BP Location: Left arm, Patient Position: Sitting, Cuff Size: Adult Large)   Pulse 100   Temp 97.9  F (36.6  C) (Tympanic)   Resp 14   Ht 1.829 m (6')   Wt 125.2 kg (276 lb)   SpO2 98%   BMI 37.43 kg/m    Body mass index is 37.43 kg/m .  Physical Exam   GENERAL: healthy, alert and no distress  NECK: no adenopathy, no asymmetry, masses, or scars, thyroid normal to palpation and no carotid bruits  RESP: lungs  clear to auscultation - no rales, rhonchi or wheezes  CV: regular rate and rhythm, normal S1 S2, no S3 or S4, no murmur, click or rub, no peripheral edema and peripheral pulses strong  MS: no gross musculoskeletal defects noted, no edema  PSYCH: mentation appears normal, affect normal/bright            Assessment & Plan     1. Type 2 diabetes mellitus without complication, without long-term current use of insulin (H)  - Hemoglobin A1c - future  - Albumin Random Urine Quantitative with Creat Ratio - future    2. Hyperlipidemia LDL goal <100  - Lipid Profile - future    3. Benign essential hypertension  - Basic metabolic panel - future    4. Elevated LFTs  - Hepatic panel - future  Do not use tylenol  Refrain from all alcohol use.     5. Gastroesophageal reflux disease, esophagitis presence not specified  Increase dose  - omeprazole (PRILOSEC) 40 MG DR capsule; Take 1 capsule (40 mg) by mouth daily  Dispense: 90 capsule; Refill: 1       Tobacco Cessation:   reports that he has been smoking cigarettes.  He started smoking about 2 years ago. He has been smoking about 1.50 packs per day. He has never used smokeless tobacco.  Tobacco Cessation Action Plan: Information offered: Patient not interested at this time      BMI:   Estimated body mass index is 37.43 kg/m  as calculated from the following:    Height as of this encounter: 1.829 m (6').    Weight as of this encounter: 125.2 kg (276 lb).   Weight management plan: Discussed healthy diet and exercise guidelines        FUTURE APPOINTMENTS:       - Follow-up visit in 3 months or as needed for acute concerns.       Marion Davis NP  [facility]

## 2019-07-19 ENCOUNTER — OFFICE VISIT (OUTPATIENT)
Dept: FAMILY MEDICINE | Facility: OTHER | Age: 49
End: 2019-07-19
Attending: NURSE PRACTITIONER

## 2019-07-19 VITALS
HEART RATE: 100 BPM | OXYGEN SATURATION: 98 % | TEMPERATURE: 97.9 F | RESPIRATION RATE: 14 BRPM | HEIGHT: 72 IN | DIASTOLIC BLOOD PRESSURE: 94 MMHG | WEIGHT: 276 LBS | BODY MASS INDEX: 37.38 KG/M2 | SYSTOLIC BLOOD PRESSURE: 128 MMHG

## 2019-07-19 DIAGNOSIS — I10 BENIGN ESSENTIAL HYPERTENSION: ICD-10-CM

## 2019-07-19 DIAGNOSIS — E11.9 TYPE 2 DIABETES MELLITUS WITHOUT COMPLICATION, WITHOUT LONG-TERM CURRENT USE OF INSULIN (H): Primary | ICD-10-CM

## 2019-07-19 DIAGNOSIS — K21.9 GASTROESOPHAGEAL REFLUX DISEASE, ESOPHAGITIS PRESENCE NOT SPECIFIED: ICD-10-CM

## 2019-07-19 DIAGNOSIS — R79.89 ELEVATED LFTS: ICD-10-CM

## 2019-07-19 DIAGNOSIS — E78.5 HYPERLIPIDEMIA LDL GOAL <100: ICD-10-CM

## 2019-07-19 PROCEDURE — 99214 OFFICE O/P EST MOD 30 MIN: CPT | Performed by: NURSE PRACTITIONER

## 2019-07-19 RX ORDER — OMEPRAZOLE 40 MG/1
40 CAPSULE, DELAYED RELEASE ORAL DAILY
Qty: 90 CAPSULE | Refills: 1 | Status: SHIPPED | OUTPATIENT
Start: 2019-07-19 | End: 2019-10-28

## 2019-07-19 ASSESSMENT — PAIN SCALES - GENERAL: PAINLEVEL: NO PAIN (0)

## 2019-07-19 ASSESSMENT — MIFFLIN-ST. JEOR: SCORE: 2159.93

## 2019-07-19 NOTE — PATIENT INSTRUCTIONS
Assessment & Plan     1. Type 2 diabetes mellitus without complication, without long-term current use of insulin (H)  - Hemoglobin A1c  - Albumin Random Urine Quantitative with Creat Ratio    2. Hyperlipidemia LDL goal <100  - Lipid Profile    3. Benign essential hypertension  - Basic metabolic panel    4. Elevated LFTs  - Hepatic panel    5. Gastroesophageal reflux disease, esophagitis presence not specified  Increase dose  - omeprazole (PRILOSEC) 40 MG DR capsule; Take 1 capsule (40 mg) by mouth daily  Dispense: 90 capsule; Refill: 1       Tobacco Cessation:   reports that he has been smoking cigarettes.  He started smoking about 2 years ago. He has been smoking about 1.50 packs per day. He has never used smokeless tobacco.  Tobacco Cessation Action Plan: Information offered: Patient not interested at this time      BMI:   Estimated body mass index is 37.43 kg/m  as calculated from the following:    Height as of this encounter: 1.829 m (6').    Weight as of this encounter: 125.2 kg (276 lb).   Weight management plan: Discussed healthy diet and exercise guidelines        FUTURE APPOINTMENTS:       - Follow-up visit in 3 months or as needed for acute concerns.       Marion Davis NP  [facility]

## 2019-07-19 NOTE — NURSING NOTE
Chief Complaint   Patient presents with     Diabetes     Hypertension     Hyperlipidemia       Initial BP (!) 128/94 (BP Location: Left arm, Patient Position: Sitting, Cuff Size: Adult Large)   Pulse 100   Temp 97.9  F (36.6  C) (Tympanic)   Resp 14   Ht 1.829 m (6')   Wt 125.2 kg (276 lb)   SpO2 98%   BMI 37.43 kg/m   Estimated body mass index is 37.43 kg/m  as calculated from the following:    Height as of this encounter: 1.829 m (6').    Weight as of this encounter: 125.2 kg (276 lb).  Medication Reconciliation: complete   Pavithra Koroma

## 2019-10-25 DIAGNOSIS — E11.9 TYPE 2 DIABETES MELLITUS WITHOUT COMPLICATION, WITHOUT LONG-TERM CURRENT USE OF INSULIN (H): ICD-10-CM

## 2019-10-25 DIAGNOSIS — E78.5 HYPERLIPIDEMIA LDL GOAL <100: ICD-10-CM

## 2019-10-25 DIAGNOSIS — I10 BENIGN ESSENTIAL HYPERTENSION: ICD-10-CM

## 2019-10-25 DIAGNOSIS — R79.89 ELEVATED LFTS: ICD-10-CM

## 2019-10-25 LAB
ALBUMIN SERPL-MCNC: 4.2 G/DL (ref 3.4–5)
ALP SERPL-CCNC: 118 U/L (ref 40–150)
ALT SERPL W P-5'-P-CCNC: 168 U/L (ref 0–70)
ANION GAP SERPL CALCULATED.3IONS-SCNC: 6 MMOL/L (ref 3–14)
AST SERPL W P-5'-P-CCNC: 50 U/L (ref 0–45)
BILIRUB DIRECT SERPL-MCNC: 0.2 MG/DL (ref 0–0.2)
BILIRUB SERPL-MCNC: 0.7 MG/DL (ref 0.2–1.3)
BUN SERPL-MCNC: 14 MG/DL (ref 7–30)
CALCIUM SERPL-MCNC: 9.5 MG/DL (ref 8.5–10.1)
CHLORIDE SERPL-SCNC: 107 MMOL/L (ref 94–109)
CHOLEST SERPL-MCNC: 185 MG/DL
CO2 SERPL-SCNC: 26 MMOL/L (ref 20–32)
CREAT SERPL-MCNC: 0.85 MG/DL (ref 0.66–1.25)
EST. AVERAGE GLUCOSE BLD GHB EST-MCNC: 166 MG/DL
GFR SERPL CREATININE-BSD FRML MDRD: >90 ML/MIN/{1.73_M2}
GLUCOSE SERPL-MCNC: 188 MG/DL (ref 70–99)
HBA1C MFR BLD: 7.4 % (ref 0–5.6)
HDLC SERPL-MCNC: 47 MG/DL
LDLC SERPL CALC-MCNC: 103 MG/DL
NONHDLC SERPL-MCNC: 138 MG/DL
POTASSIUM SERPL-SCNC: 3.8 MMOL/L (ref 3.4–5.3)
PROT SERPL-MCNC: 7.9 G/DL (ref 6.8–8.8)
SODIUM SERPL-SCNC: 139 MMOL/L (ref 133–144)
TRIGL SERPL-MCNC: 174 MG/DL

## 2019-10-25 PROCEDURE — 80061 LIPID PANEL: CPT | Performed by: NURSE PRACTITIONER

## 2019-10-25 PROCEDURE — 40000788 ZZHCL STATISTIC ESTIMATED AVERAGE GLUCOSE: Performed by: NURSE PRACTITIONER

## 2019-10-25 PROCEDURE — 84443 ASSAY THYROID STIM HORMONE: CPT | Performed by: NURSE PRACTITIONER

## 2019-10-25 PROCEDURE — 80048 BASIC METABOLIC PNL TOTAL CA: CPT | Performed by: NURSE PRACTITIONER

## 2019-10-25 PROCEDURE — 80076 HEPATIC FUNCTION PANEL: CPT | Performed by: NURSE PRACTITIONER

## 2019-10-25 PROCEDURE — 83036 HEMOGLOBIN GLYCOSYLATED A1C: CPT | Performed by: NURSE PRACTITIONER

## 2019-10-25 PROCEDURE — 36415 COLL VENOUS BLD VENIPUNCTURE: CPT | Performed by: NURSE PRACTITIONER

## 2019-10-25 NOTE — PROGRESS NOTES
Subjective     Rojelio Juárez is a 49 year old male who presents to clinic today for the following health issues:    HPI   Diabetes Follow-up      How often are you checking your blood sugar? A few times a week - last 2 weeks ago.     What time of day are you checking your blood sugars (select all that apply)?  Before meals    Have you had any blood sugars above 200?  No    Have you had any blood sugars below 70?  No    What symptoms do you notice when your blood sugar is low?  None    What concerns do you have today about your diabetes? None     Do you have any of these symptoms? (Select all that apply)  Blurry vision and Weight gain     Have you had a diabetic eye exam in the last 12 months? Yes- Date of last eye exam: 5/2019    Diabetes Management Resources    Hyperlipidemia Follow-Up      Are you having any of the following symptoms? (Select all that apply)  No complaints of shortness of breath, chest pain or pressure.  No increased sweating or nausea with activity.  No left-sided neck or arm pain.  No complaints of pain in calves when walking 1-2 blocks.    Are you regularly taking any medication or supplement to lower your cholesterol?   No    Are you having muscle aches or other side effects that you think could be caused by your cholesterol lowering medication?  No   The 10-year ASCVD risk score (Elizabeth SONIA Jr., et al., 2013) is: 12.8%    Values used to calculate the score:      Age: 49 years      Sex: Male      Is Non- : No      Diabetic: Yes      Tobacco smoker: Yes      Systolic Blood Pressure: 122 mmHg      Is BP treated: Yes      HDL Cholesterol: 47 mg/dL      Total Cholesterol: 185 mg/dL        Hypertension Follow-up      Do you check your blood pressure regularly outside of the clinic? No     Are you following a low salt diet? No    Are your blood pressures ever more than 140 on the top number (systolic) OR more   than 90 on the bottom number (diastolic), for example 140/90?  No    BP Readings from Last 2 Encounters:   10/28/19 (!) 122/100   07/19/19 (!) 128/94     Hemoglobin A1C (%)   Date Value   10/25/2019 7.4 (H)   07/16/2019 6.3 (H)     LDL Cholesterol Calculated (mg/dL)   Date Value   10/25/2019 103 (H)   07/16/2019 152 (H)         How many servings of fruits and vegetables do you eat daily?  0-1    On average, how many sweetened beverages do you drink each day (soda, juice, sweet tea, etc)?   0  How many days per week do you miss taking your medication? 1    What makes it hard for you to take your medications?  remembering to take    He has been our of medication for the past several days.  He has also been taking over the counter cold medications for symptom control.      He is requesting referral back to ortho for another injection of left ankle.      He notes increased skin tags of face, states on forehead, eye lids, upper cheeks, neck.  Lesions are increasing in size and number.        Patient Active Problem List   Diagnosis     ACP (advance care planning)     Herniated intervertebral disc of lumbar spine     Benign essential hypertension     Tobacco dependence syndrome     Anxiety     Type 2 diabetes mellitus without complication, without long-term current use of insulin (H)     Morbid obesity (H)     Hyperlipidemia LDL goal <100     Status post lumbar microdiscectomy     Major depression     Fatty infiltration of liver     History reviewed. No pertinent surgical history.    Social History     Tobacco Use     Smoking status: Current Every Day Smoker     Packs/day: 1.50     Types: Cigarettes     Start date: 1/15/2017     Smokeless tobacco: Never Used     Tobacco comment: will quit some day   Substance Use Topics     Alcohol use: Yes     Alcohol/week: 0.0 standard drinks     History reviewed. No pertinent family history.      Current Outpatient Medications   Medication Sig Dispense Refill     aspirin 81 MG EC tablet Take 1 tablet (81 mg) by mouth daily 90 tablet 3      atorvastatin (LIPITOR) 10 MG tablet Take 1 tablet (10 mg) by mouth At Bedtime 90 tablet 1     blood glucose monitoring (ACCU-CHEK FASTCLIX) lancets Use to test blood sugar 3 times daily. 102 each 3     blood glucose monitoring (ACCU-CHEK GUIDE) test strip Use to test blood sugar 3 times daily. 100 each 3     Blood Glucose Monitoring Suppl (ACCU-CHEK GUIDE) w/Device KIT 1 each daily 1 kit 0     empagliflozin (JARDIANCE) 25 MG TABS tablet Take 1 tablet (25 mg) by mouth daily 90 tablet 1     ibuprofen (ADVIL/MOTRIN) 800 MG tablet TAKE 1 TABLET(800 MG) BY MOUTH EVERY 8 HOURS AS NEEDED FOR MODERATE PAIN 90 tablet 0     insulin glargine (BASAGLAR KWIKPEN) 100 UNIT/ML pen Inject 25 Units Subcutaneous daily 15 mL 3     insulin pen needle 32G X 4 MM Use 1 pen needle daily. 100 each 3     losartan (COZAAR) 25 MG tablet TAKE 1/2 TABLET(12.5 MG) BY MOUTH DAILY 45 tablet 1     No Known Allergies  Recent Labs   Lab Test 10/25/19  0856 07/16/19  0805 04/18/19  1557 03/22/19  1419   A1C 7.4* 6.3*  --  6.2*   * 152*  --  104*   HDL 47 50  --  52   TRIG 174* 213*  --  156*   * 155* 81* 93*   CR 0.85 0.87  --  0.86   GFRESTIMATED >90 >90  --  >90   GFRESTBLACK >90 >90  --  >90   POTASSIUM 3.8 4.1  --  3.9   TSH  --  1.37  --  1.07      BP Readings from Last 3 Encounters:   10/28/19 (!) 122/100   07/19/19 (!) 128/94   04/18/19 118/80    Wt Readings from Last 3 Encounters:   10/28/19 125.1 kg (275 lb 14.4 oz)   07/19/19 125.2 kg (276 lb)   04/18/19 121.6 kg (268 lb 1.6 oz)               Reviewed and updated as needed this visit by Provider         Review of Systems   ROS COMP: Constitutional, HEENT, cardiovascular, pulmonary, gi and gu systems are negative, except as otherwise noted.    GERD:  Not well controlled on omeprazole, wonders other options.  Last EGD was 1-2 years ago, only reflux findings were noted.        Objective    BP (!) 122/100 (BP Location: Right arm, Patient Position: Chair, Cuff Size: Adult Large)    Pulse 92   Resp 18   Ht 1.829 m (6')   Wt 125.1 kg (275 lb 14.4 oz)   SpO2 98%   BMI 37.42 kg/m    Body mass index is 37.42 kg/m .  Physical Exam   GENERAL: healthy, alert and no distress  NECK: no adenopathy, no asymmetry, masses, or scars, thyroid normal to palpation and no carotid bruits  RESP: lungs clear to auscultation - no rales, rhonchi or wheezes  CV: regular rate and rhythm, normal S1 S2, no S3 or S4, no murmur, click or rub, no peripheral edema and peripheral pulses strong  MS: no gross musculoskeletal defects noted, no edema  SKIN:  Several scattered skin tags about face, forehead and neck.    PSYCH: mentation appears normal, affect normal/bright            Assessment & Plan     1. Tobacco abuse  - Tobacco Cessation - Order to Satisfy Health Maintenance    2. Tobacco abuse counseling    3. Type 2 diabetes mellitus with hyperglycemia, with long-term current use of insulin (H)  - empagliflozin (JARDIANCE) 25 MG TABS tablet; Take 1 tablet (25 mg) by mouth daily  Dispense: 90 tablet; Refill: 1  - insulin glargine (BASAGLAR KWIKPEN) 100 UNIT/ML pen; Inject 25 Units Subcutaneous daily  Dispense: 15 mL; Refill: 3  - Hemoglobin A1c    4. Benign essential hypertension  Restart losartan  Do not use over the counter cold medications except corcedin (cold medication for people with hypertension)   - losartan (COZAAR) 25 MG tablet; TAKE 1/2 TABLET(12.5 MG) BY MOUTH DAILY  Dispense: 45 tablet; Refill: 1  - Lipid Profile  - Comprehensive metabolic panel  - TSH with free T4 reflex    5. Morbid obesity (H)  Continue weight loss efforts     6. Hyperlipidemia LDL goal <100  start  - atorvastatin (LIPITOR) 10 MG tablet; Take 1 tablet (10 mg) by mouth At Bedtime  Dispense: 90 tablet; Refill: 1    7. Chronic pain of left ankle  Dr hickey  - ORTHOPEDICS ADULT REFERRAL    8. Cutaneous skin tags  [facility]  - DERMATOLOGY REFERRAL    9. Gastroesophageal reflux disease, esophagitis presence not specified  Restart,  consider repeating EGD  - esomeprazole (NEXIUM) 40 MG DR capsule; Take 1 capsule (40 mg) by mouth every morning (before breakfast) Take 30-60 minutes before eating.  Dispense: 90 capsule; Refill: 1  - ranitidine (ZANTAC) 300 MG tablet; Take 1 tablet (300 mg) by mouth At Bedtime  Dispense: 90 tablet; Refill: 1       Tobacco Cessation:   reports that he has been smoking cigarettes. He started smoking about 2 years ago. He has been smoking about 1.50 packs per day. He has never used smokeless tobacco.  Tobacco Cessation Action Plan: Information offered: Patient not interested at this time      BMI:   Estimated body mass index is 37.42 kg/m  as calculated from the following:    Height as of this encounter: 1.829 m (6').    Weight as of this encounter: 125.1 kg (275 lb 14.4 oz).   Weight management plan: Discussed healthy diet and exercise guidelines          Return in about 3 months (around 1/28/2020) for diabetes, lipids, HTN.    Marion Davis NP  [facility]

## 2019-10-28 ENCOUNTER — OFFICE VISIT (OUTPATIENT)
Dept: FAMILY MEDICINE | Facility: OTHER | Age: 49
End: 2019-10-28
Attending: NURSE PRACTITIONER
Payer: COMMERCIAL

## 2019-10-28 VITALS
HEIGHT: 72 IN | SYSTOLIC BLOOD PRESSURE: 122 MMHG | HEART RATE: 92 BPM | DIASTOLIC BLOOD PRESSURE: 100 MMHG | RESPIRATION RATE: 18 BRPM | WEIGHT: 275.9 LBS | BODY MASS INDEX: 37.37 KG/M2 | OXYGEN SATURATION: 98 %

## 2019-10-28 DIAGNOSIS — L91.8 CUTANEOUS SKIN TAGS: ICD-10-CM

## 2019-10-28 DIAGNOSIS — Z71.6 TOBACCO ABUSE COUNSELING: ICD-10-CM

## 2019-10-28 DIAGNOSIS — K21.9 GASTROESOPHAGEAL REFLUX DISEASE, ESOPHAGITIS PRESENCE NOT SPECIFIED: ICD-10-CM

## 2019-10-28 DIAGNOSIS — I10 BENIGN ESSENTIAL HYPERTENSION: ICD-10-CM

## 2019-10-28 DIAGNOSIS — E78.5 HYPERLIPIDEMIA LDL GOAL <100: ICD-10-CM

## 2019-10-28 DIAGNOSIS — Z72.0 TOBACCO ABUSE: ICD-10-CM

## 2019-10-28 DIAGNOSIS — G89.29 CHRONIC PAIN OF LEFT ANKLE: ICD-10-CM

## 2019-10-28 DIAGNOSIS — E66.01 MORBID OBESITY (H): Primary | ICD-10-CM

## 2019-10-28 DIAGNOSIS — E11.65 TYPE 2 DIABETES MELLITUS WITH HYPERGLYCEMIA, WITH LONG-TERM CURRENT USE OF INSULIN (H): ICD-10-CM

## 2019-10-28 DIAGNOSIS — M25.572 CHRONIC PAIN OF LEFT ANKLE: ICD-10-CM

## 2019-10-28 DIAGNOSIS — Z79.4 TYPE 2 DIABETES MELLITUS WITH HYPERGLYCEMIA, WITH LONG-TERM CURRENT USE OF INSULIN (H): ICD-10-CM

## 2019-10-28 LAB — TSH SERPL DL<=0.005 MIU/L-ACNC: 1.81 MU/L (ref 0.4–4)

## 2019-10-28 PROCEDURE — 99214 OFFICE O/P EST MOD 30 MIN: CPT | Performed by: NURSE PRACTITIONER

## 2019-10-28 RX ORDER — ATORVASTATIN CALCIUM 10 MG/1
10 TABLET, FILM COATED ORAL AT BEDTIME
Qty: 90 TABLET | Refills: 1 | Status: SHIPPED | OUTPATIENT
Start: 2019-10-28 | End: 2020-03-12

## 2019-10-28 RX ORDER — INSULIN GLARGINE 100 [IU]/ML
25 INJECTION, SOLUTION SUBCUTANEOUS DAILY
Qty: 15 ML | Refills: 3 | Status: SHIPPED | OUTPATIENT
Start: 2019-10-28 | End: 2020-01-21

## 2019-10-28 RX ORDER — ESOMEPRAZOLE MAGNESIUM 40 MG/1
40 CAPSULE, DELAYED RELEASE ORAL
Qty: 90 CAPSULE | Refills: 1 | Status: SHIPPED | OUTPATIENT
Start: 2019-10-28 | End: 2020-03-12 | Stop reason: ALTCHOICE

## 2019-10-28 RX ORDER — LOSARTAN POTASSIUM 25 MG/1
TABLET ORAL
Qty: 45 TABLET | Refills: 1 | Status: SHIPPED | OUTPATIENT
Start: 2019-10-28 | End: 2020-03-12

## 2019-10-28 ASSESSMENT — MIFFLIN-ST. JEOR: SCORE: 2159.47

## 2019-10-28 ASSESSMENT — ANXIETY QUESTIONNAIRES
7. FEELING AFRAID AS IF SOMETHING AWFUL MIGHT HAPPEN: SEVERAL DAYS
5. BEING SO RESTLESS THAT IT IS HARD TO SIT STILL: NOT AT ALL
IF YOU CHECKED OFF ANY PROBLEMS ON THIS QUESTIONNAIRE, HOW DIFFICULT HAVE THESE PROBLEMS MADE IT FOR YOU TO DO YOUR WORK, TAKE CARE OF THINGS AT HOME, OR GET ALONG WITH OTHER PEOPLE: NOT DIFFICULT AT ALL
1. FEELING NERVOUS, ANXIOUS, OR ON EDGE: MORE THAN HALF THE DAYS
2. NOT BEING ABLE TO STOP OR CONTROL WORRYING: SEVERAL DAYS
6. BECOMING EASILY ANNOYED OR IRRITABLE: NOT AT ALL
GAD7 TOTAL SCORE: 6
3. WORRYING TOO MUCH ABOUT DIFFERENT THINGS: MORE THAN HALF THE DAYS
4. TROUBLE RELAXING: NOT AT ALL

## 2019-10-28 ASSESSMENT — PATIENT HEALTH QUESTIONNAIRE - PHQ9: SUM OF ALL RESPONSES TO PHQ QUESTIONS 1-9: 2

## 2019-10-28 ASSESSMENT — PAIN SCALES - GENERAL: PAINLEVEL: NO PAIN (0)

## 2019-10-28 NOTE — PATIENT INSTRUCTIONS
Assessment & Plan     1. Tobacco abuse  - Tobacco Cessation - Order to Satisfy Health Maintenance    2. Tobacco abuse counseling    3. Type 2 diabetes mellitus with hyperglycemia, with long-term current use of insulin (H)  - empagliflozin (JARDIANCE) 25 MG TABS tablet; Take 1 tablet (25 mg) by mouth daily  Dispense: 90 tablet; Refill: 1  - insulin glargine (BASAGLAR KWIKPEN) 100 UNIT/ML pen; Inject 25 Units Subcutaneous daily  Dispense: 15 mL; Refill: 3  - Hemoglobin A1c    4. Benign essential hypertension  Restart losartan  Do not use over the counter cold medications except corcedin (cold medication for people with hypertension)   - losartan (COZAAR) 25 MG tablet; TAKE 1/2 TABLET(12.5 MG) BY MOUTH DAILY  Dispense: 45 tablet; Refill: 1  - Lipid Profile  - Comprehensive metabolic panel  - TSH with free T4 reflex    5. Morbid obesity (H)  Continue weight loss efforts     6. Hyperlipidemia LDL goal <100  start  - atorvastatin (LIPITOR) 10 MG tablet; Take 1 tablet (10 mg) by mouth At Bedtime  Dispense: 90 tablet; Refill: 1    7. Chronic pain of left ankle  Dr ruperto  - ORTHOPEDICS ADULT REFERRAL    8. Cutaneous skin tags  [facility]  - DERMATOLOGY REFERRAL    9. Gastroesophageal reflux disease, esophagitis presence not specified  Restart, consider repeating EGD  - esomeprazole (NEXIUM) 40 MG DR capsule; Take 1 capsule (40 mg) by mouth every morning (before breakfast) Take 30-60 minutes before eating.  Dispense: 90 capsule; Refill: 1  - ranitidine (ZANTAC) 300 MG tablet; Take 1 tablet (300 mg) by mouth At Bedtime  Dispense: 90 tablet; Refill: 1       Tobacco Cessation:   reports that he has been smoking cigarettes. He started smoking about 2 years ago. He has been smoking about 1.50 packs per day. He has never used smokeless tobacco.  Tobacco Cessation Action Plan: Information offered: Patient not interested at this time      BMI:   Estimated body mass index is 37.42 kg/m  as calculated from the  following:    Height as of this encounter: 1.829 m (6').    Weight as of this encounter: 125.1 kg (275 lb 14.4 oz).   Weight management plan: Discussed healthy diet and exercise guidelines          Return in about 3 months (around 1/28/2020) for diabetes, lipids, HTN.    Marion Davis NP  [facility]          HOW TO QUIT SMOKING  Smoking is one of the hardest habits to break. About half of all those who have ever smoked have been able to quit, and most of those (about 70%) who still smoke want to quit. Here are some of the best ways to stop smoking.     KEEP TRYING:  It takes most smokers about 8 tries before they are finally able to fully quit. So, the more often you try and fail, the better your chance of quitting the next time! So, don't give up!    GO COLD TURKEY:  Most ex-smokers quit cold turkey. Trying to cut back gradually doesn't seem to work as well, perhaps because it continues the smoking habit. Also, it is possible to fool yourself by inhaling more while smoking fewer cigarettes. This results in the same amount of nicotine in your body!    GET SUPPORT:  Support programs can make an important difference, especially for the heavy smoker. These groups offer lectures, methods to change your behavior and peer support. Call the free national Quitline for more information. 800-QUIT-NOW (004-837-1637). Low-cost or free programs are offered by many hospitals, local chapters of the American Lung Association (101-099-3695) and the American Cancer Society (343-124-4001). Support at home is important too. Non-smokers can help by offering praise and encouragement. If the smoker fails to quit, encourage them to try again!    OVER-THE-COUNTER MEDICINES:  For those who can't quit on their own, Nicotine Replacement Therapy (NRT) may make quitting much easier. Certain aids such as the nicotine patch, gum and lozenge are available without a prescription. However, it is best to use these  under the guidance of your doctor. The skin patch provides a steady supply of nicotine to the body. Nicotine gum and lozenge gives temporary bursts of low levels of nicotine. Both methods take the edge off the craving for cigarettes. WARNING: If you feel symptoms of nicotine overdose, such as nausea, vomiting, dizziness, weakness, or fast heartbeat, stop using these and see your doctor.    PRESCRIPTION MEDICINES:  After evaluating your smoking patterns and prior attempts at quitting, your doctor may offer a prescription medicine such as bupropion (Zyban, Wellbutrin), varenicline (Chantix, Champix), a niocotine inhaler or nasal spray. Each has its unique advantage and side effects which your doctor can review with you.    HEALTH BENEFITS OF QUITTING:  The benefits of quitting start right away and keep improving the longer you go without smokin minutes: blood pressure and pulse return to normal  8 hours: oxygen levels return to normal  2 days: ability to smell and taste begins to improve as damaged nerves start to regrow  2-3 weeks: circulation and lung function improves  1-9 months: decreased cough, congestion and shortness of breath; less tired  1 year: risk of heart attack decreases by half  5 years: risk of lung cancer decreases by half; risk of stroke becomes the same as a non-smoker  For information about how to quit smoking, visit the following links:  [employer] [place] ,   Clearing the Air, Quit Smoking Today   - an online booklet. http://www.smokefree.gov/pubs/clearing_the_air.pdf  Smokefree.gov http://smokefree.gov/  QuitNet http://www.quitnet.com/    7608-4192 Sukumar Dhillon, [address] [place] [address], [place] [address]. All rights reserved. This information is not intended as a substitute for professional medical care. Always follow your healthcare professional's instructions.    The Benefits of Living Smoke Free  What do you want to gain from quitting? Check off some reasons to quit.  Health  Benefits  ___ Reduce my risk of lung cancer, heart disease, chronic lung disease  ___ Have fewer wrinkles and softer skin  ___ Improve my sense of taste and smell  ___ For pregnant women--reduce the risk of having a miscarriage, stillbirth, premature birth, or low-birth-weight baby  Personal Benefits  ___ Feel more in control of my life  ___ Have better-smelling hair, breath, clothes, home, and car  ___ Save time by not having to take smoke breaks, buy cigarettes, or hunt for a light  ___ Have whiter teeth  Family Benefits  ___ Reduce my children s respiratory tract infections  ___ Set a good example for my children  ___ Reduce my family s cancer risk  Financial Benefits  ___ Save hundreds of dollars each year that would be spent on cigarettes  ___ Save money on medical bills  ___ Save on life, health, and car insurance premiums    Those Dollars Add Up!  Cigarettes are expensive, and getting more expensive all the time. Do you realize how much money you are spending on cigarettes per year? What is the average amount you spend on a pack of cigarettes? What is the average number of packs that you smoke per day? Using your answers to these questions, fill in this formula to help you find out:  ($ _____ per pack) ×  ( _____ number of packs per day) × (365 days) =  $ _____ yearly cost of smoking  Besides tobacco, there are other costs, including extra cleaning bills and replacement costs for clothing and furniture; medical expenses for smoking-related illnesses; and higher health, life, and car insurance premiums.    Cigars and Pipes Count Too!  Cigars and pipes are also dangerous. So are smokeless (chewing) tobacco and snuff. All of these products contain nicotine, a highly addictive substance that has harmful effects on your body. Quitting smoking means giving up all tobacco products.      2148-2871 Sukumar Dhillon, [address] [facility], [place] [address]. All rights reserved. This information is not intended as a  substitute for professional medical care. Always follow your healthcare professional's instructions.

## 2019-10-28 NOTE — NURSING NOTE
Chief Complaint   Patient presents with     Hypertension     Diabetes     Lipids       Initial BP (!) 122/100 (BP Location: Right arm, Patient Position: Chair, Cuff Size: Adult Large)   Pulse 92   Resp 18   Ht 1.829 m (6')   Wt 125.1 kg (275 lb 14.4 oz)   SpO2 98%   BMI 37.42 kg/m   Estimated body mass index is 37.42 kg/m  as calculated from the following:    Height as of this encounter: 1.829 m (6').    Weight as of this encounter: 125.1 kg (275 lb 14.4 oz).  Medication Reconciliation: complete  Pamela M. Lechevalier, LPN

## 2019-10-29 ASSESSMENT — ANXIETY QUESTIONNAIRES: GAD7 TOTAL SCORE: 6

## 2019-11-13 ENCOUNTER — TRANSFERRED RECORDS (OUTPATIENT)
Dept: HEALTH INFORMATION MANAGEMENT | Facility: CLINIC | Age: 49
End: 2019-11-13

## 2019-11-25 ENCOUNTER — OFFICE VISIT (OUTPATIENT)
Dept: FAMILY MEDICINE | Facility: OTHER | Age: 49
End: 2019-11-25
Attending: NURSE PRACTITIONER
Payer: COMMERCIAL

## 2019-11-25 VITALS
BODY MASS INDEX: 36.43 KG/M2 | WEIGHT: 268.6 LBS | HEART RATE: 115 BPM | SYSTOLIC BLOOD PRESSURE: 108 MMHG | DIASTOLIC BLOOD PRESSURE: 80 MMHG | TEMPERATURE: 98.5 F | OXYGEN SATURATION: 97 %

## 2019-11-25 DIAGNOSIS — R06.09 DYSPNEA ON EXERTION: ICD-10-CM

## 2019-11-25 DIAGNOSIS — F41.9 ANXIETY: ICD-10-CM

## 2019-11-25 DIAGNOSIS — E11.9 TYPE 2 DIABETES MELLITUS WITHOUT COMPLICATION, WITHOUT LONG-TERM CURRENT USE OF INSULIN (H): ICD-10-CM

## 2019-11-25 DIAGNOSIS — R07.89 OTHER CHEST PAIN: Primary | ICD-10-CM

## 2019-11-25 PROCEDURE — 99214 OFFICE O/P EST MOD 30 MIN: CPT | Performed by: NURSE PRACTITIONER

## 2019-11-25 RX ORDER — GLIMEPIRIDE 2 MG/1
2 TABLET ORAL
Qty: 30 TABLET | Refills: 1 | Status: SHIPPED | OUTPATIENT
Start: 2019-11-25 | End: 2020-01-22

## 2019-11-25 RX ORDER — ESCITALOPRAM OXALATE 10 MG/1
10 TABLET ORAL DAILY
Qty: 30 TABLET | Refills: 0 | Status: SHIPPED | OUTPATIENT
Start: 2019-11-25 | End: 2020-03-12

## 2019-11-25 ASSESSMENT — PAIN SCALES - GENERAL: PAINLEVEL: NO PAIN (0)

## 2019-11-25 NOTE — PROGRESS NOTES
Subjective     Rojelio Juárez is a 49 year old male who presents to clinic today for the following health issues:    HPI   ED/UC Followup:    Facility:  [facility]  Date of visit: 11/22/19  Reason for visit: Chest Pain  Current Status: Still feeling some pressure in chest area, unsure if it is reflux or something else. Has been suffering from a headache since then.      Emergency Department Course:  Examined and interviewed. The patient was in no acute distress. Initial EKG was without acute ischemic change. Mildly hypertensive without tachycardia . No increased work of breathing or hypoxia.   Differentials include but are not limited to ACS, PE, aortic dissection, GERD pneumonia, or costochondritis.  Labs were without leukocytosis or anemia. Electrolytes were normal. Blood sugar was 161 ( known diabetic), and ALT of 76. D Dimer was negative. Chest xray was without acute focal abnormalities.  Patient's heart score was 5. Patient had first troponin just 40 minutes after his symptoms. I recommended he stay in the ED for serial troponins, but told him we could not be certain until we have a 6 hour troponin. Patient agreed to be monitored and have at least a second troponin.   In the interim he was given a nitroglycerin. He had one single tab which relieved the residual pressure. The second troponin and EKG continued to be unremarkable. Given story with relief from SL nitrate I recommended he stay for a 6 hour troponin and also offered a rule out admission. The patient declined. An outpatient PCP appointment was made. I recommended he see his PCPand get a stress test scheduled. I also recommended he return to the ED with any symptoms. He was told to take a daily ASA and that he may want to avoid strenuous activity until he is seen again. Patient verbalized understanding and risk. He was discharged with his SO to home.  full note is reviewed.      He is willing to do stress test but due to insurance issues.       States he needs to restart something for anxiety.  He has tried paxil, zoloft and did not like how he felt on either of them.  States just worries about everything and cannot sleep.      He was unable to start jardiance due to cost - wonders other options.     Patient Active Problem List   Diagnosis     ACP (advance care planning)     Herniated intervertebral disc of lumbar spine     Benign essential hypertension     Tobacco dependence syndrome     Anxiety     Type 2 diabetes mellitus without complication, without long-term current use of insulin (H)     Morbid obesity (H)     Hyperlipidemia LDL goal <100     Status post lumbar microdiscectomy     Major depression     Fatty infiltration of liver     History reviewed. No pertinent surgical history.    Social History     Tobacco Use     Smoking status: Current Every Day Smoker     Packs/day: 1.50     Types: Cigarettes     Start date: 1/15/2017     Smokeless tobacco: Never Used     Tobacco comment: will quit some day   Substance Use Topics     Alcohol use: Yes     Alcohol/week: 0.0 standard drinks     History reviewed. No pertinent family history.      Current Outpatient Medications   Medication Sig Dispense Refill     aspirin 81 MG EC tablet Take 1 tablet (81 mg) by mouth daily 90 tablet 3     atorvastatin (LIPITOR) 10 MG tablet Take 1 tablet (10 mg) by mouth At Bedtime 90 tablet 1     blood glucose monitoring (ACCU-CHEK FASTCLIX) lancets Use to test blood sugar 3 times daily. 102 each 3     blood glucose monitoring (ACCU-CHEK GUIDE) test strip Use to test blood sugar 3 times daily. 100 each 3     Blood Glucose Monitoring Suppl (ACCU-CHEK GUIDE) w/Device KIT 1 each daily 1 kit 0     escitalopram (LEXAPRO) 10 MG tablet Take 1 tablet (10 mg) by mouth daily 30 tablet 0     esomeprazole (NEXIUM) 40 MG DR capsule Take 1 capsule (40 mg) by mouth every morning (before breakfast) Take 30-60 minutes before eating. 90 capsule 1     glimepiride (AMARYL) 2 MG tablet Take 1  tablet (2 mg) by mouth every morning (before breakfast) 30 tablet 1     ibuprofen (ADVIL/MOTRIN) 800 MG tablet TAKE 1 TABLET(800 MG) BY MOUTH EVERY 8 HOURS AS NEEDED FOR MODERATE PAIN 90 tablet 0     insulin glargine (BASAGLAR KWIKPEN) 100 UNIT/ML pen Inject 25 Units Subcutaneous daily 15 mL 3     insulin pen needle 32G X 4 MM Use 1 pen needle daily. 100 each 3     losartan (COZAAR) 25 MG tablet TAKE 1/2 TABLET(12.5 MG) BY MOUTH DAILY 45 tablet 1     ranitidine (ZANTAC) 300 MG tablet Take 1 tablet (300 mg) by mouth At Bedtime 90 tablet 1     Allergies   Allergen Reactions     Metformin GI Disturbance     Sertraline Headache and Nausea     Recent Labs   Lab Test 10/25/19  0856 07/16/19  0805 04/18/19  1557 03/22/19  1419   A1C 7.4* 6.3*  --  6.2*   * 152*  --  104*   HDL 47 50  --  52   TRIG 174* 213*  --  156*   * 155* 81* 93*   CR 0.85 0.87  --  0.86   GFRESTIMATED >90 >90  --  >90   GFRESTBLACK >90 >90  --  >90   POTASSIUM 3.8 4.1  --  3.9   TSH 1.81 1.37  --  1.07      BP Readings from Last 3 Encounters:   11/25/19 108/80   10/28/19 (!) 122/100   07/19/19 (!) 128/94    Wt Readings from Last 3 Encounters:   11/25/19 121.8 kg (268 lb 9.6 oz)   10/28/19 125.1 kg (275 lb 14.4 oz)   07/19/19 125.2 kg (276 lb)              Reviewed and updated as needed this visit by Provider  Allergies         Review of Systems   ROS COMP: Constitutional, HEENT, cardiovascular, pulmonary, gi and gu systems are negative, except as otherwise noted.      Objective    /80 (BP Location: Left arm, Patient Position: Sitting, Cuff Size: Adult Large)   Pulse 115   Temp 98.5  F (36.9  C) (Tympanic)   Wt 121.8 kg (268 lb 9.6 oz)   SpO2 97%   BMI 36.43 kg/m    Body mass index is 36.43 kg/m .  Physical Exam   GENERAL: healthy, alert and no distress  NECK: no adenopathy, no asymmetry, masses, or scars and thyroid normal to palpation  RESP: lungs clear to auscultation - no rales, rhonchi or wheezes  CV: regular rate and  rhythm, normal S1 S2, no S3 or S4, no murmur, click or rub, no peripheral edema and peripheral pulses strong  MS: no gross musculoskeletal defects noted, no edema  PSYCH: mentation appears normal, affect normal/bright            Assessment & Plan     1. Other chest pain  - will do in January  - NM Exercise stress test; Future  - US Aorta Medicare AAA Screening; Future  - declined nitroglycerin for now.     2. Type 2 diabetes mellitus without complication, without long-term current use of insulin (H)  Stop- jardiance.  - continue basaglar   - start glimepiride (AMARYL) 2 MG tablet; Take 1 tablet (2 mg) by mouth every morning (before breakfast)  Dispense: 30 tablet; Refill: 1    3. Dyspnea on exertion  - NM Exercise stress test; Future  - US Aorta Medicare AAA Screening; Future    4. Anxiety  - start  escitalopram (LEXAPRO) 10 MG tablet; Take 1 tablet (10 mg) by mouth daily  Dispense: 30 tablet; Refill: 0     Tobacco Cessation:   reports that he has been smoking cigarettes. He started smoking about 2 years ago. He has been smoking about 1.50 packs per day. He has never used smokeless tobacco.  Tobacco Cessation Action Plan: Information offered: Patient not interested at this time      BMI:   Estimated body mass index is 36.43 kg/m  as calculated from the following:    Height as of 10/28/19: 1.829 m (6').    Weight as of this encounter: 121.8 kg (268 lb 9.6 oz).           Return in about 2 months (around 1/25/2020) for anxiety/depression, diabetes, GERD.    Marion Davis NP  [facility]

## 2019-11-25 NOTE — PATIENT INSTRUCTIONS
Assessment & Plan     1. Other chest pain  - will do in January  - NM Exercise stress test; Future  - US Aorta Medicare AAA Screening; Future    2. Type 2 diabetes mellitus without complication, without long-term current use of insulin (H)  Stop- jardiance.  - continue basaglar   - start glimepiride (AMARYL) 2 MG tablet; Take 1 tablet (2 mg) by mouth every morning (before breakfast)  Dispense: 30 tablet; Refill: 1    3. Dyspnea on exertion  - NM Exercise stress test; Future  - US Aorta Medicare AAA Screening; Future    4. Anxiety  - start  escitalopram (LEXAPRO) 10 MG tablet; Take 1 tablet (10 mg) by mouth daily  Dispense: 30 tablet; Refill: 0     Tobacco Cessation:   reports that he has been smoking cigarettes. He started smoking about 2 years ago. He has been smoking about 1.50 packs per day. He has never used smokeless tobacco.  Tobacco Cessation Action Plan: Information offered: Patient not interested at this time      BMI:   Estimated body mass index is 36.43 kg/m  as calculated from the following:    Height as of 10/28/19: 1.829 m (6').    Weight as of this encounter: 121.8 kg (268 lb 9.6 oz).           Return in about 2 months (around 1/25/2020) for anxiety/depression, diabetes, GERD.    Marion Davis NP  [facility]

## 2019-11-25 NOTE — NURSING NOTE
Chief Complaint   Patient presents with     Hospital F/U       Initial /80 (BP Location: Left arm, Patient Position: Sitting, Cuff Size: Adult Large)   Pulse 115   Temp 98.5  F (36.9  C) (Tympanic)   Wt 121.8 kg (268 lb 9.6 oz)   SpO2 97%   BMI 36.43 kg/m   Estimated body mass index is 36.43 kg/m  as calculated from the following:    Height as of 10/28/19: 1.829 m (6').    Weight as of this encounter: 121.8 kg (268 lb 9.6 oz).  Medication Reconciliation: complete  Ashley A. Lechevalier, LPN

## 2020-01-01 ENCOUNTER — TRANSFERRED RECORDS (OUTPATIENT)
Dept: HEALTH INFORMATION MANAGEMENT | Facility: CLINIC | Age: 50
End: 2020-01-01

## 2020-01-01 LAB — RETINOPATHY: NORMAL

## 2020-01-08 ENCOUNTER — TRANSFERRED RECORDS (OUTPATIENT)
Dept: HEALTH INFORMATION MANAGEMENT | Facility: CLINIC | Age: 50
End: 2020-01-08

## 2020-01-20 DIAGNOSIS — E11.65 TYPE 2 DIABETES MELLITUS WITH HYPERGLYCEMIA, WITH LONG-TERM CURRENT USE OF INSULIN (H): ICD-10-CM

## 2020-01-20 DIAGNOSIS — Z79.4 TYPE 2 DIABETES MELLITUS WITH HYPERGLYCEMIA, WITH LONG-TERM CURRENT USE OF INSULIN (H): ICD-10-CM

## 2020-01-20 NOTE — TELEPHONE ENCOUNTER
basaglar     Last Written Prescription Date:  10/28/19  Last Fill Quantity: 15ml,   # refills: 3  Last Office Visit: 11/25/19  Future Office visit:    Next 5 appointments (look out 90 days)    Jan 29, 2020  2:15 PM CST  (Arrive by 2:00 PM)  SHORT with Marion Davis NP  [facility] ([facility] ) [address]  [facility] [address]  [phone]           Routing refill request to provider for review/approval because:  Drug not on the [facility], [facility] or [facility] refill protocol or controlled substance

## 2020-01-21 ENCOUNTER — TELEPHONE (OUTPATIENT)
Dept: FAMILY MEDICINE | Facility: OTHER | Age: 50
End: 2020-01-21

## 2020-01-21 DIAGNOSIS — E11.9 TYPE 2 DIABETES MELLITUS WITHOUT COMPLICATION, WITHOUT LONG-TERM CURRENT USE OF INSULIN (H): Primary | ICD-10-CM

## 2020-01-21 PROBLEM — L20.84 INTRINSIC ECZEMA: Status: ACTIVE | Noted: 2020-01-21

## 2020-01-21 PROBLEM — L91.8 SKIN TAG: Status: ACTIVE | Noted: 2020-01-21

## 2020-01-21 PROBLEM — L21.8 OTHER SEBORRHEIC DERMATITIS: Status: ACTIVE | Noted: 2020-01-21

## 2020-01-21 PROBLEM — L73.8 SEBACEOUS HYPERPLASIA: Status: ACTIVE | Noted: 2020-01-21

## 2020-01-21 RX ORDER — INSULIN GLARGINE 100 [IU]/ML
25 INJECTION, SOLUTION SUBCUTANEOUS DAILY
Qty: 15 ML | Refills: 2 | Status: SHIPPED | OUTPATIENT
Start: 2020-01-21 | End: 2020-01-27

## 2020-01-21 NOTE — TELEPHONE ENCOUNTER
Received a PA from [facility] for Basaglar Kwikpen 100 unit/ml pen-injectors. Submitted on CMM. Waiting for a response.

## 2020-01-23 DIAGNOSIS — E11.65 TYPE 2 DIABETES MELLITUS WITH HYPERGLYCEMIA, WITHOUT LONG-TERM CURRENT USE OF INSULIN (H): ICD-10-CM

## 2020-01-27 NOTE — TELEPHONE ENCOUNTER
Received a DENIAL from [facility] for Basaglar Kwikpen 100 unit/ml pen-injectors.    Forms scanned to Epic.

## 2020-02-20 ENCOUNTER — TRANSFERRED RECORDS (OUTPATIENT)
Dept: HEALTH INFORMATION MANAGEMENT | Facility: CLINIC | Age: 50
End: 2020-02-20

## 2020-02-21 ENCOUNTER — OFFICE VISIT (OUTPATIENT)
Dept: FAMILY MEDICINE | Facility: OTHER | Age: 50
End: 2020-02-21
Attending: NURSE PRACTITIONER
Payer: COMMERCIAL

## 2020-02-21 ENCOUNTER — TELEPHONE (OUTPATIENT)
Dept: FAMILY MEDICINE | Facility: OTHER | Age: 50
End: 2020-02-21

## 2020-02-21 VITALS
DIASTOLIC BLOOD PRESSURE: 88 MMHG | SYSTOLIC BLOOD PRESSURE: 122 MMHG | WEIGHT: 268.6 LBS | OXYGEN SATURATION: 99 % | RESPIRATION RATE: 18 BRPM | BODY MASS INDEX: 36.38 KG/M2 | HEART RATE: 86 BPM | HEIGHT: 72 IN

## 2020-02-21 DIAGNOSIS — M54.41 ACUTE RIGHT-SIDED LOW BACK PAIN WITH RIGHT-SIDED SCIATICA: Primary | ICD-10-CM

## 2020-02-21 DIAGNOSIS — M54.41 ACUTE RIGHT-SIDED LOW BACK PAIN WITH RIGHT-SIDED SCIATICA: ICD-10-CM

## 2020-02-21 PROBLEM — M51.16 LUMBAR DISC HERNIATION WITH RADICULOPATHY: Status: ACTIVE | Noted: 2020-02-21

## 2020-02-21 PROBLEM — M54.50 BILATERAL LOW BACK PAIN WITHOUT SCIATICA: Status: ACTIVE | Noted: 2020-02-21

## 2020-02-21 PROCEDURE — 99214 OFFICE O/P EST MOD 30 MIN: CPT | Performed by: NURSE PRACTITIONER

## 2020-02-21 RX ORDER — HYDROCODONE BITARTRATE AND ACETAMINOPHEN 5; 325 MG/1; MG/1
1 TABLET ORAL
COMMUNITY
Start: 2020-02-20 | End: 2020-03-12

## 2020-02-21 RX ORDER — PREDNISONE 20 MG/1
TABLET ORAL
COMMUNITY
Start: 2020-02-17 | End: 2020-03-12

## 2020-02-21 RX ORDER — PREGABALIN 75 MG/1
75 CAPSULE ORAL 3 TIMES DAILY
Qty: 30 CAPSULE | Refills: 1 | Status: SHIPPED | OUTPATIENT
Start: 2020-02-21 | End: 2020-03-12

## 2020-02-21 RX ORDER — HYDROCODONE BITARTRATE AND ACETAMINOPHEN 5; 325 MG/1; MG/1
1 TABLET ORAL 4 TIMES DAILY PRN
Qty: 40 TABLET | Refills: 0 | Status: SHIPPED | OUTPATIENT
Start: 2020-02-21 | End: 2020-02-21

## 2020-02-21 RX ORDER — DIAZEPAM 5 MG
5 TABLET ORAL 4 TIMES DAILY PRN
Qty: 40 TABLET | Refills: 0 | Status: SHIPPED | OUTPATIENT
Start: 2020-02-21 | End: 2020-02-21

## 2020-02-21 RX ORDER — HYDROCODONE BITARTRATE AND ACETAMINOPHEN 5; 325 MG/1; MG/1
1 TABLET ORAL 4 TIMES DAILY PRN
Qty: 40 TABLET | Refills: 0 | Status: SHIPPED | OUTPATIENT
Start: 2020-02-21 | End: 2020-03-12

## 2020-02-21 RX ORDER — DIAZEPAM 5 MG
2.5 TABLET ORAL
COMMUNITY
Start: 2020-02-20 | End: 2020-02-21

## 2020-02-21 RX ORDER — DIAZEPAM 5 MG
5 TABLET ORAL 4 TIMES DAILY PRN
Qty: 40 TABLET | Refills: 0 | Status: SHIPPED | OUTPATIENT
Start: 2020-02-21 | End: 2020-03-12

## 2020-02-21 RX ORDER — PREGABALIN 75 MG/1
75 CAPSULE ORAL 3 TIMES DAILY
Qty: 30 CAPSULE | Refills: 1 | Status: SHIPPED | OUTPATIENT
Start: 2020-02-21 | End: 2020-02-21

## 2020-02-21 ASSESSMENT — MIFFLIN-ST. JEOR: SCORE: 2126.36

## 2020-02-21 ASSESSMENT — PAIN SCALES - GENERAL: PAINLEVEL: MODERATE PAIN (5)

## 2020-02-21 NOTE — TELEPHONE ENCOUNTER
"Pt called reports Sherry just called him regarding \"wanting me to go to [place], she said she'd call me right back\" \"I'm hear at the pharmacy I don't know what's going on\".      [phone] (M)  "

## 2020-02-21 NOTE — NURSING NOTE
Chief Complaint   Patient presents with     Hospital F/U       Initial /88 (BP Location: Right arm, Patient Position: Chair, Cuff Size: Adult Large)   Pulse 86   Resp 18   Ht 1.829 m (6')   Wt 121.8 kg (268 lb 9.6 oz)   SpO2 99%   BMI 36.43 kg/m   Estimated body mass index is 36.43 kg/m  as calculated from the following:    Height as of this encounter: 1.829 m (6').    Weight as of this encounter: 121.8 kg (268 lb 9.6 oz).  Medication Reconciliation: complete  Pamela M. Lechevalier, LPN

## 2020-02-21 NOTE — PATIENT INSTRUCTIONS
Assessment & Plan     1. Acute right-sided low back pain with right-sided sciatica  MRI today as scheduled.   - pregabalin 75 MG PO capsule; Take 1 capsule (75 mg) by mouth 3 times daily  Dispense: 30 capsule; Refill: 1  - HYDROcodone-acetaminophen 5-325 MG PO tablet; Take 1 tablet by mouth 4 times daily as needed for pain  Dispense: 40 tablet; Refill: 0  - diazepam 5 MG PO tablet; Take 1 tablet (5 mg) by mouth 4 times daily as needed for anxiety  Dispense: 40 tablet; Refill: 0     Will notify of results as they are returned    Marion Davis, NP  [facility]

## 2020-02-21 NOTE — PROGRESS NOTES
Subjective     Rojelio Juárez is a 49 year old male who presents to clinic today for the following health issues:    HPI   ED/UC Followup:    Facility:  [facility]   Date of visit: 2/20/20  Reason for visit: Low Back Pain   Current Status: still in pain has mri today at [facility] ordered by ED doctor.         He slept on an air mattress for a few nights because his girlfriend had hip replacement surgery and he needed to be near if she needed anything.  He woke with severe pain.  Pain was so severe - he couldn't walk and called the ambulance and went to [facility] ED for evaluation.  He was prescribed norco and valium.  Pain is shooting down into right foot, feels stabbing and throbbing.  He did see his chiropractor on Tuesday; did ultrasound and acupuncture which did provide short term relief, but all symptoms returned later that night.  He did do one a prednisone taper, he is on 2 per day for 2-3 days, then taper to one for 3 days then off.  He has 20mg tablets at home.  MRI is scheduled for 2 pm today at [facility].  He is using a cane with ambulation.      Patient Active Problem List   Diagnosis     ACP (advance care planning)     Herniated intervertebral disc of lumbar spine     Benign essential hypertension     Tobacco dependence syndrome     Anxiety     Type 2 diabetes mellitus without complication, without long-term current use of insulin (H)     Morbid obesity (H)     Hyperlipidemia LDL goal <100     Status post lumbar microdiscectomy     Major depression     Fatty infiltration of liver     Sebaceous hyperplasia     Skin tag     Other seborrheic dermatitis     Intrinsic eczema     History reviewed. No pertinent surgical history.    Social History     Tobacco Use     Smoking status: Current Every Day Smoker     Packs/day: 1.50     Types: Cigarettes     Start date: 1/15/2017     Smokeless tobacco: Never Used     Tobacco comment: will quit some day   Substance Use Topics     Alcohol use: Yes      Alcohol/week: 0.0 standard drinks     History reviewed. No pertinent family history.      Current Outpatient Medications   Medication Sig Dispense Refill     aspirin 81 MG EC tablet Take 1 tablet (81 mg) by mouth daily 90 tablet 3     atorvastatin (LIPITOR) 10 MG tablet Take 1 tablet (10 mg) by mouth At Bedtime 90 tablet 1     blood glucose monitoring (ACCU-CHEK FASTCLIX) lancets Use to test blood sugar 3 times daily. 102 each 3     blood glucose monitoring (ACCU-CHEK GUIDE) test strip Use to test blood sugar 3 times daily. 100 each 3     Blood Glucose Monitoring Suppl (ACCU-CHEK GUIDE) w/Device KIT 1 each daily 1 kit 0     diazepam 5 MG PO tablet Take 2.5 mg by mouth       escitalopram (LEXAPRO) 10 MG tablet Take 1 tablet (10 mg) by mouth daily 30 tablet 0     esomeprazole (NEXIUM) 40 MG DR capsule Take 1 capsule (40 mg) by mouth every morning (before breakfast) Take 30-60 minutes before eating. 90 capsule 1     glimepiride (AMARYL) 2 MG tablet TAKE 1 TABLET(2 MG) BY MOUTH EVERY MORNING BEFORE BREAKFAST 30 tablet 1     HYDROcodone-acetaminophen 5-325 MG PO tablet Take 1 tablet by mouth       ibuprofen (ADVIL/MOTRIN) 800 MG tablet TAKE 1 TABLET(800 MG) BY MOUTH EVERY 8 HOURS AS NEEDED FOR MODERATE PAIN 90 tablet 0     insulin glargine (LANTUS SOLOSTAR) 100 UNIT/ML pen Inject 25 Units Subcutaneous daily basalglar 15 mL 3     insulin pen needle (32G X 4 MM) 32G X 4 MM miscellaneous Use 1 pen needle daily. 100 each 3     losartan (COZAAR) 25 MG tablet TAKE 1/2 TABLET(12.5 MG) BY MOUTH DAILY 45 tablet 1     predniSONE 20 MG PO tablet        ranitidine (ZANTAC) 300 MG tablet Take 1 tablet (300 mg) by mouth At Bedtime 90 tablet 1     Allergies   Allergen Reactions     Metformin GI Disturbance     Sertraline Headache and Nausea     Recent Labs   Lab Test 10/25/19  0856 07/16/19  0805 04/18/19  1557 03/22/19  1419   A1C 7.4* 6.3*  --  6.2*   * 152*  --  104*   HDL 47 50  --  52   TRIG 174* 213*  --  156*   *  155* 81* 93*   CR 0.85 0.87  --  0.86   GFRESTIMATED >90 >90  --  >90   GFRESTBLACK >90 >90  --  >90   POTASSIUM 3.8 4.1  --  3.9   TSH 1.81 1.37  --  1.07      BP Readings from Last 3 Encounters:   02/21/20 122/88   11/25/19 108/80   10/28/19 (!) 122/100    Wt Readings from Last 3 Encounters:   02/21/20 121.8 kg (268 lb 9.6 oz)   11/25/19 121.8 kg (268 lb 9.6 oz)   10/28/19 125.1 kg (275 lb 14.4 oz)            Reviewed and updated as needed this visit by Provider         Review of Systems   ROS COMP: Constitutional, HEENT, cardiovascular, pulmonary, gi and gu systems are negative, except as otherwise noted.      Objective    /88 (BP Location: Right arm, Patient Position: Chair, Cuff Size: Adult Large)   Pulse 86   Resp 18   Ht 1.829 m (6')   Wt 121.8 kg (268 lb 9.6 oz)   SpO2 99%   BMI 36.43 kg/m    Body mass index is 36.43 kg/m .  Physical Exam   GENERAL: alert and no distress but moving gingerly about the exam room  MS: right sided low back pain, he is able to tip toe, but not heel walk due to pain.  straight leg raise is positive on right, negative on left.  Decreased strength of right side when compared to left.    PSYCH: mentation appears normal, affect normal/bright            Assessment & Plan     1. Acute right-sided low back pain with right-sided sciatica  MRI today as scheduled.   - pregabalin 75 MG PO capsule; Take 1 capsule (75 mg) by mouth 3 times daily  Dispense: 30 capsule; Refill: 1  - HYDROcodone-acetaminophen 5-325 MG PO tablet; Take 1 tablet by mouth 4 times daily as needed for pain  Dispense: 40 tablet; Refill: 0  - diazepam 5 MG PO tablet; Take 1 tablet (5 mg) by mouth 4 times daily as needed for anxiety  Dispense: 40 tablet; Refill: 0     Will notify of results as they are returned    Marion Davis NP  [facility]

## 2020-02-21 NOTE — TELEPHONE ENCOUNTER
Martita from [facility] called and states that patient Rojelio Juárez's prescriptions were sent to the wrong address, and they need to be resent to the [facility] with the address of [address] in [facility] [place] - other wise pt will not receive his meds.     Thanks

## 2020-02-24 ENCOUNTER — TRANSFERRED RECORDS (OUTPATIENT)
Dept: HEALTH INFORMATION MANAGEMENT | Facility: CLINIC | Age: 50
End: 2020-02-24

## 2020-02-24 NOTE — TELEPHONE ENCOUNTER
Talked to attending MD Dr martinez on Friday stated if pt does not get better to go to er his office will call and get him in on Monday.  Pt aware and now called on Monday stating has not heard anything from anyone and is in pain.  Called le and the nurse called Rojelio and stated that doc would be rolling in around 930 and they would get back to him some time today.  Noted again if pt has issues to go to ER. Pt verbalized understanding  Pamela M Lechevalier LPN

## 2020-02-25 ENCOUNTER — TRANSFERRED RECORDS (OUTPATIENT)
Dept: HEALTH INFORMATION MANAGEMENT | Facility: CLINIC | Age: 50
End: 2020-02-25

## 2020-03-09 DIAGNOSIS — I10 BENIGN ESSENTIAL HYPERTENSION: ICD-10-CM

## 2020-03-09 DIAGNOSIS — E11.65 TYPE 2 DIABETES MELLITUS WITH HYPERGLYCEMIA, WITH LONG-TERM CURRENT USE OF INSULIN (H): ICD-10-CM

## 2020-03-09 DIAGNOSIS — E11.9 TYPE 2 DIABETES MELLITUS WITHOUT COMPLICATION, WITHOUT LONG-TERM CURRENT USE OF INSULIN (H): ICD-10-CM

## 2020-03-09 DIAGNOSIS — Z79.4 TYPE 2 DIABETES MELLITUS WITH HYPERGLYCEMIA, WITH LONG-TERM CURRENT USE OF INSULIN (H): ICD-10-CM

## 2020-03-09 DIAGNOSIS — R06.09 DYSPNEA ON EXERTION: ICD-10-CM

## 2020-03-09 DIAGNOSIS — R07.89 OTHER CHEST PAIN: ICD-10-CM

## 2020-03-09 LAB
ALBUMIN SERPL-MCNC: 3.6 G/DL (ref 3.4–5)
ALP SERPL-CCNC: 91 U/L (ref 40–150)
ALT SERPL W P-5'-P-CCNC: 52 U/L (ref 0–70)
ANION GAP SERPL CALCULATED.3IONS-SCNC: 8 MMOL/L (ref 3–14)
AST SERPL W P-5'-P-CCNC: 25 U/L (ref 0–45)
BILIRUB SERPL-MCNC: 1 MG/DL (ref 0.2–1.3)
BUN SERPL-MCNC: 18 MG/DL (ref 7–30)
CALCIUM SERPL-MCNC: 9.7 MG/DL (ref 8.5–10.1)
CHLORIDE SERPL-SCNC: 102 MMOL/L (ref 94–109)
CHOLEST SERPL-MCNC: 218 MG/DL
CO2 SERPL-SCNC: 26 MMOL/L (ref 20–32)
CREAT SERPL-MCNC: 0.88 MG/DL (ref 0.66–1.25)
EST. AVERAGE GLUCOSE BLD GHB EST-MCNC: 148 MG/DL
GFR SERPL CREATININE-BSD FRML MDRD: >90 ML/MIN/{1.73_M2}
GLUCOSE SERPL-MCNC: 229 MG/DL (ref 70–99)
HBA1C MFR BLD: 6.8 % (ref 0–5.6)
HDLC SERPL-MCNC: 57 MG/DL
LDLC SERPL CALC-MCNC: 113 MG/DL
NONHDLC SERPL-MCNC: 161 MG/DL
POTASSIUM SERPL-SCNC: 4.1 MMOL/L (ref 3.4–5.3)
PROT SERPL-MCNC: 7.2 G/DL (ref 6.8–8.8)
SODIUM SERPL-SCNC: 136 MMOL/L (ref 133–144)
TRIGL SERPL-MCNC: 241 MG/DL
TSH SERPL DL<=0.005 MIU/L-ACNC: 0.83 MU/L (ref 0.4–4)

## 2020-03-09 PROCEDURE — 36415 COLL VENOUS BLD VENIPUNCTURE: CPT | Performed by: NURSE PRACTITIONER

## 2020-03-09 PROCEDURE — 84443 ASSAY THYROID STIM HORMONE: CPT | Performed by: NURSE PRACTITIONER

## 2020-03-09 PROCEDURE — 83036 HEMOGLOBIN GLYCOSYLATED A1C: CPT | Performed by: NURSE PRACTITIONER

## 2020-03-09 PROCEDURE — 80061 LIPID PANEL: CPT | Performed by: NURSE PRACTITIONER

## 2020-03-09 PROCEDURE — 40000788 ZZHCL STATISTIC ESTIMATED AVERAGE GLUCOSE: Performed by: NURSE PRACTITIONER

## 2020-03-09 PROCEDURE — 80053 COMPREHEN METABOLIC PANEL: CPT | Performed by: NURSE PRACTITIONER

## 2020-03-12 ENCOUNTER — OFFICE VISIT (OUTPATIENT)
Dept: FAMILY MEDICINE | Facility: OTHER | Age: 50
End: 2020-03-12
Attending: NURSE PRACTITIONER
Payer: COMMERCIAL

## 2020-03-12 VITALS
WEIGHT: 276 LBS | BODY MASS INDEX: 37.43 KG/M2 | DIASTOLIC BLOOD PRESSURE: 78 MMHG | RESPIRATION RATE: 20 BRPM | SYSTOLIC BLOOD PRESSURE: 118 MMHG | TEMPERATURE: 98.6 F | HEART RATE: 110 BPM | OXYGEN SATURATION: 97 %

## 2020-03-12 DIAGNOSIS — E11.65 TYPE 2 DIABETES MELLITUS WITH HYPERGLYCEMIA, WITHOUT LONG-TERM CURRENT USE OF INSULIN (H): ICD-10-CM

## 2020-03-12 DIAGNOSIS — E78.5 HYPERLIPIDEMIA LDL GOAL <100: ICD-10-CM

## 2020-03-12 DIAGNOSIS — E11.9 TYPE 2 DIABETES MELLITUS WITHOUT COMPLICATION, WITHOUT LONG-TERM CURRENT USE OF INSULIN (H): Primary | ICD-10-CM

## 2020-03-12 DIAGNOSIS — K21.9 GASTROESOPHAGEAL REFLUX DISEASE, ESOPHAGITIS PRESENCE NOT SPECIFIED: ICD-10-CM

## 2020-03-12 DIAGNOSIS — I10 BENIGN ESSENTIAL HYPERTENSION: ICD-10-CM

## 2020-03-12 DIAGNOSIS — R13.10 DYSPHAGIA, UNSPECIFIED TYPE: ICD-10-CM

## 2020-03-12 PROBLEM — F41.9 ANXIETY: Status: RESOLVED | Noted: 2017-08-15 | Resolved: 2020-03-12

## 2020-03-12 PROCEDURE — 99214 OFFICE O/P EST MOD 30 MIN: CPT | Performed by: NURSE PRACTITIONER

## 2020-03-12 RX ORDER — PANTOPRAZOLE SODIUM 40 MG/1
40 TABLET, DELAYED RELEASE ORAL DAILY
Qty: 90 TABLET | Refills: 1 | Status: SHIPPED | OUTPATIENT
Start: 2020-03-12 | End: 2020-07-16 | Stop reason: ALTCHOICE

## 2020-03-12 RX ORDER — ATORVASTATIN CALCIUM 10 MG/1
10 TABLET, FILM COATED ORAL AT BEDTIME
Qty: 90 TABLET | Refills: 1 | Status: SHIPPED | OUTPATIENT
Start: 2020-03-12 | End: 2020-07-16

## 2020-03-12 RX ORDER — SUCRALFATE 1 G/1
1 TABLET ORAL 4 TIMES DAILY
Qty: 1220 TABLET | Refills: 5 | Status: SHIPPED | OUTPATIENT
Start: 2020-03-12 | End: 2020-07-16

## 2020-03-12 RX ORDER — LOSARTAN POTASSIUM 25 MG/1
TABLET ORAL
Qty: 45 TABLET | Refills: 1 | Status: SHIPPED | OUTPATIENT
Start: 2020-03-12 | End: 2020-08-31

## 2020-03-12 RX ORDER — GLIMEPIRIDE 2 MG/1
2 TABLET ORAL
Qty: 90 TABLET | Refills: 1 | Status: SHIPPED | OUTPATIENT
Start: 2020-03-12 | End: 2020-07-16 | Stop reason: ALTCHOICE

## 2020-03-12 RX ORDER — FAMOTIDINE 40 MG/1
40 TABLET, FILM COATED ORAL DAILY
Qty: 90 TABLET | Refills: 1 | Status: SHIPPED | OUTPATIENT
Start: 2020-03-12 | End: 2020-07-16

## 2020-03-12 NOTE — PROGRESS NOTES
Subjective     Rojelio Juárez is a 49 year old male who presents to clinic today for the following health issues:    HPI   Diabetes Follow-up    How often are you checking your blood sugar? A few times a month  What time of day are you checking your blood sugars (select all that apply)?  Before meals  Have you had any blood sugars above 200?  No  Have you had any blood sugars below 70?  No    What symptoms do you notice when your blood sugar is low?  Shaky, Dizzy and Weak    What concerns do you have today about your diabetes? None     Do you have any of these symptoms? (Select all that apply)  No numbness or tingling in feet.  No redness, sores or blisters on feet.  No complaints of excessive thirst.  No reports of blurry vision.  No significant changes to weight.    Have you had a diabetic eye exam in the last 12 months? Yes- Date of last eye exam: 1/2020,  Location: [facility]          Hyperlipidemia Follow-Up      Are you regularly taking any medication or supplement to lower your cholesterol?   No-should be on lipitor    Are you having muscle aches or other side effects that you think could be caused by your cholesterol lowering medication?  No   The 10-year ASCVD risk score ([place]dor[place] SCOTT Jr., et al., 2013) is: 12.5%    Values used to calculate the score:      Age: 49 years      Sex: Male      Is Non- : No      Diabetic: Yes      Tobacco smoker: Yes      Systolic Blood Pressure: 118 mmHg      Is BP treated: Yes      HDL Cholesterol: 57 mg/dL      Total Cholesterol: 218 mg/dL        Hypertension Follow-up      Do you check your blood pressure regularly outside of the clinic? No     Are you following a low salt diet? No    Are your blood pressures ever more than 140 on the top number (systolic) OR more   than 90 on the bottom number (diastolic), for example 140/90? No    BP Readings from Last 2 Encounters:   03/12/20 118/78   02/21/20 122/88     Hemoglobin A1C (%)   Date  Value   03/09/2020 6.8 (H)   10/25/2019 7.4 (H)     LDL Cholesterol Calculated (mg/dL)   Date Value   03/09/2020 113 (H)   10/25/2019 103 (H)         How many servings of fruits and vegetables do you eat daily?  0-1    On average, how many sweetened beverages do you drink each day (Examples: soda, juice, sweet tea, etc.  Do NOT count diet or artificially sweetened beverages)?   1    How many days per week do you exercise enough to make your heart beat faster? 3 or less    How many minutes a day do you exercise enough to make your heart beat faster? 9 or less  How many days per week do you miss taking your medication? 1    What makes it hard for you to take your medications?  remembering to take    His main concern is ongoing GERD and difficulty swallowing food and liquids.  He had an EGD 2 years ago that was normal.  Symptoms continue - states when he takes his first bite of food, it gets stuck right in the middle of his chest.  He has to take small bites of food, chew thoroughly and it still happens.      Patient Active Problem List   Diagnosis     ACP (advance care planning)     Herniated intervertebral disc of lumbar spine     Benign essential hypertension     Tobacco dependence syndrome     Type 2 diabetes mellitus without complication, without long-term current use of insulin (H)     Morbid obesity (H)     Hyperlipidemia LDL goal <100     Status post lumbar microdiscectomy     Fatty infiltration of liver     Sebaceous hyperplasia     Skin tag     Other seborrheic dermatitis     Intrinsic eczema     Bilateral low back pain without sciatica     Lumbar disc herniation with radiculopathy     History reviewed. No pertinent surgical history.    Social History     Tobacco Use     Smoking status: Current Every Day Smoker     Packs/day: 1.50     Types: Cigarettes     Start date: 1/15/2017     Smokeless tobacco: Never Used     Tobacco comment: will quit some day   Substance Use Topics     Alcohol use: Yes      Alcohol/week: 0.0 standard drinks     History reviewed. No pertinent family history.      Current Outpatient Medications   Medication Sig Dispense Refill     aspirin 81 MG EC tablet Take 1 tablet (81 mg) by mouth daily 90 tablet 3     atorvastatin (LIPITOR) 10 MG tablet Take 1 tablet (10 mg) by mouth At Bedtime 90 tablet 1     blood glucose monitoring (ACCU-CHEK FASTCLIX) lancets Use to test blood sugar 3 times daily. 102 each 3     blood glucose monitoring (ACCU-CHEK GUIDE) test strip Use to test blood sugar 3 times daily. 100 each 3     Blood Glucose Monitoring Suppl (ACCU-CHEK GUIDE) w/Device KIT 1 each daily 1 kit 0     famotidine (PEPCID) 40 MG tablet Take 1 tablet (40 mg) by mouth daily 90 tablet 1     glimepiride (AMARYL) 2 MG tablet Take 1 tablet (2 mg) by mouth every morning (before breakfast) 90 tablet 1     insulin glargine (LANTUS SOLOSTAR) 100 UNIT/ML pen Inject 25 Units Subcutaneous daily basalglar 15 mL 3     insulin pen needle (32G X 4 MM) 32G X 4 MM miscellaneous Use 1 pen needle daily. 100 each 3     losartan (COZAAR) 25 MG tablet TAKE 1/2 TABLET(12.5 MG) BY MOUTH DAILY 45 tablet 1     pantoprazole (PROTONIX) 40 MG EC tablet Take 1 tablet (40 mg) by mouth daily 90 tablet 1     sucralfate (CARAFATE) 1 GM tablet Take 1 tablet (1 g) by mouth 4 times daily 1220 tablet 5     ibuprofen (ADVIL/MOTRIN) 800 MG tablet TAKE 1 TABLET(800 MG) BY MOUTH EVERY 8 HOURS AS NEEDED FOR MODERATE PAIN (Patient not taking: Reported on 3/12/2020) 90 tablet 0     Allergies   Allergen Reactions     Succinylcholine Muscle Pain (Myalgia)     Pt had profound muscle weakness for 4 hours following an intubating dose of succinylcholine requiring prolonged intubation and mechanical ventilation post-operatively. See post-anesthetic evaluation note from surgery 2/25/20 for details.      Metformin GI Disturbance     Sertraline Headache and Nausea     Recent Labs   Lab Test 03/09/20  1003 10/25/19  0856 07/16/19  0805   A1C 6.8* 7.4*  6.3*   * 103* 152*   HDL 57 47 50   TRIG 241* 174* 213*   ALT 52 168* 155*   CR 0.88 0.85 0.87   GFRESTIMATED >90 >90 >90   GFRESTBLACK >90 >90 >90   POTASSIUM 4.1 3.8 4.1   TSH 0.83 1.81 1.37      BP Readings from Last 3 Encounters:   03/12/20 118/78   02/21/20 122/88   11/25/19 108/80    Wt Readings from Last 3 Encounters:   03/12/20 125.2 kg (276 lb)   02/21/20 121.8 kg (268 lb 9.6 oz)   11/25/19 121.8 kg (268 lb 9.6 oz)                    Reviewed and updated as needed this visit by Provider         Review of Systems   ROS COMP: Constitutional, HEENT, cardiovascular, pulmonary, gi and gu systems are negative, except as otherwise noted.      Objective    /78 (BP Location: Left arm, Patient Position: Sitting, Cuff Size: Adult Large)   Pulse 110   Temp 98.6  F (37  C) (Tympanic)   Resp 20   Wt 125.2 kg (276 lb)   SpO2 97%   BMI 37.43 kg/m    Body mass index is 37.43 kg/m .  Physical Exam   GENERAL: healthy, alert and no distress  NECK: no adenopathy, no asymmetry, masses, or scars, thyroid normal to palpation and no carotid bruits  RESP: lungs clear to auscultation - no rales, rhonchi or wheezes  CV: regular rate and rhythm, normal S1 S2, no S3 or S4, no murmur, click or rub, no peripheral edema and peripheral pulses strong  MS: no gross musculoskeletal defects noted, no edema  PSYCH: mentation appears normal, affect normal/bright            Assessment & Plan     1. Type 2 diabetes mellitus without complication, without long-term current use of insulin (H)  - Hemoglobin A1c  - Albumin Random Urine Quantitative with Creat Ratio  - C FOOT EXAM  NO CHARGE  - glimepiride (AMARYL) 2 MG tablet; Take 1 tablet (2 mg) by mouth every morning (before breakfast)  Dispense: 90 tablet; Refill: 1  - insulin glargine (LANTUS SOLOSTAR) 100 UNIT/ML pen; Inject 25 Units Subcutaneous daily basalglar  Dispense: 15 mL; Refill: 3    2. Hyperlipidemia LDL goal <100  - Lipid Profile  - atorvastatin (LIPITOR) 10 MG  tablet; Take 1 tablet (10 mg) by mouth At Bedtime  Dispense: 90 tablet; Refill: 1    3. Benign essential hypertension  - Comprehensive metabolic panel  - TSH with free T4 reflex  - losartan (COZAAR) 25 MG tablet; TAKE 1/2 TABLET(12.5 MG) BY MOUTH DAILY  Dispense: 45 tablet; Refill: 1    4. Gastroesophageal reflux disease, esophagitis presence not specified  Stop nexium and zantac   - pantoprazole (PROTONIX) 40 MG EC tablet; Take 1 tablet (40 mg) by mouth daily  Dispense: 90 tablet; Refill: 1  - famotidine (PEPCID) 40 MG tablet; Take 1 tablet (40 mg) by mouth daily  Dispense: 90 tablet; Refill: 1  - sucralfate (CARAFATE) 1 GM tablet; Take 1 tablet (1 g) by mouth 4 times daily  Dispense: 1220 tablet; Refill: 5  - GASTROENTEROLOGY ADULT REF CONSULT ONLY    5. Dysphagia, unspecified type  Dr Sanchez   - GASTROENTEROLOGY ADULT REF CONSULT ONLY     Anxiety and depression are resolved.     Return in about 3 months (around 6/12/2020) for diabetes, lipids, HTN.    Marion Davis NP  [facility]

## 2020-03-12 NOTE — NURSING NOTE
No chief complaint on file.      Initial /78 (BP Location: Left arm, Patient Position: Sitting, Cuff Size: Adult Large)   Pulse 110   Temp 98.6  F (37  C) (Tympanic)   Resp 20   Wt 125.2 kg (276 lb)   SpO2 97%   BMI 37.43 kg/m   Estimated body mass index is 37.43 kg/m  as calculated from the following:    Height as of 2/21/20: 1.829 m (6').    Weight as of this encounter: 125.2 kg (276 lb).  Medication Reconciliation: complete  Hailey Mitchell LPN

## 2020-03-12 NOTE — PATIENT INSTRUCTIONS
Assessment & Plan     1. Type 2 diabetes mellitus without complication, without long-term current use of insulin (H)  - Hemoglobin A1c  - Albumin Random Urine Quantitative with Creat Ratio  - C FOOT EXAM  NO CHARGE  - glimepiride (AMARYL) 2 MG tablet; Take 1 tablet (2 mg) by mouth every morning (before breakfast)  Dispense: 90 tablet; Refill: 1  - insulin glargine (LANTUS SOLOSTAR) 100 UNIT/ML pen; Inject 25 Units Subcutaneous daily basalglar  Dispense: 15 mL; Refill: 3    2. Hyperlipidemia LDL goal <100  - Lipid Profile  - atorvastatin (LIPITOR) 10 MG tablet; Take 1 tablet (10 mg) by mouth At Bedtime  Dispense: 90 tablet; Refill: 1    3. Benign essential hypertension  - Comprehensive metabolic panel  - TSH with free T4 reflex  - losartan (COZAAR) 25 MG tablet; TAKE 1/2 TABLET(12.5 MG) BY MOUTH DAILY  Dispense: 45 tablet; Refill: 1    4. Gastroesophageal reflux disease, esophagitis presence not specified  - pantoprazole (PROTONIX) 40 MG EC tablet; Take 1 tablet (40 mg) by mouth daily  Dispense: 90 tablet; Refill: 1  - famotidine (PEPCID) 40 MG tablet; Take 1 tablet (40 mg) by mouth daily  Dispense: 90 tablet; Refill: 1  - sucralfate (CARAFATE) 1 GM tablet; Take 1 tablet (1 g) by mouth 4 times daily  Dispense: 1220 tablet; Refill: 5  - GASTROENTEROLOGY ADULT REF CONSULT ONLY    5. Dysphagia, unspecified type  Dr Sanchez   - GASTROENTEROLOGY ADULT REF CONSULT ONLY     Anxiety and depression are resolved.     Return in about 3 months (around 6/12/2020) for diabetes, lipids, HTN.    Marion Davis NP  [facility]

## 2020-03-23 ENCOUNTER — TRANSFERRED RECORDS (OUTPATIENT)
Dept: HEALTH INFORMATION MANAGEMENT | Facility: CLINIC | Age: 50
End: 2020-03-23

## 2020-07-13 PROBLEM — M48.061 SPINAL STENOSIS OF LUMBAR REGION, UNSPECIFIED WHETHER NEUROGENIC CLAUDICATION PRESENT: Status: ACTIVE | Noted: 2020-02-24

## 2020-07-13 RX ORDER — ESOMEPRAZOLE MAGNESIUM 40 MG/1
CAPSULE, DELAYED RELEASE ORAL
Refills: 1 | COMMUNITY
Start: 2019-10-29 | End: 2020-07-16

## 2020-07-13 RX ORDER — PREGABALIN 75 MG/1
CAPSULE ORAL
COMMUNITY
Start: 2020-02-21 | End: 2020-07-16

## 2020-07-13 RX ORDER — ESCITALOPRAM OXALATE 10 MG/1
TABLET ORAL
COMMUNITY
Start: 2019-11-25 | End: 2020-07-16

## 2020-07-13 RX ORDER — HYDROCODONE BITARTRATE AND ACETAMINOPHEN 5; 325 MG/1; MG/1
TABLET ORAL
COMMUNITY
Start: 2020-02-22 | End: 2020-07-16

## 2020-07-13 RX ORDER — DIAZEPAM 5 MG
TABLET ORAL
COMMUNITY
Start: 2020-02-22 | End: 2020-07-16

## 2020-07-13 NOTE — PROGRESS NOTES
Subjective     Rojelio Juárez is a 50 year old male who presents to clinic today for the following health issues:    HPI   Diabetes Follow-up    How often are you checking your blood sugar? A few times a month  What time of day are you checking your blood sugars (select all that apply)?  Before meals  Have you had any blood sugars above 200?  No  Have you had any blood sugars below 70?  No    What symptoms do you notice when your blood sugar is low?  Shaky    What concerns do you have today about your diabetes? None     Do you have any of these symptoms? (Select all that apply)  No numbness or tingling in feet.  No redness, sores or blisters on feet.  No complaints of excessive thirst.  No reports of blurry vision.  No significant changes to weight.        Hyperlipidemia Follow-Up      Are you regularly taking any medication or supplement to lower your cholesterol?   No - he recently moved and lost it - needs a refill today.      Are you having muscle aches or other side effects that you think could be caused by your cholesterol lowering medication?  No   The 10-year ASCVD risk score (Elizabeth SONIA Jr., et al., 2013) is: 21.9%    Values used to calculate the score:      Age: 50 years      Sex: Male      Is Non- : No      Diabetic: Yes      Tobacco smoker: Yes      Systolic Blood Pressure: 128 mmHg      Is BP treated: Yes      HDL Cholesterol: 39 mg/dL      Total Cholesterol: 215 mg/dL        Hypertension Follow-up      Do you check your blood pressure regularly outside of the clinic? No     Are you following a low salt diet? No    Are your blood pressures ever more than 140 on the top number (systolic) OR more   than 90 on the bottom number (diastolic), for example 140/90? No    BP Readings from Last 2 Encounters:   07/16/20 (!) 128/92   03/12/20 118/78     Hemoglobin A1C (%)   Date Value   07/15/2020 7.8 (H)   03/09/2020 6.8 (H)     LDL Cholesterol Calculated (mg/dL)   Date Value   07/15/2020 115  (H)   03/09/2020 113 (H)         How many servings of fruits and vegetables do you eat daily?  0-1    On average, how many sweetened beverages do you drink each day (Examples: soda, juice, sweet tea, etc.  Do NOT count diet or artificially sweetened beverages)?   0    How many days per week do you exercise enough to make your heart beat faster? 3 or less    How many minutes a day do you exercise enough to make your heart beat faster? 9 or less  How many days per week do you miss taking your medication? Yes when looses them     What makes it hard for you to take your medications?  remembering to take and when moved lost them did not start atorvastatin yet     Labs were done yesterday - LFT's  took a huge jump - he has been off Lipitor for 3 months.  He did have hepatitis screening and US in March 2019 - US indicated fatty liver.      Patient Active Problem List   Diagnosis     ACP (advance care planning)     Herniated intervertebral disc of lumbar spine     Benign essential hypertension     Tobacco dependence syndrome     Type 2 diabetes mellitus without complication, without long-term current use of insulin (H)     Morbid obesity (H)     Hyperlipidemia LDL goal <100     Status post lumbar microdiscectomy     Fatty infiltration of liver     Sebaceous hyperplasia     Skin tag     Other seborrheic dermatitis     Intrinsic eczema     Bilateral low back pain without sciatica     Lumbar disc herniation with radiculopathy     Spinal stenosis of lumbar region, unspecified whether neurogenic claudication present     No past surgical history on file.    Social History     Tobacco Use     Smoking status: Current Every Day Smoker     Packs/day: 1.50     Types: Cigarettes     Start date: 1/15/2017     Smokeless tobacco: Never Used     Tobacco comment: will quit some day   Substance Use Topics     Alcohol use: Yes     Alcohol/week: 0.0 standard drinks     No family history on file.      Current Outpatient Medications   Medication  Sig Dispense Refill     aspirin 81 MG EC tablet Take 1 tablet (81 mg) by mouth daily 90 tablet 3     atorvastatin (LIPITOR) 10 MG tablet Take 1 tablet (10 mg) by mouth At Bedtime 90 tablet 1     blood glucose monitoring (ACCU-CHEK FASTCLIX) lancets Use to test blood sugar 3 times daily. 102 each 3     blood glucose monitoring (ACCU-CHEK GUIDE) test strip Use to test blood sugar 3 times daily. 100 each 3     Blood Glucose Monitoring Suppl (ACCU-CHEK GUIDE) w/Device KIT 1 each daily 1 kit 0     esomeprazole (NEXIUM) 40 MG DR capsule Take 1 capsule (40 mg) by mouth 2 times daily Take 30-60 minutes before eating. 180 capsule 1     ibuprofen (ADVIL/MOTRIN) 800 MG tablet TAKE 1 TABLET(800 MG) BY MOUTH EVERY 8 HOURS AS NEEDED FOR MODERATE PAIN 90 tablet 0     insulin glargine (LANTUS SOLOSTAR) 100 UNIT/ML pen Inject 25 Units Subcutaneous daily basalglar 15 mL 3     insulin pen needle (32G X 4 MM) 32G X 4 MM miscellaneous Use 1 pen needle daily. 100 each 3     losartan (COZAAR) 25 MG tablet TAKE 1/2 TABLET(12.5 MG) BY MOUTH DAILY 45 tablet 1     semaglutide (OZEMPIC, 1 MG/DOSE,) 2 MG/1.5ML pen Inject 1 mg Subcutaneous every 7 days 4 pen 3     Allergies   Allergen Reactions     Succinylcholine Muscle Pain (Myalgia)     Pt had profound muscle weakness for 4 hours following an intubating dose of succinylcholine requiring prolonged intubation and mechanical ventilation post-operatively. See post-anesthetic evaluation note from surgery 2/25/20 for details.      Metformin GI Disturbance     Sertraline Headache and Nausea     Recent Labs   Lab Test 07/15/20  1028 03/09/20  1003 10/25/19  0856   A1C 7.8* 6.8* 7.4*   * 113* 103*   HDL 39* 57 47   TRIG 304* 241* 174*   * 52 168*   CR 0.85 0.88 0.85   GFRESTIMATED >90 >90 >90   GFRESTBLACK >90 >90 >90   POTASSIUM 4.1 4.1 3.8   TSH 1.29 0.83 1.81      BP Readings from Last 3 Encounters:   07/16/20 (!) 128/92   03/12/20 118/78   02/21/20 122/88    Wt Readings from Last 3  Encounters:   07/16/20 130.7 kg (288 lb 1.6 oz)   03/12/20 125.2 kg (276 lb)   02/21/20 121.8 kg (268 lb 9.6 oz)            Reviewed and updated as needed this visit by Provider         Review of Systems   Constitutional, HEENT, cardiovascular, pulmonary, gi and gu systems are negative, except as otherwise noted.      Objective    BP (!) 128/92   Pulse 104   Temp 98  F (36.7  C) (Tympanic)   Resp 18   Ht 1.829 m (6')   Wt 130.7 kg (288 lb 1.6 oz)   SpO2 97%   BMI 39.07 kg/m    Body mass index is 39.07 kg/m .  Physical Exam   GENERAL: healthy, alert and no distress  NECK: no adenopathy, no asymmetry, masses, or scars, thyroid normal to palpation and no carotid bruits  RESP: lungs clear to auscultation - no rales, rhonchi or wheezes  CV: regular rate and rhythm, normal S1 S2, no S3 or S4, no murmur, click or rub, no peripheral edema and peripheral pulses strong  MS: no gross musculoskeletal defects noted, no edema  PSYCH: mentation appears normal, affect normal/bright    Component      Latest Ref Rng & Units 7/15/2020   Sodium      133 - 144 mmol/L 138   Potassium      3.4 - 5.3 mmol/L 4.1   Chloride      94 - 109 mmol/L 108   Carbon Dioxide      20 - 32 mmol/L 23   Anion Gap      3 - 14 mmol/L 7   Glucose      70 - 99 mg/dL 244 (H)   Urea Nitrogen      7 - 30 mg/dL 16   Creatinine      0.66 - 1.25 mg/dL 0.85   GFR Estimate      >60 mL/min/1.73:m2 >90   GFR Estimate If Black      >60 mL/min/1.73:m2 >90   Calcium      8.5 - 10.1 mg/dL 9.4   Bilirubin Total      0.2 - 1.3 mg/dL 0.7   Albumin      3.4 - 5.0 g/dL 4.0   Protein Total      6.8 - 8.8 g/dL 7.6   Alkaline Phosphatase      40 - 150 U/L 120   ALT      0 - 70 U/L 181 (H)   AST      0 - 45 U/L 120 (H)   Cholesterol      <200 mg/dL 215 (H)   Triglycerides      <150 mg/dL 304 (H)   HDL Cholesterol      >39 mg/dL 39 (L)   LDL Cholesterol Calculated      <100 mg/dL 115 (H)   Non HDL Cholesterol      <130 mg/dL 176 (H)   Creatinine Urine      mg/dL 105   Albumin  Urine mg/L      mg/L 10   Albumin Urine mg/g Cr      0 - 17 mg/g Cr 9.30   TSH      0.40 - 4.00 mU/L 1.29   Hemoglobin A1C      0 - 5.6 % 7.8 (H)   Estimated Average Glucose      mg/dL 177           Assessment & Plan     1. Type 2 diabetes mellitus without complication, without long-term current use of insulin (H)  Stop glimepiride   - Hemoglobin A1c; Future  - Albumin Random Urine Quantitative with Creat Ratio; Future  - semaglutide (OZEMPIC, 1 MG/DOSE,) 2 MG/1.5ML pen; Inject 1 mg Subcutaneous every 7 days  Dispense: 4 pen; Refill: 3    2. Hyperlipidemia LDL goal <100  - Lipid Profile; Future  - atorvastatin (LIPITOR) 10 MG tablet; Take 1 tablet (10 mg) by mouth At Bedtime  Dispense: 90 tablet; Refill: 1    3. Benign essential hypertension  - Comprehensive metabolic panel; Future  - TSH with free T4 reflex    4. Gastroesophageal reflux disease, esophagitis presence not specified  - esomeprazole (NEXIUM) 40 MG DR capsule; Take 1 capsule (40 mg) by mouth 2 times daily Take 30-60 minutes before eating.  Dispense: 180 capsule; Refill: 1  - GASTROENTEROLOGY ADULT REF CONSULT ONLY; Future    5. Dysphagia, unspecified type  - esomeprazole (NEXIUM) 40 MG DR capsule; Take 1 capsule (40 mg) by mouth 2 times daily Take 30-60 minutes before eating.  Dispense: 180 capsule; Refill: 1  - GASTROENTEROLOGY ADULT REF CONSULT ONLY; Future    6. Elevated liver function tests  - follow-up with Dr Sanchez.   - US Abdomen Limited; Future  - CBC with platelets and differential  - AFP tumor marker  - Hepatitis C Screen Reflex to HCV RNA Quant and Genotype  - Hepatitis A Antibody IgG  - Hepatitis B core antibody  - Hepatitis B Surface Antibody  - Hepatitis B surface antigen     BMI:   Estimated body mass index is 39.07 kg/m  as calculated from the following:    Height as of this encounter: 1.829 m (6').    Weight as of this encounter: 130.7 kg (288 lb 1.6 oz).   Weight management plan: Discussed healthy diet and exercise  guidelines        Return in about 1 month (around 8/16/2020) for hypertension, elevated LFT.    Marion Davis NP  [facility]

## 2020-07-15 DIAGNOSIS — E78.5 HYPERLIPIDEMIA LDL GOAL <100: ICD-10-CM

## 2020-07-15 DIAGNOSIS — E11.9 TYPE 2 DIABETES MELLITUS WITHOUT COMPLICATION, WITHOUT LONG-TERM CURRENT USE OF INSULIN (H): ICD-10-CM

## 2020-07-15 DIAGNOSIS — I10 BENIGN ESSENTIAL HYPERTENSION: ICD-10-CM

## 2020-07-15 LAB
ALBUMIN SERPL-MCNC: 4 G/DL (ref 3.4–5)
ALP SERPL-CCNC: 120 U/L (ref 40–150)
ALT SERPL W P-5'-P-CCNC: 181 U/L (ref 0–70)
ANION GAP SERPL CALCULATED.3IONS-SCNC: 7 MMOL/L (ref 3–14)
AST SERPL W P-5'-P-CCNC: 120 U/L (ref 0–45)
BILIRUB SERPL-MCNC: 0.7 MG/DL (ref 0.2–1.3)
BUN SERPL-MCNC: 16 MG/DL (ref 7–30)
CALCIUM SERPL-MCNC: 9.4 MG/DL (ref 8.5–10.1)
CHLORIDE SERPL-SCNC: 108 MMOL/L (ref 94–109)
CHOLEST SERPL-MCNC: 215 MG/DL
CO2 SERPL-SCNC: 23 MMOL/L (ref 20–32)
CREAT SERPL-MCNC: 0.85 MG/DL (ref 0.66–1.25)
CREAT UR-MCNC: 105 MG/DL
EST. AVERAGE GLUCOSE BLD GHB EST-MCNC: 177 MG/DL
GFR SERPL CREATININE-BSD FRML MDRD: >90 ML/MIN/{1.73_M2}
GLUCOSE SERPL-MCNC: 244 MG/DL (ref 70–99)
HBA1C MFR BLD: 7.8 % (ref 0–5.6)
HDLC SERPL-MCNC: 39 MG/DL
LDLC SERPL CALC-MCNC: 115 MG/DL
MICROALBUMIN UR-MCNC: 10 MG/L
MICROALBUMIN/CREAT UR: 9.3 MG/G CR (ref 0–17)
NONHDLC SERPL-MCNC: 176 MG/DL
POTASSIUM SERPL-SCNC: 4.1 MMOL/L (ref 3.4–5.3)
PROT SERPL-MCNC: 7.6 G/DL (ref 6.8–8.8)
SODIUM SERPL-SCNC: 138 MMOL/L (ref 133–144)
TRIGL SERPL-MCNC: 304 MG/DL
TSH SERPL DL<=0.005 MIU/L-ACNC: 1.29 MU/L (ref 0.4–4)

## 2020-07-15 PROCEDURE — 36415 COLL VENOUS BLD VENIPUNCTURE: CPT | Performed by: NURSE PRACTITIONER

## 2020-07-15 PROCEDURE — 40000788 ZZHCL STATISTIC ESTIMATED AVERAGE GLUCOSE: Performed by: NURSE PRACTITIONER

## 2020-07-15 PROCEDURE — 80061 LIPID PANEL: CPT | Performed by: NURSE PRACTITIONER

## 2020-07-15 PROCEDURE — 84443 ASSAY THYROID STIM HORMONE: CPT | Performed by: NURSE PRACTITIONER

## 2020-07-15 PROCEDURE — 80053 COMPREHEN METABOLIC PANEL: CPT | Performed by: NURSE PRACTITIONER

## 2020-07-15 PROCEDURE — 83036 HEMOGLOBIN GLYCOSYLATED A1C: CPT | Performed by: NURSE PRACTITIONER

## 2020-07-15 PROCEDURE — 82043 UR ALBUMIN QUANTITATIVE: CPT | Performed by: NURSE PRACTITIONER

## 2020-07-16 ENCOUNTER — OFFICE VISIT (OUTPATIENT)
Dept: FAMILY MEDICINE | Facility: OTHER | Age: 50
End: 2020-07-16
Attending: NURSE PRACTITIONER
Payer: COMMERCIAL

## 2020-07-16 VITALS
BODY MASS INDEX: 39.02 KG/M2 | TEMPERATURE: 98 F | SYSTOLIC BLOOD PRESSURE: 128 MMHG | WEIGHT: 288.1 LBS | DIASTOLIC BLOOD PRESSURE: 92 MMHG | HEIGHT: 72 IN | OXYGEN SATURATION: 97 % | HEART RATE: 104 BPM | RESPIRATION RATE: 18 BRPM

## 2020-07-16 DIAGNOSIS — I10 BENIGN ESSENTIAL HYPERTENSION: ICD-10-CM

## 2020-07-16 DIAGNOSIS — E78.5 HYPERLIPIDEMIA LDL GOAL <100: ICD-10-CM

## 2020-07-16 DIAGNOSIS — E11.9 TYPE 2 DIABETES MELLITUS WITHOUT COMPLICATION, WITHOUT LONG-TERM CURRENT USE OF INSULIN (H): Primary | ICD-10-CM

## 2020-07-16 DIAGNOSIS — K21.9 GASTROESOPHAGEAL REFLUX DISEASE, ESOPHAGITIS PRESENCE NOT SPECIFIED: ICD-10-CM

## 2020-07-16 DIAGNOSIS — R13.10 DYSPHAGIA, UNSPECIFIED TYPE: ICD-10-CM

## 2020-07-16 DIAGNOSIS — R79.89 ELEVATED LIVER FUNCTION TESTS: ICD-10-CM

## 2020-07-16 LAB
AFP SERPL-MCNC: 2.2 UG/L (ref 0–8)
BASOPHILS # BLD AUTO: 0.1 10E9/L (ref 0–0.2)
BASOPHILS NFR BLD AUTO: 1 %
DIFFERENTIAL METHOD BLD: ABNORMAL
EOSINOPHIL # BLD AUTO: 0.4 10E9/L (ref 0–0.7)
EOSINOPHIL NFR BLD AUTO: 7.4 %
ERYTHROCYTE [DISTWIDTH] IN BLOOD BY AUTOMATED COUNT: 12.4 % (ref 10–15)
HCT VFR BLD AUTO: 49.7 % (ref 40–53)
HGB BLD-MCNC: 18 G/DL (ref 13.3–17.7)
LYMPHOCYTES # BLD AUTO: 1.6 10E9/L (ref 0.8–5.3)
LYMPHOCYTES NFR BLD AUTO: 31.3 %
MCH RBC QN AUTO: 32.4 PG (ref 26.5–33)
MCHC RBC AUTO-ENTMCNC: 36.2 G/DL (ref 31.5–36.5)
MCV RBC AUTO: 89 FL (ref 78–100)
MONOCYTES # BLD AUTO: 0.5 10E9/L (ref 0–1.3)
MONOCYTES NFR BLD AUTO: 9.7 %
NEUTROPHILS # BLD AUTO: 2.6 10E9/L (ref 1.6–8.3)
NEUTROPHILS NFR BLD AUTO: 50.6 %
PLATELET # BLD AUTO: 195 10E9/L (ref 150–450)
RBC # BLD AUTO: 5.56 10E12/L (ref 4.4–5.9)
TSH SERPL DL<=0.005 MIU/L-ACNC: 0.97 MU/L (ref 0.4–4)
WBC # BLD AUTO: 5.2 10E9/L (ref 4–11)

## 2020-07-16 PROCEDURE — 85025 COMPLETE CBC W/AUTO DIFF WBC: CPT | Performed by: NURSE PRACTITIONER

## 2020-07-16 PROCEDURE — 86803 HEPATITIS C AB TEST: CPT | Performed by: NURSE PRACTITIONER

## 2020-07-16 PROCEDURE — 82105 ALPHA-FETOPROTEIN SERUM: CPT | Performed by: NURSE PRACTITIONER

## 2020-07-16 PROCEDURE — 86708 HEPATITIS A ANTIBODY: CPT | Performed by: NURSE PRACTITIONER

## 2020-07-16 PROCEDURE — 84443 ASSAY THYROID STIM HORMONE: CPT | Performed by: NURSE PRACTITIONER

## 2020-07-16 PROCEDURE — 99214 OFFICE O/P EST MOD 30 MIN: CPT | Performed by: NURSE PRACTITIONER

## 2020-07-16 PROCEDURE — 86706 HEP B SURFACE ANTIBODY: CPT | Performed by: NURSE PRACTITIONER

## 2020-07-16 PROCEDURE — 87340 HEPATITIS B SURFACE AG IA: CPT | Performed by: NURSE PRACTITIONER

## 2020-07-16 PROCEDURE — 86704 HEP B CORE ANTIBODY TOTAL: CPT | Performed by: NURSE PRACTITIONER

## 2020-07-16 PROCEDURE — 36415 COLL VENOUS BLD VENIPUNCTURE: CPT | Performed by: NURSE PRACTITIONER

## 2020-07-16 RX ORDER — ESOMEPRAZOLE MAGNESIUM 40 MG/1
40 CAPSULE, DELAYED RELEASE ORAL
Qty: 180 CAPSULE | Refills: 1 | Status: SHIPPED | OUTPATIENT
Start: 2020-07-16 | End: 2021-09-03

## 2020-07-16 RX ORDER — ATORVASTATIN CALCIUM 10 MG/1
10 TABLET, FILM COATED ORAL AT BEDTIME
Qty: 90 TABLET | Refills: 1 | Status: SHIPPED | OUTPATIENT
Start: 2020-07-16 | End: 2021-12-02

## 2020-07-16 RX ORDER — SEMAGLUTIDE 1.34 MG/ML
1 INJECTION, SOLUTION SUBCUTANEOUS
Qty: 4 PEN | Refills: 3 | Status: SHIPPED | OUTPATIENT
Start: 2020-07-16 | End: 2020-08-31 | Stop reason: SINTOL

## 2020-07-16 ASSESSMENT — PAIN SCALES - GENERAL: PAINLEVEL: NO PAIN (1)

## 2020-07-16 ASSESSMENT — MIFFLIN-ST. JEOR: SCORE: 2209.81

## 2020-07-16 NOTE — NURSING NOTE
Chief Complaint   Patient presents with     Hypertension     Diabetes     Lipids       Initial BP (!) 130/90 (BP Location: Right arm, Patient Position: Chair, Cuff Size: Adult Large)   Pulse 104   Temp 98  F (36.7  C) (Tympanic)   Resp 18   Ht 1.829 m (6')   Wt 130.7 kg (288 lb 1.6 oz)   SpO2 97%   BMI 39.07 kg/m   Estimated body mass index is 39.07 kg/m  as calculated from the following:    Height as of this encounter: 1.829 m (6').    Weight as of this encounter: 130.7 kg (288 lb 1.6 oz).  Medication Reconciliation: complete  Pamela M. Lechevalier, LPN

## 2020-07-16 NOTE — PATIENT INSTRUCTIONS
Assessment & Plan     1. Type 2 diabetes mellitus without complication, without long-term current use of insulin (H)  Stop glimepiride   - Hemoglobin A1c; Future  - Albumin Random Urine Quantitative with Creat Ratio; Future  - semaglutide (OZEMPIC, 1 MG/DOSE,) 2 MG/1.5ML pen; Inject 1 mg Subcutaneous every 7 days  Dispense: 4 pen; Refill: 3    2. Hyperlipidemia LDL goal <100  - Lipid Profile; Future  - atorvastatin (LIPITOR) 10 MG tablet; Take 1 tablet (10 mg) by mouth At Bedtime  Dispense: 90 tablet; Refill: 1    3. Benign essential hypertension  - Comprehensive metabolic panel; Future  - TSH with free T4 reflex    4. Gastroesophageal reflux disease, esophagitis presence not specified  - esomeprazole (NEXIUM) 40 MG DR capsule; Take 1 capsule (40 mg) by mouth 2 times daily Take 30-60 minutes before eating.  Dispense: 180 capsule; Refill: 1  - GASTROENTEROLOGY ADULT REF CONSULT ONLY; Future    5. Dysphagia, unspecified type  - esomeprazole (NEXIUM) 40 MG DR capsule; Take 1 capsule (40 mg) by mouth 2 times daily Take 30-60 minutes before eating.  Dispense: 180 capsule; Refill: 1  - GASTROENTEROLOGY ADULT REF CONSULT ONLY; Future    6. Elevated liver function tests  - US Abdomen Limited; Future  - CBC with platelets and differential  - AFP tumor marker  - Hepatitis C Screen Reflex to HCV RNA Quant and Genotype  - Hepatitis A Antibody IgG  - Hepatitis B core antibody  - Hepatitis B Surface Antibody  - Hepatitis B surface antigen     BMI:   Estimated body mass index is 39.07 kg/m  as calculated from the following:    Height as of this encounter: 1.829 m (6').    Weight as of this encounter: 130.7 kg (288 lb 1.6 oz).   Weight management plan: Discussed healthy diet and exercise guidelines        Return in about 1 month (around 8/16/2020) for hypertension, elevated LFT.    Marion Davis NP  [facility]

## 2020-07-17 LAB
HAV IGG SER QL IA: NONREACTIVE
HBV CORE AB SERPL QL IA: NONREACTIVE
HBV SURFACE AB SERPL IA-ACNC: 0.48 M[IU]/ML
HBV SURFACE AG SERPL QL IA: NONREACTIVE
HCV AB SERPL QL IA: NONREACTIVE

## 2020-07-21 ENCOUNTER — HOSPITAL ENCOUNTER (OUTPATIENT)
Dept: ULTRASOUND IMAGING | Facility: HOSPITAL | Age: 50
Discharge: HOME | End: 2020-07-21
Attending: NURSE PRACTITIONER | Admitting: NURSE PRACTITIONER
Payer: COMMERCIAL

## 2020-07-21 DIAGNOSIS — R79.89 ELEVATED LIVER FUNCTION TESTS: ICD-10-CM

## 2020-07-21 PROCEDURE — 76705 ECHO EXAM OF ABDOMEN: CPT | Mod: TC

## 2020-08-05 ENCOUNTER — TRANSFERRED RECORDS (OUTPATIENT)
Dept: HEALTH INFORMATION MANAGEMENT | Facility: CLINIC | Age: 50
End: 2020-08-05

## 2020-08-21 ENCOUNTER — APPOINTMENT (OUTPATIENT)
Dept: LAB | Facility: OTHER | Age: 50
End: 2020-08-21
Attending: NURSE PRACTITIONER
Payer: COMMERCIAL

## 2020-08-21 DIAGNOSIS — R79.89 ELEVATED LFTS: Primary | ICD-10-CM

## 2020-08-21 LAB
ALBUMIN SERPL-MCNC: 4 G/DL (ref 3.4–5)
ALP SERPL-CCNC: 97 U/L (ref 40–150)
ALT SERPL W P-5'-P-CCNC: 130 U/L (ref 0–70)
AST SERPL W P-5'-P-CCNC: 65 U/L (ref 0–45)
BILIRUB DIRECT SERPL-MCNC: 0.2 MG/DL (ref 0–0.2)
BILIRUB SERPL-MCNC: 0.8 MG/DL (ref 0.2–1.3)
PROT SERPL-MCNC: 7.5 G/DL (ref 6.8–8.8)

## 2020-08-21 PROCEDURE — 80076 HEPATIC FUNCTION PANEL: CPT | Performed by: NURSE PRACTITIONER

## 2020-08-21 PROCEDURE — 36415 COLL VENOUS BLD VENIPUNCTURE: CPT | Performed by: NURSE PRACTITIONER

## 2020-08-27 NOTE — PROGRESS NOTES
"Subjective     Rojelio Juárez is a 50 year old male who presents to clinic today for the following health issues:    HPI       Hypertension Follow-up      Do you check your blood pressure regularly outside of the clinic? No     Are you following a low salt diet? No    Are your blood pressures ever more than 140 on the top number (systolic) OR more   than 90 on the bottom number (diastolic), for example 140/90? No      How many servings of fruits and vegetables do you eat daily?  0-1    On average, how many sweetened beverages do you drink each day (Examples: soda, juice, sweet tea, etc.  Do NOT count diet or artificially sweetened beverages)?   0    How many days per week do you exercise enough to make your heart beat faster? 3 or less    How many minutes a day do you exercise enough to make your heart beat faster? 9 or less  How many days per week do you miss taking your medication? Stopped taking most of medication    What makes it hard for you to take your medications?  side effects    Type 2 diabetes:     Started ozempic last visit, after first injection he noticed increased GI discomfort, nausea and overall feeling \"icky.\"  He kept going for three additional weeks thinking they were transient.  Wee three he took his injection and felt good - thought side effects were gone.  However after he took week 4, all side effects returned.  He became frustrated and stopped all meds.  This weeks he feels great.  The only oral diabetes medication that he has used that he did not have any side effects was Jardiance but his insurance stopped covering it.  We have tried several (aslisted below) and all caused side effects.  I will re-order jardiance and will most likely have to complete prio auth.      amaryl  metfromin plain and extended release  ozempic  victoza  jardiance he felt fine on.        Patient Active Problem List   Diagnosis     ACP (advance care planning)     Herniated intervertebral disc of lumbar spine     " Benign essential hypertension     Tobacco dependence syndrome     Type 2 diabetes mellitus without complication, with long-term current use of insulin (H)     Morbid obesity (H)     Hyperlipidemia LDL goal <100     Status post lumbar microdiscectomy     Fatty infiltration of liver     Sebaceous hyperplasia     Skin tag     Other seborrheic dermatitis     Intrinsic eczema     Bilateral low back pain without sciatica     Lumbar disc herniation with radiculopathy     Spinal stenosis of lumbar region, unspecified whether neurogenic claudication present     No past surgical history on file.    Social History     Tobacco Use     Smoking status: Current Every Day Smoker     Packs/day: 1.50     Types: Cigarettes     Start date: 1/15/2017     Smokeless tobacco: Never Used     Tobacco comment: will quit some day   Substance Use Topics     Alcohol use: Yes     Alcohol/week: 0.0 standard drinks     No family history on file.      Current Outpatient Medications   Medication Sig Dispense Refill     blood glucose (NO BRAND SPECIFIED) lancets standard Use to test blood sugar 3 times daily or as directed. 300 each 3     blood glucose (NO BRAND SPECIFIED) test strip Use to test blood sugar 3 times daily or as directed. 300 strip 3     blood glucose monitoring (NO BRAND SPECIFIED) meter device kit Use to test blood sugar 3 times daily or as directed. 1 kit 0     empagliflozin (JARDIANCE) 25 MG TABS tablet Take 1 tablet (25 mg) by mouth daily 90 tablet 1     insulin glargine (LANTUS SOLOSTAR) 100 UNIT/ML pen Inject 25 Units Subcutaneous daily basalglar 15 mL 3     insulin pen needle (32G X 4 MM) 32G X 4 MM miscellaneous Use 1 pen needle daily. 100 each 3     losartan (COZAAR) 25 MG tablet TAKE 1/2 TABLET(12.5 MG) BY MOUTH DAILY 45 tablet 1     aspirin 81 MG EC tablet Take 1 tablet (81 mg) by mouth daily (Patient not taking: Reported on 8/31/2020) 90 tablet 3     atorvastatin (LIPITOR) 10 MG tablet Take 1 tablet (10 mg) by mouth At  Bedtime (Patient not taking: Reported on 8/31/2020) 90 tablet 1     esomeprazole (NEXIUM) 40 MG DR capsule Take 1 capsule (40 mg) by mouth 2 times daily Take 30-60 minutes before eating. (Patient not taking: Reported on 8/31/2020) 180 capsule 1     ibuprofen (ADVIL/MOTRIN) 800 MG tablet TAKE 1 TABLET(800 MG) BY MOUTH EVERY 8 HOURS AS NEEDED FOR MODERATE PAIN (Patient not taking: Reported on 8/31/2020) 90 tablet 0     Allergies   Allergen Reactions     Succinylcholine Muscle Pain (Myalgia)     Pt had profound muscle weakness for 4 hours following an intubating dose of succinylcholine requiring prolonged intubation and mechanical ventilation post-operatively. See post-anesthetic evaluation note from surgery 2/25/20 for details.      Metformin GI Disturbance     Ozempic (0.25 Or 0.5 Mg-Dose) [Semaglutide] GI Disturbance     Sertraline Headache and Nausea     Recent Labs   Lab Test 08/21/20  1433 07/16/20  1046 07/15/20  1028 03/09/20  1003 10/25/19  0856   A1C  --   --  7.8* 6.8* 7.4*   LDL  --   --  115* 113* 103*   HDL  --   --  39* 57 47   TRIG  --   --  304* 241* 174*   *  --  181* 52 168*   CR  --   --  0.85 0.88 0.85   GFRESTIMATED  --   --  >90 >90 >90   GFRESTBLACK  --   --  >90 >90 >90   POTASSIUM  --   --  4.1 4.1 3.8   TSH  --  0.97 1.29 0.83 1.81      BP Readings from Last 3 Encounters:   08/31/20 (!) 138/92   07/16/20 (!) 128/92   03/12/20 118/78    Wt Readings from Last 3 Encounters:   08/31/20 128.4 kg (283 lb)   07/16/20 130.7 kg (288 lb 1.6 oz)   03/12/20 125.2 kg (276 lb)                      Review of Systems   Constitutional, HEENT, cardiovascular, pulmonary, gi and gu systems are negative, except as otherwise noted.      Objective    BP (!) 138/92 (BP Location: Right arm, Patient Position: Chair, Cuff Size: Adult Regular)   Pulse 99   Temp 98.8  F (37.1  C) (Tympanic)   Ht 1.829 m (6')   Wt 128.4 kg (283 lb)   SpO2 98%   BMI 38.38 kg/m    Body mass index is 38.38 kg/m .  Physical Exam    GENERAL: healthy, alert and no distress  NECK: no adenopathy, no asymmetry, masses, or scars, thyroid normal to palpation and no carotid bruits  RESP: lungs clear to auscultation - no rales, rhonchi or wheezes  CV: regular rate and rhythm, normal S1 S2, no S3 or S4, no murmur, click or rub, no peripheral edema and peripheral pulses strong  ABDOMEN: soft, nontender, no hepatosplenomegaly, no masses and bowel sounds normal  MS: no gross musculoskeletal defects noted, no edema  PSYCH: mentation appears normal, affect normal/bright          Assessment & Plan     1. Benign essential hypertension  Restart losartan  - losartan (COZAAR) 25 MG tablet; TAKE 1/2 TABLET(12.5 MG) BY MOUTH DAILY  Dispense: 45 tablet; Refill: 1    2. Type 2 diabetes mellitus without complication, with long-term current use of insulin (H)  Cannot tolerate other diabetes medications as listed above, will retry Jardiance.   - blood glucose monitoring (NO BRAND SPECIFIED) meter device kit; Use to test blood sugar 3 times daily or as directed.  Dispense: 1 kit; Refill: 0  - blood glucose (NO BRAND SPECIFIED) test strip; Use to test blood sugar 3 times daily or as directed.  Dispense: 300 strip; Refill: 3  - blood glucose (NO BRAND SPECIFIED) lancets standard; Use to test blood sugar 3 times daily or as directed.  Dispense: 300 each; Refill: 3  - empagliflozin (JARDIANCE) 25 MG TABS tablet; Take 1 tablet (25 mg) by mouth daily  Dispense: 90 tablet; Refill: 1  - check glucose fasting, before supper and 2 hours after supper;  - If fasting glucose is above 120-130, increase lantus by 5 units every 5 days until readings meet target.         follow-up in 1 month    30 minute visit today with greater than 50% in counseling on medications, how to adjust insulin using the above.  Answered all questions to the best of my ability, verbalized understanding.      Marion Davis NP  [facility]

## 2020-08-31 ENCOUNTER — OFFICE VISIT (OUTPATIENT)
Dept: FAMILY MEDICINE | Facility: OTHER | Age: 50
End: 2020-08-31
Attending: NURSE PRACTITIONER
Payer: COMMERCIAL

## 2020-08-31 VITALS
OXYGEN SATURATION: 98 % | HEIGHT: 72 IN | WEIGHT: 283 LBS | TEMPERATURE: 98.8 F | DIASTOLIC BLOOD PRESSURE: 92 MMHG | SYSTOLIC BLOOD PRESSURE: 138 MMHG | HEART RATE: 99 BPM | BODY MASS INDEX: 38.33 KG/M2

## 2020-08-31 DIAGNOSIS — E11.9 TYPE 2 DIABETES MELLITUS WITHOUT COMPLICATION, WITH LONG-TERM CURRENT USE OF INSULIN (H): Primary | ICD-10-CM

## 2020-08-31 DIAGNOSIS — I10 BENIGN ESSENTIAL HYPERTENSION: ICD-10-CM

## 2020-08-31 DIAGNOSIS — Z79.4 TYPE 2 DIABETES MELLITUS WITHOUT COMPLICATION, WITH LONG-TERM CURRENT USE OF INSULIN (H): Primary | ICD-10-CM

## 2020-08-31 PROCEDURE — 99214 OFFICE O/P EST MOD 30 MIN: CPT | Performed by: NURSE PRACTITIONER

## 2020-08-31 RX ORDER — GLIMEPIRIDE 2 MG/1
TABLET ORAL
COMMUNITY
Start: 2020-06-29 | End: 2020-08-31

## 2020-08-31 RX ORDER — LOSARTAN POTASSIUM 25 MG/1
TABLET ORAL
Qty: 45 TABLET | Refills: 1 | Status: SHIPPED | OUTPATIENT
Start: 2020-08-31 | End: 2020-10-15

## 2020-08-31 ASSESSMENT — MIFFLIN-ST. JEOR: SCORE: 2186.68

## 2020-08-31 ASSESSMENT — PAIN SCALES - GENERAL: PAINLEVEL: NO PAIN (0)

## 2020-08-31 NOTE — NURSING NOTE
Chief Complaint   Patient presents with     Hypertension       Initial BP (!) 138/92 (BP Location: Right arm, Patient Position: Chair, Cuff Size: Adult Regular)   Pulse 99   Temp 98.8  F (37.1  C) (Tympanic)   Ht 1.829 m (6')   Wt 128.4 kg (283 lb)   SpO2 98%   BMI 38.38 kg/m   Estimated body mass index is 38.38 kg/m  as calculated from the following:    Height as of this encounter: 1.829 m (6').    Weight as of this encounter: 128.4 kg (283 lb).  Medication Reconciliation: complete  Monet Quarles LPN

## 2020-08-31 NOTE — PATIENT INSTRUCTIONS
Assessment & Plan     1. Benign essential hypertension  Restart losartan  - losartan (COZAAR) 25 MG tablet; TAKE 1/2 TABLET(12.5 MG) BY MOUTH DAILY  Dispense: 45 tablet; Refill: 1    2. Type 2 diabetes mellitus without complication, with long-term current use of insulin (H)  Cannot tolerate other diabetes medications as listed above, will retry Jardiance.   - blood glucose monitoring (NO BRAND SPECIFIED) meter device kit; Use to test blood sugar 3 times daily or as directed.  Dispense: 1 kit; Refill: 0  - blood glucose (NO BRAND SPECIFIED) test strip; Use to test blood sugar 3 times daily or as directed.  Dispense: 300 strip; Refill: 3  - blood glucose (NO BRAND SPECIFIED) lancets standard; Use to test blood sugar 3 times daily or as directed.  Dispense: 300 each; Refill: 3  - empagliflozin (JARDIANCE) 25 MG TABS tablet; Take 1 tablet (25 mg) by mouth daily  Dispense: 90 tablet; Refill: 1  - check glucose fasting, before supper and 2 hours after supper;  - If fasting glucose is above 120-130, increase lantus by 5 units every 5 days until readings meet target.         follow-up in 1 month      Marion Davis NP  [facility]

## 2020-09-10 ENCOUNTER — TRANSFERRED RECORDS (OUTPATIENT)
Dept: HEALTH INFORMATION MANAGEMENT | Facility: CLINIC | Age: 50
End: 2020-09-10

## 2020-10-14 DIAGNOSIS — I10 BENIGN ESSENTIAL HYPERTENSION: ICD-10-CM

## 2020-10-15 RX ORDER — LOSARTAN POTASSIUM 25 MG/1
TABLET ORAL
Qty: 45 TABLET | Refills: 1 | Status: SHIPPED | OUTPATIENT
Start: 2020-10-15 | End: 2021-09-27

## 2020-10-15 NOTE — TELEPHONE ENCOUNTER
Cozaar       Last Written Prescription Date:  8/31/2020  Last Fill Quantity: 45,   # refills: 1  Last Office Visit: 8/31/2020  Future Office visit:

## 2020-11-09 NOTE — PROGRESS NOTES
Subjective     Rojelio Juárez is a 50 year old male who presents to clinic today for the following health issues:    HPI         Chronic/Recurring Back Pain Follow Up      Where is your back pain located? (Select all that apply) low back bilateral    How would you describe your back pain?  cramping    Where does your back pain spread? nowhere    Since your last clinic visit for back pain, how has your pain changed? rapidly worsening    Does your back pain interfere with your job? No    Since your last visit, have you tried any new treatment? No     He would like to try massage, needs a form completed for flex saving account.        How many servings of fruits and vegetables do you eat daily?  2-3    On average, how many sweetened beverages do you drink each day (Examples: soda, juice, sweet tea, etc.  Do NOT count diet or artificially sweetened beverages)?   0    How many days per week do you exercise enough to make your heart beat faster? 3 or less    How many minutes a day do you exercise enough to make your heart beat faster? 9 or less    How many days per week do you miss taking your medication? 0    Diabetes Follow-up    How often are you checking your blood sugar? One time daily - usually around 120   Is taking 30 units lantus  What time of day are you checking your blood sugars (select all that apply)?  fasting  Have you had any blood sugars above 200?  No  Have you had any blood sugars below 70?  No    What symptoms do you notice when your blood sugar is low?  Shaky and Dizzy    What concerns do you have today about your diabetes? None     Do you have any of these symptoms? (Select all that apply)  No numbness or tingling in feet.  No redness, sores or blisters on feet.  No complaints of excessive thirst.  No reports of blurry vision.  No significant changes to weight.      Hyperlipidemia Follow-Up      Are you regularly taking any medication or supplement to lower your cholesterol?   Yes- lipitor    Are you  having muscle aches or other side effects that you think could be caused by your cholesterol lowering medication?  No    Hypertension Follow-up      Do you check your blood pressure regularly outside of the clinic? No     Are you following a low salt diet? sometimes    Are your blood pressures ever more than 140 on the top number (systolic) OR more   than 90 on the bottom number (diastolic), for example 140/90? NA, not checking at home.     BP Readings from Last 2 Encounters:   11/11/20 112/84   08/31/20 (!) 138/92     Hemoglobin A1C (%)   Date Value   07/15/2020 7.8 (H)   03/09/2020 6.8 (H)     LDL Cholesterol Calculated (mg/dL)   Date Value   07/15/2020 115 (H)   03/09/2020 113 (H)       Patient Active Problem List   Diagnosis     ACP (advance care planning)     Herniated intervertebral disc of lumbar spine     Benign essential hypertension     Tobacco dependence syndrome     Type 2 diabetes mellitus without complication, with long-term current use of insulin (H)     Morbid obesity (H)     Hyperlipidemia LDL goal <100     Status post lumbar microdiscectomy     Fatty infiltration of liver     Sebaceous hyperplasia     Skin tag     Other seborrheic dermatitis     Intrinsic eczema     Bilateral low back pain without sciatica     Lumbar disc herniation with radiculopathy     Spinal stenosis of lumbar region, unspecified whether neurogenic claudication present     No past surgical history on file.    Social History     Tobacco Use     Smoking status: Current Every Day Smoker     Packs/day: 1.50     Types: Cigarettes     Start date: 1/15/2017     Smokeless tobacco: Never Used     Tobacco comment: will quit some day   Substance Use Topics     Alcohol use: Yes     Alcohol/week: 0.0 standard drinks     No family history on file.      Current Outpatient Medications   Medication Sig Dispense Refill     aspirin 81 MG EC tablet Take 1 tablet (81 mg) by mouth daily 90 tablet 3     atorvastatin (LIPITOR) 10 MG tablet Take 1  tablet (10 mg) by mouth At Bedtime 90 tablet 1     blood glucose (NO BRAND SPECIFIED) lancets standard Use to test blood sugar 3 times daily or as directed. 300 each 3     blood glucose (NO BRAND SPECIFIED) test strip Use to test blood sugar 3 times daily or as directed. 300 strip 3     blood glucose monitoring (NO BRAND SPECIFIED) meter device kit Use to test blood sugar 3 times daily or as directed. 1 kit 0     empagliflozin (JARDIANCE) 25 MG TABS tablet Take 1 tablet (25 mg) by mouth daily 90 tablet 1     insulin glargine (LANTUS SOLOSTAR) 100 UNIT/ML pen Inject 25 Units Subcutaneous daily basalglar 15 mL 3     insulin pen needle (32G X 4 MM) 32G X 4 MM miscellaneous Use 1 pen needle daily. 100 each 3     esomeprazole (NEXIUM) 40 MG DR capsule Take 1 capsule (40 mg) by mouth 2 times daily Take 30-60 minutes before eating. (Patient not taking: Reported on 8/31/2020) 180 capsule 1     ibuprofen (ADVIL/MOTRIN) 800 MG tablet TAKE 1 TABLET(800 MG) BY MOUTH EVERY 8 HOURS AS NEEDED FOR MODERATE PAIN (Patient not taking: Reported on 8/31/2020) 90 tablet 0     losartan (COZAAR) 25 MG tablet TAKE 1/2 TABLET(12.5 MG) BY MOUTH DAILY 45 tablet 1     Allergies   Allergen Reactions     Succinylcholine Muscle Pain (Myalgia)     Pt had profound muscle weakness for 4 hours following an intubating dose of succinylcholine requiring prolonged intubation and mechanical ventilation post-operatively. See post-anesthetic evaluation note from surgery 2/25/20 for details.      Metformin GI Disturbance     Ozempic (0.25 Or 0.5 Mg-Dose) [Semaglutide] GI Disturbance     Sertraline Headache and Nausea     Recent Labs   Lab Test 08/21/20  1433 07/16/20  1046 07/15/20  1028 03/09/20  1003 10/25/19  0856   A1C  --   --  7.8* 6.8* 7.4*   LDL  --   --  115* 113* 103*   HDL  --   --  39* 57 47   TRIG  --   --  304* 241* 174*   *  --  181* 52 168*   CR  --   --  0.85 0.88 0.85   GFRESTIMATED  --   --  >90 >90 >90   GFRESTBLACK  --   --  >90  >90 >90   POTASSIUM  --   --  4.1 4.1 3.8   TSH  --  0.97 1.29 0.83 1.81      BP Readings from Last 3 Encounters:   11/11/20 112/84   08/31/20 (!) 138/92   07/16/20 (!) 128/92    Wt Readings from Last 3 Encounters:   11/11/20 127 kg (280 lb)   08/31/20 128.4 kg (283 lb)   07/16/20 130.7 kg (288 lb 1.6 oz)                 Review of Systems   Constitutional, HEENT, cardiovascular, pulmonary, gi and gu systems are negative, except as otherwise noted.      Objective    /84 (BP Location: Right arm, Patient Position: Chair, Cuff Size: Adult Large)   Pulse 106   Temp 98.5  F (36.9  C) (Tympanic)   Ht 1.829 m (6')   Wt 127 kg (280 lb)   SpO2 98%   BMI 37.97 kg/m    Body mass index is 37.97 kg/m .  Physical Exam   GENERAL: healthy, alert and no distress  NECK: no adenopathy, no asymmetry, masses, or scars, thyroid normal to palpation and no carotid bruits  RESP: lungs clear to auscultation - no rales, rhonchi or wheezes  CV: regular rate and rhythm, normal S1 S2, no S3 or S4, no murmur, click or rub, no peripheral edema and peripheral pulses strong  MS: no gross musculoskeletal defects noted, no edema  PSYCH: mentation appears normal, affect normal/bright            Assessment & Plan     1. Lumbar disc herniation with radiculopathy  Forms completed for massage therapy    2. Chronic bilateral low back pain without sciatica  Continue ice, heat    3. Type 2 diabetes mellitus without complication, with long-term current use of insulin (H)  A1c today.     4. Hyperlipidemia LDL goal <100  Lipids today    5. Benign essential hypertension  continue current plan  - TSH with free T4 reflex    6. Spinal stenosis of lumbar region, unspecified whether neurogenic claudication present  As above    7. Need for vaccination  Flu vaccine updated today.     8. Morbid obesity (H)  Weight loss encouraged   - COMPREHENSIVE WEIGHT MANAGEMENT    9. On statin therapy    10. Tobacco abuse counseling  Cessation encouraged     11. Fatty  infiltration of liver  - Hepatic panel       Tobacco Cessation:   reports that he has been smoking cigarettes. He started smoking about 3 years ago. He has been smoking about 1.50 packs per day. He has never used smokeless tobacco.  Tobacco Cessation Action Plan: Information offered: Patient not interested at this time             Return in about 3 months (around 2/11/2021) for diabetes, lipids, HTN.    Marion Davis NP  [employer] [facility]

## 2020-11-11 ENCOUNTER — MEDICAL CORRESPONDENCE (OUTPATIENT)
Dept: HEALTH INFORMATION MANAGEMENT | Facility: CLINIC | Age: 50
End: 2020-11-11

## 2020-11-11 ENCOUNTER — OFFICE VISIT (OUTPATIENT)
Dept: FAMILY MEDICINE | Facility: OTHER | Age: 50
End: 2020-11-11
Attending: NURSE PRACTITIONER
Payer: COMMERCIAL

## 2020-11-11 VITALS
HEART RATE: 106 BPM | TEMPERATURE: 98.5 F | SYSTOLIC BLOOD PRESSURE: 112 MMHG | WEIGHT: 280 LBS | HEIGHT: 72 IN | OXYGEN SATURATION: 98 % | BODY MASS INDEX: 37.93 KG/M2 | DIASTOLIC BLOOD PRESSURE: 84 MMHG

## 2020-11-11 DIAGNOSIS — Z71.6 TOBACCO ABUSE COUNSELING: ICD-10-CM

## 2020-11-11 DIAGNOSIS — Z23 NEED FOR VACCINATION: ICD-10-CM

## 2020-11-11 DIAGNOSIS — Z23 NEED FOR PROPHYLACTIC VACCINATION AND INOCULATION AGAINST INFLUENZA: ICD-10-CM

## 2020-11-11 DIAGNOSIS — Z79.899 ON STATIN THERAPY: ICD-10-CM

## 2020-11-11 DIAGNOSIS — G89.29 CHRONIC BILATERAL LOW BACK PAIN WITHOUT SCIATICA: ICD-10-CM

## 2020-11-11 DIAGNOSIS — M54.50 CHRONIC BILATERAL LOW BACK PAIN WITHOUT SCIATICA: ICD-10-CM

## 2020-11-11 DIAGNOSIS — E78.5 HYPERLIPIDEMIA LDL GOAL <100: ICD-10-CM

## 2020-11-11 DIAGNOSIS — I10 BENIGN ESSENTIAL HYPERTENSION: ICD-10-CM

## 2020-11-11 DIAGNOSIS — E11.9 TYPE 2 DIABETES MELLITUS WITHOUT COMPLICATION, WITH LONG-TERM CURRENT USE OF INSULIN (H): ICD-10-CM

## 2020-11-11 DIAGNOSIS — E66.01 MORBID OBESITY (H): ICD-10-CM

## 2020-11-11 DIAGNOSIS — E11.9 TYPE 2 DIABETES MELLITUS WITHOUT COMPLICATION, WITHOUT LONG-TERM CURRENT USE OF INSULIN (H): ICD-10-CM

## 2020-11-11 DIAGNOSIS — Z79.4 TYPE 2 DIABETES MELLITUS WITHOUT COMPLICATION, WITH LONG-TERM CURRENT USE OF INSULIN (H): ICD-10-CM

## 2020-11-11 DIAGNOSIS — K76.0 FATTY INFILTRATION OF LIVER: ICD-10-CM

## 2020-11-11 DIAGNOSIS — M48.061 SPINAL STENOSIS OF LUMBAR REGION, UNSPECIFIED WHETHER NEUROGENIC CLAUDICATION PRESENT: ICD-10-CM

## 2020-11-11 DIAGNOSIS — M51.16 LUMBAR DISC HERNIATION WITH RADICULOPATHY: Primary | ICD-10-CM

## 2020-11-11 LAB
ALBUMIN SERPL-MCNC: 4.2 G/DL (ref 3.4–5)
ALP SERPL-CCNC: 109 U/L (ref 40–150)
ALT SERPL W P-5'-P-CCNC: 92 U/L (ref 0–70)
ANION GAP SERPL CALCULATED.3IONS-SCNC: 9 MMOL/L (ref 3–14)
AST SERPL W P-5'-P-CCNC: 51 U/L (ref 0–45)
BILIRUB DIRECT SERPL-MCNC: 0.1 MG/DL (ref 0–0.2)
BILIRUB SERPL-MCNC: 0.4 MG/DL (ref 0.2–1.3)
BUN SERPL-MCNC: 9 MG/DL (ref 7–30)
CALCIUM SERPL-MCNC: 8.8 MG/DL (ref 8.5–10.1)
CHLORIDE SERPL-SCNC: 109 MMOL/L (ref 94–109)
CHOLEST SERPL-MCNC: 125 MG/DL
CO2 SERPL-SCNC: 22 MMOL/L (ref 20–32)
CREAT SERPL-MCNC: 0.89 MG/DL (ref 0.66–1.25)
CREAT UR-MCNC: 86 MG/DL
EST. AVERAGE GLUCOSE BLD GHB EST-MCNC: 148 MG/DL
GFR SERPL CREATININE-BSD FRML MDRD: >90 ML/MIN/{1.73_M2}
GLUCOSE SERPL-MCNC: 120 MG/DL (ref 70–99)
HBA1C MFR BLD: 6.8 % (ref 0–5.6)
HDLC SERPL-MCNC: 39 MG/DL
LDLC SERPL CALC-MCNC: 57 MG/DL
MICROALBUMIN UR-MCNC: 9 MG/L
MICROALBUMIN/CREAT UR: 10.35 MG/G CR (ref 0–17)
NONHDLC SERPL-MCNC: 86 MG/DL
POTASSIUM SERPL-SCNC: 3.9 MMOL/L (ref 3.4–5.3)
PROT SERPL-MCNC: 8.1 G/DL (ref 6.8–8.8)
SODIUM SERPL-SCNC: 140 MMOL/L (ref 133–144)
TRIGL SERPL-MCNC: 144 MG/DL
TSH SERPL DL<=0.005 MIU/L-ACNC: 0.91 MU/L (ref 0.4–4)

## 2020-11-11 PROCEDURE — 82043 UR ALBUMIN QUANTITATIVE: CPT | Performed by: NURSE PRACTITIONER

## 2020-11-11 PROCEDURE — 90471 IMMUNIZATION ADMIN: CPT | Performed by: NURSE PRACTITIONER

## 2020-11-11 PROCEDURE — 80053 COMPREHEN METABOLIC PANEL: CPT | Performed by: NURSE PRACTITIONER

## 2020-11-11 PROCEDURE — 99214 OFFICE O/P EST MOD 30 MIN: CPT | Mod: 25 | Performed by: NURSE PRACTITIONER

## 2020-11-11 PROCEDURE — 84443 ASSAY THYROID STIM HORMONE: CPT | Performed by: NURSE PRACTITIONER

## 2020-11-11 PROCEDURE — 83036 HEMOGLOBIN GLYCOSYLATED A1C: CPT | Performed by: NURSE PRACTITIONER

## 2020-11-11 PROCEDURE — 90686 IIV4 VACC NO PRSV 0.5 ML IM: CPT | Performed by: NURSE PRACTITIONER

## 2020-11-11 PROCEDURE — 80061 LIPID PANEL: CPT | Performed by: NURSE PRACTITIONER

## 2020-11-11 PROCEDURE — 82248 BILIRUBIN DIRECT: CPT | Performed by: NURSE PRACTITIONER

## 2020-11-11 PROCEDURE — 36415 COLL VENOUS BLD VENIPUNCTURE: CPT | Performed by: NURSE PRACTITIONER

## 2020-11-11 ASSESSMENT — PATIENT HEALTH QUESTIONNAIRE - PHQ9: SUM OF ALL RESPONSES TO PHQ QUESTIONS 1-9: 0

## 2020-11-11 ASSESSMENT — ANXIETY QUESTIONNAIRES
6. BECOMING EASILY ANNOYED OR IRRITABLE: NOT AT ALL
2. NOT BEING ABLE TO STOP OR CONTROL WORRYING: NOT AT ALL
4. TROUBLE RELAXING: NOT AT ALL
GAD7 TOTAL SCORE: 0
3. WORRYING TOO MUCH ABOUT DIFFERENT THINGS: NOT AT ALL
1. FEELING NERVOUS, ANXIOUS, OR ON EDGE: NOT AT ALL
7. FEELING AFRAID AS IF SOMETHING AWFUL MIGHT HAPPEN: NOT AT ALL
5. BEING SO RESTLESS THAT IT IS HARD TO SIT STILL: NOT AT ALL

## 2020-11-11 ASSESSMENT — PAIN SCALES - GENERAL: PAINLEVEL: NO PAIN (0)

## 2020-11-11 ASSESSMENT — MIFFLIN-ST. JEOR: SCORE: 2173.07

## 2020-11-11 NOTE — PATIENT INSTRUCTIONS
Assessment & Plan     1. Lumbar disc herniation with radiculopathy  Forms completed for massage therapy    2. Chronic bilateral low back pain without sciatica  Continue ice, heat    3. Type 2 diabetes mellitus without complication, with long-term current use of insulin (H)  A1c today.     4. Hyperlipidemia LDL goal <100  Lipids today    5. Benign essential hypertension  continue current plan  - TSH with free T4 reflex    6. Spinal stenosis of lumbar region, unspecified whether neurogenic claudication present  As above    7. Need for vaccination  Flu vaccine updated today.     8. Morbid obesity (H)  Weight loss encouraged   - COMPREHENSIVE WEIGHT MANAGEMENT    9. On statin therapy    10. Tobacco abuse counseling  Cessation encouraged     11. Fatty infiltration of liver  - Hepatic panel       Tobacco Cessation:   reports that he has been smoking cigarettes. He started smoking about 3 years ago. He has been smoking about 1.50 packs per day. He has never used smokeless tobacco.  Tobacco Cessation Action Plan: Information offered: Patient not interested at this time             Return in about 3 months (around 2/11/2021) for diabetes, lipids, HTN.    Marion Davis NP  [facility]

## 2020-11-11 NOTE — NURSING NOTE
Chief Complaint   Patient presents with     Back Pain       Initial /84 (BP Location: Right arm, Patient Position: Chair, Cuff Size: Adult Large)   Pulse 106   Temp 98.5  F (36.9  C) (Tympanic)   Ht 1.829 m (6')   Wt 127 kg (280 lb)   SpO2 98%   BMI 37.97 kg/m   Estimated body mass index is 37.97 kg/m  as calculated from the following:    Height as of this encounter: 1.829 m (6').    Weight as of this encounter: 127 kg (280 lb).  Medication Reconciliation: complete  Monet Quarles LPN

## 2020-11-12 ASSESSMENT — ANXIETY QUESTIONNAIRES: GAD7 TOTAL SCORE: 0

## 2021-04-13 ENCOUNTER — TELEPHONE (OUTPATIENT)
Dept: FAMILY MEDICINE | Facility: OTHER | Age: 51
End: 2021-04-13

## 2021-04-13 DIAGNOSIS — E11.9 TYPE 2 DIABETES MELLITUS WITHOUT COMPLICATION, WITH LONG-TERM CURRENT USE OF INSULIN (H): Primary | ICD-10-CM

## 2021-04-13 DIAGNOSIS — I10 BENIGN ESSENTIAL HYPERTENSION: ICD-10-CM

## 2021-04-13 DIAGNOSIS — Z79.4 TYPE 2 DIABETES MELLITUS WITHOUT COMPLICATION, WITH LONG-TERM CURRENT USE OF INSULIN (H): Primary | ICD-10-CM

## 2021-04-13 DIAGNOSIS — E78.5 HYPERLIPIDEMIA LDL GOAL <100: ICD-10-CM

## 2021-04-13 DIAGNOSIS — Z79.899 ON STATIN THERAPY: ICD-10-CM

## 2021-04-14 ENCOUNTER — MEDICAL CORRESPONDENCE (OUTPATIENT)
Dept: HEALTH INFORMATION MANAGEMENT | Facility: CLINIC | Age: 51
End: 2021-04-14

## 2021-04-14 DIAGNOSIS — E11.9 TYPE 2 DIABETES MELLITUS WITHOUT COMPLICATION, WITH LONG-TERM CURRENT USE OF INSULIN (H): ICD-10-CM

## 2021-04-14 DIAGNOSIS — I10 BENIGN ESSENTIAL HYPERTENSION: ICD-10-CM

## 2021-04-14 DIAGNOSIS — Z79.899 ON STATIN THERAPY: ICD-10-CM

## 2021-04-14 DIAGNOSIS — E78.5 HYPERLIPIDEMIA LDL GOAL <100: ICD-10-CM

## 2021-04-14 DIAGNOSIS — Z79.4 TYPE 2 DIABETES MELLITUS WITHOUT COMPLICATION, WITH LONG-TERM CURRENT USE OF INSULIN (H): ICD-10-CM

## 2021-04-14 LAB
ALBUMIN SERPL-MCNC: 4.6 G/DL (ref 3.4–5)
ALP SERPL-CCNC: 99 U/L (ref 40–150)
ALT SERPL W P-5'-P-CCNC: 154 U/L (ref 0–70)
ANION GAP SERPL CALCULATED.3IONS-SCNC: 7 MMOL/L (ref 3–14)
AST SERPL W P-5'-P-CCNC: 85 U/L (ref 0–45)
BILIRUB SERPL-MCNC: 0.7 MG/DL (ref 0.2–1.3)
BUN SERPL-MCNC: 17 MG/DL (ref 7–30)
CALCIUM SERPL-MCNC: 9.5 MG/DL (ref 8.5–10.1)
CHLORIDE SERPL-SCNC: 105 MMOL/L (ref 94–109)
CHOLEST SERPL-MCNC: 176 MG/DL
CO2 SERPL-SCNC: 25 MMOL/L (ref 20–32)
CREAT SERPL-MCNC: 0.81 MG/DL (ref 0.66–1.25)
EST. AVERAGE GLUCOSE BLD GHB EST-MCNC: 117 MG/DL
GFR SERPL CREATININE-BSD FRML MDRD: >90 ML/MIN/{1.73_M2}
GLUCOSE SERPL-MCNC: 152 MG/DL (ref 70–99)
HBA1C MFR BLD: 5.7 % (ref 0–5.6)
HDLC SERPL-MCNC: 67 MG/DL
LDLC SERPL CALC-MCNC: 91 MG/DL
NONHDLC SERPL-MCNC: 109 MG/DL
POTASSIUM SERPL-SCNC: 4 MMOL/L (ref 3.4–5.3)
PROT SERPL-MCNC: 7.9 G/DL (ref 6.8–8.8)
SODIUM SERPL-SCNC: 137 MMOL/L (ref 133–144)
TRIGL SERPL-MCNC: 89 MG/DL
TSH SERPL DL<=0.005 MIU/L-ACNC: 0.98 MU/L (ref 0.4–4)

## 2021-04-14 PROCEDURE — 80061 LIPID PANEL: CPT | Performed by: NURSE PRACTITIONER

## 2021-04-14 PROCEDURE — 84443 ASSAY THYROID STIM HORMONE: CPT | Performed by: NURSE PRACTITIONER

## 2021-04-14 PROCEDURE — 36415 COLL VENOUS BLD VENIPUNCTURE: CPT | Performed by: NURSE PRACTITIONER

## 2021-04-14 PROCEDURE — 83036 HEMOGLOBIN GLYCOSYLATED A1C: CPT | Performed by: NURSE PRACTITIONER

## 2021-04-14 PROCEDURE — 80053 COMPREHEN METABOLIC PANEL: CPT | Performed by: NURSE PRACTITIONER

## 2021-04-14 PROCEDURE — 99N1182 PR STATISTIC ESTIMATED AVERAGE GLUCOSE: Performed by: NURSE PRACTITIONER

## 2021-04-14 NOTE — PROGRESS NOTES
"  Assessment & Plan     1. Type 2 diabetes mellitus without complication, with long-term current use of insulin (H)  Continue jardiance  - C FOOT EXAM  NO CHARGE  - HEMOGLOBIN A1C; Future    2. Hyperlipidemia LDL goal <100  - LIPID PANEL; Future    3. Benign essential hypertension  - COMPREHENSIVE METABOLIC PANEL ; Future  - TSH with free T4 reflex; Future    4. Chronic bilateral low back pain without sciatica  Continue current plan    5. Dysphagia, unspecified type  He is scheduled with GI through  but not until, will see if we can move that up.          Review of the result(s) of each unique test - Lipids, A1c, BMP, lft's         BMI:   Estimated body mass index is 36.7 kg/m  as calculated from the following:    Height as of this encounter: 1.829 m (6').    Weight as of this encounter: 122.7 kg (270 lb 9.6 oz).   Weight management plan: Discussed healthy diet and exercise guidelines    Follow-up in 3 months or as needed for acute concerns      DANIEL Santos-S2, [facility]    Patient is seen in conjunction with PA student.  History is reviewed with patient and pertinent portions of the exam are repeated.  Assessment and plan is reviewed with the patient.    Marion Davis, NP  [facility]      Subjective     Rojelio Juárez is a 50 year old male who presents to clinic today for the following health issues    HPI     Diabetes Follow-up  How often are you checking your blood sugar? A few times a week, every other day  What time of day are you checking your blood sugars (select all that apply)?  Before meals  Have you had any blood sugars above 200?  No  Have you had any blood sugars below 70?  Yes, couple of times; woke up \"feeling crappy\"    What symptoms do you notice when your blood sugar is low?  Dizzy and Lethargy    What concerns do you have today about your diabetes? None     Do you have any of these symptoms? (Select all that apply)  No numbness or " tingling in feet.  No redness, sores or blisters on feet.  No complaints of excessive thirst.  No reports of blurry vision.  No significant changes to weight.    Have you had a diabetic eye exam in the last 12 months? No    -Keto diet  -Lost 18 lb, but have been at a plateau for few months      Hyperlipidemia Follow-Up    Are you regularly taking any medication or supplement to lower your cholesterol?   Yes- atorvastatin    Are you having muscle aches or other side effects that you think could be caused by your cholesterol lowering medication?  No      Hypertension Follow-up    Do you check your blood pressure regularly outside of the clinic? Yes     Are you following a low salt diet? No    Are your blood pressures ever more than 140 on the top number (systolic) OR more   than 90 on the bottom number (diastolic), for example 140/90? Yes    -Checks BP 1/week with wrist monitor    BP Readings from Last 2 Encounters:   04/22/21 126/84   11/11/20 112/84     Hemoglobin A1C (%)   Date Value   04/14/2021 5.7 (H)   11/11/2020 6.8 (H)     LDL Cholesterol Calculated (mg/dL)   Date Value   04/14/2021 91   11/11/2020 57         How many servings of fruits and vegetables do you eat daily?  0-1    On average, how many sweetened beverages do you drink each day (Examples: soda, juice, sweet tea, etc.  Do NOT count diet or artificially sweetened beverages)?   0    How many days per week do you exercise enough to make your heart beat faster? 7    How many minutes a day do you exercise enough to make your heart beat faster? 30 - 60    How many days per week do you miss taking your medication? 0      Chronic/Recurring Back Pain Follow Up  -Still experiencing chronic pain that limits activity  -Threw back out a couple of weeks ago  -Bought a new house and did a lot of work on it  -Rested and improved  -Gets occasional legs cramps    Dysphagia:  He was referred to GI, Dr Sanchez due to sensation of food getting stuck then vomiting.  He had  an EGD which indicated dysphagia due to esophagogastric junction dysfunction.   He did have botox which improved symptoms for approximately 7 months.  All symptoms have returned and are worsening, he cannot keep food won and vomits just about daily.      Patient Active Problem List   Diagnosis     ACP (advance care planning)     Herniated intervertebral disc of lumbar spine     Benign essential hypertension     Tobacco dependence syndrome     Type 2 diabetes mellitus without complication, with long-term current use of insulin (H)     Morbid obesity (H)     Hyperlipidemia LDL goal <100     Status post lumbar microdiscectomy     Fatty infiltration of liver     Sebaceous hyperplasia     Skin tag     Other seborrheic dermatitis     Intrinsic eczema     Bilateral low back pain without sciatica     Lumbar disc herniation with radiculopathy     Spinal stenosis of lumbar region, unspecified whether neurogenic claudication present     No past surgical history on file.    Social History     Tobacco Use     Smoking status: Current Every Day Smoker     Packs/day: 1.50     Types: Cigarettes     Start date: 1/15/2017     Smokeless tobacco: Never Used     Tobacco comment: will quit some day   Substance Use Topics     Alcohol use: Yes     Alcohol/week: 0.0 standard drinks     No family history on file.      Current Outpatient Medications   Medication Sig Dispense Refill     aspirin 81 MG EC tablet Take 1 tablet (81 mg) by mouth daily 90 tablet 3     atorvastatin (LIPITOR) 10 MG tablet Take 1 tablet (10 mg) by mouth At Bedtime 90 tablet 1     blood glucose (NO BRAND SPECIFIED) lancets standard Use to test blood sugar 3 times daily or as directed. 300 each 3     blood glucose (NO BRAND SPECIFIED) test strip Use to test blood sugar 3 times daily or as directed. 300 strip 3     blood glucose monitoring (NO BRAND SPECIFIED) meter device kit Use to test blood sugar 3 times daily or as directed. 1 kit 0     insulin glargine (LANTUS  SOLOSTAR) 100 UNIT/ML pen Inject 25 Units Subcutaneous daily basalglar 15 mL 3     insulin pen needle (32G X 4 MM) 32G X 4 MM miscellaneous Use 1 pen needle daily. 100 each 3     JARDIANCE 25 MG TABS tablet TAKE 1 TABLET(25 MG) BY MOUTH DAILY 90 tablet 1     losartan (COZAAR) 25 MG tablet TAKE 1/2 TABLET(12.5 MG) BY MOUTH DAILY 45 tablet 1     esomeprazole (NEXIUM) 40 MG DR capsule Take 1 capsule (40 mg) by mouth 2 times daily Take 30-60 minutes before eating. (Patient not taking: Reported on 8/31/2020) 180 capsule 1     ibuprofen (ADVIL/MOTRIN) 800 MG tablet TAKE 1 TABLET(800 MG) BY MOUTH EVERY 8 HOURS AS NEEDED FOR MODERATE PAIN (Patient not taking: Reported on 8/31/2020) 90 tablet 0     Allergies   Allergen Reactions     Succinylcholine Muscle Pain (Myalgia)     Pt had profound muscle weakness for 4 hours following an intubating dose of succinylcholine requiring prolonged intubation and mechanical ventilation post-operatively. See post-anesthetic evaluation note from surgery 2/25/20 for details.      Metformin GI Disturbance     Ozempic (0.25 Or 0.5 Mg-Dose) [Semaglutide] GI Disturbance     Sertraline Headache and Nausea     Recent Labs   Lab Test 04/14/21  0900 11/11/20  1108 08/21/20  1433 07/15/20  1028 07/15/20  1028   A1C 5.7* 6.8*  --   --  7.8*   LDL 91 57  --   --  115*   HDL 67 39*  --   --  39*   TRIG 89 144  --   --  304*   * 92* 130*  --  181*   CR 0.81 0.89  --   --  0.85   GFRESTIMATED >90 >90  --   --  >90   GFRESTBLACK >90 >90  --   --  >90   POTASSIUM 4.0 3.9  --   --  4.1   TSH 0.98 0.91  --    < > 1.29    < > = values in this interval not displayed.      BP Readings from Last 3 Encounters:   04/22/21 126/84   11/11/20 112/84   08/31/20 (!) 138/92    Wt Readings from Last 3 Encounters:   04/22/21 122.7 kg (270 lb 9.6 oz)   11/11/20 127 kg (280 lb)   08/31/20 128.4 kg (283 lb)            Review of Systems   Constitutional, HEENT, cardiovascular, pulmonary, gi and gu systems are negative,  except as otherwise noted.        Objective    /84   Pulse 100   Temp 97.8  F (36.6  C) (Tympanic)   Resp 18   Ht 1.829 m (6')   Wt 122.7 kg (270 lb 9.6 oz)   SpO2 97%   BMI 36.70 kg/m    Body mass index is 36.7 kg/m .     Physical Exam   GENERAL: healthy, alert and no distress  NECK: no adenopathy, no asymmetry, masses, or scars and thyroid normal to palpation  RESP: lungs clear to auscultation - no rales, rhonchi or wheezes  CV: regular rate and rhythm, normal S1 S2, no S3 or S4, no murmur, click or rub, no peripheral edema and peripheral pulses strong  MS: no gross musculoskeletal defects noted, no edema  PSYCH: mentation appears normal, affect normal/bright    Orders Only on 04/14/2021   Component Date Value Ref Range Status     TSH 04/14/2021 0.98  0.40 - 4.00 mU/L Final     Sodium 04/14/2021 137  133 - 144 mmol/L Final     Potassium 04/14/2021 4.0  3.4 - 5.3 mmol/L Final     Chloride 04/14/2021 105  94 - 109 mmol/L Final     Carbon Dioxide 04/14/2021 25  20 - 32 mmol/L Final     Anion Gap 04/14/2021 7  3 - 14 mmol/L Final     Glucose 04/14/2021 152* 70 - 99 mg/dL Final    Fasting specimen     Urea Nitrogen 04/14/2021 17  7 - 30 mg/dL Final     Creatinine 04/14/2021 0.81  0.66 - 1.25 mg/dL Final     GFR Estimate 04/14/2021 >90  >60 mL/min/[1.73_m2] Final    Comment: Non  GFR Calc  Starting 12/18/2018, serum creatinine based estimated GFR (eGFR) will be   calculated using the Chronic Kidney Disease Epidemiology Collaboration   (CKD-EPI) equation.       GFR Estimate If Black 04/14/2021 >90  >60 mL/min/[1.73_m2] Final    Comment:  GFR Calc  Starting 12/18/2018, serum creatinine based estimated GFR (eGFR) will be   calculated using the Chronic Kidney Disease Epidemiology Collaboration   (CKD-EPI) equation.       Calcium 04/14/2021 9.5  8.5 - 10.1 mg/dL Final     Bilirubin Total 04/14/2021 0.7  0.2 - 1.3 mg/dL Final     Albumin 04/14/2021 4.6  3.4 - 5.0 g/dL Final      Protein Total 04/14/2021 7.9  6.8 - 8.8 g/dL Final     Alkaline Phosphatase 04/14/2021 99  40 - 150 U/L Final     ALT 04/14/2021 154* 0 - 70 U/L Final     AST 04/14/2021 85* 0 - 45 U/L Final     Cholesterol 04/14/2021 176  <200 mg/dL Final     Triglycerides 04/14/2021 89  <150 mg/dL Final    Fasting specimen     HDL Cholesterol 04/14/2021 67  >39 mg/dL Final     LDL Cholesterol Calculated 04/14/2021 91  <100 mg/dL Final    Desirable:       <100 mg/dl     Non HDL Cholesterol 04/14/2021 109  <130 mg/dL Final     Hemoglobin A1C 04/14/2021 5.7* 0 - 5.6 % Final    Comment: Normal <5.7% Prediabetes 5.7-6.4%  Diabetes 6.5% or higher - adopted from ADA   consensus guidelines.       Estimated Average Glucose 04/14/2021 117  mg/dL Final

## 2021-04-14 NOTE — PROGRESS NOTES
"    {PROVIDER CHARTING PREFERENCE:397609}    Denis Serrato is a 50 year old who presents for the following health issues {ACCOMPANIED BY STATEMENT (Optional):912094}    HPI     Diabetes Follow-up      How often are you checking your blood sugar? { :500077}    What concerns do you have today about your diabetes? { :481361::\"None\"}     Do you have any of these symptoms? (Select all that apply)  { :935399}    Have you had a diabetic eye exam in the last 12 months? { :446359}        BP Readings from Last 2 Encounters:   11/11/20 112/84   08/31/20 (!) 138/92     Hemoglobin A1C (%)   Date Value   11/11/2020 6.8 (H)   07/15/2020 7.8 (H)     LDL Cholesterol Calculated (mg/dL)   Date Value   11/11/2020 57   07/15/2020 115 (H)       {Reference  Diabetes Management Resources :933306}    {Reference  Diabetes Log - 7 days :700784}    Hyperlipidemia Follow-Up      Are you regularly taking any medication or supplement to lower your cholesterol?   { :404408}    Are you having muscle aches or other side effects that you think could be caused by your cholesterol lowering medication?  { :523909}    Hypertension Follow-up      Do you check your blood pressure regularly outside of the clinic? { :647638}     Are you following a low salt diet? { :474724}    Are your blood pressures ever more than 140 on the top number (systolic) OR more   than 90 on the bottom number (diastolic), for example 140/90? { :776703}    Chronic/Recurring Back Pain Follow Up      Where is your back pain located? (Select all that apply) { :360581}    How would you describe your back pain?  { :396783}    Where does your back pain spread? { :410861}    Since your last clinic visit for back pain, how has your pain changed? { :734404}    Does your back pain interfere with your job? { :234211::\"YES\",\"No\"}    Since your last visit, have you tried any new treatment? { :112121}      How many servings of fruits and vegetables do you eat daily?  { :873763}    On " average, how many sweetened beverages do you drink each day (Examples: soda, juice, sweet tea, etc.  Do NOT count diet or artificially sweetened beverages)?   { 1-11:173268}    How many days per week do you exercise enough to make your heart beat faster? { :001166}    How many minutes a day do you exercise enough to make your heart beat faster? { :928970}    How many days per week do you miss taking your medication? {0-7 :582581}    {additonal problems for provider to add (Optional):861405}    Review of Systems   {ROS COMP (Optional):817330}      Objective    There were no vitals taken for this visit.  There is no height or weight on file to calculate BMI.  Physical Exam   {Exam List (Optional):973824}    {Diagnostic Test Results (Optional):251733}    {AMBULATORY ATTESTATION (Optional):955690}

## 2021-04-20 ENCOUNTER — TELEPHONE (OUTPATIENT)
Dept: GASTROENTEROLOGY | Facility: CLINIC | Age: 51
End: 2021-04-20

## 2021-04-20 NOTE — TELEPHONE ENCOUNTER
[facility]     Referring provider: Loco Sanchez    Referred to: [facility]     Provider Requested: Miki     Referral Received:      Records received:   Being sent via right fax, referral and minometry records    GI procedure with Laura on 8/5/2020  In CareEverywhere  Esophagogastric junction outflow obstruction and nonspecific esophageal dysmotility.  .    Images received:     Evaluation for: dysphagia     Clinical History (per RN review):   Received call from Dr Sanchez's office to fax referral. Stating that Dr Sanchez had spoken to Dr Livingston about this patient.     MD review date:   MD Decision for clinic consultation/Orders:            Referral updates/Patient contacted:

## 2021-04-22 ENCOUNTER — OFFICE VISIT (OUTPATIENT)
Dept: FAMILY MEDICINE | Facility: OTHER | Age: 51
End: 2021-04-22
Attending: NURSE PRACTITIONER
Payer: COMMERCIAL

## 2021-04-22 VITALS
WEIGHT: 270.6 LBS | OXYGEN SATURATION: 97 % | BODY MASS INDEX: 36.65 KG/M2 | HEIGHT: 72 IN | TEMPERATURE: 97.8 F | RESPIRATION RATE: 18 BRPM | HEART RATE: 100 BPM | DIASTOLIC BLOOD PRESSURE: 84 MMHG | SYSTOLIC BLOOD PRESSURE: 126 MMHG

## 2021-04-22 DIAGNOSIS — G89.29 CHRONIC BILATERAL LOW BACK PAIN WITHOUT SCIATICA: ICD-10-CM

## 2021-04-22 DIAGNOSIS — E11.9 TYPE 2 DIABETES MELLITUS WITHOUT COMPLICATION, WITH LONG-TERM CURRENT USE OF INSULIN (H): Primary | ICD-10-CM

## 2021-04-22 DIAGNOSIS — E78.5 HYPERLIPIDEMIA LDL GOAL <100: ICD-10-CM

## 2021-04-22 DIAGNOSIS — M54.50 CHRONIC BILATERAL LOW BACK PAIN WITHOUT SCIATICA: ICD-10-CM

## 2021-04-22 DIAGNOSIS — R13.10 DYSPHAGIA, UNSPECIFIED TYPE: ICD-10-CM

## 2021-04-22 DIAGNOSIS — Z79.4 TYPE 2 DIABETES MELLITUS WITHOUT COMPLICATION, WITH LONG-TERM CURRENT USE OF INSULIN (H): Primary | ICD-10-CM

## 2021-04-22 DIAGNOSIS — I10 BENIGN ESSENTIAL HYPERTENSION: ICD-10-CM

## 2021-04-22 PROCEDURE — 99214 OFFICE O/P EST MOD 30 MIN: CPT | Performed by: NURSE PRACTITIONER

## 2021-04-22 ASSESSMENT — MIFFLIN-ST. JEOR: SCORE: 2130.43

## 2021-04-22 ASSESSMENT — PAIN SCALES - GENERAL: PAINLEVEL: NO PAIN (0)

## 2021-04-22 NOTE — NURSING NOTE
Chief Complaint   Patient presents with     Diabetes     Hyperlipidemia     Hypertension       Initial /84   Pulse 100   Temp 97.8  F (36.6  C) (Tympanic)   Resp 18   Ht 1.829 m (6')   Wt 122.7 kg (270 lb 9.6 oz)   SpO2 97%   BMI 36.70 kg/m   Estimated body mass index is 36.7 kg/m  as calculated from the following:    Height as of this encounter: 1.829 m (6').    Weight as of this encounter: 122.7 kg (270 lb 9.6 oz).  Medication Reconciliation: complete  Anabel Gomez LPN

## 2021-05-04 NOTE — TELEPHONE ENCOUNTER
REFERRAL INFORMATION:    Referring Provider:  Marion Davis NP    Referring Clinic:  [facility]     Reason for Visit/Diagnosis: Esophagogastric junction outflow obstruction, Esophageal dysmotility      FUTURE VISIT INFORMATION:    Appointment Date: 7/6/2021    Appointment Time: 11 AM      NOTES STATUS DETAILS   OFFICE NOTE from Referring Provider Internal 4/22/2021 Office visit with Marion Davis NP     OFFICE NOTE from Other Specialist Received 9/10/2020, 3/23/2020 Office visit with Loco Sanchez ([facility])     HOSPITAL DISCHARGE SUMMARY/  ED VISITS N/A    OPERATIVE REPORT Care Everywhere Esophageal Motility: 8/5/2020 ([facility])     MEDICATION LIST Internal         ENDOSCOPY  Care Everywhere/ Received  EGD: 8/5/2020 ([facility])    COLONOSCOPY N/A    ERCP N/A    EUS N/A    STOOL TESTING N/A    PERTINENT LABS Internal/ Care Everywhere    PATHOLOGY REPORTS (RELATED) N/A    IMAGING (CT, MRI, EGD, MRCP, Small Bowel Follow Through/SBT, MR/CT Enterography) N/A

## 2021-05-11 ENCOUNTER — TRANSCRIBE ORDERS (OUTPATIENT)
Dept: GASTROENTEROLOGY | Facility: CLINIC | Age: 51
End: 2021-05-11

## 2021-05-11 DIAGNOSIS — K22.0 ACHALASIA: Primary | ICD-10-CM

## 2021-05-14 ENCOUNTER — PATIENT OUTREACH (OUTPATIENT)
Dept: GASTROENTEROLOGY | Facility: CLINIC | Age: 51
End: 2021-05-14

## 2021-05-14 NOTE — PROGRESS NOTES
Reached out to pt to check in on esophageal symptoms prior to visit with Dr Serrano. Left  for call back.   
no

## 2021-06-03 ENCOUNTER — TRANSFERRED RECORDS (OUTPATIENT)
Dept: HEALTH INFORMATION MANAGEMENT | Facility: CLINIC | Age: 51
End: 2021-06-03

## 2021-06-17 ENCOUNTER — TELEPHONE (OUTPATIENT)
Dept: GASTROENTEROLOGY | Facility: CLINIC | Age: 51
End: 2021-06-17

## 2021-06-17 NOTE — TELEPHONE ENCOUNTER
LVM for patient regarding his appt with Dr. Serrano on 7/6/21. Dr. Serrano will be unavailable to complete his video visit that day. Offering to move patient to 7/15/21 at 4:20 PM. Patient to call back to confirm if appt with work for him.    Yazmin PRIETO RN, BSN

## 2021-07-06 ENCOUNTER — PRE VISIT (OUTPATIENT)
Dept: GASTROENTEROLOGY | Facility: CLINIC | Age: 51
End: 2021-07-06

## 2021-09-03 ENCOUNTER — VIRTUAL VISIT (OUTPATIENT)
Dept: GASTROENTEROLOGY | Facility: CLINIC | Age: 51
End: 2021-09-03
Attending: INTERNAL MEDICINE
Payer: COMMERCIAL

## 2021-09-03 VITALS — BODY MASS INDEX: 33.91 KG/M2 | WEIGHT: 250 LBS

## 2021-09-03 DIAGNOSIS — R13.19 ESOPHAGEAL DYSPHAGIA: Primary | ICD-10-CM

## 2021-09-03 DIAGNOSIS — Z79.4 TYPE 2 DIABETES MELLITUS WITHOUT COMPLICATION, WITH LONG-TERM CURRENT USE OF INSULIN (H): ICD-10-CM

## 2021-09-03 DIAGNOSIS — K22.0 ACHALASIA: ICD-10-CM

## 2021-09-03 DIAGNOSIS — E11.9 TYPE 2 DIABETES MELLITUS WITHOUT COMPLICATION, WITH LONG-TERM CURRENT USE OF INSULIN (H): ICD-10-CM

## 2021-09-03 PROCEDURE — 99204 OFFICE O/P NEW MOD 45 MIN: CPT | Mod: GT | Performed by: INTERNAL MEDICINE

## 2021-09-03 RX ORDER — EMPAGLIFLOZIN 25 MG/1
TABLET, FILM COATED ORAL
Qty: 90 TABLET | Refills: 1 | Status: SHIPPED | OUTPATIENT
Start: 2021-09-03 | End: 2022-11-07

## 2021-09-03 NOTE — PATIENT INSTRUCTIONS
It was a pleasure taking care of you today.  I've included a brief summary of our discussion and care plan from today's visit below.  Please review this information with your primary care provider.  _______________________________________________________________________    My recommendations are summarized as follows:  - all previous esophageal records to be reviewed by team at conference   - EGD with EndoFLIP  - HRM, esophageal manometry      Please call our nurse Sylvia with any questions or concerns- [phone].  --    Return to GI Clinic in after testing is completed to review your progress.    _______________________________________________________________________    Who do I call with any questions after my visit?  Please be in touch if there are any further questions that arise following today's visit.  There are multiple ways to contact your gastroenterology care team.        During business hours, you may reach a Gastroenterology nurse at [phone] and choose option 3.         To schedule or reschedule an appointment, please call [phone].       You can always send a secure message through [employer].  [employer] messages are answered by your nurse or doctor typically within 24 hours.  Please allow extra time on weekends and holidays.        For urgent/emergent questions after business hours, you may reach the on-call GI Fellow by contacting the [facility] at [phone].     How will I get the results of any tests ordered?    You will receive all of your results.  If you have signed up for [employer], any tests ordered at your visit will be available to you after your physician reviews them.  Typically this takes 1-2 weeks.  If there are urgent results that require a change in your care plan, your physician or nurse will call you to discuss the next steps.      What is [employer]?  MyChart is a secure way for you to access all of your healthcare records from the [facility].  It  is a web based computer program, so you can sign on to it from any location.  It also allows you to send secure messages to your care team.  I recommend signing up for [employer] access if you have not already done so and are comfortable with using a computer.      How to I schedule a follow-up visit?  If you did not schedule a follow-up visit today, please call [phone] to schedule a follow-up office visit.        Sincerely,    Lebron Castrejon MD     [facility]  Division of Gastroenterology

## 2021-09-03 NOTE — LETTER
9/3/2021         RE: Rojelio Juárez  [address] [place] [id number]        Dear Colleague,    Thank you for referring your patient, Rojelio Juárez, to the [facility] [place]. Please see a copy of my visit note below.    Referring provider: Dr Sanchez    CC: 51 year old male referred by Dr Sanchez for evaluation of achalasia.    HPI:   52 yo man with obesity (BMI 33) who presents for dysphagia.     Notes dysphagia to food and liquid in his chest. Occasionally has liquid regurgitation immediately or after 10 minutes, never longer than this. No nocturnal issues. Only when eating or drinking. No heartburn. Weight is stable. Botox injection was helpful one year ago for about 7-8 months. Repeat botox didn't really help though. Only ibuprofen, no opiates. Symptoms present for last 3-4 years. Manometry was completed in [place] three times.      Manometry 8/5/20 interpreted as EGJOO, potentially evolving achalasia. Computer generated report states 100% intact swallows but 90% premature contraction with IRP ~29. This is a scanned copy. I have an EGD report dated 6/3/21 which documents normal esophageal mucosa and sense of resistance passing LES. Stomach was normal. 100U botox was injected into LES. I don't have any other records.    A 10 point ROS is otherwise negative.    Past Medical History:   Diagnosis Date     Anxiety 8/15/2017     Benign essential hypertension 1/20/2017     Fatty infiltration of liver 11/21/2018     Herniated intervertebral disc of lumbar spine 1/20/2017     Tobacco dependence syndrome 1/20/2017     PSH:   No esopahgeal or abdominal surgery.     FHX:   No family history of esophageal, GI pathology or CRC.     Social History     Tobacco Use     Smoking status: Current Every Day Smoker     Packs/day: 1.50     Types: Cigarettes     Start date: 1/15/2017     Smokeless tobacco: Never Used     Tobacco comment: will quit some day   Substance Use Topics     Alcohol use: Yes      Alcohol/week: 0.0 standard drinks   Works in [place].     Current Outpatient Medications   Medication     aspirin 81 MG EC tablet     atorvastatin (LIPITOR) 10 MG tablet     blood glucose (NO BRAND SPECIFIED) lancets standard     blood glucose (NO BRAND SPECIFIED) test strip     blood glucose monitoring (NO BRAND SPECIFIED) meter device kit     ibuprofen (ADVIL/MOTRIN) 800 MG tablet     JARDIANCE 25 MG TABS tablet     losartan (COZAAR) 25 MG tablet     No current facility-administered medications for this visit.      Physical exam:  GEN: NAD  Eyes: EOMI  Ears: hearing intact  Mouth: MMM  Neck: full ROM  Cardiopulmonary: non labored  Skin: no jaundice  Neuro: awake and oriented  MSK: moves arms equally  Psych: normal affect     Labs:   CMP 4/14/21 notable for elevated  AST 85    Imaging:   No esophagram.     Endoscopy:  See above.    Assessment and recommendations:   50 yo man with obesity (BMI 33) who presents for dysphagia.     DDX includes EGJOO, achalasia, other dysmotility. Previous workup has been inconclusive.    We will perform repeat manometry and EGD potentially with endoFLIP.     Elevated LFTs should be followed up with primary care, if they haven't been already. May represent [place].    RTC when testing is complete.    Lebron Castrejon MD    [facility]  Division of Gastroenterology      Again, thank you for allowing me to participate in the care of your patient.      Sincerely,    Lebron Castrejon MD

## 2021-09-03 NOTE — PROGRESS NOTES
Video-Visit Details    Type of service:  Video Visit  Video Start Time: 1149a    Video End Time:1207p    Originating Location (pt. Location): Home    Distant Location (provider location):  [facility] [place]     Platform used for Video Visit: [employer]

## 2021-09-03 NOTE — PROGRESS NOTES
Referring provider: Dr Sanchez    CC: 51 year old male referred by Dr Sanchez for evaluation of achalasia.    HPI:   52 yo man with obesity (BMI 33) who presents for dysphagia.     Notes dysphagia to food and liquid in his chest. Occasionally has liquid regurgitation immediately or after 10 minutes, never longer than this. No nocturnal issues. Only when eating or drinking. No heartburn. Weight is stable. Botox injection was helpful one year ago for about 7-8 months. Repeat botox didn't really help though. Only ibuprofen, no opiates. Symptoms present for last 3-4 years. Manometry was completed in [place] three times.      Manometry 8/5/20 interpreted as EGJOO, potentially evolving achalasia. Computer generated report states 100% intact swallows but 90% premature contraction with IRP ~29. This is a scanned copy. I have an EGD report dated 6/3/21 which documents normal esophageal mucosa and sense of resistance passing LES. Stomach was normal. 100U botox was injected into LES. I don't have any other records.    A 10 point ROS is otherwise negative.    Past Medical History:   Diagnosis Date     Anxiety 8/15/2017     Benign essential hypertension 1/20/2017     Fatty infiltration of liver 11/21/2018     Herniated intervertebral disc of lumbar spine 1/20/2017     Tobacco dependence syndrome 1/20/2017     PSH:   No esopahgeal or abdominal surgery.     FHX:   No family history of esophageal, GI pathology or CRC.     Social History     Tobacco Use     Smoking status: Current Every Day Smoker     Packs/day: 1.50     Types: Cigarettes     Start date: 1/15/2017     Smokeless tobacco: Never Used     Tobacco comment: will quit some day   Substance Use Topics     Alcohol use: Yes     Alcohol/week: 0.0 standard drinks   Works in [place].     Current Outpatient Medications   Medication     aspirin 81 MG EC tablet     atorvastatin (LIPITOR) 10 MG tablet     blood glucose (NO BRAND SPECIFIED) lancets standard     blood glucose (NO BRAND  SPECIFIED) test strip     blood glucose monitoring (NO BRAND SPECIFIED) meter device kit     ibuprofen (ADVIL/MOTRIN) 800 MG tablet     JARDIANCE 25 MG TABS tablet     losartan (COZAAR) 25 MG tablet     No current facility-administered medications for this visit.      Physical exam:  GEN: NAD  Eyes: EOMI  Ears: hearing intact  Mouth: MMM  Neck: full ROM  Cardiopulmonary: non labored  Skin: no jaundice  Neuro: awake and oriented  MSK: moves arms equally  Psych: normal affect     Labs:   CMP 4/14/21 notable for elevated  AST 85    Imaging:   No esophagram.     Endoscopy:  See above.    Assessment and recommendations:   52 yo man with obesity (BMI 33) who presents for dysphagia.     DDX includes EGJOO, achalasia, other dysmotility. Previous workup has been inconclusive.    We will perform repeat manometry and EGD potentially with endoFLIP.     Elevated LFTs should be followed up with primary care, if they haven't been already. May represent [facility].    RTC when testing is complete.    Lebron Castrejon MD    [facility]  Division of Gastroenterology

## 2021-09-07 ENCOUNTER — PATIENT OUTREACH (OUTPATIENT)
Dept: GASTROENTEROLOGY | Facility: CLINIC | Age: 51
End: 2021-09-07

## 2021-09-07 NOTE — TELEPHONE ENCOUNTER
Spoke with pt regarding provider recommendation for Manometry and egd with endoflip.  Pt would like to complete both tests in one day.  Endoscopy will communicated with to arrange for egd with endoflip with Dr Serrano and manometry beforehand will be arranged as well.  Pt would like to complete pre-procedure covid test near home.

## 2021-09-09 ENCOUNTER — TRANSFERRED RECORDS (OUTPATIENT)
Dept: HEALTH INFORMATION MANAGEMENT | Facility: CLINIC | Age: 51
End: 2021-09-09

## 2021-09-10 ENCOUNTER — PATIENT OUTREACH (OUTPATIENT)
Dept: GASTROENTEROLOGY | Facility: CLINIC | Age: 51
End: 2021-09-10

## 2021-09-10 ENCOUNTER — TELEPHONE (OUTPATIENT)
Dept: GASTROENTEROLOGY | Facility: CLINIC | Age: 51
End: 2021-09-10

## 2021-09-10 DIAGNOSIS — Z20.822 ENCOUNTER FOR LABORATORY TESTING FOR COVID-19 VIRUS: Primary | ICD-10-CM

## 2021-09-10 NOTE — TELEPHONE ENCOUNTER
Pt scheduled for testing on 11/9  Esophageal manometry 10:30am  EGD with endoFLIP with Dr Serrano at [facility] at 1:45pm, 12:15pm check in  Pt will have pre-op and covid test done on 11/5 at [facility] near home  Pt will call writer with any questions prior to or after testing  Instructions and information for manometry sent via Flinja.

## 2021-09-15 NOTE — TELEPHONE ENCOUNTER
Sylvia RN reached out to schedule ENDOFLIP for patient.      Pt scheduled for testing on 11/9  Esophageal manometry 10:30am  EGD with endoFLIP with Dr Serrano at [facility] at 1:45pm, 12:15pm check in  Pt will have pre-op and covid test done on 11/5 at [facility] near home  Pt will call writer with any questions prior to or after testing  Instructions and information for manometry sent via [employer].

## 2021-09-20 ENCOUNTER — TRANSFERRED RECORDS (OUTPATIENT)
Dept: HEALTH INFORMATION MANAGEMENT | Facility: HOSPITAL | Age: 51
End: 2021-09-20
Payer: COMMERCIAL

## 2021-09-20 LAB — RETINOPATHY: NEGATIVE

## 2021-09-26 ENCOUNTER — HEALTH MAINTENANCE LETTER (OUTPATIENT)
Age: 51
End: 2021-09-26

## 2021-09-26 DIAGNOSIS — I10 BENIGN ESSENTIAL HYPERTENSION: ICD-10-CM

## 2021-09-27 PROBLEM — E66.812 CLASS 2 SEVERE OBESITY WITH SERIOUS COMORBIDITY AND BODY MASS INDEX (BMI) OF 37.0 TO 37.9 IN ADULT (H): Status: ACTIVE | Noted: 2018-06-22

## 2021-09-27 RX ORDER — LOSARTAN POTASSIUM 25 MG/1
TABLET ORAL
Qty: 45 TABLET | Refills: 1 | Status: SHIPPED | OUTPATIENT
Start: 2021-09-27 | End: 2022-05-23

## 2021-09-27 NOTE — TELEPHONE ENCOUNTER
Losartan  Last Written Prescription Date: 10/15/20  Last Fill Quantity: 45 # of Refills: 1  Last Office Visit: 4/22/21

## 2021-10-29 ENCOUNTER — TELEPHONE (OUTPATIENT)
Dept: GASTROENTEROLOGY | Facility: CLINIC | Age: 51
End: 2021-10-29

## 2021-10-29 NOTE — TELEPHONE ENCOUNTER
Patient scheduled for EGD with endoflip on 11.9.2021.     Covid test scheduled: 11.5.2021    Pre op exam scheduled: 11.5.2021 in  [place], [place] with Marion Davis NP    Arrival time: 1215    Facility location: [facility]    Sedation type: MAC    Indication for procedure: Evaluate for possible achalasia, EGD with EndoFLIP    Anticoagulations? 81mg    D/t achalasia EGD prep instructions with 24 hours clear liquids prior to procedure.  Instructions sent via Cadent.      Myranda Suresh RN

## 2021-11-01 NOTE — TELEPHONE ENCOUNTER
Writer reviewed pre-assessment questions with patient prior to upcoming EGD on 11.9.2021.      Covid test scheduled: 11.5.2021; pt has [facility] fax number for results     Pre op exam scheduled: 11.5.2021 in  [place], [place] with Marion Davis NP     Arrival time: 1215     Facility location: [facility]     Sedation type: MAC    Electronic Implantable devices? No    Blood thinners/Antiplatelet medication? No    Reviewed EGD prep instructions with 24 hr clear liquid diet prior to procedure with patient.    Designated  policy reviewed with patient.     Patient verbalized understanding.  No further questions or concerns.    Myranda Suresh RN

## 2021-11-02 NOTE — PROGRESS NOTES
[facility]  [address]  [facility] [address]  Phone: [phone]  Primary Provider: Marion Whitt  Pre-op Performing Provider: MARION WHITT      PREOPERATIVE EVALUATION:  Today's date: 11/5/2021    Rojelio Juárez is a 51 year old male who presents for a preoperative evaluation.    Surgical Information:  Surgery/Procedure: ESOPHAGOGASTRODUODENOSCOPY (EGD)  Surgery Location: [facility]  Surgeon: Dr. Serrano  Surgery Date: 11/9/21  Time of Surgery: TBD  Where patient plans to recover: At home with family  Fax number for surgical facility:     Type of Anesthesia Anticipated: to be determined    Assessment & Plan     The proposed surgical procedure is considered INTERMEDIATE risk.    1. Pre-operative examination  - EKG 12-lead complete w/read - (Clinic Performed)  - Comprehensive metabolic panel  - CBC with Platelets & Differential    2. Dysphagia, unspecified type    3. Type 2 diabetes mellitus without complication, with long-term current use of insulin (H)  - Hemoglobin A1c  - Albumin Random Urine Quantitative with Creat Ratio    4. Hyperlipidemia associated with type 2 diabetes mellitus (H)  - Lipid Profile    5. Benign essential hypertension  - TSH with free T4 reflex    6. Morbid obesity (H)  Weight loss encouraged     7. Nicotine dependence, uncomplicated, unspecified nicotine product type  Smoking cessation encouraged    8. Encounter for laboratory testing for COVID-19 virus  - Asymptomatic COVID-19 Virus (Coronavirus) by PCR Nose    9. Mild recurrent major depression (H)  - escitalopram (LEXAPRO) 10 MG tablet; Take 1 tablet (10 mg) by mouth daily  Dispense: 30 tablet; Refill: 1  - Vitamin D Deficiency    10. Hyperlipidemia LDL goal <100  Continue lipitor      Risks and Recommendations:  The patient has the following additional risks and recommendations for perioperative complications:   - No identified additional risk factors other than previously  addressed    Medication Instructions:  hold all medications the day of procedure.     RECOMMENDATION:  APPROVAL GIVEN to proceed with proposed procedure, without further diagnostic evaluation.    Review of the result(s) of each unique test - see below, EKG      Subjective     HPI related to upcoming procedure: ongoing dysphasia with liquids, pills and food.  EGD has been scheduled for further evaluation.       Preop Questions 11/5/2021   1. Have you ever had a heart attack or stroke? No   2. Have you ever had surgery on your heart or blood vessels, such as a stent placement, a coronary artery bypass, or surgery on an artery in your head, neck, heart, or legs? No   3. Do you have chest pain with activity? No   4. Do you have a history of  heart failure? No   5. Do you currently have a cold, bronchitis or symptoms of other infection? No   6. Do you have a cough, shortness of breath, or wheezing? No   7. Do you or anyone in your family have previous history of blood clots? No   8. Do you or does anyone in your family have a serious bleeding problem such as prolonged bleeding following surgeries or cuts? No   9. Have you ever had problems with anemia or been told to take iron pills? No   10. Have you had any abnormal blood loss such as black, tarry or bloody stools? No   11. Have you ever had a blood transfusion? No   12. Are you willing to have a blood transfusion if it is medically needed before, during, or after your surgery? Yes   13. Have you or any of your relatives ever had problems with anesthesia? YES - allergy to succinylcholine   14. Do you have sleep apnea, excessive snoring or daytime drowsiness? UNKNOWN - snoring, has not been tested.    15. Do you have any artifical heart valves or other implanted medical devices like a pacemaker, defibrillator, or continuous glucose monitor? No   16. Do you have artificial joints? No   17. Are you allergic to latex? No     Health Care Directive:  Patient does not have a  Health Care Directive or Living Will: Discussed advance care planning with patient; information given to patient to review.    Preoperative Review of :   reviewed - no record of controlled substances prescribed.      Status of Chronic Conditions:  DIABETES - Patient has a longstanding history of DiabetesType Type II . Patient is being treated with diet and oral agents and denies significant side effects. Control has been good. Complicating factors include but are not limited to: hypertension and hyperlipidemia.   Lab Results   Component Value Date    A1C 5.7 04/14/2021    A1C 6.8 11/11/2020    A1C 7.8 07/15/2020    A1C 6.8 03/09/2020    A1C 7.4 10/25/2019         HYPERLIPIDEMIA - Patient has a long history of significant Hyperlipidemia requiring medication for treatment with recent good control. Patient reports no problems or side effects with the medication.   The 10-year ASCVD risk score (Elizabethdorian SCOTT Jr., et al., 2013) is: 9.6%    Values used to calculate the score:      Age: 51 years      Sex: Male      Is Non- : No      Diabetic: Yes      Tobacco smoker: Yes      Systolic Blood Pressure: 122 mmHg      Is BP treated: Yes      HDL Cholesterol: 67 mg/dL      Total Cholesterol: 176 mg/dL      HYPERTENSION - Patient has longstanding history of HTN , currently denies any symptoms referable to elevated blood pressure. Specifically denies chest pain, palpitations, dyspnea, orthopnea, PND or peripheral edema. Blood pressure readings have been in normal range. Current medication regimen is as listed below. Patient denies any side effects of medication.   The 10-year ASCVD risk score (Elizabeth SCOTT Jr., et al., 2013) is: 9.6%    Values used to calculate the score:      Age: 51 years      Sex: Male      Is Non- : No      Diabetic: Yes      Tobacco smoker: Yes      Systolic Blood Pressure: 122 mmHg      Is BP treated: Yes      HDL Cholesterol: 67 mg/dL      Total Cholesterol: 176  mg/dL        Review of Systems  CONSTITUTIONAL: NEGATIVE for fever, chills, change in weight  INTEGUMENTARY/SKIN: NEGATIVE for worrisome rashes, moles or lesions  EYES: NEGATIVE for vision changes or irritation  ENT/MOUTH: NEGATIVE for ear, mouth and throat problems  RESP: NEGATIVE for significant cough or SOB  CV: NEGATIVE for chest pain, palpitations or peripheral edema  GI: dysphagia  : NEGATIVE for frequency, dysuria, or hematuria  MUSCULOSKELETAL: NEGATIVE for significant arthralgias or myalgia  NEURO: NEGATIVE for weakness, dizziness or paresthesias  ENDOCRINE: NEGATIVE for temperature intolerance, skin/hair changes  HEME: NEGATIVE for bleeding problems  PSYCHIATRIC: NEGATIVE for changes in mood or affect    Patient Active Problem List    Diagnosis Date Noted     Spinal stenosis of lumbar region, unspecified whether neurogenic claudication present 02/24/2020     Priority: Medium     Added automatically from request for surgery [id number]       Bilateral low back pain without sciatica 02/21/2020     Priority: Medium     Lumbar disc herniation with radiculopathy 02/21/2020     Priority: Medium     Sebaceous hyperplasia 01/21/2020     Priority: Medium     Skin tag 01/21/2020     Priority: Medium     Other seborrheic dermatitis 01/21/2020     Priority: Medium     Intrinsic eczema 01/21/2020     Priority: Medium     Fatty infiltration of liver 11/21/2018     Priority: Medium     Hyperlipidemia LDL goal <100 06/25/2018     Priority: Medium     Type 2 diabetes mellitus without complication, with long-term current use of insulin (H) 06/22/2018     Priority: Medium     Class 2 severe obesity with serious comorbidity and body mass index (BMI) of 37.0 to 37.9 in adult (H) 06/22/2018     Priority: Medium     Status post lumbar microdiscectomy 03/13/2017     Priority: Medium     ACP (advance care planning) 01/20/2017     Priority: Medium     Advance Care Planning 1/20/2017: ACP Review of Chart / Resources Provided:  Reviewed  chart for advance care plan.  Rojelio Juárez has no plan or code status on file. Discussed available resources and provided with information. Confirmed code status reflects current choices pending further ACP discussions.  Confirmed/documented legally designated decision makers.  Added by LUCIANO OLIVAS               Herniated intervertebral disc of lumbar spine 01/20/2017     Priority: Medium     Benign essential hypertension 01/20/2017     Priority: Medium     Tobacco dependence syndrome 01/20/2017     Priority: Medium      Past Medical History:   Diagnosis Date     Anxiety 8/15/2017     Benign essential hypertension 1/20/2017     Fatty infiltration of liver 11/21/2018     Herniated intervertebral disc of lumbar spine 1/20/2017     Tobacco dependence syndrome 1/20/2017     No past surgical history on file.  Current Outpatient Medications   Medication Sig Dispense Refill     aspirin 81 MG EC tablet Take 1 tablet (81 mg) by mouth daily 90 tablet 3     atorvastatin (LIPITOR) 10 MG tablet Take 1 tablet (10 mg) by mouth At Bedtime 90 tablet 1     blood glucose (NO BRAND SPECIFIED) lancets standard Use to test blood sugar 3 times daily or as directed. 300 each 3     blood glucose (NO BRAND SPECIFIED) test strip Use to test blood sugar 3 times daily or as directed. 300 strip 3     blood glucose monitoring (NO BRAND SPECIFIED) meter device kit Use to test blood sugar 3 times daily or as directed. 1 kit 0     ibuprofen (ADVIL/MOTRIN) 800 MG tablet TAKE 1 TABLET(800 MG) BY MOUTH EVERY 8 HOURS AS NEEDED FOR MODERATE PAIN 90 tablet 0     JARDIANCE 25 MG TABS tablet TAKE 1 TABLET(25 MG) BY MOUTH DAILY 90 tablet 1     losartan (COZAAR) 25 MG tablet TAKE 1/2 TABLET(12.5 MG) BY MOUTH DAILY 45 tablet 1       Allergies   Allergen Reactions     Succinylcholine Muscle Pain (Myalgia)     Pt had profound muscle weakness for 4 hours following an intubating dose of succinylcholine requiring prolonged intubation and mechanical ventilation  post-operatively. See post-anesthetic evaluation note from surgery 2/25/20 for details.      Metformin GI Disturbance     Ozempic (0.25 Or 0.5 Mg-Dose) [Semaglutide] GI Disturbance     Sertraline Headache and Nausea        Social History     Tobacco Use     Smoking status: Current Every Day Smoker     Packs/day: 1.50     Types: Cigarettes     Start date: 1/15/2017     Smokeless tobacco: Never Used     Tobacco comment: will quit some day   Substance Use Topics     Alcohol use: Yes     Alcohol/week: 0.0 standard drinks       History   Drug Use No         Objective     /80 (BP Location: Left arm, Patient Position: Chair, Cuff Size: Adult Large)   Pulse 99   Temp 97.2  F (36.2  C) (Tympanic)   Resp 18   Ht 1.829 m (6')   Wt 122.2 kg (269 lb 8 oz)   SpO2 98%   BMI 36.55 kg/m      Physical Exam    GENERAL APPEARANCE: healthy, alert and no distress     EYES: EOMI,  PERRL     HENT: ear canals and TM's normal and nose and mouth without ulcers or lesions     NECK: no adenopathy, no asymmetry, masses, or scars and thyroid normal to palpation     RESP: lungs clear to auscultation - no rales, rhonchi or wheezes     CV: regular rates and rhythm, normal S1 S2, no S3 or S4 and no murmur, click or rub     ABDOMEN:  soft, nontender, no HSM or masses and bowel sounds normal     MS: extremities normal- no gross deformities noted, no evidence of inflammation in joints, FROM in all extremities.     SKIN: no suspicious lesions or rashes     NEURO: Normal strength and tone, sensory exam grossly normal, mentation intact and speech normal     PSYCH: mentation appears normal. and affect normal/bright     LYMPHATICS: No cervical adenopathy    Recent Labs   Lab Test 04/14/21  0900 11/11/20  1108 07/16/20  1046 07/15/20  1028   HGB  --   --  18.0*  --    PLT  --   --  195  --     140  --    < >   POTASSIUM 4.0 3.9  --    < >   CR 0.81 0.89  --    < >   A1C 5.7* 6.8*  --    < >    < > = values in this interval not displayed.         Diagnostics:  Recent Results (from the past 240 hour(s))   Asymptomatic COVID-19 Virus (Coronavirus) by PCR Nose    Collection Time: 11/05/21  9:17 AM    Specimen: Nose; Swab   Result Value Ref Range    SARS CoV2 PCR Negative Negative   TSH with free T4 reflex    Collection Time: 11/05/21 10:22 AM   Result Value Ref Range    TSH 1.26 0.40 - 4.00 mU/L   Hemoglobin A1c    Collection Time: 11/05/21 10:22 AM   Result Value Ref Range    Estimated Average Glucose 128 mg/dL    Hemoglobin A1C 6.1 (H) 0.0 - 5.6 %   Lipid Profile    Collection Time: 11/05/21 10:22 AM   Result Value Ref Range    Cholesterol 179 <200 mg/dL    Triglycerides 154 (H) <150 mg/dL    Direct Measure HDL 54 >=40 mg/dL    LDL Cholesterol Calculated 94 <=100 mg/dL    Non HDL Cholesterol 125 <130 mg/dL    Patient Fasting > 8hrs? No    Comprehensive metabolic panel    Collection Time: 11/05/21 10:22 AM   Result Value Ref Range    Sodium 139 133 - 144 mmol/L    Potassium 4.1 3.4 - 5.3 mmol/L    Chloride 108 94 - 109 mmol/L    Carbon Dioxide (CO2) 22 20 - 32 mmol/L    Anion Gap 9 3 - 14 mmol/L    Urea Nitrogen 13 7 - 30 mg/dL    Creatinine 0.81 0.66 - 1.25 mg/dL    Calcium 9.2 8.5 - 10.1 mg/dL    Glucose 128 (H) 70 - 99 mg/dL    Alkaline Phosphatase 89 40 - 150 U/L    AST 49 (H) 0 - 45 U/L     (H) 0 - 70 U/L    Protein Total 7.9 6.8 - 8.8 g/dL    Albumin 4.3 3.4 - 5.0 g/dL    Bilirubin Total 1.1 0.2 - 1.3 mg/dL    GFR Estimate >90 >60 mL/min/1.73m2   Vitamin D Deficiency    Collection Time: 11/05/21 10:22 AM   Result Value Ref Range    Vitamin D, Total (25-Hydroxy) 19 (L) 20 - 75 ug/L   CBC with platelets and differential    Collection Time: 11/05/21 10:22 AM   Result Value Ref Range    WBC Count 7.6 4.0 - 11.0 10e3/uL    RBC Count 5.56 4.40 - 5.90 10e6/uL    Hemoglobin 17.9 (H) 13.3 - 17.7 g/dL    Hematocrit 51.3 40.0 - 53.0 %    MCV 92 78 - 100 fL    MCH 32.2 26.5 - 33.0 pg    MCHC 34.9 31.5 - 36.5 g/dL    RDW 12.5 10.0 - 15.0 %    Platelet  Count 222 150 - 450 10e3/uL    % Neutrophils 67 %    % Lymphocytes 21 %    % Monocytes 6 %    % Eosinophils 6 %    % Basophils 1 %    Absolute Neutrophils 5.1 1.6 - 8.3 10e3/uL    Absolute Lymphocytes 1.6 0.8 - 5.3 10e3/uL    Absolute Monocytes 0.5 0.0 - 1.3 10e3/uL    Absolute Eosinophils 0.4 0.0 - 0.7 10e3/uL    Absolute Basophils 0.1 0.0 - 0.2 10e3/uL   Albumin Random Urine Quantitative with Creat Ratio    Collection Time: 11/05/21 10:25 AM   Result Value Ref Range    Creatinine Urine mg/dL 69 mg/dL    Albumin Urine mg/L 12 mg/L    Albumin Urine mg/g Cr 17.39 (H) 0.00 - 17.00 mg/g Cr      EKG: appears normal, NSR, normal axis, normal intervals, no acute ST/T changes c/w ischemia, no LVH by voltage criteria, unchanged from previous tracings    Revised Cardiac Risk Index (RCRI):  The patient has the following serious cardiovascular risks for perioperative complications:   - No serious cardiac risks = 0 points     RCRI Interpretation: 0 points: Class I (very low risk - 0.4% complication rate)           Signed Electronically by: Marion Davis NP  Copy of this evaluation report is provided to requesting physician.

## 2021-11-03 ENCOUNTER — PATIENT OUTREACH (OUTPATIENT)
Dept: GASTROENTEROLOGY | Facility: CLINIC | Age: 51
End: 2021-11-03

## 2021-11-03 NOTE — TELEPHONE ENCOUNTER
Returned pt call to answer questions about timing of procedures next week:  Manometry at [facility] followed by EGD with EndoFLIP at the [facility].  Pt will go from one procedure to the next and will use the shuttle or their ride to transport.  Pt has no other questions about prep or procedures.

## 2021-11-05 ENCOUNTER — OFFICE VISIT (OUTPATIENT)
Dept: FAMILY MEDICINE | Facility: OTHER | Age: 51
End: 2021-11-05
Attending: NURSE PRACTITIONER
Payer: COMMERCIAL

## 2021-11-05 VITALS
SYSTOLIC BLOOD PRESSURE: 122 MMHG | DIASTOLIC BLOOD PRESSURE: 80 MMHG | RESPIRATION RATE: 18 BRPM | WEIGHT: 269.5 LBS | HEIGHT: 72 IN | BODY MASS INDEX: 36.5 KG/M2 | TEMPERATURE: 97.2 F | OXYGEN SATURATION: 98 % | HEART RATE: 99 BPM

## 2021-11-05 DIAGNOSIS — E78.5 HYPERLIPIDEMIA ASSOCIATED WITH TYPE 2 DIABETES MELLITUS (H): ICD-10-CM

## 2021-11-05 DIAGNOSIS — Z01.818 PRE-OPERATIVE EXAMINATION: Primary | ICD-10-CM

## 2021-11-05 DIAGNOSIS — E11.9 TYPE 2 DIABETES MELLITUS WITHOUT COMPLICATION, WITH LONG-TERM CURRENT USE OF INSULIN (H): ICD-10-CM

## 2021-11-05 DIAGNOSIS — F33.0 MILD RECURRENT MAJOR DEPRESSION (H): ICD-10-CM

## 2021-11-05 DIAGNOSIS — E11.69 HYPERLIPIDEMIA ASSOCIATED WITH TYPE 2 DIABETES MELLITUS (H): ICD-10-CM

## 2021-11-05 DIAGNOSIS — R13.10 DYSPHAGIA, UNSPECIFIED TYPE: ICD-10-CM

## 2021-11-05 DIAGNOSIS — I10 BENIGN ESSENTIAL HYPERTENSION: ICD-10-CM

## 2021-11-05 DIAGNOSIS — E78.5 HYPERLIPIDEMIA LDL GOAL <100: ICD-10-CM

## 2021-11-05 DIAGNOSIS — E66.01 MORBID OBESITY (H): ICD-10-CM

## 2021-11-05 DIAGNOSIS — Z79.4 TYPE 2 DIABETES MELLITUS WITHOUT COMPLICATION, WITH LONG-TERM CURRENT USE OF INSULIN (H): ICD-10-CM

## 2021-11-05 DIAGNOSIS — F17.200 NICOTINE DEPENDENCE, UNCOMPLICATED, UNSPECIFIED NICOTINE PRODUCT TYPE: ICD-10-CM

## 2021-11-05 DIAGNOSIS — Z20.822 ENCOUNTER FOR LABORATORY TESTING FOR COVID-19 VIRUS: ICD-10-CM

## 2021-11-05 LAB
ALBUMIN SERPL-MCNC: 4.3 G/DL (ref 3.4–5)
ALP SERPL-CCNC: 89 U/L (ref 40–150)
ALT SERPL W P-5'-P-CCNC: 108 U/L (ref 0–70)
ANION GAP SERPL CALCULATED.3IONS-SCNC: 9 MMOL/L (ref 3–14)
AST SERPL W P-5'-P-CCNC: 49 U/L (ref 0–45)
BASOPHILS # BLD AUTO: 0.1 10E3/UL (ref 0–0.2)
BASOPHILS NFR BLD AUTO: 1 %
BILIRUB SERPL-MCNC: 1.1 MG/DL (ref 0.2–1.3)
BUN SERPL-MCNC: 13 MG/DL (ref 7–30)
CALCIUM SERPL-MCNC: 9.2 MG/DL (ref 8.5–10.1)
CHLORIDE BLD-SCNC: 108 MMOL/L (ref 94–109)
CHOLEST SERPL-MCNC: 179 MG/DL
CO2 SERPL-SCNC: 22 MMOL/L (ref 20–32)
CREAT SERPL-MCNC: 0.81 MG/DL (ref 0.66–1.25)
CREAT UR-MCNC: 69 MG/DL
EOSINOPHIL # BLD AUTO: 0.4 10E3/UL (ref 0–0.7)
EOSINOPHIL NFR BLD AUTO: 6 %
ERYTHROCYTE [DISTWIDTH] IN BLOOD BY AUTOMATED COUNT: 12.5 % (ref 10–15)
EST. AVERAGE GLUCOSE BLD GHB EST-MCNC: 128 MG/DL
FASTING STATUS PATIENT QL REPORTED: NO
GFR SERPL CREATININE-BSD FRML MDRD: >90 ML/MIN/1.73M2
GLUCOSE BLD-MCNC: 128 MG/DL (ref 70–99)
HBA1C MFR BLD: 6.1 % (ref 0–5.6)
HCT VFR BLD AUTO: 51.3 % (ref 40–53)
HDLC SERPL-MCNC: 54 MG/DL
HGB BLD-MCNC: 17.9 G/DL (ref 13.3–17.7)
LDLC SERPL CALC-MCNC: 94 MG/DL
LYMPHOCYTES # BLD AUTO: 1.6 10E3/UL (ref 0.8–5.3)
LYMPHOCYTES NFR BLD AUTO: 21 %
MCH RBC QN AUTO: 32.2 PG (ref 26.5–33)
MCHC RBC AUTO-ENTMCNC: 34.9 G/DL (ref 31.5–36.5)
MCV RBC AUTO: 92 FL (ref 78–100)
MICROALBUMIN UR-MCNC: 12 MG/L
MICROALBUMIN/CREAT UR: 17.39 MG/G CR (ref 0–17)
MONOCYTES # BLD AUTO: 0.5 10E3/UL (ref 0–1.3)
MONOCYTES NFR BLD AUTO: 6 %
NEUTROPHILS # BLD AUTO: 5.1 10E3/UL (ref 1.6–8.3)
NEUTROPHILS NFR BLD AUTO: 67 %
NONHDLC SERPL-MCNC: 125 MG/DL
PLATELET # BLD AUTO: 222 10E3/UL (ref 150–450)
POTASSIUM BLD-SCNC: 4.1 MMOL/L (ref 3.4–5.3)
PROT SERPL-MCNC: 7.9 G/DL (ref 6.8–8.8)
RBC # BLD AUTO: 5.56 10E6/UL (ref 4.4–5.9)
SODIUM SERPL-SCNC: 139 MMOL/L (ref 133–144)
TRIGL SERPL-MCNC: 154 MG/DL
TSH SERPL DL<=0.005 MIU/L-ACNC: 1.26 MU/L (ref 0.4–4)
WBC # BLD AUTO: 7.6 10E3/UL (ref 4–11)

## 2021-11-05 PROCEDURE — 82306 VITAMIN D 25 HYDROXY: CPT | Performed by: NURSE PRACTITIONER

## 2021-11-05 PROCEDURE — 99214 OFFICE O/P EST MOD 30 MIN: CPT | Performed by: NURSE PRACTITIONER

## 2021-11-05 PROCEDURE — 93000 ELECTROCARDIOGRAM COMPLETE: CPT | Mod: 77 | Performed by: INTERNAL MEDICINE

## 2021-11-05 PROCEDURE — 83036 HEMOGLOBIN GLYCOSYLATED A1C: CPT | Performed by: NURSE PRACTITIONER

## 2021-11-05 PROCEDURE — 80050 GENERAL HEALTH PANEL: CPT | Performed by: NURSE PRACTITIONER

## 2021-11-05 PROCEDURE — 82043 UR ALBUMIN QUANTITATIVE: CPT | Performed by: NURSE PRACTITIONER

## 2021-11-05 PROCEDURE — 80061 LIPID PANEL: CPT | Performed by: NURSE PRACTITIONER

## 2021-11-05 PROCEDURE — 36415 COLL VENOUS BLD VENIPUNCTURE: CPT | Performed by: NURSE PRACTITIONER

## 2021-11-05 PROCEDURE — U0003 INFECTIOUS AGENT DETECTION BY NUCLEIC ACID (DNA OR RNA); SEVERE ACUTE RESPIRATORY SYNDROME CORONAVIRUS 2 (SARS-COV-2) (CORONAVIRUS DISEASE [COVID-19]), AMPLIFIED PROBE TECHNIQUE, MAKING USE OF HIGH THROUGHPUT TECHNOLOGIES AS DESCRIBED BY CMS-2020-01-R: HCPCS | Performed by: NURSE PRACTITIONER

## 2021-11-05 PROCEDURE — U0005 INFEC AGEN DETEC AMPLI PROBE: HCPCS | Performed by: NURSE PRACTITIONER

## 2021-11-05 RX ORDER — ESCITALOPRAM OXALATE 10 MG/1
10 TABLET ORAL DAILY
Qty: 30 TABLET | Refills: 1 | Status: SHIPPED | OUTPATIENT
Start: 2021-11-05 | End: 2023-02-20

## 2021-11-05 ASSESSMENT — MIFFLIN-ST. JEOR: SCORE: 2120.44

## 2021-11-05 ASSESSMENT — ANXIETY QUESTIONNAIRES
7. FEELING AFRAID AS IF SOMETHING AWFUL MIGHT HAPPEN: NEARLY EVERY DAY
5. BEING SO RESTLESS THAT IT IS HARD TO SIT STILL: SEVERAL DAYS
IF YOU CHECKED OFF ANY PROBLEMS ON THIS QUESTIONNAIRE, HOW DIFFICULT HAVE THESE PROBLEMS MADE IT FOR YOU TO DO YOUR WORK, TAKE CARE OF THINGS AT HOME, OR GET ALONG WITH OTHER PEOPLE: SOMEWHAT DIFFICULT
3. WORRYING TOO MUCH ABOUT DIFFERENT THINGS: MORE THAN HALF THE DAYS
2. NOT BEING ABLE TO STOP OR CONTROL WORRYING: MORE THAN HALF THE DAYS
4. TROUBLE RELAXING: MORE THAN HALF THE DAYS
6. BECOMING EASILY ANNOYED OR IRRITABLE: NEARLY EVERY DAY
GAD7 TOTAL SCORE: 14
1. FEELING NERVOUS, ANXIOUS, OR ON EDGE: SEVERAL DAYS

## 2021-11-05 ASSESSMENT — PATIENT HEALTH QUESTIONNAIRE - PHQ9: SUM OF ALL RESPONSES TO PHQ QUESTIONS 1-9: 9

## 2021-11-05 ASSESSMENT — PAIN SCALES - GENERAL: PAINLEVEL: NO PAIN (0)

## 2021-11-05 NOTE — NURSING NOTE
Chief Complaint   Patient presents with     Pre-Op Exam       Initial /80 (BP Location: Left arm, Patient Position: Chair, Cuff Size: Adult Large)   Pulse 99   Temp 97.2  F (36.2  C) (Tympanic)   Resp 18   Ht 1.829 m (6')   Wt 122.2 kg (269 lb 8 oz)   SpO2 98%   BMI 36.55 kg/m   Estimated body mass index is 36.55 kg/m  as calculated from the following:    Height as of this encounter: 1.829 m (6').    Weight as of this encounter: 122.2 kg (269 lb 8 oz).  Medication Reconciliation: complete  Pamela M. Lechevalier, LPN

## 2021-11-06 LAB — SARS-COV-2 RNA RESP QL NAA+PROBE: NEGATIVE

## 2021-11-06 ASSESSMENT — ANXIETY QUESTIONNAIRES: GAD7 TOTAL SCORE: 14

## 2021-11-08 LAB — DEPRECATED CALCIDIOL+CALCIFEROL SERPL-MC: 19 UG/L (ref 20–75)

## 2021-11-09 ENCOUNTER — OFFICE VISIT (OUTPATIENT)
Dept: GASTROENTEROLOGY | Facility: CLINIC | Age: 51
End: 2021-11-09
Payer: COMMERCIAL

## 2021-11-09 ENCOUNTER — ANESTHESIA EVENT (OUTPATIENT)
Dept: GASTROENTEROLOGY | Facility: CLINIC | Age: 51
End: 2021-11-09
Payer: COMMERCIAL

## 2021-11-09 ENCOUNTER — ANESTHESIA (OUTPATIENT)
Dept: GASTROENTEROLOGY | Facility: CLINIC | Age: 51
End: 2021-11-09
Payer: COMMERCIAL

## 2021-11-09 ENCOUNTER — HOSPITAL ENCOUNTER (OUTPATIENT)
Facility: CLINIC | Age: 51
Discharge: HOME | End: 2021-11-09
Attending: INTERNAL MEDICINE | Admitting: INTERNAL MEDICINE
Payer: COMMERCIAL

## 2021-11-09 VITALS
DIASTOLIC BLOOD PRESSURE: 77 MMHG | OXYGEN SATURATION: 97 % | RESPIRATION RATE: 16 BRPM | WEIGHT: 250 LBS | HEART RATE: 100 BPM | BODY MASS INDEX: 33.86 KG/M2 | HEIGHT: 72 IN | TEMPERATURE: 97.8 F | SYSTOLIC BLOOD PRESSURE: 121 MMHG

## 2021-11-09 VITALS
HEART RATE: 100 BPM | WEIGHT: 264.33 LBS | BODY MASS INDEX: 35.8 KG/M2 | RESPIRATION RATE: 16 BRPM | TEMPERATURE: 97.8 F | OXYGEN SATURATION: 99 % | HEIGHT: 72 IN | DIASTOLIC BLOOD PRESSURE: 90 MMHG | SYSTOLIC BLOOD PRESSURE: 110 MMHG

## 2021-11-09 DIAGNOSIS — K22.0 ACHALASIA: ICD-10-CM

## 2021-11-09 DIAGNOSIS — R13.19 ESOPHAGEAL DYSPHAGIA: Primary | ICD-10-CM

## 2021-11-09 LAB
GLUCOSE BLDC GLUCOMTR-MCNC: 101 MG/DL (ref 70–99)
GLUCOSE BLDC GLUCOMTR-MCNC: 105 MG/DL (ref 70–99)
UPPER GI ENDOSCOPY: NORMAL

## 2021-11-09 PROCEDURE — 258N000003 HC RX IP 258 OP 636: Performed by: ANESTHESIOLOGY

## 2021-11-09 PROCEDURE — 250N000009 HC RX 250

## 2021-11-09 PROCEDURE — 250N000009 HC RX 250: Performed by: ANESTHESIOLOGY

## 2021-11-09 PROCEDURE — 91010 ESOPHAGUS MOTILITY STUDY: CPT

## 2021-11-09 PROCEDURE — 43239 EGD BIOPSY SINGLE/MULTIPLE: CPT | Performed by: INTERNAL MEDICINE

## 2021-11-09 PROCEDURE — 250N000011 HC RX IP 250 OP 636: Performed by: ANESTHESIOLOGY

## 2021-11-09 PROCEDURE — 82962 GLUCOSE BLOOD TEST: CPT

## 2021-11-09 PROCEDURE — 91040 ESOPH BALLOON DISTENSION TST: CPT | Performed by: INTERNAL MEDICINE

## 2021-11-09 PROCEDURE — 88342 IMHCHEM/IMCYTCHM 1ST ANTB: CPT | Mod: TC | Performed by: INTERNAL MEDICINE

## 2021-11-09 PROCEDURE — 88342 IMHCHEM/IMCYTCHM 1ST ANTB: CPT | Mod: 26 | Performed by: PATHOLOGY

## 2021-11-09 PROCEDURE — 88305 TISSUE EXAM BY PATHOLOGIST: CPT | Mod: 26 | Performed by: PATHOLOGY

## 2021-11-09 PROCEDURE — 91037 ESOPH IMPED FUNCTION TEST: CPT

## 2021-11-09 PROCEDURE — 370N000017 HC ANESTHESIA TECHNICAL FEE, PER MIN: Performed by: INTERNAL MEDICINE

## 2021-11-09 RX ORDER — LIDOCAINE 40 MG/G
CREAM TOPICAL
Status: DISCONTINUED | OUTPATIENT
Start: 2021-11-09 | End: 2021-11-09 | Stop reason: HOSPADM

## 2021-11-09 RX ORDER — FENTANYL CITRATE 50 UG/ML
25 INJECTION, SOLUTION INTRAMUSCULAR; INTRAVENOUS EVERY 5 MIN PRN
Status: CANCELLED | OUTPATIENT
Start: 2021-11-09

## 2021-11-09 RX ORDER — FENTANYL CITRATE 50 UG/ML
25 INJECTION, SOLUTION INTRAMUSCULAR; INTRAVENOUS
Status: DISCONTINUED | OUTPATIENT
Start: 2021-11-09 | End: 2021-11-09 | Stop reason: HOSPADM

## 2021-11-09 RX ORDER — NALOXONE HYDROCHLORIDE 0.4 MG/ML
0.2 INJECTION, SOLUTION INTRAMUSCULAR; INTRAVENOUS; SUBCUTANEOUS
Status: CANCELLED | OUTPATIENT
Start: 2021-11-09

## 2021-11-09 RX ORDER — NALOXONE HYDROCHLORIDE 0.4 MG/ML
0.4 INJECTION, SOLUTION INTRAMUSCULAR; INTRAVENOUS; SUBCUTANEOUS
Status: CANCELLED | OUTPATIENT
Start: 2021-11-09

## 2021-11-09 RX ORDER — SODIUM CHLORIDE 9 MG/ML
INJECTION, SOLUTION INTRAVENOUS CONTINUOUS PRN
Status: DISCONTINUED | OUTPATIENT
Start: 2021-11-09 | End: 2021-11-09

## 2021-11-09 RX ORDER — HYDROMORPHONE HCL IN WATER/PF 6 MG/30 ML
0.2 PATIENT CONTROLLED ANALGESIA SYRINGE INTRAVENOUS EVERY 5 MIN PRN
Status: CANCELLED | OUTPATIENT
Start: 2021-11-09

## 2021-11-09 RX ORDER — HYDRALAZINE HYDROCHLORIDE 20 MG/ML
2.5-5 INJECTION INTRAMUSCULAR; INTRAVENOUS EVERY 10 MIN PRN
Status: CANCELLED | OUTPATIENT
Start: 2021-11-09

## 2021-11-09 RX ORDER — GLYCOPYRROLATE 0.2 MG/ML
INJECTION, SOLUTION INTRAMUSCULAR; INTRAVENOUS PRN
Status: DISCONTINUED | OUTPATIENT
Start: 2021-11-09 | End: 2021-11-09

## 2021-11-09 RX ORDER — PROCHLORPERAZINE MALEATE 10 MG
10 TABLET ORAL EVERY 6 HOURS PRN
Status: CANCELLED | OUTPATIENT
Start: 2021-11-09

## 2021-11-09 RX ORDER — ONDANSETRON 2 MG/ML
4 INJECTION INTRAMUSCULAR; INTRAVENOUS
Status: DISCONTINUED | OUTPATIENT
Start: 2021-11-09 | End: 2021-11-09 | Stop reason: HOSPADM

## 2021-11-09 RX ORDER — ONDANSETRON 2 MG/ML
INJECTION INTRAMUSCULAR; INTRAVENOUS PRN
Status: DISCONTINUED | OUTPATIENT
Start: 2021-11-09 | End: 2021-11-09

## 2021-11-09 RX ORDER — LIDOCAINE HYDROCHLORIDE 20 MG/ML
INJECTION, SOLUTION INFILTRATION; PERINEURAL PRN
Status: DISCONTINUED | OUTPATIENT
Start: 2021-11-09 | End: 2021-11-09

## 2021-11-09 RX ORDER — MEPERIDINE HYDROCHLORIDE 25 MG/ML
12.5 INJECTION INTRAMUSCULAR; INTRAVENOUS; SUBCUTANEOUS
Status: DISCONTINUED | OUTPATIENT
Start: 2021-11-09 | End: 2021-11-09 | Stop reason: HOSPADM

## 2021-11-09 RX ORDER — FLUMAZENIL 0.1 MG/ML
0.2 INJECTION, SOLUTION INTRAVENOUS
Status: CANCELLED | OUTPATIENT
Start: 2021-11-09 | End: 2021-11-10

## 2021-11-09 RX ORDER — ONDANSETRON 2 MG/ML
4 INJECTION INTRAMUSCULAR; INTRAVENOUS EVERY 30 MIN PRN
Status: DISCONTINUED | OUTPATIENT
Start: 2021-11-09 | End: 2021-11-09 | Stop reason: HOSPADM

## 2021-11-09 RX ORDER — ALBUTEROL SULFATE 0.83 MG/ML
2.5 SOLUTION RESPIRATORY (INHALATION) EVERY 4 HOURS PRN
Status: CANCELLED | OUTPATIENT
Start: 2021-11-09

## 2021-11-09 RX ORDER — PROPOFOL 10 MG/ML
INJECTION, EMULSION INTRAVENOUS PRN
Status: DISCONTINUED | OUTPATIENT
Start: 2021-11-09 | End: 2021-11-09

## 2021-11-09 RX ORDER — ONDANSETRON 2 MG/ML
4 INJECTION INTRAMUSCULAR; INTRAVENOUS EVERY 6 HOURS PRN
Status: CANCELLED | OUTPATIENT
Start: 2021-11-09

## 2021-11-09 RX ORDER — ONDANSETRON 4 MG/1
4 TABLET, ORALLY DISINTEGRATING ORAL EVERY 6 HOURS PRN
Status: CANCELLED | OUTPATIENT
Start: 2021-11-09

## 2021-11-09 RX ORDER — LABETALOL HYDROCHLORIDE 5 MG/ML
5-10 INJECTION, SOLUTION INTRAVENOUS EVERY 10 MIN PRN
Status: CANCELLED | OUTPATIENT
Start: 2021-11-09

## 2021-11-09 RX ORDER — PROPOFOL 10 MG/ML
INJECTION, EMULSION INTRAVENOUS CONTINUOUS PRN
Status: DISCONTINUED | OUTPATIENT
Start: 2021-11-09 | End: 2021-11-09

## 2021-11-09 RX ORDER — ONDANSETRON 4 MG/1
4 TABLET, ORALLY DISINTEGRATING ORAL EVERY 30 MIN PRN
Status: DISCONTINUED | OUTPATIENT
Start: 2021-11-09 | End: 2021-11-09 | Stop reason: HOSPADM

## 2021-11-09 RX ORDER — OXYCODONE HYDROCHLORIDE 5 MG/1
5 TABLET ORAL EVERY 4 HOURS PRN
Status: DISCONTINUED | OUTPATIENT
Start: 2021-11-09 | End: 2021-11-09 | Stop reason: HOSPADM

## 2021-11-09 RX ADMIN — GLYCOPYRROLATE 0.2 MG: 0.2 INJECTION, SOLUTION INTRAMUSCULAR; INTRAVENOUS at 13:36

## 2021-11-09 RX ADMIN — TOPICAL ANESTHETIC 1 EACH: 200 SPRAY DENTAL; PERIODONTAL at 13:25

## 2021-11-09 RX ADMIN — PROPOFOL 70 MG: 10 INJECTION, EMULSION INTRAVENOUS at 13:36

## 2021-11-09 RX ADMIN — PROPOFOL 50 MG: 10 INJECTION, EMULSION INTRAVENOUS at 13:31

## 2021-11-09 RX ADMIN — PROPOFOL 50 MG: 10 INJECTION, EMULSION INTRAVENOUS at 13:33

## 2021-11-09 RX ADMIN — ONDANSETRON 4 MG: 2 INJECTION INTRAMUSCULAR; INTRAVENOUS at 13:28

## 2021-11-09 RX ADMIN — PROPOFOL 100 MCG/KG/MIN: 10 INJECTION, EMULSION INTRAVENOUS at 13:28

## 2021-11-09 RX ADMIN — LIDOCAINE HYDROCHLORIDE 60 MG: 20 INJECTION, SOLUTION INFILTRATION; PERINEURAL at 13:28

## 2021-11-09 RX ADMIN — SODIUM CHLORIDE: 9 INJECTION, SOLUTION INTRAVENOUS at 13:26

## 2021-11-09 ASSESSMENT — MIFFLIN-ST. JEOR
SCORE: 2031.99
SCORE: 2097

## 2021-11-09 ASSESSMENT — PAIN SCALES - GENERAL: PAINLEVEL: MILD PAIN (2)

## 2021-11-09 NOTE — PATIENT INSTRUCTIONS
Esophogeal Manometry Study  1. Resume regular diet.  2. You may have a bloody nose or sore throat after the procedure.  3. If you have questions call [phone] from 7:00am-5:00pm.  For afterhours questions call GI doctor on call at [phone].

## 2021-11-09 NOTE — ANESTHESIA PREPROCEDURE EVALUATION
Anesthesia Pre-Procedure Evaluation    Patient: Rojelio Juárez   MRN: [id number] : 1970        Preoperative Diagnosis: Achalasia [K22.0]    Procedure : Procedure(s):  ESOPHAGOGASTRODUODENOSCOPY (EGD)          Past Medical History:   Diagnosis Date     Anxiety 8/15/2017     Benign essential hypertension 2017     Fatty infiltration of liver 2018     Herniated intervertebral disc of lumbar spine 2017     Tobacco dependence syndrome 2017      No past surgical history on file.   Allergies   Allergen Reactions     Succinylcholine Muscle Pain (Myalgia)     Pt had profound muscle weakness for 4 hours following an intubating dose of succinylcholine requiring prolonged intubation and mechanical ventilation post-operatively. See post-anesthetic evaluation note from surgery 20 for details.      Metformin GI Disturbance     Ozempic (0.25 Or 0.5 Mg-Dose) [Semaglutide] GI Disturbance     Sertraline Headache and Nausea      Social History     Tobacco Use     Smoking status: Current Every Day Smoker     Packs/day: 1.50     Types: Cigarettes     Start date: 1/15/2017     Smokeless tobacco: Never Used     Tobacco comment: will quit some day   Substance Use Topics     Alcohol use: Yes     Alcohol/week: 0.0 standard drinks      Wt Readings from Last 1 Encounters:   21 113.4 kg (250 lb)        Anesthesia Evaluation   Pt has had prior anesthetic. Type: General.    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    ROS/MED HX  ENT/Pulmonary:       Neurologic:       Cardiovascular:     (+) Dyslipidemia hypertension-----    METS/Exercise Tolerance:     Hematologic:       Musculoskeletal:       GI/Hepatic: Comment: dysphagia      Renal/Genitourinary:       Endo:     (+) type II DM, Using insulin, Obesity,     Psychiatric/Substance Use:     (+) psychiatric history depression     Infectious Disease:       Malignancy:       Other:            Physical Exam    Airway        Mallampati: II    TM distance: > 3 FB   Neck ROM: full   Mouth opening: > 3 cm    Respiratory Devices and Support         Dental  no notable dental history         Cardiovascular   cardiovascular exam normal          Pulmonary   pulmonary exam normal                OUTSIDE LABS:  CBC:   Lab Results   Component Value Date    WBC 7.6 11/05/2021    WBC 5.2 07/16/2020    HGB 17.9 (H) 11/05/2021    HGB 18.0 (H) 07/16/2020    HCT 51.3 11/05/2021    HCT 49.7 07/16/2020     11/05/2021     07/16/2020     BMP:   Lab Results   Component Value Date     11/05/2021     04/14/2021    POTASSIUM 4.1 11/05/2021    POTASSIUM 4.0 04/14/2021    CHLORIDE 108 11/05/2021    CHLORIDE 105 04/14/2021    CO2 22 11/05/2021    CO2 25 04/14/2021    BUN 13 11/05/2021    BUN 17 04/14/2021    CR 0.81 11/05/2021    CR 0.81 04/14/2021     (H) 11/05/2021     (H) 04/14/2021     COAGS: No results found for: PTT, INR, FIBR  POC: No results found for: BGM, HCG, HCGS  HEPATIC:   Lab Results   Component Value Date    ALBUMIN 4.3 11/05/2021    PROTTOTAL 7.9 11/05/2021     (H) 11/05/2021    AST 49 (H) 11/05/2021    ALKPHOS 89 11/05/2021    BILITOTAL 1.1 11/05/2021     OTHER:   Lab Results   Component Value Date    A1C 6.1 (H) 11/05/2021    CATHLEEN 9.2 11/05/2021    TSH 1.26 11/05/2021       Anesthesia Plan    ASA Status:  3      Anesthesia Type: MAC.     - Reason for MAC: chronic cardiopulmonary disease   Induction: Propofol.   Maintenance: TIVA.        Consents    Anesthesia Plan(s) and associated risks, benefits, and realistic alternatives discussed. Questions answered and patient/representative(s) expressed understanding.     - Discussed with:  Patient         Postoperative Care       PONV prophylaxis: Background Propofol Infusion     Comments:                Yany Lane MD

## 2021-11-09 NOTE — ANESTHESIA CARE TRANSFER NOTE
Patient: Rojelio Juárez    Procedure: Procedure(s):  ESOPHAGOGASTRODUODENOSCOPY, WITH BIOPSY  Esophageal Balloon Provocation Study       Diagnosis: Achalasia [K22.0]  Diagnosis Additional Information: No value filed.    Anesthesia Type:   MAC     Note:    Oropharynx: oropharynx clear of all foreign objects and spontaneously breathing  Level of Consciousness: awake  Oxygen Supplementation: room air    Independent Airway: airway patency satisfactory and stable    Vital Signs Stable: post-procedure vital signs reviewed and stable  Report to RN Given: handoff report given  Patient transferred to: Phase II    Handoff Report: Identifed the Patient, Identified the Reponsible Provider, Reviewed the pertinent medical history, Discussed the surgical course, Reviewed Intra-OP anesthesia mangement and issues during anesthesia, Set expectations for post-procedure period and Allowed opportunity for questions and acknowledgement of understanding      Vitals:  Vitals Value Taken Time   BP     Temp     Pulse     Resp     SpO2 98 % 11/09/21 1411   Vitals shown include unvalidated device data.    Electronically Signed By: MITCH Penaloza CRNA  November 9, 2021  2:13 PM

## 2021-11-09 NOTE — ANESTHESIA POSTPROCEDURE EVALUATION
Patient: Rojelio Juárez    Procedure: Procedure(s):  ESOPHAGOGASTRODUODENOSCOPY, WITH BIOPSY  Esophageal Balloon Provocation Study       Diagnosis:Achalasia [K22.0]  Diagnosis Additional Information: No value filed.    Anesthesia Type:  MAC    Note:  Disposition: Outpatient   Postop Pain Control: Uneventful            Sign Out: Well controlled pain   PONV: No   Neuro/Psych: Uneventful            Sign Out: Acceptable/Baseline neuro status   Airway/Respiratory: Uneventful            Sign Out: Acceptable/Baseline resp. status   CV/Hemodynamics: Uneventful            Sign Out: Acceptable CV status; No obvious hypovolemia; No obvious fluid overload   Other NRE: NONE   DID A NON-ROUTINE EVENT OCCUR? No           Last vitals:  Vitals Value Taken Time   BP 94/65 11/09/21 1415   Temp 36.6  C (97.9  F) 11/09/21 1411   Pulse 92 11/09/21 1415   Resp 16 11/09/21 1411   SpO2 99 % 11/09/21 1421   Vitals shown include unvalidated device data.    Electronically Signed By: Aric Haywood MD  November 9, 2021  2:22 PM

## 2021-11-09 NOTE — OR NURSING
Patient had EGD with biopsies with endoflip (balloon distension study).  Patient tolerated procedure under MAC

## 2021-11-09 NOTE — H&P
Rojelio Juárez  [id number]  male  51 year old      Reason for procedure/surgery: egd, dysphagia, endoflip    Patient Active Problem List   Diagnosis     ACP (advance care planning)     Herniated intervertebral disc of lumbar spine     Benign essential hypertension     Tobacco dependence syndrome     Type 2 diabetes mellitus without complication, with long-term current use of insulin (H)     Morbid obesity (H)     Hyperlipidemia associated with type 2 diabetes mellitus (H)     Status post lumbar microdiscectomy     Fatty infiltration of liver     Sebaceous hyperplasia     Skin tag     Other seborrheic dermatitis     Intrinsic eczema     Bilateral low back pain without sciatica     Lumbar disc herniation with radiculopathy     Spinal stenosis of lumbar region, unspecified whether neurogenic claudication present       Past Surgical History:    Past Surgical History:   Procedure Laterality Date     BACK SURGERY         Past Medical History:   Past Medical History:   Diagnosis Date     Anxiety 08/15/2017     Benign essential hypertension 01/20/2017     Fatty infiltration of liver 11/21/2018     Herniated intervertebral disc of lumbar spine 01/20/2017     Pseudocholinesterase deficiency      Tobacco dependence syndrome 01/20/2017       Social History:   Social History     Tobacco Use     Smoking status: Current Every Day Smoker     Packs/day: 1.50     Types: Cigarettes     Start date: 1/15/2017     Smokeless tobacco: Never Used     Tobacco comment: will quit some day   Substance Use Topics     Alcohol use: Yes     Alcohol/week: 0.0 standard drinks       Family History: History reviewed. No pertinent family history.    Allergies:   Allergies   Allergen Reactions     Succinylcholine Muscle Pain (Myalgia)     Pt had profound muscle weakness for 4 hours following an intubating dose of succinylcholine requiring prolonged intubation and mechanical ventilation post-operatively. See post-anesthetic evaluation note from surgery  2/25/20 for details.      Metformin GI Disturbance     Ozempic (0.25 Or 0.5 Mg-Dose) [Semaglutide] GI Disturbance     Sertraline Headache and Nausea       Active Medications:   No current outpatient medications on file.       Systemic Review:   CONSTITUTIONAL: NEGATIVE for fever, chills, change in weight  ENT/MOUTH: NEGATIVE for ear, mouth and throat problems  RESP: NEGATIVE for significant cough or SOB  CV: NEGATIVE for chest pain, palpitations or peripheral edema    Physical Examination:   Vital Signs: /85   Pulse 88   Temp 97.8  F (36.6  C) (Oral)   Resp 16   Ht 1.829 m (6')   Wt 119.9 kg (264 lb 5.3 oz)   SpO2 99%   BMI 35.85 kg/m    GENERAL: healthy, alert and no distress  NECK: no adenopathy, no asymmetry, masses, or scars  RESP: lungs clear to auscultation - no rales, rhonchi or wheezes  CV: regular rate and rhythm, normal S1 S2, no S3 or S4, no murmur, click or rub, no peripheral edema and peripheral pulses strong  ABDOMEN: soft, nontender, no hepatosplenomegaly, no masses and bowel sounds normal  MS: no gross musculoskeletal defects noted, no edema    Plan: Appropriate to proceed as scheduled.      Danial Serrano DO  11/9/2021    PCP:  Marion Davis

## 2021-11-09 NOTE — OP NOTE
Please see endoscopy report for full description of the procedure.     EndoFLIP directed at the LES ( 16cm ([id number]) balloon length):   Date: 11/9/21       16cm balloon  Balloon inflation Balloon pressure CSA (mm^2) DI (mm^2/mmHg) Dmin (mm) Compliance   30 (ladmark ID) 15.5 29 1.22 4.9    40 17.2 26 1.54 5.8    50 49.4  2.52 12.6    60 55.3  6.81 21.9    70           Diminished contractions initially with disorganized spastic and strong contractions with occasional retrograde contractions terminating in a hypercontractile squeeze    Danial Serrano DO   of Medicine  Division of Gastroenterology, Hepatology, and Nutrition  [facility]

## 2021-11-09 NOTE — PROGRESS NOTES
Non-Invasive GI Procedure Visit    Rojelio Juárez is a 51 year old male with history of Data Unavailable.   Patient stated reason for procedure: Dysphagia  COVID-19 Test Performed within 48-72hrs of the procedure:  Yes. COVID-19 result was NEGATIVE.    COVID-19 PCR Results    COVID-19 PCR Results 7/31/20 5/29/21 9/9/21 11/5/21   COVID-19 Virus by PCR (External Result) Undetected Undetected Negative    SARS CoV2 PCR    Negative      Comments are available for some flowsheets but are not being displayed.         COVID-19 Antibody Results, Testing for Immunity    COVID-19 Antibody Results, Testing for Immunity   No data to display.             Pre-Procedure Assessment  Patient presents to clinic today for Esophageal Manometry Study    Referring Provider: Dr Castrejon  Patient has undergone previous endoscopy.    Does patient report taking a PPI (omeprazole, pantoprazole, rabeprazole, lansoprazole, esomeprazole, dexlansoprazole)? No  Does patient report taking a H2 blocker (ranitidine, or famotidine)? No  Does patient report taking opioids? No  Patient reported that last food and/or drink was last consumed 10 hours ago.  Esophageal Questionnaire(s) Completed:   Yes - Esophageal Questionnaire(s)    BEDQ Questionnaire  BEDQ Questionnaire: How Often Have You Had the Following? 11/9/2021   Trouble eating solid food (meat, bread, vegetables) 4   Trouble eating soft foods (yogurt, jello, pudding) 4   Trouble swallowing liquids 4   Pain while swallowing 4   Coughing or choking while swallowing foods or liquids 0   Total Score: 16     BEDQ Questionnaire: Discomfort/Pain Ratings 11/9/2021   Eating solid food (meat, bread, vegetables) 4   Eating soft foods (yogurt, jello, pudding) 4   Drinking liquid 4   Total Score: 12       Eckardt Questionnaire  Eckardt Questionnaire 11/9/2021   Dysphagia 2   Regurgitation 1   Retrosternal Pain 2   Weight Loss (kg) 0   Total Score:  5       Promis 10 Questionnaire  PROMIS 10 FLOWSHEET DATA  "11/9/2021   In general, would you say your health is: 3   In general, would you say your quality of life is: 3   In general, how would you rate your physical health? 3   In general, how would you rate your mental health, including your mood and your ability to think? 3   In general, how would you rate your satisfaction with your social activities and relationships? 4   In general, please rate how well you carry out your usual social activities and roles. (This includes activities at home, at work and in your community, and responsibilities as a parent, child, spouse, employee, friend, etc.) 4   To what extent are you able to carry out your everyday physical activities such as walking, climbing stairs, carrying groceries, or moving a chair? 4   In the past 7 days, how often have you been bothered by emotional problems such as feeling anxious, depressed, or irritable? 3   In the past 7 days, how would you rate your fatigue on average? 2   In the past 7 days, how would you rate your pain on average, where 0 means no pain, and 10 means worst imaginable pain? 2   Mental health question re-calculation - no clinical value 3   Physical health question re-calculation - no clinical value 4   Pain question re-calculation - no clinical value 4   Global Mental Health Score 13   Global Physical Health Score 15   PROMIS TOTAL - SUBSCORES 28       .    Patient Hx  Patient's history, medications and allergies were reviewed.     Height: 6' 0\"   Weight: 250 lbs 0 oz    Patient Active Problem List    Diagnosis Date Noted     Spinal stenosis of lumbar region, unspecified whether neurogenic claudication present 02/24/2020     Priority: Medium     Added automatically from request for surgery [id number]       Bilateral low back pain without sciatica 02/21/2020     Priority: Medium     Lumbar disc herniation with radiculopathy 02/21/2020     Priority: Medium     Sebaceous hyperplasia 01/21/2020     Priority: Medium     Skin tag 01/21/2020     " Priority: Medium     Other seborrheic dermatitis 01/21/2020     Priority: Medium     Intrinsic eczema 01/21/2020     Priority: Medium     Fatty infiltration of liver 11/21/2018     Priority: Medium     Hyperlipidemia associated with type 2 diabetes mellitus (H) 06/25/2018     Priority: Medium     Type 2 diabetes mellitus without complication, with long-term current use of insulin (H) 06/22/2018     Priority: Medium     Morbid obesity (H) 06/22/2018     Priority: Medium     Status post lumbar microdiscectomy 03/13/2017     Priority: Medium     ACP (advance care planning) 01/20/2017     Priority: Medium     Advance Care Planning 1/20/2017: ACP Review of Chart / Resources Provided:  Reviewed chart for advance care plan.  Rojelio WRIGHT Franck has no plan or code status on file. Discussed available resources and provided with information. Confirmed code status reflects current choices pending further ACP discussions.  Confirmed/documented legally designated decision makers.  Added by LUCIANO OLIVAS               Herniated intervertebral disc of lumbar spine 01/20/2017     Priority: Medium     Benign essential hypertension 01/20/2017     Priority: Medium     Tobacco dependence syndrome 01/20/2017     Priority: Medium      Prior to Admission medications    Medication Sig Start Date End Date Taking? Authorizing Provider   aspirin 81 MG EC tablet Take 1 tablet (81 mg) by mouth daily 6/22/18   Marion Davis, NP   atorvastatin (LIPITOR) 10 MG tablet Take 1 tablet (10 mg) by mouth At Bedtime 7/16/20   Marion Davis, NP   blood glucose (NO BRAND SPECIFIED) lancets standard Use to test blood sugar 3 times daily or as directed. 8/31/20   Marion Davis NP   blood glucose (NO BRAND SPECIFIED) test strip Use to test blood sugar 3 times daily or as directed. 8/31/20   Marion Davis, NP   blood glucose monitoring (NO BRAND SPECIFIED) meter device kit Use to test blood sugar 3 times daily or as  directed. 8/31/20   Marion Davis, NP   escitalopram (LEXAPRO) 10 MG tablet Take 1 tablet (10 mg) by mouth daily 11/5/21   Marion Davis, DUSTY   ibuprofen (ADVIL/MOTRIN) 800 MG tablet TAKE 1 TABLET(800 MG) BY MOUTH EVERY 8 HOURS AS NEEDED FOR MODERATE PAIN 12/8/17   Marion Davis, NP   JARDIANCE 25 MG TABS tablet TAKE 1 TABLET(25 MG) BY MOUTH DAILY 9/3/21   Marion Davis, NP   losartan (COZAAR) 25 MG tablet TAKE 1/2 TABLET(12.5 MG) BY MOUTH DAILY 9/27/21   Miranda Ernst CNP     Allergies   Allergen Reactions     Succinylcholine Muscle Pain (Myalgia)     Pt had profound muscle weakness for 4 hours following an intubating dose of succinylcholine requiring prolonged intubation and mechanical ventilation post-operatively. See post-anesthetic evaluation note from surgery 2/25/20 for details.      Metformin GI Disturbance     Ozempic (0.25 Or 0.5 Mg-Dose) [Semaglutide] GI Disturbance     Sertraline Headache and Nausea     Past Medical History:   Diagnosis Date     Anxiety 8/15/2017     Benign essential hypertension 1/20/2017     Fatty infiltration of liver 11/21/2018     Herniated intervertebral disc of lumbar spine 1/20/2017     Tobacco dependence syndrome 1/20/2017     No past surgical history on file.  No family history on file.  Social History     Tobacco Use     Smoking status: Current Every Day Smoker     Packs/day: 1.50     Types: Cigarettes     Start date: 1/15/2017     Smokeless tobacco: Never Used     Tobacco comment: will quit some day   Substance Use Topics     Alcohol use: Yes     Alcohol/week: 0.0 standard drinks        Pre-Procedure Education & Consent  Procedure education was provided to: Patient  Teaching method: Explanation  Barriers to learning: No Barrier    Patient indicated understanding of pre-procedure instruction and appropriate consent was obtained and  documented.    ____________________________________________________________________    Post-Procedure Documentation: Esophageal Manometry    Manometry catheter was placed via right nare to 53 cm and normal saline swallows given per protocol. Manometry catheter was removed at the end of test.    Discharge instructions given to patient.    Notification of pending test results sent to provider for interpretation. Please reference scanned document for final interpretation of results. Patient will follow up with referring provider for test results.    Sylvia Dick RN on 11/9/2021 at 10:55 AM

## 2021-11-11 LAB
PATH REPORT.COMMENTS IMP SPEC: NORMAL
PATH REPORT.COMMENTS IMP SPEC: NORMAL
PATH REPORT.FINAL DX SPEC: NORMAL
PATH REPORT.GROSS SPEC: NORMAL
PATH REPORT.MICROSCOPIC SPEC OTHER STN: NORMAL
PATH REPORT.MICROSCOPIC SPEC OTHER STN: NORMAL
PATH REPORT.RELEVANT HX SPEC: NORMAL
PHOTO IMAGE: NORMAL

## 2021-12-01 DIAGNOSIS — E78.5 HYPERLIPIDEMIA LDL GOAL <100: ICD-10-CM

## 2021-12-02 RX ORDER — ATORVASTATIN CALCIUM 10 MG/1
TABLET, FILM COATED ORAL
Qty: 90 TABLET | Refills: 3 | Status: SHIPPED | OUTPATIENT
Start: 2021-12-02 | End: 2023-05-04

## 2021-12-07 ENCOUNTER — PATIENT OUTREACH (OUTPATIENT)
Dept: GASTROENTEROLOGY | Facility: CLINIC | Age: 51
End: 2021-12-07
Payer: COMMERCIAL

## 2021-12-29 ENCOUNTER — TELEPHONE (OUTPATIENT)
Dept: FAMILY MEDICINE | Facility: OTHER | Age: 51
End: 2021-12-29
Payer: COMMERCIAL

## 2021-12-29 NOTE — TELEPHONE ENCOUNTER
Call from patient reporting cough, congestion, and headache. Patient tested for covid on 12/24/21 with negative results. Requesting appointment to be evaluated.     Patient states he is at [facility] currently and is not wanting to wait to be seen.     Spoke with TREVOR Powell with [facility] for patient to come at 315 pm.     Call returned to patient, received notification that patient is now being seen by provider at , no longer in need of appointment.

## 2022-01-25 ENCOUNTER — VIRTUAL VISIT (OUTPATIENT)
Dept: GASTROENTEROLOGY | Facility: CLINIC | Age: 52
End: 2022-01-25
Payer: COMMERCIAL

## 2022-01-25 DIAGNOSIS — K22.4 ESOPHAGEAL DYSMOTILITY: ICD-10-CM

## 2022-01-25 DIAGNOSIS — R13.19 ESOPHAGEAL DYSPHAGIA: Primary | ICD-10-CM

## 2022-01-25 PROCEDURE — 99213 OFFICE O/P EST LOW 20 MIN: CPT | Mod: TEL | Performed by: INTERNAL MEDICINE

## 2022-01-25 RX ORDER — HYOSCYAMINE SULFATE 0.125 MG
0.12 TABLET ORAL
Qty: 60 TABLET | Refills: 0 | Status: SHIPPED | OUTPATIENT
Start: 2022-01-25 | End: 2022-02-24

## 2022-01-25 RX ORDER — OMEPRAZOLE 40 MG/1
40 CAPSULE, DELAYED RELEASE ORAL
Qty: 120 CAPSULE | Refills: 0 | Status: SHIPPED | OUTPATIENT
Start: 2022-01-25 | End: 2022-03-26

## 2022-01-25 NOTE — PROGRESS NOTES
HPI:   52 yo man with obesity (BMI 33) who presents for dysphagia.     Still having daily dysphagia. Worse since last visit.     Previously tried omeprazole 3 years ago. Otherwise hasn't tried any prescription medications.    A 10 point ROS is otherwise negative.    Past Medical History:   Diagnosis Date     Anxiety 08/15/2017     Benign essential hypertension 01/20/2017     Fatty infiltration of liver 11/21/2018     Herniated intervertebral disc of lumbar spine 01/20/2017     Pseudocholinesterase deficiency      Tobacco dependence syndrome 01/20/2017       Past Surgical History:   Procedure Laterality Date     BACK SURGERY       ESOPHAGEAL BALLOON PROVOCATION STUDY N/A 11/9/2021    Procedure: Esophageal Balloon Provocation Study;  Surgeon: Danial Serrano DO;  Location:  GI     ESOPHAGOSCOPY, GASTROSCOPY, DUODENOSCOPY (EGD), COMBINED N/A 11/9/2021    Procedure: ESOPHAGOGASTRODUODENOSCOPY, WITH BIOPSY;  Surgeon: Danial Serrano DO;  Location:  GI       No family history on file.    Social History     Tobacco Use     Smoking status: Current Every Day Smoker     Packs/day: 0.50     Types: Cigarettes     Start date: 1/15/2017     Smokeless tobacco: Never Used   Substance Use Topics     Alcohol use: Yes     Alcohol/week: 0.0 standard drinks       Current Outpatient Medications   Medication     aspirin 81 MG EC tablet     atorvastatin (LIPITOR) 10 MG tablet     blood glucose (NO BRAND SPECIFIED) lancets standard     blood glucose (NO BRAND SPECIFIED) test strip     blood glucose monitoring (NO BRAND SPECIFIED) meter device kit     escitalopram (LEXAPRO) 10 MG tablet     ibuprofen (ADVIL/MOTRIN) 800 MG tablet     JARDIANCE 25 MG TABS tablet     losartan (COZAAR) 25 MG tablet     No current facility-administered medications for this visit.        Physical exam:  GEN: NAD  Eyes: EOMI  Ears: hearing intact  Mouth: MMM  Neck: full ROM  Cardiopulmonary: non labored  Skin: no jaundice  Neuro: awake and oriented  MSK: moves  arms equally  Psych: normal affect     Labs:   ALT, , 49    Imaging:   No pertinent imaging.    Endoscopy:  See below.     Assessment and recommendations:   50 yo man with obesity (BMI 33) who presents for dysphagia.     Workup is not consistent with achalasia.     Manometry was technically normal although there was suggestion of EGJOO.     EndoFLIP was not consistent with achalasia- DI at 60cc/55mmHg was 6.81 with Dmin of 21.9mm. There were disorganized spastic and strong contractions and occasional retrograde contractions though.     We will treat as if he has a spastic esophageal process to see if there is any improvement.     - start omeprazole 40mg twice per day  - take a peppermint Altoid 5 to 10 minutes before every meal  - after 2 weeks, if the Altoids haven't been helpful, try hyoscyamine 125 mcg (1 capsule) twice per day, approximately 30 to 60 minutes before meals  - call us with an update in 4 weeks  - if these haven't been helpful, we will try sildenafil (Viagra), which may make your swallowing better    - we will talk about intestinal metaplasia at follow up visit    RTC 2 months

## 2022-01-25 NOTE — LETTER
1/25/2022         RE: Rojelio Juárez  [address] [place] [id number]        Dear Colleague,    Thank you for referring your patient, Rojelio Juárez, to the [facility] GASTROENTEROLOG[facility] [place]. Please see a copy of my visit note below.    Telephone duration 15 minutes.     Amwell and doximity didn't work.     HPI:   50 yo man with obesity (BMI 33) who presents for dysphagia.     Still having daily dysphagia. Worse since last visit.     Previously tried omeprazole 3 years ago. Otherwise hasn't tried any prescription medications.    A 10 point ROS is otherwise negative.    Past Medical History:   Diagnosis Date     Anxiety 08/15/2017     Benign essential hypertension 01/20/2017     Fatty infiltration of liver 11/21/2018     Herniated intervertebral disc of lumbar spine 01/20/2017     Pseudocholinesterase deficiency      Tobacco dependence syndrome 01/20/2017       Past Surgical History:   Procedure Laterality Date     BACK SURGERY       ESOPHAGEAL BALLOON PROVOCATION STUDY N/A 11/9/2021    Procedure: Esophageal Balloon Provocation Study;  Surgeon: Danial Serrano DO;  [facility]     ESOPHAGOSCOPY, GASTROSCOPY, DUODENOSCOPY (EGD), COMBINED N/A 11/9/2021    Procedure: ESOPHAGOGASTRODUODENOSCOPY, WITH BIOPSY;  Surgeon: Danial Serrano DO;  Location: [facility]       No family history on file.    Social History     Tobacco Use     Smoking status: Current Every Day Smoker     Packs/day: 0.50     Types: Cigarettes     Start date: 1/15/2017     Smokeless tobacco: Never Used   Substance Use Topics     Alcohol use: Yes     Alcohol/week: 0.0 standard drinks       Current Outpatient Medications   Medication     aspirin 81 MG EC tablet     atorvastatin (LIPITOR) 10 MG tablet     blood glucose (NO BRAND SPECIFIED) lancets standard     blood glucose (NO BRAND SPECIFIED) test strip     blood glucose monitoring (NO BRAND SPECIFIED) meter device kit     escitalopram (LEXAPRO) 10 MG tablet     ibuprofen  (ADVIL/MOTRIN) 800 MG tablet     JARDIANCE 25 MG TABS tablet     losartan (COZAAR) 25 MG tablet     No current facility-administered medications for this visit.        Physical exam:  GEN: NAD  Eyes: EOMI  Ears: hearing intact  Mouth: MMM  Neck: full ROM  Cardiopulmonary: non labored  Skin: no jaundice  Neuro: awake and oriented  MSK: moves arms equally  Psych: normal affect     Labs:   ALT, , 49    Imaging:   No pertinent imaging.    Endoscopy:  See below.     Assessment and recommendations:   50 yo man with obesity (BMI 33) who presents for dysphagia.     Workup is not consistent with achalasia.     Manometry was technically normal although there was suggestion of EGJOO.     EndoFLIP was not consistent with achalasia- DI at 60cc/55mmHg was 6.81 with Dmin of 21.9mm. There were disorganized spastic and strong contractions and occasional retrograde contractions though.     We will treat as if he has a spastic esophageal process to see if there is any improvement.     - start omeprazole 40mg twice per day  - take a peppermint Altoid 5 to 10 minutes before every meal  - after 2 weeks, if the Altoids haven't been helpful, try hyoscyamine 125 mcg (1 capsule) twice per day, approximately 30 to 60 minutes before meals  - call us with an update in 4 weeks  - if these haven't been helpful, we will try sildenafil (Viagra), which may make your swallowing better    - we will talk about intestinal metaplasia at follow up visit    RTC 2 months        Again, thank you for allowing me to participate in the care of your patient.        Sincerely,        Lebron Castrejon MD

## 2022-01-25 NOTE — PATIENT INSTRUCTIONS
It was a pleasure taking care of you today.  I've included a brief summary of our discussion and care plan from today's visit below.  Please review this information with your primary care provider.  _______________________________________________________________________    My recommendations are summarized as follows:  - start omeprazole 40mg twice per day  - take a peppermint Altoid 5 to 10 minutes before every meal  - after 2 weeks, if the Altoids haven't been helpful, try hyoscyamine 125 mcg (1 capsule) twice per day, approximately 30 to 60 minutes before meals  - call us with an update in 4 weeks  - if these haven't been helpful, we will try sildenafil (Viagra), which may make your swallowing better    Please call our nurse Sylvia with any questions or concerns- [phone].  --    Return to GI Clinic in 2 months to review your progress.    _______________________________________________________________________    Who do I call with any questions after my visit?  Please be in touch if there are any further questions that arise following today's visit.  There are multiple ways to contact your gastroenterology care team.        During business hours, you may reach a Gastroenterology nurse at [phone] and choose option 3.         To schedule or reschedule an appointment, please call [phone].       You can always send a secure message through [employer].  [employer] messages are answered by your nurse or doctor typically within 24 hours.  Please allow extra time on weekends and holidays.        For urgent/emergent questions after business hours, you may reach the on-call GI Fellow by contacting the [facility] at [phone].     How will I get the results of any tests ordered?    You will receive all of your results.  If you have signed up for MyChart, any tests ordered at your visit will be available to you after your physician reviews them.  Typically this takes 1-2 weeks.  If there are  urgent results that require a change in your care plan, your physician or nurse will call you to discuss the next steps.      What is [employer]?  [employer] is a secure way for you to access all of your healthcare records from the [facility].  It is a web based computer program, so you can sign on to it from any location.  It also allows you to send secure messages to your care team.  I recommend signing up for [employer] access if you have not already done so and are comfortable with using a computer.      How to I schedule a follow-up visit?  If you did not schedule a follow-up visit today, please call [phone] to schedule a follow-up office visit.        Sincerely,    Lebron Castrejon MD     [facility]  Division of Gastroenterology

## 2022-02-25 ENCOUNTER — MYC REFILL (OUTPATIENT)
Dept: FAMILY MEDICINE | Facility: OTHER | Age: 52
End: 2022-02-25
Payer: COMMERCIAL

## 2022-02-25 DIAGNOSIS — M54.41 RIGHT-SIDED LOW BACK PAIN WITH RIGHT-SIDED SCIATICA, UNSPECIFIED CHRONICITY: ICD-10-CM

## 2022-02-25 DIAGNOSIS — M54.41 ACUTE RIGHT-SIDED LOW BACK PAIN WITH RIGHT-SIDED SCIATICA: ICD-10-CM

## 2022-02-26 NOTE — TELEPHONE ENCOUNTER
Ibuprofen      Last Written Prescription Date:  12/8/17  Last Fill Quantity: 90,   # refills: 0  Last Office Visit: 11/5/21  Future Office visit:       Routing refill request to provider for review/approval because:

## 2022-02-28 RX ORDER — IBUPROFEN 800 MG/1
800 TABLET, FILM COATED ORAL EVERY 8 HOURS PRN
Qty: 90 TABLET | Refills: 0 | Status: SHIPPED | OUTPATIENT
Start: 2022-02-28 | End: 2022-10-28

## 2022-03-02 ENCOUNTER — NURSE TRIAGE (OUTPATIENT)
Dept: FAMILY MEDICINE | Facility: OTHER | Age: 52
End: 2022-03-02
Payer: COMMERCIAL

## 2022-03-02 NOTE — TELEPHONE ENCOUNTER
Patient called clinic with rash on both legs and left arm that is itchy. Patient states he has had rash for multiple marks the past few years. Patient scheduled to see PCP tomorrow.    Reason for Disposition    SEVERE itching    Additional Information    Negative: Sudden onset of rash (within last 2 hours) and difficulty with breathing or swallowing    Negative: Difficult to awaken or acting confused (e.g., disoriented, slurred speech)    Negative: Fever and purple or blood-colored spots or dots    Negative: Too weak or sick to stand    Negative: Life-threatening reaction (anaphylaxis) in the past to similar substance (e.g., food, insect bite/sting, chemical, etc.) and < 2 hours since exposure    Negative: Sounds like a life-threatening emergency to the triager    Negative: Insect bites suspected    Negative: Hives suspected    Negative: Sunburn suspected    Negative: Drug rash suspected and started taking new medicine within last 2 weeks (Exception: antihistamine, eye drops, ear drops, decongestant or other OTC cough/cold medicines)    Negative: Bright red, sunburn-like rash and current tampon use    Negative: Bright red, sunburn-like rash and current tampon use or nasal packing    Negative: Bright red, sunburn-like rash and wound infection or recent surgery    Negative: Bright red skin that peels off in sheets    Negative: Stiff neck (can't touch chin to chest)    Negative: Patient sounds very sick or weak to the triager    Negative: Fever    Negative: Face becomes swollen    Negative: Headache    Negative: Purple or blood-colored spots or dots (no fever and sounds well to triager)    Negative: Joint pain or swelling    Negative: Sores in mouth    Negative: Rash looks like large or small blisters (i.e., fluid filled bubbles or sacs on the skin)    Negative: Pregnant    Negative: Rash began within 4 hours of a new prescription medication    Answer Assessment - Initial Assessment Questions  1. APPEARANCE of RASH:  "\"Describe the rash.\" (e.g., spots, blisters, raised areas, skin peeling, scaly)     Raised red area that is itchy  2. SIZE: \"How big are the spots?\" (e.g., tip of pen, eraser, coin; inches, centimeters)      Covers both calves   3. LOCATION: \"Where is the rash located?\"      Both legs and left forearm  4. COLOR: \"What color is the rash?\" (Note: It is difficult to assess rash color in people with darker-colored skin. When this situation occurs, simply ask the caller to describe what they see.)      Pink color  5. ONSET: \"When did the rash begin?\"      Comes back ever winter  6. FEVER: \"Do you have a fever?\" If so, ask: \"What is your temperature, how was it measured, and when did it start?\"      no  7. ITCHING: \"Does the rash itch?\" If so, ask: \"How bad is the itch?\" (Scale 1-10; or mild, moderate, severe)      Yes, severe  8. CAUSE: \"What do you think is causing the rash?\"      Unsure but patient states he has had this multiple marks in a row  9. MEDICATION FACTORS: \"Have you started any new medications within the last 2 weeks?\" (e.g., antibiotics)       no  10. OTHER SYMPTOMS: \"Do you have any other symptoms?\" (e.g., dizziness, headache, sore throat, joint pain)        no  11. PREGNANCY: \"Is there any chance you are pregnant?\" \"When was your last menstrual period?\"        n/a    Protocols used: RASH OR REDNESS - WIDESPREAD-A-OH      "

## 2022-03-03 ENCOUNTER — OFFICE VISIT (OUTPATIENT)
Dept: FAMILY MEDICINE | Facility: OTHER | Age: 52
End: 2022-03-03
Attending: NURSE PRACTITIONER
Payer: COMMERCIAL

## 2022-03-03 VITALS
DIASTOLIC BLOOD PRESSURE: 86 MMHG | SYSTOLIC BLOOD PRESSURE: 116 MMHG | TEMPERATURE: 98.4 F | RESPIRATION RATE: 16 BRPM | OXYGEN SATURATION: 98 % | WEIGHT: 271.6 LBS | HEART RATE: 89 BPM | BODY MASS INDEX: 36.79 KG/M2 | HEIGHT: 72 IN

## 2022-03-03 DIAGNOSIS — F17.200 NICOTINE DEPENDENCE, UNCOMPLICATED, UNSPECIFIED NICOTINE PRODUCT TYPE: ICD-10-CM

## 2022-03-03 DIAGNOSIS — G25.81 RESTLESS LEGS SYNDROME: ICD-10-CM

## 2022-03-03 DIAGNOSIS — R06.2 WHEEZING: ICD-10-CM

## 2022-03-03 DIAGNOSIS — L30.9 DERMATITIS: Primary | ICD-10-CM

## 2022-03-03 PROCEDURE — 99214 OFFICE O/P EST MOD 30 MIN: CPT | Performed by: NURSE PRACTITIONER

## 2022-03-03 RX ORDER — PRAMIPEXOLE DIHYDROCHLORIDE 0.25 MG/1
.25-.5 TABLET ORAL 3 TIMES DAILY
Qty: 60 TABLET | Refills: 1 | Status: SHIPPED | OUTPATIENT
Start: 2022-03-03 | End: 2022-07-27

## 2022-03-03 RX ORDER — ALBUTEROL SULFATE 90 UG/1
2 AEROSOL, METERED RESPIRATORY (INHALATION) EVERY 6 HOURS
Qty: 18 G | Refills: 3 | Status: SHIPPED | OUTPATIENT
Start: 2022-03-03 | End: 2023-08-23

## 2022-03-03 RX ORDER — FLUCONAZOLE 150 MG/1
150 TABLET ORAL DAILY
Qty: 3 TABLET | Refills: 0 | Status: SHIPPED | OUTPATIENT
Start: 2022-03-03 | End: 2022-03-06

## 2022-03-03 RX ORDER — NYSTATIN AND TRIAMCINOLONE ACETONIDE 100000; 1 [USP'U]/G; MG/G
CREAM TOPICAL 2 TIMES DAILY PRN
Qty: 120 G | Refills: 1 | Status: SHIPPED | OUTPATIENT
Start: 2022-03-03 | End: 2023-02-20

## 2022-03-03 ASSESSMENT — PAIN SCALES - GENERAL: PAINLEVEL: NO PAIN (0)

## 2022-03-03 NOTE — NURSING NOTE
Chief Complaint   Patient presents with     Derm Problem       Initial /86 (BP Location: Left arm, Patient Position: Chair, Cuff Size: Adult Large)   Pulse 89   Temp 98.4  F (36.9  C) (Tympanic)   Resp 16   Ht 1.829 m (6')   Wt 123.2 kg (271 lb 9.6 oz)   SpO2 98%   BMI 36.84 kg/m   Estimated body mass index is 36.84 kg/m  as calculated from the following:    Height as of this encounter: 1.829 m (6').    Weight as of this encounter: 123.2 kg (271 lb 9.6 oz).  Medication Reconciliation: complete  Pamela M. Lechevalier, LPN

## 2022-03-03 NOTE — PATIENT INSTRUCTIONS
Assessment & Plan     1. Dermatitis  Follow-up with elie if no improvement   - fluconazole (DIFLUCAN) 150 MG tablet; Take 1 tablet (150 mg) by mouth daily for 3 days  Dispense: 3 tablet; Refill: 0  - nystatin-triamcinolone (MYCOLOG II) 523481-6.1 UNIT/GM-% external cream; Apply topically 2 times daily as needed (to affected area)  Dispense: 120 g; Refill: 1  - Adult Dermatology Referral; Future    2. Nicotine dependence, uncomplicated, unspecified nicotine product type  Cessation encouraged    3. Wheezing  - albuterol (PROAIR HFA/PROVENTIL HFA/VENTOLIN HFA) 108 (90 Base) MCG/ACT inhaler; Inhale 2 puffs into the lungs every 6 hours  Dispense: 18 g; Refill: 3    4. Restless legs syndrome  - pramipexole (MIRAPEX) 0.25 MG tablet; Take 1-2 tablets (0.25-0.5 mg) by mouth 3 times daily  Dispense: 60 tablet; Refill: 1       Tobacco Cessation:   reports that he has been smoking cigarettes. He started smoking about 5 years ago. He has been smoking about 0.50 packs per day. He has never used smokeless tobacco.  Tobacco Cessation Action Plan: Information offered: Patient not interested at this time    BMI:   Estimated body mass index is 36.84 kg/m  as calculated from the following:    Height as of this encounter: 1.829 m (6').    Weight as of this encounter: 123.2 kg (271 lb 9.6 oz).   Weight management plan: Discussed healthy diet and exercise guidelines        No follow-ups on file.    Marion Davis NP

## 2022-03-03 NOTE — PROGRESS NOTES
Assessment & Plan     1. Dermatitis  Follow-up with dermatology if no improvement   - fluconazole (DIFLUCAN) 150 MG tablet; Take 1 tablet (150 mg) by mouth daily for 3 days  Dispense: 3 tablet; Refill: 0  - nystatin-triamcinolone (MYCOLOG II) 436534-9.1 UNIT/GM-% external cream; Apply topically 2 times daily as needed (to affected area)  Dispense: 120 g; Refill: 1  - Adult Dermatology Referral; Future    2. Nicotine dependence, uncomplicated, unspecified nicotine product type  Cessation encouraged    3. Wheezing  - albuterol (PROAIR HFA/PROVENTIL HFA/VENTOLIN HFA) 108 (90 Base) MCG/ACT inhaler; Inhale 2 puffs into the lungs every 6 hours  Dispense: 18 g; Refill: 3    4. Restless legs syndrome  - pramipexole (MIRAPEX) 0.25 MG tablet; Take 1-2 tablets (0.25-0.5 mg) by mouth 3 times daily  Dispense: 60 tablet; Refill: 1       Tobacco Cessation:   reports that he has been smoking cigarettes. He started smoking about 5 years ago. He has been smoking about 0.50 packs per day. He has never used smokeless tobacco.  Tobacco Cessation Action Plan: Information offered: Patient not interested at this time    BMI:   Estimated body mass index is 36.84 kg/m  as calculated from the following:    Height as of this encounter: 1.829 m (6').    Weight as of this encounter: 123.2 kg (271 lb 9.6 oz).   Weight management plan: Discussed healthy diet and exercise guidelines    Follow-up in May for chronic conditions.       Marion Davis NP  [facility]    Denis Adams is a 51 year old who presents for the following health issues     HPI     Rash  Onset/Duration: ongoing for years, it is very itchy.    Description  Location: started on left leg, spread to right leg and now to left forearm.    Character: blotchy  Itching: moderate - severe especially on left arm.    Intensity:  moderate  Progression of Symptoms:  worsening  Accompanying signs and symptoms:   Fever: no  Body aches or joint pain: no  Sore  throat symptoms: no  Recent cold symptoms: no  History:           Previous episodes of similar rash: yes  New exposures:  None  Recent travel: no  Exposure to similar rash: no  Precipitating or alleviating factors:none   Therapies tried and outcome: kenalog cream did not really work that good     Requesting refill of albuterol - he continues to smoke, is thinking of quitting and has cut down.      Restless legs - ongoing for quite some time but feels has been getting worse.  Legs start jumping while in his chair or couch watching TV in the evenings and continues in his sleep per girlfriend.     Patient Active Problem List   Diagnosis     ACP (advance care planning)     Herniated intervertebral disc of lumbar spine     Benign essential hypertension     Tobacco dependence syndrome     Type 2 diabetes mellitus without complication, with long-term current use of insulin (H)     Morbid obesity (H)     Hyperlipidemia associated with type 2 diabetes mellitus (H)     Status post lumbar microdiscectomy     Fatty infiltration of liver     Sebaceous hyperplasia     Skin tag     Other seborrheic dermatitis     Intrinsic eczema     Bilateral low back pain without sciatica     Lumbar disc herniation with radiculopathy     Spinal stenosis of lumbar region, unspecified whether neurogenic claudication present     Past Surgical History:   Procedure Laterality Date     BACK SURGERY       ESOPHAGEAL BALLOON PROVOCATION STUDY N/A 11/9/2021    Procedure: Esophageal Balloon Provocation Study;  Surgeon: Danial Serrano DO;  Location:  GI     ESOPHAGOSCOPY, GASTROSCOPY, DUODENOSCOPY (EGD), COMBINED N/A 11/9/2021    Procedure: ESOPHAGOGASTRODUODENOSCOPY, WITH BIOPSY;  Surgeon: Danial Serrano DO;  Location:  GI       Social History     Tobacco Use     Smoking status: Current Every Day Smoker     Packs/day: 0.50     Types: Cigarettes     Start date: 1/15/2017     Smokeless tobacco: Never Used   Substance Use Topics     Alcohol use: Yes      Alcohol/week: 0.0 standard drinks     History reviewed. No pertinent family history.      Current Outpatient Medications   Medication Sig Dispense Refill     aspirin 81 MG EC tablet Take 1 tablet (81 mg) by mouth daily 90 tablet 3     atorvastatin (LIPITOR) 10 MG tablet TAKE 1 TABLET(10 MG) BY MOUTH AT BEDTIME 90 tablet 3     blood glucose (NO BRAND SPECIFIED) lancets standard Use to test blood sugar 3 times daily or as directed. 300 each 3     blood glucose (NO BRAND SPECIFIED) test strip Use to test blood sugar 3 times daily or as directed. 300 strip 3     blood glucose monitoring (NO BRAND SPECIFIED) meter device kit Use to test blood sugar 3 times daily or as directed. 1 kit 0     escitalopram (LEXAPRO) 10 MG tablet Take 1 tablet (10 mg) by mouth daily 30 tablet 1     ibuprofen (ADVIL/MOTRIN) 800 MG tablet Take 1 tablet (800 mg) by mouth every 8 hours as needed for moderate pain 90 tablet 0     JARDIANCE 25 MG TABS tablet TAKE 1 TABLET(25 MG) BY MOUTH DAILY 90 tablet 1     losartan (COZAAR) 25 MG tablet TAKE 1/2 TABLET(12.5 MG) BY MOUTH DAILY 45 tablet 1     omeprazole (PRILOSEC) 40 MG DR capsule Take 1 capsule (40 mg) by mouth 2 times daily (before meals) 120 capsule 0     Allergies   Allergen Reactions     Succinylcholine Muscle Pain (Myalgia)     Pt had profound muscle weakness for 4 hours following an intubating dose of succinylcholine requiring prolonged intubation and mechanical ventilation post-operatively. See post-anesthetic evaluation note from surgery 2/25/20 for details.      Metformin GI Disturbance     Ozempic (0.25 Or 0.5 Mg-Dose) [Semaglutide] GI Disturbance     Sertraline Headache and Nausea     Recent Labs   Lab Test 11/05/21  1022 04/14/21  0900 11/11/20  1108   A1C 6.1* 5.7* 6.8*   LDL 94 91 57   HDL 54 67 39*   TRIG 154* 89 144   * 154* 92*   CR 0.81 0.81 0.89   GFRESTIMATED >90 >90 >90   GFRESTBLACK  --  >90 >90   POTASSIUM 4.1 4.0 3.9   TSH 1.26 0.98 0.91      BP Readings from Last  3 Encounters:   03/03/22 116/86   11/09/21 (!) 110/90   11/09/21 121/77    Wt Readings from Last 3 Encounters:   03/03/22 123.2 kg (271 lb 9.6 oz)   11/09/21 119.9 kg (264 lb 5.3 oz)   11/09/21 113.4 kg (250 lb)                      Review of Systems   Constitutional, HEENT, cardiovascular, pulmonary, gi and gu systems are negative, except as otherwise noted.      Objective    /86 (BP Location: Left arm, Patient Position: Chair, Cuff Size: Adult Large)   Pulse 89   Temp 98.4  F (36.9  C) (Tympanic)   Resp 16   Ht 1.829 m (6')   Wt 123.2 kg (271 lb 9.6 oz)   SpO2 98%   BMI 36.84 kg/m    Body mass index is 36.84 kg/m .  Physical Exam   GENERAL: healthy, alert and no distress  MS: no gross musculoskeletal defects noted, no edema  SKIN: anterior shins bilateral - right worse than left - blotchy erythematous circular lesions with pinpoint redness that does karsten.  The area on right leg has central clearing, lesion on left is smaller and appears to have smaller central clearing.  Another patch is on left forearm that does not have central clearing.    PSYCH: mentation appears normal, affect normal/bright

## 2022-03-09 ENCOUNTER — TELEPHONE (OUTPATIENT)
Dept: GASTROENTEROLOGY | Facility: CLINIC | Age: 52
End: 2022-03-09
Payer: COMMERCIAL

## 2022-03-09 DIAGNOSIS — K22.4 ESOPHAGEAL DYSMOTILITY: ICD-10-CM

## 2022-03-09 DIAGNOSIS — K22.4 HYPERCONTRACTILE ESOPHAGUS: Primary | ICD-10-CM

## 2022-03-09 RX ORDER — SILDENAFIL 50 MG/1
25 TABLET, FILM COATED ORAL DAILY
Qty: 30 TABLET | Refills: 11 | Status: SHIPPED | OUTPATIENT
Start: 2022-03-09 | End: 2023-02-20

## 2022-03-09 NOTE — TELEPHONE ENCOUNTER
[facility] Call Center    Phone Message    May a detailed message be left on voicemail: yes     Reason for Call: Other: Patient called as omperazole and hyoscyamine are not working for him.  Dr. Castrejon told him to let him know if they don't work for him, as he has a 3rd option for him.  Patient uses [id number] pharmacy.  Please follow up with patient as well.  Thank you.     Action Taken: Message routed to:  [facility]    Travel Screening: Not Applicable

## 2022-03-10 ENCOUNTER — TELEPHONE (OUTPATIENT)
Dept: GASTROENTEROLOGY | Facility: CLINIC | Age: 52
End: 2022-03-10

## 2022-03-10 NOTE — TELEPHONE ENCOUNTER
Reached out to pt to let them know that provider did write prescription for discussed medication.  Sent to pt's local pharmacy.  Writer placing PA should it be needed.

## 2022-03-10 NOTE — TELEPHONE ENCOUNTER
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 There were disorganized spastic and strong contractions and occasional retrograde contractions though.      We will treat as if he has a spastic esophageal process to see if there is any improvement.      - start omeprazole 40mg twice per day  - take a peppermint Altoid 5 to 10 minutes before every meal  - after 2 weeks, if the Altoids haven't been helpful, try hyoscyamine 125 mcg (1 capsule) twice per day, approximately 30 to 60 minutes before meals  - call us with an update in 4 weeks  - if these haven't been helpful, we will try sildenafil (Viagra), which may make your swallowing better      Insurance Name: [employer] [place] [employer]   Insurance ID:  [id number]       Pharmacy Information (if different than what is on RX)  Name:    Phone:

## 2022-03-15 NOTE — TELEPHONE ENCOUNTER
Prior Authorization Not Needed per Insurance        Medication: sildenafil (VIAGRA) 50 MG tablet-PA NOT NEEDED   Insurance Company: [employer] - Phone [phone] Fax [phone]  Expected CoPay: #[id number]    Pharmacy Filling the Rx: [id number] [address] AT [facility] [address]  Pharmacy Notified: Yes  Patient Notified: No    Called pharmacy and pharmacy stated that PA is Not Needed and medication is covered. Pharmacy stated that they have a paid claim on medication today (3/15/2022) quantity 18 for 75 day supply and patient has picked up medication. Insurance also stated that PA is Not Needed and medication is covered.

## 2022-03-25 ENCOUNTER — VIRTUAL VISIT (OUTPATIENT)
Dept: GASTROENTEROLOGY | Facility: CLINIC | Age: 52
End: 2022-03-25
Payer: COMMERCIAL

## 2022-03-25 DIAGNOSIS — R13.19 ESOPHAGEAL DYSPHAGIA: Primary | ICD-10-CM

## 2022-03-25 DIAGNOSIS — K22.4 ESOPHAGEAL DYSMOTILITY: ICD-10-CM

## 2022-03-25 PROCEDURE — 99213 OFFICE O/P EST LOW 20 MIN: CPT | Mod: TEL | Performed by: INTERNAL MEDICINE

## 2022-03-25 NOTE — LETTER
3/25/2022         RE: Rojelio Juárez  [address] [place] [id number]        Dear Colleague,    Thank you for referring your patient, Rojelio Juárez, to the [facility] [place]. Please see a copy of my visit note below.    Phone call 18 minutes    HPI:   52 yo man with obesity (BMI 33) who presents for follow up of dysphagia.      Needs to sit up for 5 minutes after drinking. Had gatorade suddenly come up after 20 seconds this morning while sitting in recliner.    Only thing that helped his symptoms was initial treatment of botox in 2020- felt 100% better. Then had a second treatment 6/3/21 which lasted for about 1 month.    Got headaches and nausea from sildenafil. No improvement in dysphagia. No improvement with dicyclomine. Taking omeprazole 40mg BID with no improvement.     A 10 point ROS is otherwise negative.    Past Medical History:   Diagnosis Date     Anxiety 08/15/2017     Benign essential hypertension 01/20/2017     Fatty infiltration of liver 11/21/2018     Herniated intervertebral disc of lumbar spine 01/20/2017     Pseudocholinesterase deficiency      Tobacco dependence syndrome 01/20/2017       Past Surgical History:   Procedure Laterality Date     BACK SURGERY       ESOPHAGEAL BALLOON PROVOCATION STUDY N/A 11/9/2021    Procedure: Esophageal Balloon Provocation Study;  Surgeon: Danial Serrano DO;  [facility]     ESOPHAGOSCOPY, GASTROSCOPY, DUODENOSCOPY (EGD), COMBINED N/A 11/9/2021    Procedure: ESOPHAGOGASTRODUODENOSCOPY, WITH BIOPSY;  Surgeon: Danial Serrano DO;  [facility]       No family history on file.    Social History     Tobacco Use     Smoking status: Current Every Day Smoker     Packs/day: 0.50     Types: Cigarettes     Start date: 1/15/2017     Smokeless tobacco: Never Used   Substance Use Topics     Alcohol use: Yes     Alcohol/week: 0.0 standard drinks       Current Outpatient Medications   Medication     albuterol (PROAIR HFA/PROVENTIL  HFA/VENTOLIN HFA) 108 (90 Base) MCG/ACT inhaler     aspirin 81 MG EC tablet     atorvastatin (LIPITOR) 10 MG tablet     blood glucose (NO BRAND SPECIFIED) lancets standard     blood glucose (NO BRAND SPECIFIED) test strip     blood glucose monitoring (NO BRAND SPECIFIED) meter device kit     escitalopram (LEXAPRO) 10 MG tablet     ibuprofen (ADVIL/MOTRIN) 800 MG tablet     JARDIANCE 25 MG TABS tablet     losartan (COZAAR) 25 MG tablet     nystatin-triamcinolone (MYCOLOG II) 323725-7.1 UNIT/GM-% external cream     omeprazole (PRILOSEC) 40 MG DR capsule     pramipexole (MIRAPEX) 0.25 MG tablet     sildenafil (VIAGRA) 50 MG tablet     No current facility-administered medications for this visit.      Labs:   ALT, , 49     Imaging:   No pertinent imaging.     Endoscopy:  See below.     Assessment and recommendations:   50 yo man with obesity (BMI 33) who presents for follow up of dysphagia.     Workup is not consistent with achalasia. His repeat workup with manometry and FLIP was completed ~5 months after treatment with botox, so there is some possibility that IRP was improved due to lasting effects of botox. However, botox would not have any effect on peristalsis which was intact and normal.     Manometry was technically normal although there was suggestion of EGJOO.      EndoFLIP was not consistent with achalasia- DI at 60cc/55mmHg was 6.81 with Dmin of 21.9mm. There were disorganized spastic and strong contractions and occasional retrograde contractions though.      We have treated as if he has a spastic esophageal process to see if there is any improvement. Unfortunately, no improvement with sildenafil 50mg QD, dicyclomine, or omeprazole 40mg BID.      - we will discuss at next esophageal conference  - options include repeat workup (now that it's been ~9 months since botox), repeat botox, chest CT, others TBD     - we will talk about intestinal metaplasia at follow up visit     RTC 2 months        Lebron RG  MD Cash

## 2022-03-25 NOTE — PATIENT INSTRUCTIONS
It was a pleasure taking care of you today.  I've included a brief summary of our discussion and care plan from today's visit below.  Please review this information with your primary care provider.  _______________________________________________________________________    My recommendations are summarized as follows:  - we will discuss your case at our esophageal conference    Please call our nurse Sylvia with any questions or concerns- [phone].  --    Return to GI Clinic in 2 months to review your progress.    _______________________________________________________________________    Who do I call with any questions after my visit?  Please be in touch if there are any further questions that arise following today's visit.  There are multiple ways to contact your gastroenterology care team.        During business hours, you may reach a Gastroenterology nurse at [phone] and choose option 3.         To schedule or reschedule an appointment, please call [phone].       You can always send a secure message through My[employer]atingTree.  My[employer]atingTree messages are answered by your nurse or doctor typically within 24 hours.  Please allow extra time on weekends and holidays.        For urgent/emergent questions after business hours, you may reach the on-call GI Fellow by contacting the [facility]  at [phone].     How will I get the results of any tests ordered?    You will receive all of your results.  If you have signed up for My[employer], any tests ordered at your visit will be available to you after your physician reviews them.  Typically this takes 1-2 weeks.  If there are urgent results that require a change in your care plan, your physician or nurse will call you to discuss the next steps.      What is My[employer]atingTree?  My[employer]atingTree is a secure way for you to access all of your healthcare records from the [facility].  It is a web based computer program, so you can sign on to it from any location.  It  also allows you to send secure messages to your care team.  I recommend signing up for [employer] access if you have not already done so and are comfortable with using a computer.      How to I schedule a follow-up visit?  If you did not schedule a follow-up visit today, please call [phone] to schedule a follow-up office visit.        Sincerely,    Lebron Castrejon MD     [facility]  Division of Gastroenterology

## 2022-03-25 NOTE — PROGRESS NOTES
HPI:   50 yo man with obesity (BMI 33) who presents for follow up of dysphagia.      Needs to sit up for 5 minutes after drinking. Had gatorade suddenly come up after 20 seconds this morning while sitting in recliner.    Only thing that helped his symptoms was initial treatment of botox in 2020- felt 100% better. Then had a second treatment 6/3/21 which lasted for about 1 month.    Got headaches and nausea from sildenafil. No improvement in dysphagia. No improvement with dicyclomine. Taking omeprazole 40mg BID with no improvement.     A 10 point ROS is otherwise negative.    Past Medical History:   Diagnosis Date     Anxiety 08/15/2017     Benign essential hypertension 01/20/2017     Fatty infiltration of liver 11/21/2018     Herniated intervertebral disc of lumbar spine 01/20/2017     Pseudocholinesterase deficiency      Tobacco dependence syndrome 01/20/2017       Past Surgical History:   Procedure Laterality Date     BACK SURGERY       ESOPHAGEAL BALLOON PROVOCATION STUDY N/A 11/9/2021    Procedure: Esophageal Balloon Provocation Study;  Surgeon: Danial Serrano DO;  [facility]: [facility]     ESOPHAGOSCOPY, GASTROSCOPY, DUODENOSCOPY (EGD), COMBINED N/A 11/9/2021    Procedure: ESOPHAGOGASTRODUODENOSCOPY, WITH BIOPSY;  Surgeon: Danial Serrano DO;  Location: [facility]       No family history on file.    Social History     Tobacco Use     Smoking status: Current Every Day Smoker     Packs/day: 0.50     Types: Cigarettes     Start date: 1/15/2017     Smokeless tobacco: Never Used   Substance Use Topics     Alcohol use: Yes     Alcohol/week: 0.0 standard drinks       Current Outpatient Medications   Medication     albuterol (PROAIR HFA/PROVENTIL HFA/VENTOLIN HFA) 108 (90 Base) MCG/ACT inhaler     aspirin 81 MG EC tablet     atorvastatin (LIPITOR) 10 MG tablet     blood glucose (NO BRAND SPECIFIED) lancets standard     blood glucose (NO BRAND SPECIFIED) test strip     blood glucose monitoring (NO BRAND SPECIFIED) meter device kit      escitalopram (LEXAPRO) 10 MG tablet     ibuprofen (ADVIL/MOTRIN) 800 MG tablet     JARDIANCE 25 MG TABS tablet     losartan (COZAAR) 25 MG tablet     nystatin-triamcinolone (MYCOLOG II) 055942-5.1 UNIT/GM-% external cream     omeprazole (PRILOSEC) 40 MG DR capsule     pramipexole (MIRAPEX) 0.25 MG tablet     sildenafil (VIAGRA) 50 MG tablet     No current facility-administered medications for this visit.      Labs:   ALT, , 49     Imaging:   No pertinent imaging.     Endoscopy:  See below.     Assessment and recommendations:   52 yo man with obesity (BMI 33) who presents for follow up of dysphagia.     Workup is not consistent with achalasia. His repeat workup with manometry and FLIP was completed ~5 months after treatment with botox, so there is some possibility that IRP was improved due to lasting effects of botox. However, botox would not have any effect on peristalsis which was intact and normal.     Manometry was technically normal although there was suggestion of EGJOO.      EndoFLIP was not consistent with achalasia- DI at 60cc/55mmHg was 6.81 with Dmin of 21.9mm. There were disorganized spastic and strong contractions and occasional retrograde contractions though.      We have treated as if he has a spastic esophageal process to see if there is any improvement. Unfortunately, no improvement with sildenafil 50mg QD, dicyclomine, or omeprazole 40mg BID.      - we will discuss at next esophageal conference  - options include repeat workup (now that it's been ~9 months since botox), repeat botox, chest CT, others TBD     - we will talk about intestinal metaplasia at follow up visit     RTC 2 months

## 2022-03-28 ENCOUNTER — DOCUMENTATION ONLY (OUTPATIENT)
Dept: SLEEP MEDICINE | Facility: HOSPITAL | Age: 52
End: 2022-03-28
Payer: COMMERCIAL

## 2022-03-28 NOTE — PROGRESS NOTES
SLEEP HISTORY QUESTIONNAIRE    Please describe the main reason for your sleep appointment? Sleep problems    How long has this been a problem? decades    Have you been diagnosed with a sleep problem in the past? NO    If so, what? n/a    What treatment was recommended? unanswered    Have you had a sleep study in the past? NO    If yes, where and when? N/A    Sleep Habits:   Do you read in bed? No  Do you eat in bed? No  Do you watch TV in bed? Yes  Do you work in bed? No  Do you use a phone or computer in bed? No    Is you sleep disturbed by:   Bed partner: No  Children: No  Noise: Yes   Pets: No  Other: no      On two or more nights per week, do you drink alcohol to help you fall asleep?NO    On two or more nights per week, do you take melatonin to help you fall asleep? NO    On two or more nights per week, do you take over the counter medicine to fall asleep?  NO    Do you take drinks with caffeine (coffee, tea, soda, energy drinks)? YES    Do you have 3 or more caffeine drinks in a day? NO    Do you have caffeine drinks within 6 hours of bedtime? YES    Do you smoke or use tobacco? YES    Do you exercise? NO    Sleep Routine:   Using a 24 Hour Clock    What time do you usually get into bed on workdays? 9 pm    Weekend/non work days? 9 pm    What time do you get out of bed on workdays? 5 am      Weekend/non work days?7 am    Do you work the evening or night shift or do your shifts rotate? YES    How long does it usually take to fall to sleep? 10 minutes    How many times do you wake during the night? 3    How much time do you feel that you are awake during the entire night? 60 minutes    How long does it take for you to fall back to sleep after you wake up? 5 minutes    Why do you think you wake up? bathroom    What do you do when you wake up? unanswered    How much sleep do you think you get on work nights? 8 hrs    How much sleep do you think you get on weekends/non work days? 8 hrs    How much sleep do you  think you need to feel your best? 8 hrs    How many days during a week do you take a nap on average? 0    What is the average length of your naps? 0    Do you feel better after taking a nap? unanswered    If you could chose the best sleep schedule for you, what time would you go to bed? 9 pm  What time would you get up? 6 am    Do you read in bed? NO    Do you eat in bed? NO    Do you watch TV in bed? YES    Do you do work in bed? NO    Do you use a computer or phone in bed? NO    Sleep Disruptions?   Leg movements:  Do you ever have restless, crawling, aching or other unusual feelings in your legs? YES    Do you ever wake yourself by kicking your legs during the night? YES    Are the sheets and blankets messed up or tossed about when you get up? YES    Night-time behaviors:   Do you have nightmares or night terrors? NO   How often? n/a    Have you had times when you were sleep walking? NO    Have you been seen doing anything unusual while you sleep at nights? NO  What? n/a  How often? n/a    Have you ever hurt yourself or someone else while you were sleeping? NO  Please describe: n/a    Do you clench or grind your teeth during the night? yes    Sleep Apnea (pauses in breathing during sleep):  Do you wake with a headache in the morning? YES  How often? 3/wk    Does your bed partner, family or friends ever say that you snore? YES  How many nights per week do you snore? loud  Can snoring be heard outside the bedroom? no    Do you ever wake yourself up from snoring, gasping or choking? YES    Have you ever been told that you stop breathing or have pauses in your breathing? YES    Do you wake in the morning with a dry throat or mouth? YES    Do you have trouble breathing through your nose? no    Do you have problems with heartburn, reflux or a hiatal hernia? YES    Which positions do you usually sleep in? (stomach, back, sides, all) sides    Do you use oxygen or any other medical equipment when you sleep? NO    Do  members of your family (related by blood) snore? Don't know    Have any members of your family been diagnosed with with sleep apnea? Don't know    Do other members of your family have restless leg? NO    Do other members of your family have sleep walking? NO    Have you ever had an accident, or near accident due to sleepiness while driving? NO    Does your sleepiness affect your work on the job or at school? NO    Do you ever fall asleep by accident while doing a task? NO    Have you had sudden muscle weakness when laughing, angry or surprised? NO    Have you ever been unable to move your body when falling asleep or waking up? NO    Do you ever have trouble  your dreams from real life events? NO  Please describe: n/a    Physical Health: (including illness and injury): During the past 30 days, on how many days was your physical health not good? 0/30 days     Mental Health: (including stress, depression, and problems with emotions): During the last 30 days, how may days was your mental health not good? 530 days.     During the past 30 days, on how many days did poor physical or mental health keep you from doing your usual activities? This might be self-care, work, or play? 530 days.     Social History:   Marital status:     Who lives in your home with you? unanswered    Mother (alive or dead)? alive If has , from what? n/a  Father (alive or dead)? dead If has , from what? uanswered    Siblings: YES  Have any ? NO  If so, from what? n/a    Currently working? YES  If yes, work: [employer]  Former jobs: no     Sleepiness Scale:   Sitting and reading 1   Watching TV 2   Sitting in a public place 0   Riding in a car 0   Lying down to rest in the afternoon 2   Sitting and talking to someone 0   Sitting quietly after a lunch without alcohol 1   In a car, stopping for a few minutes in traffic 0       Surgical History:   Past Surgical History:   Procedure Laterality Date     BACK SURGERY        ESOPHAGEAL BALLOON PROVOCATION STUDY N/A 11/9/2021    Procedure: Esophageal Balloon Provocation Study;  Surgeon: Danial Serrano DO;  [facility]: [facility]     ESOPHAGOSCOPY, GASTROSCOPY, DUODENOSCOPY (EGD), COMBINED N/A 11/9/2021    Procedure: ESOPHAGOGASTRODUODENOSCOPY, WITH BIOPSY;  Surgeon: Danial Serrano DO;  [facility]       Medical Conditions:   Past Medical History:   Diagnosis Date     Anxiety 08/15/2017     Benign essential hypertension 01/20/2017     Fatty infiltration of liver 11/21/2018     Herniated intervertebral disc of lumbar spine 01/20/2017     Pseudocholinesterase deficiency      Tobacco dependence syndrome 01/20/2017       Medications:   Current Outpatient Medications   Medication Sig     albuterol (PROAIR HFA/PROVENTIL HFA/VENTOLIN HFA) 108 (90 Base) MCG/ACT inhaler Inhale 2 puffs into the lungs every 6 hours     aspirin 81 MG EC tablet Take 1 tablet (81 mg) by mouth daily     atorvastatin (LIPITOR) 10 MG tablet TAKE 1 TABLET(10 MG) BY MOUTH AT BEDTIME     blood glucose (NO BRAND SPECIFIED) lancets standard Use to test blood sugar 3 times daily or as directed.     blood glucose (NO BRAND SPECIFIED) test strip Use to test blood sugar 3 times daily or as directed.     blood glucose monitoring (NO BRAND SPECIFIED) meter device kit Use to test blood sugar 3 times daily or as directed.     escitalopram (LEXAPRO) 10 MG tablet Take 1 tablet (10 mg) by mouth daily     ibuprofen (ADVIL/MOTRIN) 800 MG tablet Take 1 tablet (800 mg) by mouth every 8 hours as needed for moderate pain     JARDIANCE 25 MG TABS tablet TAKE 1 TABLET(25 MG) BY MOUTH DAILY     losartan (COZAAR) 25 MG tablet TAKE 1/2 TABLET(12.5 MG) BY MOUTH DAILY     nystatin-triamcinolone (MYCOLOG II) 660192-1.1 UNIT/GM-% external cream Apply topically 2 times daily as needed (to affected area)     pramipexole (MIRAPEX) 0.25 MG tablet Take 1-2 tablets (0.25-0.5 mg) by mouth 3 times daily     sildenafil (VIAGRA) 50 MG tablet Take 0.5 tablets  (25 mg) by mouth daily     No current facility-administered medications for this visit.       Are you currently having any of the following symptoms?   General:   Obvious weight gain or loss YES  Fever, chills or sweats NO  Drug allergies: no    Eyes:   Changes in vision YES  Blind spots NO  Double vision NO  Other no    Ear, Nose and Throat:   Ear pain NO  Sore throat YES  Sinus pain NO  Post-nasal drip NO  Runny nose NO  Bloody nose NO    Heart:   Rapid or irregular heart beat NO  Chest pain or pressure NO  Out of breath when lying down NO  Swelling in feet or legs NO  High blood pressure NO  Heart disease NO    Nervous system   Headaches YES  Weakness in arms or legs NO  Numbness in arms of legs YES  Other: no    Skin  Rashes YES  New moles or skin changes YES  Other no    Lungs  Shortness of breath at rest NO  Shortness of breath with activity NO  Dry cough DOES NOT APPLY  Coughing up mucous or phlegm NO  Coughing up blood NO  Wheezing when breathing YES    Lymph System  Swollen lymph nodes NO  New lumps or bumps NO  Changes in breasts or discharge NO    Digestive System   Nausea or vomiting NO  Loose or watery stools NO  Hard, dry stools (constipation) NO  Fat or grease in stools no  Blood in stools NO  Stools are black or bloody NO  Abdominal (belly) pain NO    Urinary Tract   Pain when you urinate (pee) NO  Blood in your urine NO  Urinate (pee) more than normal NO  Irregular periods NO    Muscles and bones   Muscle pain NO  Joint or bone pain NO  Swollen joints NO  Other no    Glands  Increased thirst or urination YES  Diabetes YES  Morning glucose: unanswered  Afternoon glucose: unanswered    Mental Health  Depression NO  Anxiety YES  Other mental health issues: no

## 2022-03-29 NOTE — PROGRESS NOTES
ISMA HENDERSON       Name: Rojelio Juárez MRN# [id number]   Age: 51 year old YOB: 1970     Stop Bang questionnaire completed with a score of >3 to allow for HST     Have you been told you snore loudly (louder than talking or loud enough to be heard through doors)? YES    Do you often feel tired, fatigued, or sleepy during the daytime? NO    Has anyone observed you stop breathing during your sleep? YES    Do you have or are you being treated for high blood pressure? unknown    Is your BMI greater than 35? NO    Is your neck size circumference 16 inches or greater? NO    Are you over 50 years old? YES    Stop Bang Score (# of yes): 4

## 2022-03-31 ENCOUNTER — TRANSFERRED RECORDS (OUTPATIENT)
Dept: HEALTH INFORMATION MANAGEMENT | Facility: CLINIC | Age: 52
End: 2022-03-31
Payer: COMMERCIAL

## 2022-03-31 NOTE — PROGRESS NOTES
Chart review prior to sleep testing.    Patient Summary:  51 year old yo male who is referred for concern for sleep-disordered breathing.    Patient Active Problem List    Diagnosis Date Noted     Spinal stenosis of lumbar region, unspecified whether neurogenic claudication present 02/24/2020     Priority: Medium     Added automatically from request for surgery [id number]       Bilateral low back pain without sciatica 02/21/2020     Priority: Medium     Lumbar disc herniation with radiculopathy 02/21/2020     Priority: Medium     Sebaceous hyperplasia 01/21/2020     Priority: Medium     Skin tag 01/21/2020     Priority: Medium     Other seborrheic dermatitis 01/21/2020     Priority: Medium     Intrinsic eczema 01/21/2020     Priority: Medium     Fatty infiltration of liver 11/21/2018     Priority: Medium     Hyperlipidemia associated with type 2 diabetes mellitus (H) 06/25/2018     Priority: Medium     Type 2 diabetes mellitus without complication, with long-term current use of insulin (H) 06/22/2018     Priority: Medium     Morbid obesity (H) 06/22/2018     Priority: Medium     Status post lumbar microdiscectomy 03/13/2017     Priority: Medium     ACP (advance care planning) 01/20/2017     Priority: Medium     Advance Care Planning 1/20/2017: ACP Review of Chart / Resources Provided:  Reviewed chart for advance care plan.  Rojelio Juárez has no plan or code status on file. Discussed available resources and provided with information. Confirmed code status reflects current choices pending further ACP discussions.  Confirmed/documented legally designated decision makers.  Added by LUCIANO OLIVAS               Herniated intervertebral disc of lumbar spine 01/20/2017     Priority: Medium     Benign essential hypertension 01/20/2017     Priority: Medium     Tobacco dependence syndrome 01/20/2017     Priority: Medium       Current Outpatient Medications   Medication     albuterol (PROAIR HFA/PROVENTIL HFA/VENTOLIN HFA) 108 (90  "Base) MCG/ACT inhaler     aspirin 81 MG EC tablet     atorvastatin (LIPITOR) 10 MG tablet     blood glucose (NO BRAND SPECIFIED) lancets standard     blood glucose (NO BRAND SPECIFIED) test strip     blood glucose monitoring (NO BRAND SPECIFIED) meter device kit     escitalopram (LEXAPRO) 10 MG tablet     ibuprofen (ADVIL/MOTRIN) 800 MG tablet     JARDIANCE 25 MG TABS tablet     losartan (COZAAR) 25 MG tablet     nystatin-triamcinolone (MYCOLOG II) 354552-0.1 UNIT/GM-% external cream     pramipexole (MIRAPEX) 0.25 MG tablet     sildenafil (VIAGRA) 50 MG tablet     No current facility-administered medications for this visit.     Pertinent PMHx of DM II, HTN, tobacco use, HLD, morbid obesity, RLS.    STOP-BANG score of 6, with unknown neck circumference.  [place] score of 6.  BMI of Estimated body mass index is 36.84 kg/m  as calculated from the following:    Height as of 3/3/22: 1.829 m (6').    Weight as of 3/3/22: 123.2 kg (271 lb 9.6 oz).     Per questionnaire: \"Sleep problems\"    Sxs for decades, no prior sleep testing.    Caffeine use:  No for 3+ per day.  Yes for within 6 hours of bed.    Tobacco use: Yes    Sleep pattern:  Workdays.  9pm - 5am, total sleep time 8 hours.  Weekends.  9pm - 7am, total sleep time 8 hours.  Time to fall asleep: ~10 minutes.  Awakenings: 3 times per night, 5 minutes to return to sleep, awake for total of 60 minutes.  Napping.  0 days per week, - hours per nap.    Yes for RLS screen.  No for sleep walking.  No for dream enactment behavior.  Yes for bruxism.    Yes for morning headaches.  Yes for snoring.  Yes for observed apnea.  No for FHx of ANICETO.    SHx:  , works for [employer].    A/P:  1.)  High likelihood of ANICETO with STOP-BANG score of 6.   - Would appear to be candidate for either home sleep testing or in-lab PSG.    2.)  RLS  - Currently treated with pramipexole 0.25-0.5mg TID  - Will review if residual symptoms  - Plan for baseline ferritin  -  decreasing " caffeine use near bed and nicotine use on exacerbating RLS    ---  This note was written with the assistance of the Dragon voice-dictation technology software. The final document, although reviewed, may contain errors. For corrections, please contact the office.    Rashaun Miranda MD    Sleep Medicine  [facility]  ([phone])  [facility]  ([phone])

## 2022-04-04 DIAGNOSIS — G47.33 OSA (OBSTRUCTIVE SLEEP APNEA): Primary | ICD-10-CM

## 2022-04-13 NOTE — PROGRESS NOTES
Watch PAT device has been registered and shipped via [employer] on 4/13/2022. Patient was notified that package was mailed out. [employer] serial number: [id number].

## 2022-04-15 ENCOUNTER — OFFICE VISIT (OUTPATIENT)
Dept: SLEEP MEDICINE | Facility: HOSPITAL | Age: 52
End: 2022-04-15
Attending: FAMILY MEDICINE
Payer: COMMERCIAL

## 2022-04-15 DIAGNOSIS — G47.33 OSA (OBSTRUCTIVE SLEEP APNEA): ICD-10-CM

## 2022-04-15 PROCEDURE — 95800 SLP STDY UNATTENDED: CPT | Mod: 26

## 2022-04-15 PROCEDURE — 95800 SLP STDY UNATTENDED: CPT | Performed by: FAMILY MEDICINE

## 2022-04-25 ENCOUNTER — TELEPHONE (OUTPATIENT)
Dept: PSYCHOLOGY | Facility: OTHER | Age: 52
End: 2022-04-25
Payer: COMMERCIAL

## 2022-04-25 NOTE — TELEPHONE ENCOUNTER
----- Message from Trish Billy LPN sent at 4/21/2022 10:54 AM CDT -----  Regarding: RE: possible new patient for therapy  Oh you are welcome Comfort almeida  ----- Message -----  From: Rosa Stanford  Sent: 4/21/2022   9:52 AM CDT  To: Trish Billy LPN  Subject: RE: possible new patient for therapy             You bet! Thanks, Trish :)    Comfort   ----- Message -----  From: Trish Billy LPN  Sent: 4/19/2022  11:34 AM CDT  To: Rosa Stanford  Subject: possible new patient for therapy                 Jeronimo Greenwood,  I had a voice mail from this patient.  He would like to schedule an appt with Juan Pablo Ortiz.  Could you please give him a call at [phone] to get more information for scheduling an appt.  Thank you, appreciate it.  Trish Billy LPN

## 2022-04-25 NOTE — TELEPHONE ENCOUNTER
Attempt # 1  Outcome: Left Message   Comment: Left message to return call to schedule consult with CARLOS Dena

## 2022-05-03 ENCOUNTER — DOCUMENTATION ONLY (OUTPATIENT)
Dept: SLEEP MEDICINE | Facility: CLINIC | Age: 52
End: 2022-05-03
Payer: COMMERCIAL

## 2022-05-03 NOTE — PROCEDURES
WatchPAT - HOME SLEEP STUDY INTERPRETATION    Patient: Rojelio Juráez  MRN: [id number]  YOB: 1970  Study Date: 2022  Referring Provider: Marion Davis  Ordering Provider: Rashaun Miranda MD, MD    Chain of custody patient verification was not enabled.       Indications for Home Study: Rojelio Juárez is a 51 year old male with a history of DM II, HTN, tobacco use, HLD, morbid obesity, RLS who presents with symptoms suggestive of obstructive sleep apnea.    Estimated body mass index is 36.84 kg/m  as calculated from the following:    Height as of 3/3/22: 1.829 m (6').    Weight as of 3/3/22: 123.2 kg (271 lb 9.6 oz).  [place] Sleepiness Scale:   STOP-BAN/8    Data: A full night home sleep study was performed recording the standard physiologic parameters including peripheral arterial tonometry (PAT), sound/snoring, body position,  movement, sound, and oxygen saturation by pulse oximetry. Pulse rate was estimated by oximetry recording. Sleep staging (wake, REM, light, and deep sleep) was derived from PAT signal.  This study was considered adequate based on > 4 hours of quality oximetry and respiratory recording. As specified by the AASM Manual for the Scoring of Sleep and Associated events, version 2.3, Rule VIII.D 1B, 4% oxygen desaturation scoring for hypopneas is used as a standard of care on all home sleep apnea testing.    Total Recording Time: 8 hrs, 41 min  Total Sleep Time: 7 hrs, 54 min  % of Sleep Time REM: 29%    Respiratory:  Snoring: Snoring was present.  Respiratory events: The PAT respiratory disturbance index [pRDI] was 31 events per hour.  The PAT apnea/hypopnea index [pAHI] was 30.5 events per hour.  GRACE was 29.6 events per hour.  During REM sleep the pAHI was 7.5.  Sleep Associated Hypoxemia: sustained hypoxemia was not present. Mean oxygen saturation was 92%.  Minimum was 84%.  Time with saturation less than 88% was 7.1 minutes.    Heart Rate: By pulse  oximetry normal rate was noted.     Position: Percent of time spent: supine - 46.7%, prone - 19.7%, on right - 22.1%, on left - 11.5%.  pAHI was 10.7 per hour supine, 18.3 per hour prone, 86 per hour on right side, and 25.8 per hour on left side.     Assessment:   Severe obstructive sleep apnea.  Sleep associated hypoxemia was present.    Recommendations:  Consider auto-CPAP at 5-15 cmH2O, oral appliance therapy or polysomnography with full night PAP titration.  Suggest optimizing sleep hygiene and avoiding sleep deprivation.  Weight management.    Diagnosis Code(s): Obstructive Sleep Apnea G47.33, Hypoxemia G47.36    Rashaun Miranda MD, MD, May 3, 2022   Diplomate, American Board of Family Medicine, Sleep Medicine

## 2022-05-07 ENCOUNTER — HEALTH MAINTENANCE LETTER (OUTPATIENT)
Age: 52
End: 2022-05-07

## 2022-05-17 ENCOUNTER — VIRTUAL VISIT (OUTPATIENT)
Dept: SLEEP MEDICINE | Facility: HOSPITAL | Age: 52
End: 2022-05-17
Attending: FAMILY MEDICINE
Payer: COMMERCIAL

## 2022-05-17 VITALS — HEIGHT: 72 IN | BODY MASS INDEX: 33.86 KG/M2 | WEIGHT: 250 LBS

## 2022-05-17 DIAGNOSIS — G47.33 OSA (OBSTRUCTIVE SLEEP APNEA): Primary | ICD-10-CM

## 2022-05-17 PROCEDURE — 99203 OFFICE O/P NEW LOW 30 MIN: CPT | Mod: GT | Performed by: FAMILY MEDICINE

## 2022-05-17 ASSESSMENT — SLEEP AND FATIGUE QUESTIONNAIRES
HOW LIKELY ARE YOU TO NOD OFF OR FALL ASLEEP WHILE LYING DOWN TO REST IN THE AFTERNOON WHEN CIRCUMSTANCES PERMIT: HIGH CHANCE OF DOZING
HOW LIKELY ARE YOU TO NOD OFF OR FALL ASLEEP WHILE WATCHING TV: MODERATE CHANCE OF DOZING
HOW LIKELY ARE YOU TO NOD OFF OR FALL ASLEEP WHILE SITTING QUIETLY AFTER LUNCH WITHOUT ALCOHOL: HIGH CHANCE OF DOZING
HOW LIKELY ARE YOU TO NOD OFF OR FALL ASLEEP IN A CAR, WHILE STOPPED FOR A FEW MINUTES IN TRAFFIC: WOULD NEVER DOZE
HOW LIKELY ARE YOU TO NOD OFF OR FALL ASLEEP WHILE SITTING AND READING: MODERATE CHANCE OF DOZING
HOW LIKELY ARE YOU TO NOD OFF OR FALL ASLEEP WHILE SITTING INACTIVE IN A PUBLIC PLACE: WOULD NEVER DOZE
HOW LIKELY ARE YOU TO NOD OFF OR FALL ASLEEP WHILE SITTING AND TALKING TO SOMEONE: WOULD NEVER DOZE
HOW LIKELY ARE YOU TO NOD OFF OR FALL ASLEEP WHEN YOU ARE A PASSENGER IN A CAR FOR AN HOUR WITHOUT A BREAK: MODERATE CHANCE OF DOZING

## 2022-05-17 ASSESSMENT — PAIN SCALES - GENERAL: PAINLEVEL: NO PAIN (0)

## 2022-05-17 NOTE — PROGRESS NOTES
"Bryan is a 51 year old who is being evaluated via a billable video visit.      How would you like to obtain your AVS? MyChart  If the video visit is dropped, the invitation should be resent by: Text to cell phone: [phone]   Will anyone else be joining your video visit? WADE Lorenz/MAYANK          The patient has been notified of following:      \"This video visit will be conducted via a call between you and your physician/provider. We have found that certain health care needs can be provided without the need for an in-person physical exam.  This service lets us provide the care you need with a video conversation.  If a prescription is necessary we can send it directly to your pharmacy.  If lab work is needed we can place an order for that and you can then stop by our lab to have the test done at a later time.     Video visits are billed at different rates depending on your insurance coverage.  Please reach out to your insurance provider with any questions.     If during the course of the call the physician/provider feels a video visit is not appropriate, you will not be charged for this service.\"     Patient has given verbal consent for Video visit? Yes  How would you like to obtain your AVS? Mail a copy  If you are dropped from the video visit, the video invite should be resent to: Text to cell phone: -  Will anyone else be joining your video visit? No  If patient encounters technical issues they should call [phone]      Video-Visit Details     Type of service:  Video Visit     Video Start Time: 2:30pm  Video End Time: 2:55pm    Originating Location (pt. Location): Home     Distant Location (provider location):  [facility]      Platform used for Video Visit: LIN TV    Virtual visit for review of sleep testing results.     A/P:  1.)  Severe ANICETO (pAHI 30.5) with mild sleep-associated hypoxemia (SpO2 <= 88% for 7.1 minutes)   - Discussed treatment options including " "CPAP, oral appliances, weight management.   - Plan to proceed with oral appliance for treatment of ANICETO, current dentist is [facility].     2.)  RLS  - Currently treated with pramipexole 0.25-0.5mg TID  - Will review if residual symptoms  - Plan for baseline ferritin  -  decreasing caffeine use near bed and nicotine use on exacerbating RLS    SUBJECTIVE:  Rojelio Juárez is a 51 year old male.    51 year old yo male who is referred for concern for sleep-disordered breathing.    Pertinent PMHx of DM II, HTN, tobacco use, HLD, morbid obesity, RLS.     STOP-BANG score of 6, with unknown neck circumference.  [place] score of 6.  BMI of Estimated body mass index is 36.84 kg/m  as calculated from the following:    Height as of 3/3/22: 1.829 m (6').    Weight as of 3/3/22: 123.2 kg (271 lb 9.6 oz).      Today - Per questionnaire: \"Sleep problems\"     Sxs for decades, no prior sleep testing.     Caffeine use:  No for 3+ per day.  Yes for within 6 hours of bed.     Tobacco use: Yes     Sleep pattern:  Workdays.  9pm - 5am, total sleep time 8 hours.  Weekends.  9pm - 7am, total sleep time 8 hours.  Time to fall asleep: ~10 minutes.  Awakenings: 3 times per night, 5 minutes to return to sleep, awake for total of 60 minutes.  Napping.  0 days per week, - hours per nap.     Yes for RLS screen.  No for sleep walking.  No for dream enactment behavior.  Yes for bruxism.     Yes for morning headaches.  Yes for snoring.  Yes for observed apnea.  No for FHx of ANICETO.     SHx:  , works for [employer].    WatchPAT - HOME SLEEP STUDY INTERPRETATION     Patient: Rojelio Juárez  MRN: [id number]  YOB: 1970  Study Date: 4/26/2022  Referring Provider: Marion Davis  Ordering Provider: Rashaun Miranda MD, MD     Chain of custody patient verification was not enabled.       Indications for Home Study: Rojelio Juárez is a 51 year old male with a history of DM II, HTN, tobacco use, HLD, morbid " obesity, RLS who presents with symptoms suggestive of obstructive sleep apnea.     Estimated body mass index is 36.84 kg/m  as calculated from the following:    Height as of 3/3/22: 1.829 m (6').    Weight as of 3/3/22: 123.2 kg (271 lb 9.6 oz).  [place] Sleepiness Scale:   STOP-BAN/8     Data: A full night home sleep study was performed recording the standard physiologic parameters including peripheral arterial tonometry (PAT), sound/snoring, body position,  movement, sound, and oxygen saturation by pulse oximetry. Pulse rate was estimated by oximetry recording. Sleep staging (wake, REM, light, and deep sleep) was derived from PAT signal.  This study was considered adequate based on > 4 hours of quality oximetry and respiratory recording. As specified by the AASM Manual for the Scoring of Sleep and Associated events, version 2.3, Rule VIII.D 1B, 4% oxygen desaturation scoring for hypopneas is used as a standard of care on all home sleep apnea testing.     Total Recording Time: 8 hrs, 41 min  Total Sleep Time: 7 hrs, 54 min  % of Sleep Time REM: 29%     Respiratory:  Snoring: Snoring was present.  Respiratory events: The PAT respiratory disturbance index [pRDI] was 31 events per hour.  The PAT apnea/hypopnea index [pAHI] was 30.5 events per hour.  GRACE was 29.6 events per hour.  During REM sleep the pAHI was 7.5.  Sleep Associated Hypoxemia: sustained hypoxemia was not present. Mean oxygen saturation was 92%.  Minimum was 84%.  Time with saturation less than 88% was 7.1 minutes.     Heart Rate: By pulse oximetry normal rate was noted.      Position: Percent of time spent: supine - 46.7%, prone - 19.7%, on right - 22.1%, on left - 11.5%.  pAHI was 10.7 per hour supine, 18.3 per hour prone, 86 per hour on right side, and 25.8 per hour on left side.      Assessment:   Severe obstructive sleep apnea.  Sleep associated hypoxemia was present.     Recommendations:  Consider auto-CPAP at 5-15 cmH2O, oral appliance  therapy or polysomnography with full night PAP titration.  Suggest optimizing sleep hygiene and avoiding sleep deprivation.  Weight management.     Diagnosis Code(s): Obstructive Sleep Apnea G47.33, Hypoxemia G47.36     Rashaun Miranda MD, MD, May 3, 2022   Diplomate, American Board of Family Medicine, Sleep Medicine       Past medical history:    Patient Active Problem List    Diagnosis Date Noted     Spinal stenosis of lumbar region, unspecified whether neurogenic claudication present 02/24/2020     Priority: Medium     Added automatically from request for surgery [id number]       Bilateral low back pain without sciatica 02/21/2020     Priority: Medium     Lumbar disc herniation with radiculopathy 02/21/2020     Priority: Medium     Sebaceous hyperplasia 01/21/2020     Priority: Medium     Skin tag 01/21/2020     Priority: Medium     Other seborrheic dermatitis 01/21/2020     Priority: Medium     Intrinsic eczema 01/21/2020     Priority: Medium     Fatty infiltration of liver 11/21/2018     Priority: Medium     Hyperlipidemia associated with type 2 diabetes mellitus (H) 06/25/2018     Priority: Medium     Type 2 diabetes mellitus without complication, with long-term current use of insulin (H) 06/22/2018     Priority: Medium     Morbid obesity (H) 06/22/2018     Priority: Medium     Status post lumbar microdiscectomy 03/13/2017     Priority: Medium     ACP (advance care planning) 01/20/2017     Priority: Medium     Advance Care Planning 1/20/2017: ACP Review of Chart / Resources Provided:  Reviewed chart for advance care plan.  Rojelio Juárez has no plan or code status on file. Discussed available resources and provided with information. Confirmed code status reflects current choices pending further ACP discussions.  Confirmed/documented legally designated decision makers.  Added by LUCIANO OLIVAS               Herniated intervertebral disc of lumbar spine 01/20/2017     Priority: Medium     Benign essential  hypertension 01/20/2017     Priority: Medium     Tobacco dependence syndrome 01/20/2017     Priority: Medium       10 point ROS of systems including Constitutional, Eyes, Respiratory, Cardiovascular, Gastroenterology, Genitourinary, Integumentary, Muscularskeletal, Psychiatric were all negative except for pertinent positives noted in my HPI.    Current Outpatient Medications   Medication Sig Dispense Refill     albuterol (PROAIR HFA/PROVENTIL HFA/VENTOLIN HFA) 108 (90 Base) MCG/ACT inhaler Inhale 2 puffs into the lungs every 6 hours 18 g 3     aspirin 81 MG EC tablet Take 1 tablet (81 mg) by mouth daily 90 tablet 3     atorvastatin (LIPITOR) 10 MG tablet TAKE 1 TABLET(10 MG) BY MOUTH AT BEDTIME 90 tablet 3     blood glucose (NO BRAND SPECIFIED) lancets standard Use to test blood sugar 3 times daily or as directed. 300 each 3     blood glucose (NO BRAND SPECIFIED) test strip Use to test blood sugar 3 times daily or as directed. 300 strip 3     blood glucose monitoring (NO BRAND SPECIFIED) meter device kit Use to test blood sugar 3 times daily or as directed. 1 kit 0     escitalopram (LEXAPRO) 10 MG tablet Take 1 tablet (10 mg) by mouth daily 30 tablet 1     ibuprofen (ADVIL/MOTRIN) 800 MG tablet Take 1 tablet (800 mg) by mouth every 8 hours as needed for moderate pain 90 tablet 0     JARDIANCE 25 MG TABS tablet TAKE 1 TABLET(25 MG) BY MOUTH DAILY 90 tablet 1     losartan (COZAAR) 25 MG tablet TAKE 1/2 TABLET(12.5 MG) BY MOUTH DAILY 45 tablet 1     nystatin-triamcinolone (MYCOLOG II) 736547-1.1 UNIT/GM-% external cream Apply topically 2 times daily as needed (to affected area) 120 g 1     pramipexole (MIRAPEX) 0.25 MG tablet Take 1-2 tablets (0.25-0.5 mg) by mouth 3 times daily 60 tablet 1     sildenafil (VIAGRA) 50 MG tablet Take 0.5 tablets (25 mg) by mouth daily 30 tablet 11       OBJECTIVE:  There were no vitals taken for this visit.    Physical Exam     ---  This note was written with the assistance of the Dragon  voice-dictation technology software. The final document, although reviewed, may contain errors. For corrections, please contact the office.    Rashaun Miranda MD    Sleep Medicine  [facility]  ([phone])  [facility]  ([phone])    Time spent on the date of service:  25 minutes.

## 2022-05-20 DIAGNOSIS — I10 BENIGN ESSENTIAL HYPERTENSION: ICD-10-CM

## 2022-05-23 RX ORDER — LOSARTAN POTASSIUM 25 MG/1
TABLET ORAL
Qty: 45 TABLET | Refills: 2 | Status: SHIPPED | OUTPATIENT
Start: 2022-05-23 | End: 2022-07-13

## 2022-07-11 ENCOUNTER — NURSE TRIAGE (OUTPATIENT)
Dept: FAMILY MEDICINE | Facility: OTHER | Age: 52
End: 2022-07-11

## 2022-07-11 NOTE — TELEPHONE ENCOUNTER
"Protocol advises patient to be seen within 2 weeks for upper abdominal pain that started 3-4 weeks ago. Patient reports no pain right now, but reports pain comes and goes throughout the day. Patient also reports his breathing is difficult at times that comes and goes. Patient reports no difficulty breathing at time of triage call. Offered patient first available with PCP, 7/21. Patient declined and stated he needs to be seen today. No available with covering provider in [facility] or [place] today. Advised patient to be seen in ER for new or worsening symptoms. Patient verbalized understanding. Can patient be seen this week with PCP?   Please advise, thank you.    Reason for Disposition    Pain is mainly in upper abdomen  (if needed ask: \"is it mainly above the belly button?\")    Abdominal pain is a chronic symptom (recurrent or ongoing AND present > 4 weeks)    Additional Information    Negative: Shock suspected (e.g., cold/pale/clammy skin, too weak to stand, low BP, rapid pulse)    Negative: Difficult to awaken or acting confused (e.g., disoriented, slurred speech)    Negative: Passed out (i.e., lost consciousness, collapsed and was not responding)    Negative: Sounds like a life-threatening emergency to the triager    Negative: Chest pain    Negative: Severe difficulty breathing (e.g., struggling for each breath, speaks in single words)    Negative: Shock suspected (e.g., cold/pale/clammy skin, too weak to stand, low BP, rapid pulse)    Negative: Difficult to awaken or acting confused (e.g., disoriented, slurred speech)    Negative: Passed out (i.e., lost consciousness, collapsed and was not responding)    Negative: Visible sweat on face or sweat dripping down face    Negative: Sounds like a life-threatening emergency to the triager    Negative: Followed an abdomen (stomach) injury    Negative: Chest pain    Negative: [1] SEVERE pain (e.g., excruciating) AND [2] present > 1 hour    Negative: [1] Pain lasts > 10 " "minutes AND [2] age > 50    Negative: [1] Pain lasts > 10 minutes AND [2] age > 40 AND [3] associated chest, arm, neck, upper back or jaw pain    Negative: [1] Pain lasts > 10 minutes AND [2] age > 35 AND [3] at least one cardiac risk factor (i.e., hypertension, diabetes, obesity, smoker or strong family history of heart disease)    Negative: [1] Pain lasts > 10 minutes AND [2] history of heart disease (i.e., heart attack, bypass surgery, angina, angioplasty, CHF; not just a heart murmur)    Negative: [1] Pain lasts > 10 minutes AND [2] difficulty breathing    Negative: [1] Vomiting AND [2] contains red blood  (Exception: few streaks and only occurred once)    Negative: [1] Vomiting AND [2] contains black (\"coffee ground\") material    Negative: Blood in bowel movements  (Exception: Blood on surface of BM with constipation)    Negative: Black or tarry bowel movements  (Exception: chronic-unchanged  black-grey bowel movements AND is taking iron pills or Pepto-bismol)    Negative: [1] Pregnant > 24 weeks AND [2] hand or face swelling    Negative: Patient sounds very sick or weak to the triager    Negative: [1] MILD-MODERATE pain AND [2] constant AND [3] present > 2 hours    Negative: [1] MILD-MODERATE pain AND [2] not relieved by antacids    Negative: [1] Vomiting AND [2] contains bile (green color)    Negative: [1] Vomiting AND [2] abdomen looks much more swollen than usual    Negative: White of the eyes have turned yellow (i.e., jaundice)    Negative: Fever > 103 F (39.4 C)    Negative: [1] Fever > 101 F (38.3 C) AND [2] age > 60    Negative: [1] Fever > 100.0 F (37.8 C) AND [2] bedridden (e.g., nursing home patient, CVA, chronic illness, recovering from surgery)    Negative: [1] Fever > 100.0 F (37.8 C) AND [2] diabetes mellitus or weak immune system (e.g., HIV positive, cancer chemo, splenectomy, organ transplant, chronic steroids)    Negative: [1] MODERATE pain (e.g., interferes with normal activities) AND [2] " "comes and goes (cramps) AND [3] present > 24 hours  (Exception: pain with Vomiting or Diarrhea - see that Guideline)    Negative: [1] MILD pain (e.g., does not interfere with normal activities) AND [2] comes and goes (cramps) AND [3] present > 72 hours    Negative: Alcohol abuse known or suspected    Negative: [1] Abdominal pain is intermittent AND [2] shoots into chest, with sour taste in mouth    Answer Assessment - Initial Assessment Questions  1. LOCATION: \"Where does it hurt?\"       Upper right and left abdomen  2. RADIATION: \"Does the pain shoot anywhere else?\" (e.g., chest, back)      no  3. ONSET: \"When did the pain begin?\" (Minutes, hours or days ago)       3 weeks ago  4. SUDDEN: \"Gradual or sudden onset?\"      sudden  5. PATTERN \"Does the pain come and go, or is it constant?\"     - If constant: \"Is it getting better, staying the same, or worsening?\"       (Note: Constant means the pain never goes away completely; most serious pain is constant and it progresses)      - If intermittent: \"How long does it last?\" \"Do you have pain now?\"      (Note: Intermittent means the pain goes away completely between bouts)      Comes and goes  6. SEVERITY: \"How bad is the pain?\"  (e.g., Scale 1-10; mild, moderate, or severe)     - MILD (1-3): doesn't interfere with normal activities, abdomen soft and not tender to touch      - MODERATE (4-7): interferes with normal activities or awakens from sleep, tender to touch      - SEVERE (8-10): excruciating pain, doubled over, unable to do any normal activities        No pain right now  7. RECURRENT SYMPTOM: \"Have you ever had this type of abdominal pain before?\" If so, ask: \"When was the last time?\" and \"What happened that time?\"       No   8. CAUSE: \"What do you think is causing the abdominal pain?\"      Unsure   9. RELIEVING/AGGRAVATING FACTORS: \"What makes it better or worse?\" (e.g., movement, antacids, bowel movement)      Nothing makes it works or better it just comes and " "goes   10. OTHER SYMPTOMS: \"Has there been any vomiting, diarrhea, constipation, or urine problems?\"        no    Protocols used: ABDOMINAL PAIN - MALE-A-AH, ABDOMINAL PAIN - UPPER-A-AH      "

## 2022-07-11 NOTE — PROGRESS NOTES
Assessment & Plan     1. Cough  - Symptomatic; Yes; 7/10/2022 Influenza A/B & SARS-CoV2 (COVID-19) Virus PCR Multiplex; Future    2. Abdominal pain, generalized  - Comprehensive metabolic panel; Future  - CBC with Platelets & Differential; Future  - CT Abdomen Pelvis w/o & w Contrast; Future  - Amylase; Future  - Lipase; Future    3. Benign essential hypertension  Dose increase   - losartan (COZAAR) 25 MG tablet; Take 1 tablet (25 mg) by mouth daily  Dispense: 90 tablet; Refill: 1    4. Elevated LFTs  Abdominal CT as ordered.     follow-up in 2 weeks for chronic conditions.     Marion Davis NP  [facility] - [place]JAM[facility]N Adams is a 52 year old, presenting for the following health issues:  Abdominal Pain      HPI     Pain History:  When did you first notice your pain? - Acute Pain   Have you seen anyone else for your pain? No  Where in your body do you have pain? Abdominal/Flank Pain  Onset/Duration: 3-4 weeks   Description:   Character: Sharp  Location: left upper quadrant right lower quadrant  Radiation: None  Intensity: severe, when it occurs. Intermittent   Progression of Symptoms:  intermittent  Accompanying Signs & Symptoms:  Fever/Chills: no but last Sunday Monday was not feeling well.   Gas/Bloating: No  Nausea: No  Vomitting: No  Diarrhea: No  Constipation: No  Dysuria or Hematuria: No  History:   Trauma: No  Previous similar pain: No  Previous tests done: none  Precipitating factors:   Does the pain change with:     Food: No    Bowel Movement: No    Urination: No   Other factors:  A lot of fatigue and shortness of breath.   Therapies tried and outcome: Ibuprofen with help    Symptoms worsened Pieter July 10 .  Notes fatigue, shortness of breath, sinus pain pressure and cough.  Fever/chills Sunday and Monday, felt better yesterday.  Home covid test negative yesterday.  Abdominal pain that is intermittent, decreased appetite.  Sleeping all the time.       Recent Labs   Lab  Test 11/05/21  1022 04/14/21  0900 11/11/20  1108   A1C 6.1* 5.7* 6.8*   LDL 94 91 57   HDL 54 67 39*   TRIG 154* 89 144   * 154* 92*   CR 0.81 0.81 0.89   GFRESTIMATED >90 >90 >90   GFRESTBLACK  --  >90 >90   POTASSIUM 4.1 4.0 3.9   TSH 1.26 0.98 0.91      BP Readings from Last 3 Encounters:   07/13/22 (!) 122/92   03/03/22 116/86   11/09/21 (!) 110/90    Wt Readings from Last 3 Encounters:   07/13/22 122.1 kg (269 lb 1.6 oz)   05/17/22 113.4 kg (250 lb)   03/03/22 123.2 kg (271 lb 9.6 oz)                Review of Systems   Constitutional, HEENT, cardiovascular, pulmonary, gi and gu systems are negative, except as otherwise noted.      Objective    BP (!) 122/92 (BP Location: Right arm, Patient Position: Chair, Cuff Size: Adult Regular)   Pulse 91   Temp 97  F (36.1  C) (Tympanic)   Wt 122.1 kg (269 lb 1.6 oz)   SpO2 98%   BMI 36.50 kg/m    Body mass index is 36.5 kg/m .  Physical Exam   GENERAL: alert, no distress and Ill appearing, glossy eyes  HENT: ear canals and TM's normal, nose and mouth without ulcers or lesions  NECK: no adenopathy, no asymmetry, masses, or scars and thyroid normal to palpation  RESP: lungs clear to auscultation - no rales, rhonchi or wheezes but cough with inspiration.   CV: regular rate and rhythm, normal S1 S2, no S3 or S4, no murmur  ABDOMEN: bowel sounds normal, epigastric tenderness  MS: no gross musculoskeletal defects noted, no edema  PSYCH: mentation appears normal, affect normal/bright        Results for orders placed or performed in visit on 07/13/22   Comprehensive metabolic panel     Status: Abnormal   Result Value Ref Range    Sodium 136 133 - 144 mmol/L    Potassium 4.3 3.4 - 5.3 mmol/L    Chloride 108 94 - 109 mmol/L    Carbon Dioxide (CO2) 20 20 - 32 mmol/L    Anion Gap 8 3 - 14 mmol/L    Urea Nitrogen 20 7 - 30 mg/dL    Creatinine 0.73 0.66 - 1.25 mg/dL    Calcium 9.9 8.5 - 10.1 mg/dL    Glucose 169 (H) 70 - 99 mg/dL    Alkaline Phosphatase 93 40 - 150 U/L      (H) 0 - 45 U/L     (H) 0 - 70 U/L    Protein Total 8.2 6.8 - 8.8 g/dL    Albumin 4.3 3.4 - 5.0 g/dL    Bilirubin Total 0.9 0.2 - 1.3 mg/dL    GFR Estimate >90 >60 mL/min/1.73m2   Amylase     Status: Normal   Result Value Ref Range    Amylase 97 30 - 110 U/L   Lipase     Status: Normal   Result Value Ref Range    Lipase 160 73 - 393 U/L   CBC with platelets and differential     Status: Abnormal   Result Value Ref Range    WBC Count 7.0 4.0 - 11.0 10e3/uL    RBC Count 5.51 4.40 - 5.90 10e6/uL    Hemoglobin 18.5 (H) 13.3 - 17.7 g/dL    Hematocrit 50.9 40.0 - 53.0 %    MCV 92 78 - 100 fL    MCH 33.6 (H) 26.5 - 33.0 pg    MCHC 36.3 31.5 - 36.5 g/dL    RDW 12.7 10.0 - 15.0 %    Platelet Count 189 150 - 450 10e3/uL    % Neutrophils 52 %    % Lymphocytes 30 %    % Monocytes 11 %    % Eosinophils 7 %    % Basophils 1 %    Absolute Neutrophils 3.6 1.6 - 8.3 10e3/uL    Absolute Lymphocytes 2.1 0.8 - 5.3 10e3/uL    Absolute Monocytes 0.7 0.0 - 1.3 10e3/uL    Absolute Eosinophils 0.5 0.0 - 0.7 10e3/uL    Absolute Basophils 0.1 0.0 - 0.2 10e3/uL   CBC with Platelets & Differential     Status: Abnormal    Narrative    The following orders were created for panel order CBC with Platelets & Differential.  Procedure                               Abnormality         Status                     ---------                               -----------         ------                     CBC with platelets and d...[[id number]]  Abnormal            Final result                 Please view results for these tests on the individual orders.                 .  ..

## 2022-07-13 ENCOUNTER — OFFICE VISIT (OUTPATIENT)
Dept: FAMILY MEDICINE | Facility: OTHER | Age: 52
End: 2022-07-13
Attending: NURSE PRACTITIONER
Payer: COMMERCIAL

## 2022-07-13 VITALS
BODY MASS INDEX: 36.5 KG/M2 | DIASTOLIC BLOOD PRESSURE: 88 MMHG | SYSTOLIC BLOOD PRESSURE: 110 MMHG | WEIGHT: 269.1 LBS | OXYGEN SATURATION: 98 % | TEMPERATURE: 97 F | HEART RATE: 91 BPM

## 2022-07-13 DIAGNOSIS — R05.9 COUGH: Primary | ICD-10-CM

## 2022-07-13 DIAGNOSIS — R10.84 ABDOMINAL PAIN, GENERALIZED: ICD-10-CM

## 2022-07-13 DIAGNOSIS — R79.89 ELEVATED LFTS: ICD-10-CM

## 2022-07-13 DIAGNOSIS — I10 BENIGN ESSENTIAL HYPERTENSION: ICD-10-CM

## 2022-07-13 LAB
ALBUMIN SERPL-MCNC: 4.3 G/DL (ref 3.4–5)
ALP SERPL-CCNC: 93 U/L (ref 40–150)
ALT SERPL W P-5'-P-CCNC: 195 U/L (ref 0–70)
AMYLASE SERPL-CCNC: 97 U/L (ref 30–110)
ANION GAP SERPL CALCULATED.3IONS-SCNC: 8 MMOL/L (ref 3–14)
AST SERPL W P-5'-P-CCNC: 101 U/L (ref 0–45)
BASOPHILS # BLD AUTO: 0.1 10E3/UL (ref 0–0.2)
BASOPHILS NFR BLD AUTO: 1 %
BILIRUB SERPL-MCNC: 0.9 MG/DL (ref 0.2–1.3)
BUN SERPL-MCNC: 20 MG/DL (ref 7–30)
CALCIUM SERPL-MCNC: 9.9 MG/DL (ref 8.5–10.1)
CHLORIDE BLD-SCNC: 108 MMOL/L (ref 94–109)
CO2 SERPL-SCNC: 20 MMOL/L (ref 20–32)
CREAT SERPL-MCNC: 0.73 MG/DL (ref 0.66–1.25)
EOSINOPHIL # BLD AUTO: 0.5 10E3/UL (ref 0–0.7)
EOSINOPHIL NFR BLD AUTO: 7 %
ERYTHROCYTE [DISTWIDTH] IN BLOOD BY AUTOMATED COUNT: 12.7 % (ref 10–15)
FLUAV RNA SPEC QL NAA+PROBE: NEGATIVE
FLUBV RNA RESP QL NAA+PROBE: NEGATIVE
GFR SERPL CREATININE-BSD FRML MDRD: >90 ML/MIN/1.73M2
GLUCOSE BLD-MCNC: 169 MG/DL (ref 70–99)
HCT VFR BLD AUTO: 50.9 % (ref 40–53)
HGB BLD-MCNC: 18.5 G/DL (ref 13.3–17.7)
LIPASE SERPL-CCNC: 160 U/L (ref 73–393)
LYMPHOCYTES # BLD AUTO: 2.1 10E3/UL (ref 0.8–5.3)
LYMPHOCYTES NFR BLD AUTO: 30 %
MCH RBC QN AUTO: 33.6 PG (ref 26.5–33)
MCHC RBC AUTO-ENTMCNC: 36.3 G/DL (ref 31.5–36.5)
MCV RBC AUTO: 92 FL (ref 78–100)
MONOCYTES # BLD AUTO: 0.7 10E3/UL (ref 0–1.3)
MONOCYTES NFR BLD AUTO: 11 %
NEUTROPHILS # BLD AUTO: 3.6 10E3/UL (ref 1.6–8.3)
NEUTROPHILS NFR BLD AUTO: 52 %
PLATELET # BLD AUTO: 189 10E3/UL (ref 150–450)
POTASSIUM BLD-SCNC: 4.3 MMOL/L (ref 3.4–5.3)
PROT SERPL-MCNC: 8.2 G/DL (ref 6.8–8.8)
RBC # BLD AUTO: 5.51 10E6/UL (ref 4.4–5.9)
RSV RNA SPEC NAA+PROBE: NEGATIVE
SARS-COV-2 RNA RESP QL NAA+PROBE: NEGATIVE
SODIUM SERPL-SCNC: 136 MMOL/L (ref 133–144)
WBC # BLD AUTO: 7 10E3/UL (ref 4–11)

## 2022-07-13 PROCEDURE — 99214 OFFICE O/P EST MOD 30 MIN: CPT | Performed by: NURSE PRACTITIONER

## 2022-07-13 PROCEDURE — 83690 ASSAY OF LIPASE: CPT | Performed by: NURSE PRACTITIONER

## 2022-07-13 PROCEDURE — 87637 SARSCOV2&INF A&B&RSV AMP PRB: CPT | Performed by: NURSE PRACTITIONER

## 2022-07-13 PROCEDURE — 80053 COMPREHEN METABOLIC PANEL: CPT | Performed by: NURSE PRACTITIONER

## 2022-07-13 PROCEDURE — 82150 ASSAY OF AMYLASE: CPT | Performed by: NURSE PRACTITIONER

## 2022-07-13 PROCEDURE — 85025 COMPLETE CBC W/AUTO DIFF WBC: CPT | Performed by: NURSE PRACTITIONER

## 2022-07-13 PROCEDURE — 36415 COLL VENOUS BLD VENIPUNCTURE: CPT | Performed by: NURSE PRACTITIONER

## 2022-07-13 RX ORDER — BETAMETHASONE DIPROPIONATE 0.5 MG/G
CREAM TOPICAL
COMMUNITY
Start: 2022-03-31 | End: 2023-02-20

## 2022-07-13 RX ORDER — LOSARTAN POTASSIUM 25 MG/1
25 TABLET ORAL DAILY
Qty: 90 TABLET | Refills: 1 | Status: SHIPPED | OUTPATIENT
Start: 2022-07-13 | End: 2023-05-04

## 2022-07-13 ASSESSMENT — PAIN SCALES - GENERAL: PAINLEVEL: NO PAIN (0)

## 2022-07-13 NOTE — NURSING NOTE
Chief Complaint   Patient presents with     Abdominal Pain       Initial BP (!) 122/92 (BP Location: Right arm, Patient Position: Chair, Cuff Size: Adult Regular)   Pulse 91   Temp 97  F (36.1  C) (Tympanic)   Wt 122.1 kg (269 lb 1.6 oz)   SpO2 98%   BMI 36.50 kg/m   Estimated body mass index is 36.5 kg/m  as calculated from the following:    Height as of 5/17/22: 1.829 m (6').    Weight as of this encounter: 122.1 kg (269 lb 1.6 oz).  Medication Reconciliation: complete  Vanessa Arzate

## 2022-07-15 ENCOUNTER — HOSPITAL ENCOUNTER (OUTPATIENT)
Dept: CT IMAGING | Facility: HOSPITAL | Age: 52
Discharge: HOME | End: 2022-07-15
Attending: NURSE PRACTITIONER | Admitting: NURSE PRACTITIONER
Payer: COMMERCIAL

## 2022-07-15 DIAGNOSIS — R10.84 ABDOMINAL PAIN, GENERALIZED: ICD-10-CM

## 2022-07-15 PROCEDURE — 250N000011 HC RX IP 250 OP 636: Performed by: RADIOLOGY

## 2022-07-15 PROCEDURE — 74177 CT ABD & PELVIS W/CONTRAST: CPT

## 2022-07-15 RX ORDER — IOPAMIDOL 755 MG/ML
120 INJECTION, SOLUTION INTRAVASCULAR ONCE
Status: COMPLETED | OUTPATIENT
Start: 2022-07-15 | End: 2022-07-15

## 2022-07-15 RX ADMIN — IOPAMIDOL 120 ML: 755 INJECTION, SOLUTION INTRAVENOUS at 09:06

## 2022-07-24 NOTE — PROGRESS NOTES
Assessment & Plan     1. Type 2 diabetes mellitus without complication, with long-term current use of insulin (H)  Continue current plan   - Hemoglobin A1c; Future  - [facility] FOOT EXAM  NO CHARGE    2. Hyperlipidemia associated with type 2 diabetes mellitus (H)  continue lipitor  - Lipid Profile; Future    3. Benign essential hypertension  Well controlled   - Lipid Profile; Future  - Comprehensive metabolic panel; Future  - TSH with free T4 reflex; Future    4. Nicotine dependence, uncomplicated, unspecified nicotine product type  - SMOKING CESSATION COUNSELING 3-10 MIN    5. On statin therapy  - Comprehensive metabolic panel; Future    6. Restless legs syndrome  Stop mirapex  Start gabapentin - 300mg once daily for three days, then increase to 300mg twice daily for three days.  If symptoms are controlled, stop - if not increase to 300mg three times daily and stay on this dose.     7. Other dysphagia  He will call [place] to schedule consult due to work schedule.        Review of prior external note(s) from - CareEverywhere information from GI notes reviewed        Return in about 6 months (around 1/27/2023) for diabetes, lipids, HTN.    Marion Davis NP  [facility] - [place] C[place] Adams is a 52 year old, presenting for the following health issues:  Hypertension, Diabetes, and Lipids      HPI     Diabetes Follow-up      How often are you checking your blood sugar? Not at all    What concerns do you have today about your diabetes? None     Do you have any of these symptoms? (Select all that apply)  No numbness or tingling in feet.  No redness, sores or blisters on feet.  No complaints of excessive thirst.  No reports of blurry vision.  No significant changes to weight.      Hyperlipidemia Follow-Up      Are you regularly taking any medication or supplement to lower your cholesterol?   Yes- Lipitor     Are you having muscle aches or other side effects that you think could be caused by your  cholesterol lowering medication?  No    Hypertension Follow-up      Do you check your blood pressure regularly outside of the clinic? No     Are you following a low salt diet? Yes    Are your blood pressures ever more than 140 on the top number (systolic) OR more   than 90 on the bottom number (diastolic), for example 140/90? No    BP Readings from Last 2 Encounters:   07/27/22 114/78   07/13/22 110/88     Hemoglobin A1C POCT (%)   Date Value   04/14/2021 5.7 (H)   11/11/2020 6.8 (H)     Hemoglobin A1C (%)   Date Value   11/05/2021 6.1 (H)     LDL Cholesterol Calculated (mg/dL)   Date Value   11/05/2021 94   04/14/2021 91   11/11/2020 57         How many servings of fruits and vegetables do you eat daily?  0-1    On average, how many sweetened beverages do you drink each day (Examples: soda, juice, sweet tea, etc.  Do NOT count diet or artificially sweetened beverages)?   0    How many days per week do you exercise enough to make your heart beat faster? 3 or less    How many minutes a day do you exercise enough to make your heart beat faster? 10 - 19    How many days per week do you miss taking your medication? 0    His only other concern is ongoing abdominal issue.  He has been to Dr Sanchez who diagnosed achalasia. Dr Sanchez did botox injections, the first lasted one year the second 1 month.   He then obtained a second opinion with  who disagreed with that diagnosis.  Repeat EGD's are unremarkable.  Abdominal CT did not reveal a hiatal hernia.  He was referred to [facility] but did not answer the phone when scheduling called and when he called the following day appt ws 4 months out.  He did not schedule.  He does have the number and will call tomorrow to obtain an appt.      He struggles with nausea, vomiting, food getting stuck and epigastric pain, tenderness with palpation at base of sternum.      Recent Labs   Lab Test 07/13/22  1220 11/05/21  1022 04/14/21  0900 11/11/20  1108   A1C  --  6.1* 5.7* 6.8*   LDL  --  94  91 57   HDL  --  54 67 39*   TRIG  --  154* 89 144   * 108* 154* 92*   CR 0.73 0.81 0.81 0.89   GFRESTIMATED >90 >90 >90 >90   GFRESTBLACK  --   --  >90 >90   POTASSIUM 4.3 4.1 4.0 3.9   TSH  --  1.26 0.98 0.91      BP Readings from Last 3 Encounters:   07/27/22 114/78   07/13/22 110/88   03/03/22 116/86    Wt Readings from Last 3 Encounters:   07/27/22 121.1 kg (267 lb)   07/13/22 122.1 kg (269 lb 1.6 oz)   05/17/22 113.4 kg (250 lb)            Review of Systems   Constitutional, HEENT, cardiovascular, pulmonary, gi and gu systems are negative, except as otherwise noted.    mirapex is not helping control restless legs, interested in other options which are reviewed.        Objective    /78 (BP Location: Left arm, Patient Position: Chair, Cuff Size: Adult Large)   Pulse 101   Temp 98.8  F (37.1  C) (Tympanic)   Resp 16   Ht 1.829 m (6')   Wt 121.1 kg (267 lb)   SpO2 99%   BMI 36.21 kg/m    Body mass index is 36.21 kg/m .  Physical Exam   GENERAL: healthy, alert and no distress  NECK: no adenopathy, no asymmetry, masses, or scars, thyroid normal to palpation and no carotid bruits  RESP: lungs clear to auscultation - no rales, rhonchi or wheezes  CV: regular rate and rhythm, normal S1 S2, no S3 or S4, no murmur, click or rub, no peripheral edema and peripheral pulses strong  MS: no gross musculoskeletal defects noted, no edema  PSYCH: mentation appears normal, affect normal/bright    Office Visit on 07/13/2022   Component Date Value Ref Range Status     Influenza A PCR 07/13/2022 Negative  Negative Final     Influenza B PCR 07/13/2022 Negative  Negative Final     RSV PCR 07/13/2022 Negative  Negative Final     SARS CoV2 PCR 07/13/2022 Negative  Negative Final    NEGATIVE: SARS-CoV-2 (COVID-19) RNA not detected, presumed negative.     Sodium 07/13/2022 136  133 - 144 mmol/L Final     Potassium 07/13/2022 4.3  3.4 - 5.3 mmol/L Final     Chloride 07/13/2022 108  94 - 109 mmol/L Final     Carbon  Dioxide (CO2) 07/13/2022 20  20 - 32 mmol/L Final     Anion Gap 07/13/2022 8  3 - 14 mmol/L Final     Urea Nitrogen 07/13/2022 20  7 - 30 mg/dL Final     Creatinine 07/13/2022 0.73  0.66 - 1.25 mg/dL Final     Calcium 07/13/2022 9.9  8.5 - 10.1 mg/dL Final     Glucose 07/13/2022 169 (A) 70 - 99 mg/dL Final     Alkaline Phosphatase 07/13/2022 93  40 - 150 U/L Final     AST 07/13/2022 101 (A) 0 - 45 U/L Final     ALT 07/13/2022 195 (A) 0 - 70 U/L Final     Protein Total 07/13/2022 8.2  6.8 - 8.8 g/dL Final     Albumin 07/13/2022 4.3  3.4 - 5.0 g/dL Final     Bilirubin Total 07/13/2022 0.9  0.2 - 1.3 mg/dL Final     GFR Estimate 07/13/2022 >90  >60 mL/min/1.73m2 Final    Effective December 21, 2021 eGFRcr in adults is calculated using the 2021 CKD-EPI creatinine equation which includes age and gender (Catherine et al., [place], DOI: 10.1056/ESYWec3775765)     Amylase 07/13/2022 97  30 - 110 U/L Final     Lipase 07/13/2022 160  73 - 393 U/L Final     WBC Count 07/13/2022 7.0  4.0 - 11.0 10e3/uL Final     RBC Count 07/13/2022 5.51  4.40 - 5.90 10e6/uL Final     Hemoglobin 07/13/2022 18.5 (A) 13.3 - 17.7 g/dL Final     Hematocrit 07/13/2022 50.9  40.0 - 53.0 % Final     MCV 07/13/2022 92  78 - 100 fL Final     MCH 07/13/2022 33.6 (A) 26.5 - 33.0 pg Final     MCHC 07/13/2022 36.3  31.5 - 36.5 g/dL Final     RDW 07/13/2022 12.7  10.0 - 15.0 % Final     Platelet Count 07/13/2022 189  150 - 450 10e3/uL Final     % Neutrophils 07/13/2022 52  % Final     % Lymphocytes 07/13/2022 30  % Final     % Monocytes 07/13/2022 11  % Final     % Eosinophils 07/13/2022 7  % Final     % Basophils 07/13/2022 1  % Final     Absolute Neutrophils 07/13/2022 3.6  1.6 - 8.3 10e3/uL Final     Absolute Lymphocytes 07/13/2022 2.1  0.8 - 5.3 10e3/uL Final     Absolute Monocytes 07/13/2022 0.7  0.0 - 1.3 10e3/uL Final     Absolute Eosinophils 07/13/2022 0.5  0.0 - 0.7 10e3/uL Final     Absolute Basophils 07/13/2022 0.1  0.0 - 0.2 10e3/uL Final                    .  ..

## 2022-07-27 ENCOUNTER — OFFICE VISIT (OUTPATIENT)
Dept: FAMILY MEDICINE | Facility: OTHER | Age: 52
End: 2022-07-27
Attending: NURSE PRACTITIONER
Payer: COMMERCIAL

## 2022-07-27 VITALS
WEIGHT: 267 LBS | RESPIRATION RATE: 16 BRPM | BODY MASS INDEX: 36.16 KG/M2 | SYSTOLIC BLOOD PRESSURE: 114 MMHG | DIASTOLIC BLOOD PRESSURE: 78 MMHG | OXYGEN SATURATION: 99 % | HEART RATE: 101 BPM | HEIGHT: 72 IN | TEMPERATURE: 98.8 F

## 2022-07-27 DIAGNOSIS — Z79.4 TYPE 2 DIABETES MELLITUS WITHOUT COMPLICATION, WITH LONG-TERM CURRENT USE OF INSULIN (H): Primary | ICD-10-CM

## 2022-07-27 DIAGNOSIS — E11.9 TYPE 2 DIABETES MELLITUS WITHOUT COMPLICATION, WITH LONG-TERM CURRENT USE OF INSULIN (H): Primary | ICD-10-CM

## 2022-07-27 DIAGNOSIS — E78.5 HYPERLIPIDEMIA ASSOCIATED WITH TYPE 2 DIABETES MELLITUS (H): ICD-10-CM

## 2022-07-27 DIAGNOSIS — E11.69 HYPERLIPIDEMIA ASSOCIATED WITH TYPE 2 DIABETES MELLITUS (H): ICD-10-CM

## 2022-07-27 DIAGNOSIS — Z79.899 ON STATIN THERAPY: ICD-10-CM

## 2022-07-27 DIAGNOSIS — F17.200 NICOTINE DEPENDENCE, UNCOMPLICATED, UNSPECIFIED NICOTINE PRODUCT TYPE: ICD-10-CM

## 2022-07-27 DIAGNOSIS — I10 BENIGN ESSENTIAL HYPERTENSION: ICD-10-CM

## 2022-07-27 DIAGNOSIS — R13.19 OTHER DYSPHAGIA: ICD-10-CM

## 2022-07-27 DIAGNOSIS — G25.81 RESTLESS LEGS SYNDROME: ICD-10-CM

## 2022-07-27 LAB
ALBUMIN SERPL-MCNC: 4.2 G/DL (ref 3.4–5)
ALP SERPL-CCNC: 87 U/L (ref 40–150)
ALT SERPL W P-5'-P-CCNC: 173 U/L (ref 0–70)
ANION GAP SERPL CALCULATED.3IONS-SCNC: 7 MMOL/L (ref 3–14)
AST SERPL W P-5'-P-CCNC: 94 U/L (ref 0–45)
BILIRUB SERPL-MCNC: 0.7 MG/DL (ref 0.2–1.3)
BUN SERPL-MCNC: 12 MG/DL (ref 7–30)
CALCIUM SERPL-MCNC: 9.3 MG/DL (ref 8.5–10.1)
CHLORIDE BLD-SCNC: 107 MMOL/L (ref 94–109)
CHOLEST SERPL-MCNC: 132 MG/DL
CO2 SERPL-SCNC: 25 MMOL/L (ref 20–32)
CREAT SERPL-MCNC: 0.84 MG/DL (ref 0.66–1.25)
EST. AVERAGE GLUCOSE BLD GHB EST-MCNC: 160 MG/DL
FASTING STATUS PATIENT QL REPORTED: NO
GFR SERPL CREATININE-BSD FRML MDRD: >90 ML/MIN/1.73M2
GLUCOSE BLD-MCNC: 124 MG/DL (ref 70–99)
HBA1C MFR BLD: 7.2 % (ref 0–5.6)
HDLC SERPL-MCNC: 38 MG/DL
LDLC SERPL CALC-MCNC: 66 MG/DL
NONHDLC SERPL-MCNC: 94 MG/DL
POTASSIUM BLD-SCNC: 4.2 MMOL/L (ref 3.4–5.3)
PROT SERPL-MCNC: 7.9 G/DL (ref 6.8–8.8)
SODIUM SERPL-SCNC: 139 MMOL/L (ref 133–144)
TRIGL SERPL-MCNC: 142 MG/DL
TSH SERPL DL<=0.005 MIU/L-ACNC: 1.1 MU/L (ref 0.4–4)

## 2022-07-27 PROCEDURE — 83036 HEMOGLOBIN GLYCOSYLATED A1C: CPT | Performed by: NURSE PRACTITIONER

## 2022-07-27 PROCEDURE — 36415 COLL VENOUS BLD VENIPUNCTURE: CPT | Performed by: NURSE PRACTITIONER

## 2022-07-27 PROCEDURE — 80061 LIPID PANEL: CPT | Performed by: NURSE PRACTITIONER

## 2022-07-27 PROCEDURE — 84443 ASSAY THYROID STIM HORMONE: CPT | Performed by: NURSE PRACTITIONER

## 2022-07-27 PROCEDURE — 99214 OFFICE O/P EST MOD 30 MIN: CPT | Performed by: NURSE PRACTITIONER

## 2022-07-27 PROCEDURE — 80053 COMPREHEN METABOLIC PANEL: CPT | Performed by: NURSE PRACTITIONER

## 2022-07-27 RX ORDER — GABAPENTIN 300 MG/1
300 CAPSULE ORAL 3 TIMES DAILY
Qty: 90 CAPSULE | Refills: 1 | Status: SHIPPED | OUTPATIENT
Start: 2022-07-27 | End: 2023-02-20

## 2022-07-27 ASSESSMENT — PAIN SCALES - GENERAL: PAINLEVEL: NO PAIN (0)

## 2022-07-27 NOTE — PATIENT INSTRUCTIONS
Assessment & Plan     1. Type 2 diabetes mellitus without complication, with long-term current use of insulin (H)  Continue current plan   - Hemoglobin A1c; Future  - [facility] FOOT EXAM  NO CHARGE    2. Hyperlipidemia associated with type 2 diabetes mellitus (H)  continue lipitor  - Lipid Profile; Future    3. Benign essential hypertension  Well controlled   - Lipid Profile; Future  - Comprehensive metabolic panel; Future  - TSH with free T4 reflex; Future    4. Nicotine dependence, uncomplicated, unspecified nicotine product type  - SMOKING CESSATION COUNSELING 3-10 MIN    5. On statin therapy  - Comprehensive metabolic panel; Future    6. Restless legs syndrome  Stop mirapex  Start gabapentin - 300mg once daily for three days, then increase to 300mg twice daily for three days.  If symptoms are controlled, stop - if not increase to 300mg three times daily and stay on this dose.     Review of prior external note(s) from - CareEverywhere information from GI notes reviewed        Return in about 6 months (around 1/27/2023) for diabetes, lipids, HTN.    Marion Davis NP  [facility] - [facility]

## 2022-07-27 NOTE — NURSING NOTE
Chief Complaint   Patient presents with     Hypertension     Diabetes     Lipids       Initial /78 (BP Location: Left arm, Patient Position: Chair, Cuff Size: Adult Large)   Pulse 101   Temp 98.8  F (37.1  C) (Tympanic)   Resp 16   Ht 1.829 m (6')   Wt 121.1 kg (267 lb)   SpO2 99%   BMI 36.21 kg/m   Estimated body mass index is 36.21 kg/m  as calculated from the following:    Height as of this encounter: 1.829 m (6').    Weight as of this encounter: 121.1 kg (267 lb).  Medication Reconciliation: complete  Pamela M. Lechevalier, LPN

## 2022-07-28 DIAGNOSIS — Z79.4 TYPE 2 DIABETES MELLITUS WITHOUT COMPLICATION, WITH LONG-TERM CURRENT USE OF INSULIN (H): Primary | ICD-10-CM

## 2022-07-28 DIAGNOSIS — E11.9 TYPE 2 DIABETES MELLITUS WITHOUT COMPLICATION, WITH LONG-TERM CURRENT USE OF INSULIN (H): Primary | ICD-10-CM

## 2022-10-10 ENCOUNTER — OFFICE VISIT (OUTPATIENT)
Dept: FAMILY MEDICINE | Facility: OTHER | Age: 52
End: 2022-10-10
Attending: NURSE PRACTITIONER
Payer: COMMERCIAL

## 2022-10-10 VITALS
HEIGHT: 72 IN | OXYGEN SATURATION: 97 % | WEIGHT: 272.4 LBS | RESPIRATION RATE: 16 BRPM | HEART RATE: 104 BPM | SYSTOLIC BLOOD PRESSURE: 116 MMHG | BODY MASS INDEX: 36.9 KG/M2 | DIASTOLIC BLOOD PRESSURE: 78 MMHG | TEMPERATURE: 98.1 F

## 2022-10-10 DIAGNOSIS — R20.2 NUMBNESS AND TINGLING OF RIGHT LOWER EXTREMITY: ICD-10-CM

## 2022-10-10 DIAGNOSIS — G89.29 CHRONIC RIGHT-SIDED LOW BACK PAIN WITH RIGHT-SIDED SCIATICA: Primary | ICD-10-CM

## 2022-10-10 DIAGNOSIS — M54.41 CHRONIC RIGHT-SIDED LOW BACK PAIN WITH RIGHT-SIDED SCIATICA: Primary | ICD-10-CM

## 2022-10-10 DIAGNOSIS — F17.200 NICOTINE DEPENDENCE, UNCOMPLICATED, UNSPECIFIED NICOTINE PRODUCT TYPE: ICD-10-CM

## 2022-10-10 DIAGNOSIS — Z23 NEED FOR VACCINATION: ICD-10-CM

## 2022-10-10 DIAGNOSIS — Z79.4 TYPE 2 DIABETES MELLITUS WITHOUT COMPLICATION, WITH LONG-TERM CURRENT USE OF INSULIN (H): ICD-10-CM

## 2022-10-10 DIAGNOSIS — Z12.11 COLON CANCER SCREENING: ICD-10-CM

## 2022-10-10 DIAGNOSIS — E11.9 TYPE 2 DIABETES MELLITUS WITHOUT COMPLICATION, WITH LONG-TERM CURRENT USE OF INSULIN (H): ICD-10-CM

## 2022-10-10 DIAGNOSIS — Z23 NEED FOR PROPHYLACTIC VACCINATION AND INOCULATION AGAINST INFLUENZA: ICD-10-CM

## 2022-10-10 DIAGNOSIS — R20.0 NUMBNESS AND TINGLING OF RIGHT LOWER EXTREMITY: ICD-10-CM

## 2022-10-10 DIAGNOSIS — E66.01 MORBID OBESITY (H): ICD-10-CM

## 2022-10-10 PROCEDURE — 90682 RIV4 VACC RECOMBINANT DNA IM: CPT | Performed by: NURSE PRACTITIONER

## 2022-10-10 PROCEDURE — 90750 HZV VACC RECOMBINANT IM: CPT | Performed by: NURSE PRACTITIONER

## 2022-10-10 PROCEDURE — 99214 OFFICE O/P EST MOD 30 MIN: CPT | Mod: 25 | Performed by: NURSE PRACTITIONER

## 2022-10-10 PROCEDURE — 90472 IMMUNIZATION ADMIN EACH ADD: CPT | Performed by: NURSE PRACTITIONER

## 2022-10-10 PROCEDURE — 90677 PCV20 VACCINE IM: CPT | Performed by: NURSE PRACTITIONER

## 2022-10-10 PROCEDURE — 90471 IMMUNIZATION ADMIN: CPT | Performed by: NURSE PRACTITIONER

## 2022-10-10 ASSESSMENT — ANXIETY QUESTIONNAIRES
6. BECOMING EASILY ANNOYED OR IRRITABLE: MORE THAN HALF THE DAYS
GAD7 TOTAL SCORE: 10
IF YOU CHECKED OFF ANY PROBLEMS ON THIS QUESTIONNAIRE, HOW DIFFICULT HAVE THESE PROBLEMS MADE IT FOR YOU TO DO YOUR WORK, TAKE CARE OF THINGS AT HOME, OR GET ALONG WITH OTHER PEOPLE: NOT DIFFICULT AT ALL
7. FEELING AFRAID AS IF SOMETHING AWFUL MIGHT HAPPEN: MORE THAN HALF THE DAYS
5. BEING SO RESTLESS THAT IT IS HARD TO SIT STILL: NEARLY EVERY DAY
4. TROUBLE RELAXING: NOT AT ALL
3. WORRYING TOO MUCH ABOUT DIFFERENT THINGS: SEVERAL DAYS
1. FEELING NERVOUS, ANXIOUS, OR ON EDGE: MORE THAN HALF THE DAYS
GAD7 TOTAL SCORE: 10
2. NOT BEING ABLE TO STOP OR CONTROL WORRYING: NOT AT ALL

## 2022-10-10 ASSESSMENT — PAIN SCALES - GENERAL: PAINLEVEL: MILD PAIN (3)

## 2022-10-10 ASSESSMENT — PATIENT HEALTH QUESTIONNAIRE - PHQ9: SUM OF ALL RESPONSES TO PHQ QUESTIONS 1-9: 3

## 2022-10-10 NOTE — PROGRESS NOTES
Assessment & Plan     1. Chronic right-sided low back pain with right-sided sciatica  - MR Lumbar Spine w/o & w Contrast; Future  - Physical Therapy Referral; Future    2. Numbness and tingling of right lower extremity  MRI as above    3. Nicotine dependence, uncomplicated, unspecified nicotine product type  Cessation encouraged   - SMOKING CESSATION COUNSELING 3-10 MIN    4. Need for prophylactic vaccination and inoculation against influenza  - INFLUENZA QUAD, RECOMBINANT, P-FREE (RIV4) (FLUBLOK)    5. Need for vaccination  - SHINGRIX [[id number]]    6. Colon cancer screening  - Colonoscopy Screening  Referral; Future    7. Type 2 diabetes mellitus without complication, with long-term current use of insulin (H)  Eye exam is due.      8. Morbid obesity (H)  Weight loss encouraged.     Review of prior external note(s) from - CareEverywhere information from ophthalmology  reviewed  Review of the result(s) of each unique test - previous MRI         BMI:   Estimated body mass index is 36.94 kg/m  as calculated from the following:    Height as of this encounter: 1.829 m (6').    Weight as of this encounter: 123.6 kg (272 lb 6.4 oz).   Weight management plan: Discussed healthy diet and exercise guidelines    Will notify of results as they are returned.      Marion Davis, NP  [facility] - [place] CEDRIC Adams is a 52 year old, presenting for the following health issues:  Back Pain      HPI     Chronic/Recurring Back Pain Follow Up      Where is your back pain located? (Select all that apply) low back right    How would you describe your back pain?  burning, cramping, dull ache, gnawing, sharp, shooting and stabbing    Where does your back pain spread? the right buttock, the right  thigh, the right  knee and the right foot.  Numbness/tingling of right leg and foot.     Since your last clinic visit for back pain, how has your pain changed? rapidly worsening    Does your back pain  interfere with your job? YES    Since your last visit, have you tried any new treatment? No     Chiropractic cares, previous surgery.  These symptoms are the same as he had prior to his back surgery.  He has been taking ibuprofen, gabapentin does not help that much.        How many days per week do you miss taking your medication? 0    He is due for eye exam due to diabetes and colon cancer screening.  He believes there is a positive family history of colon cancer on his fathers side per his mother.      Recent Labs   Lab Test 07/27/22  1459 07/13/22  1220 11/05/21  1022 11/05/21  1022 04/14/21  0900 11/11/20  1108   A1C 7.2*  --   --  6.1* 5.7* 6.8*   LDL 66  --   --  94 91 57   HDL 38*  --   --  54 67 39*   TRIG 142  --   --  154* 89 144   * 195*  --  108* 154* 92*   CR 0.84 0.73   < > 0.81 0.81 0.89   GFRESTIMATED >90 >90   < > >90 >90 >90   GFRESTBLACK  --   --   --   --  >90 >90   POTASSIUM 4.2 4.3   < > 4.1 4.0 3.9   TSH 1.10  --   --  1.26 0.98 0.91    < > = values in this interval not displayed.      BP Readings from Last 3 Encounters:   10/10/22 116/78   07/27/22 114/78   07/13/22 110/88    Wt Readings from Last 3 Encounters:   10/10/22 123.6 kg (272 lb 6.4 oz)   07/27/22 121.1 kg (267 lb)   07/13/22 122.1 kg (269 lb 1.6 oz)                   Review of Systems   Constitutional, HEENT, cardiovascular, pulmonary, gi and gu systems are negative, except as otherwise noted.      Objective    /78 (BP Location: Left arm, Patient Position: Chair, Cuff Size: Adult Large)   Pulse 104   Temp 98.1  F (36.7  C) (Tympanic)   Resp 16   Ht 1.829 m (6')   Wt 123.6 kg (272 lb 6.4 oz)   SpO2 97%   BMI 36.94 kg/m    Body mass index is 36.94 kg/m .  Physical Exam   GENERAL: healthy, alert and no distress  PSYCH: mentation appears normal, affect normal/bright      LUMBAR SPINE MRI     HISTORY:  This is a 47-year-old male with 5-day history of right-sided  back pain, right lower extremity  discomfort.     FINDINGS:  The lumbar spine demonstrates a normal lordotic curvature.  The conus medullaris is located at T12-L1 and is normal.  There are  mild endplate degenerative changes seen at T11.     Intervertebral disks at T12-L1, L1-L2 and L2-L3 are normal in height  and hydration.  Endplates and facet joints at these levels are  normal.     L3-L4 demonstrates mild disk degeneration with slight bulge and  endplate osteophytic spurring.  Facet joints are normal.     L4-L5:  Mild disk degeneration with slight bulge and 4 mm posterior  central disk protrusion contributing to mild central canal stenosis  and lateral recess narrowing.  Annular fissuring is seen about the  dorsal aspect of the disk at this level.  There is minimal facet joint  degenerative change with mild right facet joint ligamentum flavum  hypertrophy.     L5-S1:  Postoperative changes consistent with laminotomy and partial  facetectomy.  Mild disk degeneration with dorsal annular fissuring.  There is a right foraminal disk protrusion with annular tearing.  This  protrusion contributes to slight right lateral recess narrowing with  impingement of the exiting right S1 root sleeve.  The right neural  foramen is mildly narrowed.     Sacroiliac joints are congruent.     Continued on page 2...                    IMPRESSION:  1.  POSTOPERATIVE CHANGES ON THE RIGHT AT L5-S1 WITH SLIGHT RIGHT  LATERAL RECESS NARROWING RESULTING IN RIGHT S1 NERVE ROOT IMPINGEMENT.  ENHANCING SCAR SURROUNDS THE TRAVERSING RIGHT S1 ROOT SLEEVE.     2.  DORSAL ANNULAR FISSURING ABOUT THE DISKS AT L4-L5 AND L5-S1 MAY  CONTRIBUTE TO THE PATIENT'S HISTORY OF BACK PAIN.  Exam Date: Aug 16, 2017 12:33:36 AM  Author: OVIDIO LOWE  This report is final and signed

## 2022-10-10 NOTE — NURSING NOTE
Chief Complaint   Patient presents with     Back Pain       Initial /78 (BP Location: Left arm, Patient Position: Chair, Cuff Size: Adult Large)   Pulse 104   Temp 98.1  F (36.7  C) (Tympanic)   Resp 16   Ht 1.829 m (6')   Wt 123.6 kg (272 lb 6.4 oz)   SpO2 97%   BMI 36.94 kg/m   Estimated body mass index is 36.94 kg/m  as calculated from the following:    Height as of this encounter: 1.829 m (6').    Weight as of this encounter: 123.6 kg (272 lb 6.4 oz).  Medication Reconciliation: complete  Pamela M. Lechevalier, LPN

## 2022-10-10 NOTE — PATIENT INSTRUCTIONS
Assessment & Plan     1. Chronic right-sided low back pain with right-sided sciatica  - MR Lumbar Spine w/o & w Contrast; Future  - Physical Therapy Referral; Future    2. Numbness and tingling of right lower extremity  MRI as above    3. Nicotine dependence, uncomplicated, unspecified nicotine product type  Cessation encouraged   - SMOKING CESSATION COUNSELING 3-10 MIN    4. Need for prophylactic vaccination and inoculation against influenza  - INFLUENZA QUAD, RECOMBINANT, P-FREE (RIV4) (FLUBLOK)    5. Need for vaccination  - SHINGRIX [[id number]]    6. Colon cancer screening  - Colonoscopy Screening  Referral; Future    7. Type 2 diabetes mellitus without complication, with long-term current use of insulin (H)  Eye exam is due.        Will notify of results as they are returned.     Marion Davis,   Certified Adult Nurse Practitioner  [phone]

## 2022-10-14 ENCOUNTER — TELEPHONE (OUTPATIENT)
Dept: SURGERY | Facility: OTHER | Age: 52
End: 2022-10-14

## 2022-10-18 ENCOUNTER — HOSPITAL ENCOUNTER (OUTPATIENT)
Dept: MRI IMAGING | Facility: HOSPITAL | Age: 52
Discharge: HOME | End: 2022-10-18
Attending: NURSE PRACTITIONER | Admitting: NURSE PRACTITIONER
Payer: COMMERCIAL

## 2022-10-18 DIAGNOSIS — M54.41 CHRONIC RIGHT-SIDED LOW BACK PAIN WITH RIGHT-SIDED SCIATICA: ICD-10-CM

## 2022-10-18 DIAGNOSIS — G89.29 CHRONIC RIGHT-SIDED LOW BACK PAIN WITH RIGHT-SIDED SCIATICA: ICD-10-CM

## 2022-10-18 PROCEDURE — 72148 MRI LUMBAR SPINE W/O DYE: CPT

## 2022-10-19 DIAGNOSIS — M54.16 LUMBAR NERVE ROOT COMPRESSION: ICD-10-CM

## 2022-10-19 DIAGNOSIS — M51.26 HERNIATED INTERVERTEBRAL DISC OF LUMBAR SPINE: ICD-10-CM

## 2022-10-19 DIAGNOSIS — G89.29 CHRONIC RIGHT-SIDED LOW BACK PAIN WITH RIGHT-SIDED SCIATICA: Primary | ICD-10-CM

## 2022-10-19 DIAGNOSIS — M54.41 CHRONIC RIGHT-SIDED LOW BACK PAIN WITH RIGHT-SIDED SCIATICA: Primary | ICD-10-CM

## 2022-10-19 DIAGNOSIS — I10 BENIGN ESSENTIAL HYPERTENSION: ICD-10-CM

## 2022-10-20 ENCOUNTER — HOSPITAL ENCOUNTER (OUTPATIENT)
Dept: PHYSICAL THERAPY | Facility: OTHER | Age: 52
Discharge: HOME | End: 2022-10-20
Attending: NURSE PRACTITIONER
Payer: COMMERCIAL

## 2022-10-20 DIAGNOSIS — M54.41 CHRONIC RIGHT-SIDED LOW BACK PAIN WITH RIGHT-SIDED SCIATICA: ICD-10-CM

## 2022-10-20 DIAGNOSIS — G89.29 CHRONIC RIGHT-SIDED LOW BACK PAIN WITH RIGHT-SIDED SCIATICA: ICD-10-CM

## 2022-10-20 PROCEDURE — 97110 THERAPEUTIC EXERCISES: CPT | Mod: GP

## 2022-10-20 PROCEDURE — 97162 PT EVAL MOD COMPLEX 30 MIN: CPT | Mod: GP

## 2022-10-20 RX ORDER — LOSARTAN POTASSIUM 25 MG/1
TABLET ORAL
Qty: 90 TABLET | Refills: 1 | OUTPATIENT
Start: 2022-10-20

## 2022-10-20 NOTE — PROGRESS NOTES
10/20/22 1245   General Information   Type of Visit Initial OP Ortho PT Evaluation   Start of Care Date 10/20/22   Referring Physician Hemant Sanchez NP   Patient/Family Goals Statement Lessen back and right leg symptoms of pain tingling and numbness+   Orders Evaluate and Treat   Date of Order 10/10/22   Certification Required? No   Medical Diagnosis R LBP with R sciatica   Surgical/Medical history reviewed Yes   Precautions/Limitations no known precautions/limitations   General Information Comments PMH-see chart   Body Part(s)   Body Part(s) Lumbar Spine/SI   Presentation and Etiology   Pertinent history of current problem (include personal factors and/or comorbidities that impact the POC) Patient reports a longstanding history of back pain.  He states he has had 2 previous back surgeries one approximately 5 years ago and one proximately 2 years ago.  He notes previous laminectomy/facetectomy he believes at L5.  He states more recently approximately 5 weeks ago he began having right lower extremity pain and then pain into the especially right low back which have both progressively worsened.  He did try some chiropractic sessions with with Dr. Devich with no relief.  He has used heat intermittently which seems to help.  He is taking over-the-counter ibuprofen.  He is not doing any regular exercise program at this time.  He does not acknowledge he has gained quite a bit of weight and is having difficulty trying to lose this especially now with this back pain.  He was seen by his primary care provider and an MRI was recently done which showed a large extruded disc at L5-S1 on the right with severe compression of the right S1 nerve.  Patient has been referred for neurosurgical consult and is waiting for a call on an appointment.  He is now also referred to physical therapy   Impairments A. Pain;D. Decreased ROM;E. Decreased flexibility;F. Decreased strength and endurance;G. Impaired balance;H. Impaired gait;J.  Burning;K. Numbness;L. Tingling   Functional Limitations perform activities of daily living;perform required work activities;perform desired leisure / sports activities   Symptom Location Right low back pain with right lower extremity pain tingling and numbness down the posterior thigh calf into the foot especially the first and second toes   Chronicity Chronic   Pain rating (0-10 point scale) Best (/10);Worst (/10)   Best (/10) 1   Worst (/10) 8   Pain quality A. Sharp;B. Dull;C. Aching;D. Burning;E. Shooting;F. Stabbing;G. Cramping   Frequency of pain/symptoms A. Constant   Pain/symptoms are: The same all the time   Pain/symptoms exacerbated by A. Sitting;B. Walking;F. Nothing;K. Home tasks;L. Work tasks;M. Other;G. Certain positions;C. Lifting   Pain exacerbation comment Standing, sleeping, sex and social life, traveling   Pain/symptoms eased by C. Rest;G. Heat;I. OTC medication(s)   Progression of symptoms since onset: Worsened   Current / Previous Interventions   Diagnostic Tests: MRI   MRI Results Results   MRI results Large extruded disc fragment L5-S1 on right severely compressing the right S1 nerve root   Current Level of Function   Patient role/employment history A. Employed   Employment Comments Works at the [employer]   Fall Risk Screen   Fall screen completed by PT   Have you fallen 2 or more times in the past year? No   Have you fallen and had an injury in the past year? No   Is patient a fall risk? No   Abuse Screen (yes response referral indicated)   Feels Unsafe at Home or Work/School no   Feels Threatened by Someone no   Does Anyone Try to Keep You From Having Contact with Others or Doing Things Outside Your Home? no   Physical Signs of Abuse Present no   Patient needs abuse support services and resources No   Lumbar Spine/SI Objective Findings   Hamstring Flexibility Significant hypomobility   Quadricep Flexibility Hypomobile   Piriformis Flexibility Hypomobile   Flexion ROM 0-47 w BLBP   Extension  ROM 0-12 w R LE shooting pain and B LBP   Right Side Bending ROM 0-10   Left Side Bending ROM 0-14 W RLBP   Repeated Extension-Standing ROM Increased right lower extremity symptoms   Pelvic Screen Negative   Hip Screen Negative special tests but decreased hip mobility noted especially with external rotation   Transversus Abdominus Strength (Brendan Leg Lowering-deg) Pain with resisted trunk extension, he is able to withstand moderate resistance with his trunk flexors   Ankle Dorsiflexion (L4) Strength 5/5   Ankle Plantar Flexion (S1) Strength unable to do full heel raise   SLR Positive on right   Crossover SLR Negative   Segmental Mobility Unable to perform mechanical exam due to pain with testing positions   Palpation Soft tissue tension tenderness palpation especially along the right lumbar paraspinal muscles and quadratus lumborum muscle.  Tenderness along the L4 and L5 facet joints.  Soft tissue tension tenderness also along the right gluteal/piriformis muscles   Slump Test Positive on right   Observation Patient having difficulty getting into testing positions due to pain   Posture Left shoulder and iliac crest are elevated as compared to the right   Planned Therapy Interventions   Planned Therapy Interventions manual therapy;joint mobilization;strengthening;stretching;ROM   Planned Modality Interventions   Planned Modality Interventions Comments Modalities as indicated   Clinical Impression   Criteria for Skilled Therapeutic Interventions Met yes, treatment indicated   PT Diagnosis Right low back pain with right lower extremity radiculopathy   Influenced by the following impairments Pain, decreased mobility, decreased strength   Functional limitations due to impairments Lifting, walking, sitting, standing, household and work tasks, sleeping, sex and social life, traveling   Clinical Presentation Evolving/Changing   Clinical Presentation Rationale Clinical judgment-progression of pain and functional limitations    Clinical Decision Making (Complexity) Moderate complexity   Therapy Frequency 2 times/Week   Predicted Duration of Therapy Intervention (days/wks) Up to 6 weeks   Risk & Benefits of therapy have been explained Yes   Patient, Family & other staff in agreement with plan of care Yes   Clinical Impression Comments Patient presents with right low back and lower extremity symptoms with positive MRI noting extruded disc at L5-S1 with compression of the right S1 nerve root.  Objectively is noted to have significant soft tissue restrictions and decreased mobility in the trunk and lower extremities   Education Assessment   Barriers to Learning No barriers   ORTHO GOALS   PT Ortho Eval Goals 1;2;3   Ortho Goal 1   Goal Identifier STG 1   Goal Description Decreased pain when at its worst to a 7/10   Target Date 11/03/22   Ortho Goal 2   Goal Identifier LTG 1   Goal Description Patient will demonstrate independence with home exercise program   Target Date 12/01/22   Ortho Goal 3   Goal Identifier LTG 2   Goal Description Patient be able to walk 1-2+ miles without back or lower extremity pain limiting him 75% of the time   Target Date 12/01/22   Total Evaluation Time   PT Ari, Moderate Complexity Minutes (58509) 30

## 2022-10-26 ENCOUNTER — HOSPITAL ENCOUNTER (OUTPATIENT)
Dept: PHYSICAL THERAPY | Facility: OTHER | Age: 52
Discharge: HOME | End: 2022-10-26
Attending: NURSE PRACTITIONER
Payer: COMMERCIAL

## 2022-10-26 ENCOUNTER — TELEPHONE (OUTPATIENT)
Dept: FAMILY MEDICINE | Facility: OTHER | Age: 52
End: 2022-10-26

## 2022-10-26 DIAGNOSIS — G89.29 CHRONIC RIGHT-SIDED LOW BACK PAIN WITH RIGHT-SIDED SCIATICA: Primary | ICD-10-CM

## 2022-10-26 DIAGNOSIS — M54.41 CHRONIC RIGHT-SIDED LOW BACK PAIN WITH RIGHT-SIDED SCIATICA: Primary | ICD-10-CM

## 2022-10-26 DIAGNOSIS — M51.26 HERNIATED INTERVERTEBRAL DISC OF LUMBAR SPINE: ICD-10-CM

## 2022-10-26 DIAGNOSIS — M54.16 LUMBAR NERVE ROOT COMPRESSION: ICD-10-CM

## 2022-10-26 PROCEDURE — 97110 THERAPEUTIC EXERCISES: CPT | Mod: GP

## 2022-10-26 PROCEDURE — 97035 APP MDLTY 1+ULTRASOUND EA 15: CPT | Mod: GP

## 2022-10-26 NOTE — TELEPHONE ENCOUNTER
FOR THURS 10/27/2022    To: Hemant Sanchez nurse   Please call patient back to discuss referral.  Appointment way out and wondering if you could recommend somewhere else.  Please call him back @ [phone].  Thank you

## 2022-10-27 DIAGNOSIS — M54.41 RIGHT-SIDED LOW BACK PAIN WITH RIGHT-SIDED SCIATICA, UNSPECIFIED CHRONICITY: ICD-10-CM

## 2022-10-27 DIAGNOSIS — M54.41 ACUTE RIGHT-SIDED LOW BACK PAIN WITH RIGHT-SIDED SCIATICA: ICD-10-CM

## 2022-10-27 NOTE — TELEPHONE ENCOUNTER
Neurosurgery referral - We could try [facility], , [facility] (which I do not think he would like), [facility], or [facility] to see what is available.

## 2022-10-28 ENCOUNTER — HOSPITAL ENCOUNTER (OUTPATIENT)
Dept: PHYSICAL THERAPY | Facility: OTHER | Age: 52
Discharge: HOME | End: 2022-10-28
Attending: NURSE PRACTITIONER
Payer: COMMERCIAL

## 2022-10-28 PROCEDURE — 97035 APP MDLTY 1+ULTRASOUND EA 15: CPT | Mod: GP

## 2022-10-28 PROCEDURE — 97110 THERAPEUTIC EXERCISES: CPT | Mod: GP

## 2022-10-28 RX ORDER — IBUPROFEN 800 MG/1
TABLET, FILM COATED ORAL
Qty: 90 TABLET | Refills: 0 | Status: SHIPPED | OUTPATIENT
Start: 2022-10-28 | End: 2023-05-31

## 2022-10-28 NOTE — TELEPHONE ENCOUNTER
Ibuprofen (Advil/Motrin) 800 MG tablet   Last Written Prescription Date:  2/28/22  Last Fill Quantity: 90,   # refills: 0  Last Office Visit: 10/10/22  Future Office visit:

## 2022-11-01 ENCOUNTER — HOSPITAL ENCOUNTER (OUTPATIENT)
Dept: PHYSICAL THERAPY | Facility: OTHER | Age: 52
Discharge: HOME | End: 2022-11-01
Attending: NURSE PRACTITIONER
Payer: COMMERCIAL

## 2022-11-01 PROCEDURE — 97035 APP MDLTY 1+ULTRASOUND EA 15: CPT | Mod: GP

## 2022-11-04 ENCOUNTER — HOSPITAL ENCOUNTER (OUTPATIENT)
Dept: PHYSICAL THERAPY | Facility: OTHER | Age: 52
Discharge: HOME | End: 2022-11-04
Attending: NURSE PRACTITIONER
Payer: COMMERCIAL

## 2022-11-04 PROCEDURE — 97035 APP MDLTY 1+ULTRASOUND EA 15: CPT | Mod: GP

## 2022-11-07 ENCOUNTER — MYC MEDICAL ADVICE (OUTPATIENT)
Dept: FAMILY MEDICINE | Facility: OTHER | Age: 52
End: 2022-11-07

## 2022-11-07 DIAGNOSIS — Z79.4 TYPE 2 DIABETES MELLITUS WITHOUT COMPLICATION, WITH LONG-TERM CURRENT USE OF INSULIN (H): ICD-10-CM

## 2022-11-07 DIAGNOSIS — E11.9 TYPE 2 DIABETES MELLITUS WITHOUT COMPLICATION, WITH LONG-TERM CURRENT USE OF INSULIN (H): ICD-10-CM

## 2022-11-11 ENCOUNTER — HOSPITAL ENCOUNTER (OUTPATIENT)
Dept: PHYSICAL THERAPY | Facility: OTHER | Age: 52
Discharge: HOME | End: 2022-11-11
Attending: NURSE PRACTITIONER
Payer: COMMERCIAL

## 2022-11-11 PROCEDURE — 97035 APP MDLTY 1+ULTRASOUND EA 15: CPT | Mod: GP

## 2022-11-11 PROCEDURE — 97110 THERAPEUTIC EXERCISES: CPT | Mod: GP

## 2022-11-14 ENCOUNTER — TRANSFERRED RECORDS (OUTPATIENT)
Dept: HEALTH INFORMATION MANAGEMENT | Facility: CLINIC | Age: 52
End: 2022-11-14

## 2022-11-14 ENCOUNTER — MEDICAL CORRESPONDENCE (OUTPATIENT)
Dept: INTERVENTIONAL RADIOLOGY/VASCULAR | Facility: HOSPITAL | Age: 52
End: 2022-11-14

## 2022-11-28 ENCOUNTER — OFFICE VISIT (OUTPATIENT)
Dept: FAMILY MEDICINE | Facility: OTHER | Age: 52
End: 2022-11-28
Attending: NURSE PRACTITIONER
Payer: COMMERCIAL

## 2022-11-28 ENCOUNTER — TELEPHONE (OUTPATIENT)
Dept: FAMILY MEDICINE | Facility: OTHER | Age: 52
End: 2022-11-28

## 2022-11-28 VITALS
RESPIRATION RATE: 18 BRPM | DIASTOLIC BLOOD PRESSURE: 86 MMHG | SYSTOLIC BLOOD PRESSURE: 124 MMHG | TEMPERATURE: 97.2 F | HEART RATE: 83 BPM | OXYGEN SATURATION: 98 % | WEIGHT: 273.3 LBS | BODY MASS INDEX: 37.07 KG/M2

## 2022-11-28 DIAGNOSIS — R05.1 ACUTE COUGH: ICD-10-CM

## 2022-11-28 DIAGNOSIS — J01.01 ACUTE RECURRENT MAXILLARY SINUSITIS: Primary | ICD-10-CM

## 2022-11-28 PROCEDURE — 99213 OFFICE O/P EST LOW 20 MIN: CPT | Performed by: NURSE PRACTITIONER

## 2022-11-28 RX ORDER — BENZONATATE 100 MG/1
200 CAPSULE ORAL 3 TIMES DAILY PRN
Qty: 60 CAPSULE | Refills: 0 | Status: SHIPPED | OUTPATIENT
Start: 2022-11-28 | End: 2023-01-30

## 2022-11-28 ASSESSMENT — PAIN SCALES - GENERAL: PAINLEVEL: MODERATE PAIN (4)

## 2022-11-28 NOTE — PROGRESS NOTES
Assessment & Plan     Acute recurrent maxillary sinusitis  - amoxicillin-clavulanate (AUGMENTIN) 875-125 MG tablet; Take 1 tablet by mouth 2 times daily for 10 days    Acute cough  Reviewed possible viral cause. Treating sinusitis given double sickening and severity of symptoms.   - benzonatate (TESSALON) 100 MG capsule; Take 2 capsules (200 mg) by mouth 3 times daily as needed for cough                 Return if symptoms worsen or fail to improve.    Miranda Ernst, CNP  [facility] - [place]AM[facility] Adams is a 52 year old, presenting for the following health issues:  Cough and Fever      HPI     Acute Illness  Acute illness concerns: cough/ fever  Onset/Duration: 1 week. Was feeling so much better over the weekend and then symptoms got worse last night. Worst symptoms are sore throat and cough.  Symptoms:  Fever: YES  Chills/Sweats: YES  Headache (location?): YES  Sinus Pressure: YES  Conjunctivitis:  No  Ear Pain: no  Rhinorrhea: YES  Congestion: YES  Sore Throat: YES  Cough: YES-productive of clear sputum  Wheeze: No  Decreased Appetite: YES  Nausea: YES  Vomiting: No  Diarrhea: No  Dysuria/Freq.: No  Dysuria or Hematuria: No  Fatigue/Achiness: YES  Sick/Strep Exposure: No  Therapies tried and outcome: cough medicine, mucinex and nasal spray      Review of Systems       Objective    /86 (BP Location: Right arm, Patient Position: Sitting, Cuff Size: Adult Large)   Pulse 83   Temp 97.2  F (36.2  C) (Tympanic)   Resp 18   Wt 124 kg (273 lb 4.8 oz)   SpO2 98%   BMI 37.07 kg/m    Body mass index is 37.07 kg/m .       Physical Exam   HENT: normal cephalic/atraumatic, ear canals and TM's normal, nose and mouth without ulcers or lesions, oral mucous membranes moist. Slightly purulent drainage to posterior pharynx in moderate amount. Very tender over maxillary sinuses L>R. Mild swelling present.   RESP: lungs clear to auscultation - no rales, rhonchi or wheezes  CV: regular rate and  rhythm, normal S1 S2, no S3 or S4, no murmur, click or rub, no peripheral edema and peripheral pulses strong

## 2022-11-28 NOTE — TELEPHONE ENCOUNTER
9:27 AM    Reason for Call: OVERBOOK    Patient is having the following symptoms: Cough, sore throat, fever  for  1 week    The patient is requesting an appointment for Today with  Hemant Sanchez    Was an appointment offered for this call? No  If yes : Appointment type              Date    Preferred method for responding to this message: Telephone Call  What is your phone number ?  [phone]    If we cannot reach you directly, may we leave a detailed response at the number you provided? Yes    Can this message wait until your PCP/provider returns, if unavailable today? Not applicable,     Marline Krishnamurthy

## 2022-11-28 NOTE — LETTER
November 28, 2022      Rojelio Juárez  [address] [place] [id number]        To Whom It May Concern,     Bryan was seen on 11/28/22. Please excuse Bryan for 11/21/22- 11/30/22 (return Thursday 12/1/22) for any missed work due to illness. He may return sooner if feeling well enough to work.       Sincerely,        Miranda Ernst, CNP

## 2022-12-22 ENCOUNTER — TELEPHONE (OUTPATIENT)
Dept: INTERVENTIONAL RADIOLOGY/VASCULAR | Facility: HOSPITAL | Age: 52
End: 2022-12-22

## 2022-12-22 NOTE — TELEPHONE ENCOUNTER
Called patient to remind them of their appt on 12/27. Also reminded patient to not take any antibiotics, steroids, or immunizations two weeks before and after this appt.  And they also need a .    Darcie Stearns

## 2022-12-27 ENCOUNTER — HOSPITAL ENCOUNTER (OUTPATIENT)
Facility: HOSPITAL | Age: 52
Discharge: HOME | End: 2022-12-27
Attending: RADIOLOGY | Admitting: RADIOLOGY
Payer: COMMERCIAL

## 2022-12-27 ENCOUNTER — HOSPITAL ENCOUNTER (OUTPATIENT)
Dept: INTERVENTIONAL RADIOLOGY/VASCULAR | Facility: HOSPITAL | Age: 52
Discharge: HOME | End: 2022-12-27
Attending: NEUROLOGICAL SURGERY | Admitting: RADIOLOGY
Payer: COMMERCIAL

## 2022-12-27 DIAGNOSIS — M54.17 RADICULOPATHY, LUMBOSACRAL REGION: ICD-10-CM

## 2022-12-27 PROCEDURE — 250N000011 HC RX IP 250 OP 636: Performed by: RADIOLOGY

## 2022-12-27 PROCEDURE — 62323 NJX INTERLAMINAR LMBR/SAC: CPT

## 2022-12-27 RX ORDER — DEXAMETHASONE SODIUM PHOSPHATE 10 MG/ML
10 INJECTION, SOLUTION INTRAMUSCULAR; INTRAVENOUS ONCE
Status: COMPLETED | OUTPATIENT
Start: 2022-12-27 | End: 2022-12-27

## 2022-12-27 RX ORDER — IOPAMIDOL 612 MG/ML
15 INJECTION, SOLUTION INTRATHECAL ONCE
Status: COMPLETED | OUTPATIENT
Start: 2022-12-27 | End: 2022-12-27

## 2022-12-27 RX ORDER — DEXAMETHASONE SODIUM PHOSPHATE 10 MG/ML
10 INJECTION, SOLUTION INTRAMUSCULAR; INTRAVENOUS ONCE
Status: DISCONTINUED | OUTPATIENT
Start: 2022-12-27 | End: 2022-12-27

## 2022-12-27 RX ORDER — IOPAMIDOL 612 MG/ML
15 INJECTION, SOLUTION INTRATHECAL ONCE
Status: DISCONTINUED | OUTPATIENT
Start: 2022-12-27 | End: 2022-12-27

## 2022-12-27 RX ADMIN — IOPAMIDOL 6 ML: 612 INJECTION, SOLUTION INTRATHECAL at 15:17

## 2022-12-27 RX ADMIN — DEXAMETHASONE SODIUM PHOSPHATE 10 MG: 10 INJECTION, SOLUTION INTRAMUSCULAR; INTRAVENOUS at 15:17

## 2022-12-27 ASSESSMENT — ACTIVITIES OF DAILY LIVING (ADL)
ADLS_ACUITY_SCORE: 35

## 2022-12-27 NOTE — DISCHARGE INSTRUCTIONS
Cell number on file:    Telephone Information:   Mobile [phone]     Is it ok to leave a message at this number(s)? Yes    Dr. Pierce completed your procedure on 12/27/2022.    Current Pain Level (0-10 Scale): 5/10  Post Pain Level (0-10):  5/10    Radiology Discharge instructions for Steroid Injection    Activity Level:     Do not do any heavy activity or exercise for 24 hours.   Do not drive for 4 hours after your injection.  Diet:   Return to your normal diet.  Medications:   If you have stopped taking your Aspirin, Coumadin/Warfarin, Ibuprofen, or any   other blood thinner for this procedure you may resume in the morning unless   your primary care provider has given you other instructions.    Diabetics may see an increase in blood sugar after steroid injections. If you are concerned about your blood sugar, please contact your family doctor.    Site Care:  Remove the bandage and bathe or shower the morning after the procedure.      Please allow two weeks to experience improvement in your pain.  If you have any further issues, please contact your provider.    Call your Primary Care Provider if you have the following (if your primary care provider is not available please seek emergency care):   Nausea with vomiting   Severe headache   Drowsiness or confusion   Redness or drainage at the injection or puncture site   Temperature over 101 degrees F   Other concerns   Worsening back pain   Stiff neck

## 2022-12-28 ASSESSMENT — ACTIVITIES OF DAILY LIVING (ADL)
ADLS_ACUITY_SCORE: 35

## 2023-01-08 ENCOUNTER — HEALTH MAINTENANCE LETTER (OUTPATIENT)
Age: 53
End: 2023-01-08

## 2023-01-09 ENCOUNTER — OFFICE VISIT (OUTPATIENT)
Dept: FAMILY MEDICINE | Facility: OTHER | Age: 53
End: 2023-01-09
Attending: NURSE PRACTITIONER
Payer: COMMERCIAL

## 2023-01-09 ENCOUNTER — TELEPHONE (OUTPATIENT)
Dept: INTERVENTIONAL RADIOLOGY/VASCULAR | Facility: HOSPITAL | Age: 53
End: 2023-01-09

## 2023-01-09 VITALS
DIASTOLIC BLOOD PRESSURE: 82 MMHG | TEMPERATURE: 98.4 F | BODY MASS INDEX: 36.98 KG/M2 | WEIGHT: 273 LBS | SYSTOLIC BLOOD PRESSURE: 122 MMHG | RESPIRATION RATE: 18 BRPM | HEIGHT: 72 IN | OXYGEN SATURATION: 96 % | HEART RATE: 118 BPM

## 2023-01-09 DIAGNOSIS — Z12.11 SCREEN FOR COLON CANCER: ICD-10-CM

## 2023-01-09 DIAGNOSIS — M51.26 HERNIATED INTERVERTEBRAL DISC OF LUMBAR SPINE: ICD-10-CM

## 2023-01-09 DIAGNOSIS — M54.41 CHRONIC RIGHT-SIDED LOW BACK PAIN WITH RIGHT-SIDED SCIATICA: ICD-10-CM

## 2023-01-09 DIAGNOSIS — G89.29 CHRONIC RIGHT-SIDED LOW BACK PAIN WITH RIGHT-SIDED SCIATICA: ICD-10-CM

## 2023-01-09 DIAGNOSIS — M54.16 LUMBAR NERVE ROOT COMPRESSION: ICD-10-CM

## 2023-01-09 DIAGNOSIS — Z79.4 TYPE 2 DIABETES MELLITUS WITHOUT COMPLICATION, WITH LONG-TERM CURRENT USE OF INSULIN (H): Primary | ICD-10-CM

## 2023-01-09 DIAGNOSIS — R20.0 NUMBNESS AND TINGLING OF RIGHT LOWER EXTREMITY: ICD-10-CM

## 2023-01-09 DIAGNOSIS — R20.2 NUMBNESS AND TINGLING OF RIGHT LOWER EXTREMITY: ICD-10-CM

## 2023-01-09 DIAGNOSIS — E11.9 TYPE 2 DIABETES MELLITUS WITHOUT COMPLICATION, WITH LONG-TERM CURRENT USE OF INSULIN (H): Primary | ICD-10-CM

## 2023-01-09 PROCEDURE — 99214 OFFICE O/P EST MOD 30 MIN: CPT | Performed by: NURSE PRACTITIONER

## 2023-01-09 RX ORDER — OXYCODONE HYDROCHLORIDE 15 MG/1
15 TABLET, FILM COATED, EXTENDED RELEASE ORAL AT BEDTIME
Qty: 15 TABLET | Refills: 0 | Status: SHIPPED | OUTPATIENT
Start: 2023-01-09 | End: 2023-01-30

## 2023-01-09 ASSESSMENT — PAIN SCALES - GENERAL: PAINLEVEL: MODERATE PAIN (4)

## 2023-01-09 NOTE — PROGRESS NOTES
1. Type 2 diabetes mellitus without complication, with long-term current use of insulin (H)  [facility]  - Adult Eye  Referral; Future    2. Screen for colon cancer  - Colonoscopy Screening  Referral; Future    3. Herniated intervertebral disc of lumbar spine  Repeat injections.   - INJ TRANSFORAMIN EPIDURAL, LUMB/SACR EA ADDTL  - oxyCODONE (OXYCONTIN) 15 MG 12 hr tablet; Take 1 tablet (15 mg) by mouth At Bedtime  Dispense: 15 tablet; Refill: 0    4. Chronic right-sided low back pain with right-sided sciatica  - INJ TRANSFORAMIN EPIDURAL, LUMB/SACR EA ADDTL  - oxyCODONE (OXYCONTIN) 15 MG 12 hr tablet; Take 1 tablet (15 mg) by mouth At Bedtime  Dispense: 15 tablet; Refill: 0    5. Lumbar nerve root compression  - INJ TRANSFORAMIN EPIDURAL, LUMB/SACR EA ADDTL  - oxyCODONE (OXYCONTIN) 15 MG 12 hr tablet; Take 1 tablet (15 mg) by mouth At Bedtime  Dispense: 15 tablet; Refill: 0    6. Numbness and tingling of right lower extremity  - INJ TRANSFORAMIN EPIDURAL, LUMB/SACR EA ADDTL  - oxyCODONE (OXYCONTIN) 15 MG 12 hr tablet; Take 1 tablet (15 mg) by mouth At Bedtime  Dispense: 15 tablet; Refill: 0    Follow-up 1/30/2023 as scheduled.       Denis Adams is a 52 year old, presenting for the following health issues:  Back Pain      HPI     Pain History:  When did you first notice your pain? - Chronic Pain   Have you seen this provider for your pain in the past?   Yes   Where in your body do you have pain? Low back and down rt leg  Are you seeing anyone else for your pain? No    PHQ-9 SCORE 11/11/2020 11/5/2021 10/10/2022   PHQ-9 Total Score 0 9 3       JOE-7 SCORE 11/11/2020 11/5/2021 10/10/2022   Total Score 0 14 10     Chronic Pain Follow Up:    Location of pain: Low back  Analgesia/pain control:    - Recent changes:  none    - Overall control: Inadequate pain control    - Current treatments: ibuprofen   Adherence:     - Do you ever take more pain medicine than prescribed? No    - When did  you take your last dose of pain medicine?  This am   Adverse effects: No     Had injections, no improvement was advised to return to order repeat injections.     PDMP Review       Value Time User    State PDMP site checked  Yes 11/9/2021 10:20 AM Marion Davis NP        Last CSA Agreement:   CSA -- Patient Level:    CSA: None found at the patient level.       Last UDS: nnot indicated, short term prescription.          Review of Systems   Constitutional, HEENT, cardiovascular, pulmonary, gi and gu systems are negative, except as otherwise noted.      Objective    Ht 1.829 m (6')   BMI 37.07 kg/m    Body mass index is 37.07 kg/m .  Physical Exam   GENERAL: healthy, alert and no distress  PSYCH: mentation appears normal, affect normal/bright        PROCEDURE: MR LUMBAR SPINE W/O CONTRAST 10/18/2022 11:27 AM     HISTORY: Chronic right-sided low back pain with right-sided sciatica;  Chronic right-sided low back pain with right-sided sciatica     COMPARISONS: 2017     Meds/Dose Given:     TECHNIQUE: Images were obtained sagittally T1 STIR and T2 weighted.  Images were obtained axially proton density and T2 weighted     FINDINGS: The T12-L1, L1-L2 and L2-L3 disks are normal.     At L3-L4 there is mild degenerative dehydration of the disc. No thecal  sac or nerve root compression is noted. The facet joints appear  normal.     At L4-L5 there is mild asymmetric greater on the right than the left.  There is mild impingement on the right L5 nerve root in the lateral  recess. Mild facet joint degenerative changes are noted.     At L5-S1 there is an extruded disc fragment on the right. There is  severe compression of the right S1 nerve root. Mild facet joint  degenerative changes are noted. Postoperative changes are noted.     Intradurally the nerves of the cauda equina and conus are normal.     The paravertebral soft tissues are normal                                                                         IMPRESSION: Large extruded disc fragment L5-S1 on the right severely  compressing the right S1 nerve root     MICHAEL ANTONIO MD       XR LUMBAR TRANSLAMINAR INJ INCL IMAGING     Clinical history: Male, age 52 years; L5-S1 RIGHT INTERLAMINAR MARY KAY;  Radiculopathy, lumbosacral region     Per the patient and/or EMR today - pain duration is over 4 weeks,  moderate pain, with limited relief from prior attempted conservative  therapy.     Comparison: MRI lumbar spine 10/18/2022     Technique: After informed consent was obtained, a timeout was  performed, satisfactorily identifying the patient and the site of the  procedure.     The patient was placed in the prone position and prepped and draped in  the usual sterile fashion. Local anesthetic consisting of 2 mL of 1%  lidocaine was instilled into the skin and deep tissues.     The tip of a 22-gauge spinal needle was then directed toward the right  parasagittal aspect of the L5-S1 interspace. Fluoroscopic guidance and  loss-of-resistance was used to position the tip in the epidural space.   6 milliliters of Isovue-M 300 contrast material were then instilled,  demonstrating satisfactory position of the needle tip.     10 mg of dexamethasone (10 mg/mL) and 2 mL of 1%, preservative-free,  lidocaine were then instilled. Images were obtained. The needle was  then removed. The patient tolerated the procedure well.     ISOVUE M 300-6ML    LIDOCAINE1%-3.5ML    DEXAMETHASONE-10MG                                                                      Impression:  Technically successful fluoroscopically guided L5-S1 interlaminar  epidural steroid injection.     Total fluoroscopy time: .13 minutes.     No acute complication. Followup is pending.     RV=R0, no relief.     If this particular injection provides little long-term relief consider  right S1 (not L5-S1) transforaminal epidural steroid injection. Recent  MRI demonstrates right parasagittal disc herniation at L5-S1,  dorsally  displacing and compressing the right S1 root sleeve.     OVIDIO LOWE MD

## 2023-01-12 ENCOUNTER — MYC MEDICAL ADVICE (OUTPATIENT)
Dept: FAMILY MEDICINE | Facility: OTHER | Age: 53
End: 2023-01-12

## 2023-01-13 NOTE — TELEPHONE ENCOUNTER
LVM instructing patient to call pharmacies to check stock & verify they accept his insurance.    Redirect script to pharmacy of choice when patient calls back

## 2023-01-13 NOTE — TELEPHONE ENCOUNTER
Raine Carrillo MD routed conversation to  Nurse Triage Pool 15 minutes ago (12:26 PM)     Raine Carrillo MD 15 minutes ago (12:26 PM)     TS  I don't think Oxycontin has a generic, can you check with other pharmacies?         Note      Lechevalier, Pamela M., TREVOR routed conversation to Raine Carrillo MD 20 hours ago (4:19 PM)     Bryan ALBERT [facility] (supporting Marion Davis NP) 20 hours ago (4:11 PM)     KIRAN Marcos prescribed me a pain med and [facility] doesnt have the generic and ins wont cover the name brand. Is there another pharmacy around that has the generic?

## 2023-01-16 NOTE — TELEPHONE ENCOUNTER
Spoke with patient via phone  Writer called [facility] to confirm that oxycontin needs a PA & they do not steven generic form  Requested PA be sent to clinic  Instructed patient to call other pharmacies, if he wants, to to check if generic form is available  Call back to the clinic if needing to redirect generic script to new pharmacy    Pended to PCP & PA dept to review

## 2023-01-17 ENCOUNTER — TELEPHONE (OUTPATIENT)
Dept: FAMILY MEDICINE | Facility: OTHER | Age: 53
End: 2023-01-17

## 2023-01-17 NOTE — TELEPHONE ENCOUNTER
Received a PA from [employer] for OxyCONTIN 15MG er tablets. Submitted on CMM. Waiting for a response.

## 2023-01-18 NOTE — TELEPHONE ENCOUNTER
Received DENIAL from [email or web address] for OxyContin 15MG er tablets. 01/17/2023    Forms scanned to Epic.

## 2023-01-25 ENCOUNTER — TELEPHONE (OUTPATIENT)
Dept: INTERVENTIONAL RADIOLOGY/VASCULAR | Facility: HOSPITAL | Age: 53
End: 2023-01-25

## 2023-01-25 ENCOUNTER — TELEPHONE (OUTPATIENT)
Dept: FAMILY MEDICINE | Facility: OTHER | Age: 53
End: 2023-01-25

## 2023-01-25 DIAGNOSIS — Z12.11 ENCOUNTER FOR SCREENING COLONOSCOPY: Primary | ICD-10-CM

## 2023-01-25 RX ORDER — BISACODYL 5 MG
10 TABLET, DELAYED RELEASE (ENTERIC COATED) ORAL ONCE
Qty: 2 TABLET | Refills: 0 | Status: SHIPPED | OUTPATIENT
Start: 2023-01-25 | End: 2023-01-25

## 2023-01-25 NOTE — TELEPHONE ENCOUNTER
Screening Questions for the Scheduling of Screening Colonoscopies     (If Colonoscopy is diagnostic, Provider should review the chart before scheduling.)    Are you younger than 50 or older than 80?  Non    Do you take aspirin or fish oil?  No (if yes, tell patient to stop 1 week prior to Colonoscopy)y    Do you take warfarin (Coumadin), clopidogrel (Plavix), apixaban (Eliquis), dabigatram (Pradaxa), rivaroxaban (Xarelto) or any blood thinner? No    Do you use oxygen at home?  No    Do you have kidney disease? No    Are you on dialysis? No    Have you had a stroke or heart attack in the last year? No    Have you had a stent in your heart or any blood vessel in the last year? No    Have you had a transplant of any organ?  No    Have you had a colonoscopy or upper endoscopy (EGD) before?  No         Date of scheduled Colonoscopy:2/23/23    Provider: Dr Satish [facility]

## 2023-01-25 NOTE — TELEPHONE ENCOUNTER
Patient called the IR department for another injection. When staff (Darcie Stearns) looked patient up in epic she noticed patient was not an IR Consult. When explained to the patient he just needs to go back to his primary care provider to get a new order for an injection. Patient got agitated and hung up the phone.    Darcie Stearns

## 2023-01-25 NOTE — TELEPHONE ENCOUNTER
Patient scheduled screening colonoscopy with Dr. Covarrubias on 02/23/23, instruction booklet mailed today.

## 2023-01-26 NOTE — PROGRESS NOTES
Assessment & Plan     1. Type 2 diabetes mellitus without complication, with long-term current use of insulin (H)  Continue jardiance   - Albumin Random Urine Quantitative with Creat Ratio; Future  - Hemoglobin A1c; Future  Eye exam scheduled for March 3, 2023 with [facility]    2. Hyperlipidemia LDL goal <100  Continue lipitor  - Lipid Profile; Future    3. Benign essential hypertension  Well controlled   - Comprehensive metabolic panel; Future  - TSH with free T4 reflex; Future    4. Chronic bilateral low back pain without sciatica  Notes reviewed  Referral back to Dr Batista at  for evaluation.   - oxyCODONE-acetaminophen (PERCOCET) 7.5-325 MG per tablet; Take 1 tablet by mouth nightly as needed for severe pain (7-10)  Dispense: 10 tablet; Refill: 0    5. Lumbar disc herniation with radiculopathy  As above  - oxyCODONE-acetaminophen (PERCOCET) 7.5-325 MG per tablet; Take 1 tablet by mouth nightly as needed for severe pain (7-10)  Dispense: 10 tablet; Refill: 0    6. Screen for colon cancer  Scheduled.     7. On statin therapy  - Comprehensive metabolic panel; Future    8. Morbid obesity (H)  Weight loss encouraged     9. Need for vaccination  - ZOSTER VACCINE RECOMBINANT ADJUVANTED (SHINGRIX)    10. Personal history of tobacco use  - Prof fee: Shared Decision Making for Lung Cancer Screening  - CT Chest Lung Cancer Scrn Low Dose wo; Future      Return in about 3 months (around 4/30/2023) for diabetes, lipids, HTN.    Marion Davis NP  [facility] - BENJAMÍN Adams is a 52 year old, presenting for the following health issues:  Hypertension, Diabetes, and Lipids      HPI     Diabetes Follow-up      How often are you checking your blood sugar? Not at all    What concerns do you have today about your diabetes? None     Do you have any of these symptoms? (Select all that apply)  No numbness or tingling in feet.  No redness, sores or blisters on feet.  No complaints of  excessive thirst.  No reports of blurry vision.  No significant changes to weight.    Have you had a diabetic eye exam in the last 12 months? No    Has not been watching diet or exercising due to back pain.    Did start rybelsus, could not tolerate due to GI upset.  He has only been taking jardiance.      Hyperlipidemia Follow-Up      Are you regularly taking any medication or supplement to lower your cholesterol?   Yes- Liptior 10 mg    Are you having muscle aches or other side effects that you think could be caused by your cholesterol lowering medication?  No    Hypertension Follow-up      Do you check your blood pressure regularly outside of the clinic? No     Are you following a low salt diet? Yes    Are your blood pressures ever more than 140 on the top number (systolic) OR more   than 90 on the bottom number (diastolic), for example 140/90? No    BP Readings from Last 2 Encounters:   01/30/23 110/80   01/09/23 122/82     Hemoglobin A1C (%)   Date Value   07/27/2022 7.2 (H)   11/05/2021 6.1 (H)   04/14/2021 5.7 (H)   11/11/2020 6.8 (H)     LDL Cholesterol Calculated (mg/dL)   Date Value   07/27/2022 66   11/05/2021 94   04/14/2021 91   11/11/2020 57         Pain History:  When did you first notice your pain? - Acute Pain   Have you seen anyone else for your pain? No  Where in your body do you have pain? Back Pain  Onset/Duration: months   Description:   Location of pain: low back right  Character of pain: sharp, dull ache, stabbing, gnawing and burning  Pain radiation: radiates into the right buttocks and radiates into the right leg  New numbness or weakness in legs, not attributed to pain: YES  Intensity: Currently 4/10  Progression of Symptoms: worsening  History:   Specific cause: none  Pain interferes with job: YES  History of back problems: recurrent self limited episodes of low back pain in the past  Any previous MRI or X-rays: Yes- at [facility].  Date 10/18/22  Sees a specialist for back pain:  "No  Alleviating factors:   Improved by: nothing    Precipitating factors:  Worsened by: Lifting, Bending, Standing and Walking  Therapies tried and outcome: steroid injection, cold, heat, muscle relaxants, NSAIDs and Physical Therapy    Prescribed oxycontin - denied by insurance company.    Referral for injections at [facility] - he \"got the run around\" and would like to go back to Dr Batista.        Recent Labs   Lab Test 07/27/22  1459 07/13/22  1220 11/05/21  1022 11/05/21  1022 04/14/21  0900 11/11/20  1108   A1C 7.2*  --   --  6.1* 5.7* 6.8*   LDL 66  --   --  94 91 57   HDL 38*  --   --  54 67 39*   TRIG 142  --   --  154* 89 144   * 195*  --  108* 154* 92*   CR 0.84 0.73   < > 0.81 0.81 0.89   GFRESTIMATED >90 >90   < > >90 >90 >90   GFRESTBLACK  --   --   --   --  >90 >90   POTASSIUM 4.2 4.3   < > 4.1 4.0 3.9   TSH 1.10  --   --  1.26 0.98 0.91    < > = values in this interval not displayed.      BP Readings from Last 3 Encounters:   01/30/23 110/80   01/09/23 122/82   11/28/22 124/86    Wt Readings from Last 3 Encounters:   01/30/23 121.2 kg (267 lb 4.8 oz)   01/09/23 123.8 kg (273 lb)   11/28/22 124 kg (273 lb 4.8 oz)                      Review of Systems   Constitutional, HEENT, cardiovascular, pulmonary, gi and gu systems are negative, except as otherwise noted.      Objective    /80 (BP Location: Left arm, Patient Position: Chair, Cuff Size: Adult Large)   Pulse 102   Temp 97.4  F (36.3  C) (Tympanic)   Resp 16   Ht 1.829 m (6')   Wt 121.2 kg (267 lb 4.8 oz)   SpO2 98%   BMI 36.25 kg/m    Body mass index is 36.25 kg/m .  Physical Exam   GENERAL: healthy, alert and no distress  NECK: no adenopathy, no asymmetry, masses, or scars, thyroid normal to palpation and no carotid bruits  RESP: lungs clear to auscultation - no rales, rhonchi or wheezes  CV: regular rate and rhythm, normal S1 S2, no S3 or S4, no murmur, click or rub, no peripheral edema and peripheral pulses strong  PSYCH: " mentation appears normal, affect normal/bright            PROCEDURE: MR LUMBAR SPINE W/O CONTRAST 10/18/2022 11:27 AM     HISTORY: Chronic right-sided low back pain with right-sided sciatica;  Chronic right-sided low back pain with right-sided sciatica     COMPARISONS: 2017     Meds/Dose Given:     TECHNIQUE: Images were obtained sagittally T1 STIR and T2 weighted.  Images were obtained axially proton density and T2 weighted     FINDINGS: The T12-L1, L1-L2 and L2-L3 disks are normal.     At L3-L4 there is mild degenerative dehydration of the disc. No thecal  sac or nerve root compression is noted. The facet joints appear  normal.     At L4-L5 there is mild asymmetric greater on the right than the left.  There is mild impingement on the right L5 nerve root in the lateral  recess. Mild facet joint degenerative changes are noted.     At L5-S1 there is an extruded disc fragment on the right. There is  severe compression of the right S1 nerve root. Mild facet joint  degenerative changes are noted. Postoperative changes are noted.     Intradurally the nerves of the cauda equina and conus are normal.     The paravertebral soft tissues are normal                                                                        IMPRESSION: Large extruded disc fragment L5-S1 on the right severely  compressing the right S1 nerve root     MICHAEL ANTONIO MD                 Lung Cancer Screening Shared Decision Making Visit     Rojelio Juárez, a 52 year old male, is eligible for lung cancer screening    History   Smoking Status     Former     Packs/day: 0.75     Years: 38.00     Types: Cigarettes     Start date: 1/1/1984     Quit date: 12/12/2022   Smokeless Tobacco     Never       I have discussed with patient the risks and benefits of screening for lung cancer with low-dose CT.     The risks include:    radiation exposure: one low dose chest CT has as much ionizing radiation as about 15 chest x-rays, or 6 months of background radiation  living in [place]      false positives: most findings/nodules are NOT cancer, but some might still require additional diagnostic evaluation, including biopsy    over-diagnosis: some slow growing cancers that might never have been clinically significant will be detected and treated unnecessarily     The benefit of early detection of lung cancer is contingent upon adherence to annual screening or more frequent follow up if indicated.     Furthermore, to benefit from screening, Rojelio must be willing and able to undergo diagnostic procedures, if indicated. Although no specific guide is available for determining severity of comorbidities, it is reasonable to withhold screening in patients who have greater mortality risk from other diseases.     We did discuss that the best way to prevent lung cancer is to not smoke.    Some patients may value a numeric estimation of lung cancer risk when evaluating if lung cancer screening is right for them, here is one calculator:    ShouldIScreen

## 2023-01-30 ENCOUNTER — OFFICE VISIT (OUTPATIENT)
Dept: FAMILY MEDICINE | Facility: OTHER | Age: 53
End: 2023-01-30
Attending: NURSE PRACTITIONER
Payer: COMMERCIAL

## 2023-01-30 VITALS
RESPIRATION RATE: 16 BRPM | DIASTOLIC BLOOD PRESSURE: 80 MMHG | HEART RATE: 102 BPM | HEIGHT: 72 IN | TEMPERATURE: 97.4 F | WEIGHT: 267.3 LBS | OXYGEN SATURATION: 98 % | SYSTOLIC BLOOD PRESSURE: 110 MMHG | BODY MASS INDEX: 36.21 KG/M2

## 2023-01-30 DIAGNOSIS — M54.50 CHRONIC BILATERAL LOW BACK PAIN WITHOUT SCIATICA: ICD-10-CM

## 2023-01-30 DIAGNOSIS — Z23 NEED FOR VACCINATION: ICD-10-CM

## 2023-01-30 DIAGNOSIS — Z79.4 TYPE 2 DIABETES MELLITUS WITHOUT COMPLICATION, WITH LONG-TERM CURRENT USE OF INSULIN (H): Primary | ICD-10-CM

## 2023-01-30 DIAGNOSIS — Z12.11 SCREEN FOR COLON CANCER: ICD-10-CM

## 2023-01-30 DIAGNOSIS — Z87.891 PERSONAL HISTORY OF TOBACCO USE: ICD-10-CM

## 2023-01-30 DIAGNOSIS — G89.29 CHRONIC BILATERAL LOW BACK PAIN WITHOUT SCIATICA: ICD-10-CM

## 2023-01-30 DIAGNOSIS — E78.5 HYPERLIPIDEMIA LDL GOAL <100: ICD-10-CM

## 2023-01-30 DIAGNOSIS — I10 BENIGN ESSENTIAL HYPERTENSION: ICD-10-CM

## 2023-01-30 DIAGNOSIS — Z79.899 ON STATIN THERAPY: ICD-10-CM

## 2023-01-30 DIAGNOSIS — E66.01 MORBID OBESITY (H): ICD-10-CM

## 2023-01-30 DIAGNOSIS — E11.9 TYPE 2 DIABETES MELLITUS WITHOUT COMPLICATION, WITH LONG-TERM CURRENT USE OF INSULIN (H): Primary | ICD-10-CM

## 2023-01-30 DIAGNOSIS — M51.16 LUMBAR DISC HERNIATION WITH RADICULOPATHY: ICD-10-CM

## 2023-01-30 LAB
ALBUMIN SERPL BCG-MCNC: 4.5 G/DL (ref 3.5–5.2)
ALP SERPL-CCNC: 98 U/L (ref 40–129)
ALT SERPL W P-5'-P-CCNC: 92 U/L (ref 10–50)
ANION GAP SERPL CALCULATED.3IONS-SCNC: 13 MMOL/L (ref 7–15)
AST SERPL W P-5'-P-CCNC: 52 U/L (ref 10–50)
BILIRUB SERPL-MCNC: 0.7 MG/DL
BUN SERPL-MCNC: 13.3 MG/DL (ref 6–20)
CALCIUM SERPL-MCNC: 9.8 MG/DL (ref 8.6–10)
CHLORIDE SERPL-SCNC: 105 MMOL/L (ref 98–107)
CHOLEST SERPL-MCNC: 164 MG/DL
CREAT SERPL-MCNC: 0.74 MG/DL (ref 0.67–1.17)
CREAT UR-MCNC: 45.9 MG/DL
DEPRECATED HCO3 PLAS-SCNC: 24 MMOL/L (ref 22–29)
EST. AVERAGE GLUCOSE BLD GHB EST-MCNC: 183 MG/DL
GFR SERPL CREATININE-BSD FRML MDRD: >90 ML/MIN/1.73M2
GLUCOSE SERPL-MCNC: 204 MG/DL (ref 70–99)
HBA1C MFR BLD: 8 %
HDLC SERPL-MCNC: 42 MG/DL
LDLC SERPL CALC-MCNC: 79 MG/DL
MICROALBUMIN UR-MCNC: <12 MG/L
MICROALBUMIN/CREAT UR: NORMAL MG/G{CREAT}
NONHDLC SERPL-MCNC: 122 MG/DL
POTASSIUM SERPL-SCNC: 4.4 MMOL/L (ref 3.4–5.3)
PROT SERPL-MCNC: 7.4 G/DL (ref 6.4–8.3)
SODIUM SERPL-SCNC: 142 MMOL/L (ref 136–145)
TRIGL SERPL-MCNC: 214 MG/DL
TSH SERPL DL<=0.005 MIU/L-ACNC: 0.9 UIU/ML (ref 0.3–4.2)

## 2023-01-30 PROCEDURE — 82570 ASSAY OF URINE CREATININE: CPT | Performed by: NURSE PRACTITIONER

## 2023-01-30 PROCEDURE — 80053 COMPREHEN METABOLIC PANEL: CPT | Performed by: NURSE PRACTITIONER

## 2023-01-30 PROCEDURE — 80061 LIPID PANEL: CPT | Performed by: NURSE PRACTITIONER

## 2023-01-30 PROCEDURE — 99214 OFFICE O/P EST MOD 30 MIN: CPT | Mod: 25 | Performed by: NURSE PRACTITIONER

## 2023-01-30 PROCEDURE — 82043 UR ALBUMIN QUANTITATIVE: CPT | Performed by: NURSE PRACTITIONER

## 2023-01-30 PROCEDURE — 83036 HEMOGLOBIN GLYCOSYLATED A1C: CPT | Performed by: NURSE PRACTITIONER

## 2023-01-30 PROCEDURE — G0296 VISIT TO DETERM LDCT ELIG: HCPCS | Performed by: NURSE PRACTITIONER

## 2023-01-30 PROCEDURE — 90750 HZV VACC RECOMBINANT IM: CPT | Performed by: NURSE PRACTITIONER

## 2023-01-30 PROCEDURE — 90471 IMMUNIZATION ADMIN: CPT | Performed by: NURSE PRACTITIONER

## 2023-01-30 PROCEDURE — 36415 COLL VENOUS BLD VENIPUNCTURE: CPT | Performed by: NURSE PRACTITIONER

## 2023-01-30 PROCEDURE — 84443 ASSAY THYROID STIM HORMONE: CPT | Performed by: NURSE PRACTITIONER

## 2023-01-30 RX ORDER — OXYCODONE AND ACETAMINOPHEN 7.5; 325 MG/1; MG/1
1 TABLET ORAL
Qty: 10 TABLET | Refills: 0 | Status: SHIPPED | OUTPATIENT
Start: 2023-01-30 | End: 2023-02-20

## 2023-01-30 ASSESSMENT — PAIN SCALES - GENERAL: PAINLEVEL: MODERATE PAIN (4)

## 2023-01-30 NOTE — PATIENT INSTRUCTIONS
Assessment & Plan     1. Type 2 diabetes mellitus without complication, with long-term current use of insulin (H)  Continue jardiance   - Albumin Random Urine Quantitative with Creat Ratio; Future  - Hemoglobin A1c; Future  Eye exam scheduled for March 3, 2023 with [facility]    2. Hyperlipidemia LDL goal <100  Continue lipitor  - Lipid Profile; Future    3. Benign essential hypertension  Well controlled   - Comprehensive metabolic panel; Future  - TSH with free T4 reflex; Future    4. Chronic bilateral low back pain without sciatica  Notes reviewed  Will order injections for [facility]  - oxyCODONE-acetaminophen (PERCOCET) 7.5-325 MG per tablet; Take 1 tablet by mouth nightly as needed for severe pain (7-10)  Dispense: 10 tablet; Refill: 0    5. Lumbar disc herniation with radiculopathy  As above  - oxyCODONE-acetaminophen (PERCOCET) 7.5-325 MG per tablet; Take 1 tablet by mouth nightly as needed for severe pain (7-10)  Dispense: 10 tablet; Refill: 0    6. Screen for colon cancer  Scheduled.     7. On statin therapy  - Comprehensive metabolic panel; Future    8. Morbid obesity (H)  Weight loss encouraged     9. Need for vaccination  - ZOSTER VACCINE RECOMBINANT ADJUVANTED (SHINGRIX)    10. Personal history of tobacco use  - Prof fee: Shared Decision Making for Lung Cancer Screening  - CT Chest Lung Cancer Scrn Low Dose wo; Future      Return in about 3 months (around 4/30/2023) for diabetes, lipids, HTN.    Marion Davis, NP  [facility]      Lung Cancer Screening   Frequently Asked Questions  If you are at high-risk for lung cancer, getting screened with low-dose computed tomography (LDCT) every year can help save your life. This handout offers answers to some of the most common questions about lung cancer screening. If you have other questions, please call [phone] ([phone]).     What is it?  Lung cancer screening uses special X-ray technology to create an  image of your lung tissue. The exam is quick and easy and takes less than 10 seconds. We don t give you any medicine or use any needles. You can eat before and after the exam. You don t need to change your clothes as long as the clothing on your chest doesn t contain metal. But, you do need to be able to hold your breath for at least 6 seconds during the exam.    What is the goal of lung cancer screening?  The goal of lung cancer screening is to save lives. Many times, lung cancer is not found until a person starts having physical symptoms. Lung cancer screening can help detect lung cancer in the earliest stages when it may be easier to treat.    Who should be screened for lung cancer?  We suggest lung cancer screening for anyone who is at high-risk for lung cancer. You are in the high-risk group if you:     are between the ages of 55 and 79, and   have smoked at least 1 pack of cigarettes a day for 20 or more years, and   still smoke or have quit within the past 15 years.    However, if you have a new cough or shortness of breath, you should talk to your doctor before being screened.    Why does it matter if I have symptoms?  Certain symptoms can be a sign that you have a condition in your lungs that should be checked and treated by your doctor. These symptoms include fever, chest pain, a new or changing cough, shortness of breath that you have never felt before, coughing up blood or unexplained weight loss. Having any of these symptoms can greatly affect the results of lung cancer screening.       Should all smokers get an LDCT lung cancer screening exam?  It depends. Lung cancer screening is for a very specific group of men and women who have a history of heavy smoking over a long period of time (see  Who should be screened for lung cancer  above).  I am in the high-risk group, but have been diagnosed with cancer in the past. Is LDCT lung cancer screening right for me?  In some cases, you should not have LDCT  lung screening, such as when your doctor is already following your cancer with CT scan studies. Your doctor will help you decide if LDCT lung screening is right for you.  Do I need to have a screening exam every year?  Yes. If you are in the high-risk group described earlier, you should get an LDCT lung cancer screening exam every year until you are 79, or are no longer willing or able to undergo screening and possible procedures to diagnose and treat lung cancer.  How effective is LDCT at preventing death from lung cancer?  Studies have shown that LDCT lung cancer screening can lower the risk of death from lung cancer by 20 percent in people who are at high-risk.  What are the risks?  There are some risks and limitations of LDCT lung cancer screening. We want to make sure you understand the risks and benefits, so please let us know if you have any questions. Your doctor may want to talk with you more about these risks.   Radiation exposure: As with any exam that uses radiation, there is a very small increased risk of cancer. The amount of radiation in LDCT is small--about the same amount a person would get from a mammogram. Your doctor orders the exam when he or she feels the potential benefits outweigh the risks.   False negatives: No test is perfect, including LDCT. It is possible that you may have a medical condition, including lung cancer, that is not found during your exam. This is called a false negative result.   False positives and more testing: LDCT very often finds something in the lung that could be cancer, but in fact is not. This is called a false positive result. False positive tests often cause anxiety. To make sure these findings are not cancer, you may need to have more tests. These tests will be done only if you give us permission. Sometimes patients need a treatment that can have side effects, such as a biopsy. For more information on false positives, see  What can I expect from the results?     Findings not related to lung cancer: Your LDCT exam also takes pictures of areas of your body next to your lungs. In a very small number of cases, the CT scan will show an abnormal finding in one of these areas, such as your kidneys, adrenal glands, liver or thyroid. This finding may not be serious, but you may need more tests. Your doctor can help you decide what other tests you may need, if any.  What can I expect from the results?  About 1 out of 4 LDCT exams will find something that may need more tests. Most of the time, these findings are lung nodules. Lung nodules are very small collections of tissue in the lung. These nodules are very common, and the vast majority--more than 97 percent--are not cancer (benign). Most are normal lymph nodes or small areas of scarring from past infections.  But, if a small lung nodule is found to be cancer, the cancer can be cured more than 90 percent of the time. To know if the nodule is cancer, we may need to get more images before your next yearly screening exam. If the nodule has suspicious features (for example, it is large, has an odd shape or grows over time), we will refer you to a specialist for further testing.  Will my doctor also get the results?  Yes. Your doctor will get a copy of your results.  Is it okay to keep smoking now that there s a cancer screening exam?  No. Tobacco is one of the strongest cancer-causing agents. It causes not only lung cancer, but other cancers and cardiovascular (heart) diseases as well. The damage caused by smoking builds over time. This means that the longer you smoke, the higher your risk of disease. While it is never too late to quit, the sooner you quit, the better.  Where can I find help to quit smoking?  The best way to prevent lung cancer is to stop smoking. If you have already quit smoking, congratulations and keep it up! For help on quitting smoking, please call [employer] at 5-610-QUIT-NOW ([phone]) or the [employer]  [employer] at 1-440.944.9560 to find local resources near you.  One-on-one health coaching:  If you d prefer to work individually with a health care provider on tobacco cessation, we offer:     Medication Therapy Management:  Our specially trained pharmacists work closely with you and your doctor to help you quit smoking.  Call [phone] or [phone] (toll free).

## 2023-01-31 ENCOUNTER — HOSPITAL ENCOUNTER (OUTPATIENT)
Dept: CT IMAGING | Facility: HOSPITAL | Age: 53
Discharge: HOME | End: 2023-01-31
Attending: NURSE PRACTITIONER | Admitting: NURSE PRACTITIONER
Payer: COMMERCIAL

## 2023-01-31 DIAGNOSIS — E11.9 TYPE 2 DIABETES MELLITUS WITHOUT COMPLICATION, WITH LONG-TERM CURRENT USE OF INSULIN (H): Primary | ICD-10-CM

## 2023-01-31 DIAGNOSIS — Z79.4 TYPE 2 DIABETES MELLITUS WITHOUT COMPLICATION, WITH LONG-TERM CURRENT USE OF INSULIN (H): Primary | ICD-10-CM

## 2023-01-31 DIAGNOSIS — Z87.891 PERSONAL HISTORY OF TOBACCO USE: ICD-10-CM

## 2023-01-31 PROCEDURE — 71271 CT THORAX LUNG CANCER SCR C-: CPT

## 2023-01-31 RX ORDER — PIOGLITAZONEHYDROCHLORIDE 30 MG/1
30 TABLET ORAL DAILY
Qty: 90 TABLET | Refills: 1 | Status: SHIPPED | OUTPATIENT
Start: 2023-01-31 | End: 2023-05-01

## 2023-02-02 ENCOUNTER — TELEPHONE (OUTPATIENT)
Dept: FAMILY MEDICINE | Facility: OTHER | Age: 53
End: 2023-02-02

## 2023-02-02 DIAGNOSIS — K22.4 HYPERCONTRACTILE ESOPHAGUS: ICD-10-CM

## 2023-02-02 DIAGNOSIS — R13.19 OTHER DYSPHAGIA: Primary | ICD-10-CM

## 2023-02-02 DIAGNOSIS — K22.4 ESOPHAGEAL DYSMOTILITY: ICD-10-CM

## 2023-02-10 DIAGNOSIS — Z79.4 TYPE 2 DIABETES MELLITUS WITHOUT COMPLICATION, WITH LONG-TERM CURRENT USE OF INSULIN (H): ICD-10-CM

## 2023-02-10 DIAGNOSIS — E11.9 TYPE 2 DIABETES MELLITUS WITHOUT COMPLICATION, WITH LONG-TERM CURRENT USE OF INSULIN (H): ICD-10-CM

## 2023-02-10 RX ORDER — EMPAGLIFLOZIN 25 MG/1
TABLET, FILM COATED ORAL
Qty: 90 TABLET | Refills: 1 | Status: SHIPPED | OUTPATIENT
Start: 2023-02-10 | End: 2023-09-20

## 2023-02-14 ENCOUNTER — ANESTHESIA EVENT (OUTPATIENT)
Dept: SURGERY | Facility: HOSPITAL | Age: 53
End: 2023-02-14
Payer: COMMERCIAL

## 2023-02-14 ASSESSMENT — LIFESTYLE VARIABLES: TOBACCO_USE: 1

## 2023-02-14 NOTE — ANESTHESIA PREPROCEDURE EVALUATION
Anesthesia Pre-Procedure Evaluation    Patient: Rojelio Juárez   MRN: [id number] : 1970        Procedure : Procedure(s):  COLONOSCOPY          Past Medical History:   Diagnosis Date     Anxiety 08/15/2017     Benign essential hypertension 2017     Fatty infiltration of liver 2018     Herniated intervertebral disc of lumbar spine 2017     Pseudocholinesterase deficiency      Tobacco dependence syndrome 2017      Past Surgical History:   Procedure Laterality Date     BACK SURGERY       ESOPHAGEAL BALLOON PROVOCATION STUDY N/A 2021    Procedure: Esophageal Balloon Provocation Study;  Surgeon: Danial Serrano DO;  Location: [facility]     ESOPHAGOSCOPY, GASTROSCOPY, DUODENOSCOPY (EGD), COMBINED N/A 2021    Procedure: ESOPHAGOGASTRODUODENOSCOPY, WITH BIOPSY;  Surgeon: Danial Serrano DO;  Location: [facility]      Allergies   Allergen Reactions     Succinylcholine Muscle Pain (Myalgia)     Pt had profound muscle weakness for 4 hours following an intubating dose of succinylcholine requiring prolonged intubation and mechanical ventilation post-operatively. See post-anesthetic evaluation note from surgery 20 for details.      Prednisone      Weight gain 20+ lbs with short course reported. Use only if absolutely needed.     Metformin GI Disturbance     Ozempic (0.25 Or 0.5 Mg-Dose) [Semaglutide] GI Disturbance     Sertraline Headache and Nausea      Social History     Tobacco Use     Smoking status: Former     Packs/day: 0.75     Years: 38.00     Pack years: 28.50     Types: Cigarettes     Start date: 1984     Quit date: 2022     Years since quittin.1     Smokeless tobacco: Never   Substance Use Topics     Alcohol use: Yes     Alcohol/week: 0.0 standard drinks      Wt Readings from Last 1 Encounters:   23 121.2 kg (267 lb 4.8 oz)        Anesthesia Evaluation   Pt has had prior anesthetic. Type: General and MAC.    History of anesthetic complications   presumed PChE  deficiency 2/20.    ROS/MED HX  ENT/Pulmonary:     (+) ANICETO risk factors, hypertension, obese, tobacco use, Current use,     Neurologic:  - neg neurologic ROS     Cardiovascular:     (+) Dyslipidemia hypertension-----    METS/Exercise Tolerance: >4 METS    Hematologic:  - neg hematologic  ROS     Musculoskeletal: Comment: Bilateral low back pain without sciatica  Lumbar disc herniation with radiculopathy  Spinal stenosis of lumbar region, unspecified whether neurogenic claudication present          GI/Hepatic: Comment: Dysphagia  Fatty liver    (+) bowel prep, liver disease,     Renal/Genitourinary:  - neg Renal ROS     Endo: Comment: Pseudocholinesterase deficiency    (+) type II DM, Last HgA1c: 7.2, date: 7-27-22, Using insulin, not previously admitted for DM/DKA. Obesity,     Psychiatric/Substance Use:     (+) psychiatric history depression and anxiety     Infectious Disease:  - neg infectious disease ROS     Malignancy:  - neg malignancy ROS     Other:  - neg other ROS    (+) , H/O Chronic Pain,        Physical Exam    Airway        Mallampati: IV   TM distance: > 3 FB   Neck ROM: full   Mouth opening: < 3 cm    Respiratory Devices and Support         Dental       (+) Minor Abnormalities - some fillings, tiny chips      Cardiovascular   cardiovascular exam normal          Pulmonary   pulmonary exam normal                OUTSIDE LABS:  CBC:   Lab Results   Component Value Date    WBC 7.0 07/13/2022    WBC 7.6 11/05/2021    HGB 18.5 (H) 07/13/2022    HGB 17.9 (H) 11/05/2021    HCT 50.9 07/13/2022    HCT 51.3 11/05/2021     07/13/2022     11/05/2021     BMP:   Lab Results   Component Value Date     01/30/2023     07/27/2022    POTASSIUM 4.4 01/30/2023    POTASSIUM 4.2 07/27/2022    CHLORIDE 105 01/30/2023    CHLORIDE 107 07/27/2022    CO2 24 01/30/2023    CO2 25 07/27/2022    BUN 13.3 01/30/2023    BUN 12 07/27/2022    CR 0.74 01/30/2023    CR 0.84 07/27/2022     (H) 01/30/2023      (H) 07/27/2022     COAGS: No results found for: PTT, INR, FIBR  POC: No results found for: BGM, HCG, HCGS  HEPATIC:   Lab Results   Component Value Date    ALBUMIN 4.5 01/30/2023    PROTTOTAL 7.4 01/30/2023    ALT 92 (H) 01/30/2023    AST 52 (H) 01/30/2023    ALKPHOS 98 01/30/2023    BILITOTAL 0.7 01/30/2023     OTHER:   Lab Results   Component Value Date    A1C 8.0 (H) 01/30/2023    CATHLEEN 9.8 01/30/2023    LIPASE 160 07/13/2022    AMYLASE 97 07/13/2022    TSH 0.90 01/30/2023       Anesthesia Plan    ASA Status:  3   NPO Status:  NPO Appropriate    Anesthesia Type: MAC.     - Reason for MAC: straight local not clinically adequate   Induction: Propofol.           Consents    Anesthesia Plan(s) and associated risks, benefits, and realistic alternatives discussed. Questions answered and patient/representative(s) expressed understanding.     - Discussed: Risks, Benefits and Alternatives for BOTH SEDATION and the PROCEDURE were discussed     - Discussed with:  Patient      - Extended Intubation/Ventilatory Support Discussed: No.      - Patient is DNR/DNI Status: No    Use of blood products discussed: No .     Postoperative Care            Comments:    Other Comments: Risks and benefits of MAC anesthetic discussed including dental damage, aspiration, loss of airway, conversion to general anesthetic, CV complications, MI, stroke, death. Pt wishes to proceed.             MITCH Everett CRNA

## 2023-02-15 NOTE — PROGRESS NOTES
[facility] Rehabilitation Service    Outpatient Physical Therapy Discharge Note  Patient: Rojelio Juárez  : 1970    Beginning/End Dates of Reporting Period:  10/20/22 to 22    Referring Provider: Hemant Sanchez NP    Therapy Diagnosis: R LBP with R sciatica     Client Self Report: He reports today he is doing OK but yesterday was a bad day. He has a Neuro consult at [facility] on 22.    Objective Measurements:  Objective Measure: palpation  Details: + mild soft tissue tension and tenderness R lumbar                                    Outcome Measures (most recent score):      Goals:  Goal Identifier     Goal Description Decreased pain when at its worst to a 7/10   Target Date 22   Date Met  22   Progress (detail required for progress note):       Goal Identifier LTG 1   Goal Description Patient will demonstrate independence with home exercise program   Target Date 22   Date Met  22   Progress (detail required for progress note):       Goal Identifier LTG 2   Goal Description Patient be able to walk 1-2+ miles without back or lower extremity pain limiting him 75% of the time   Target Date 22   Date Met      Progress (detail required for progress note): not met     Goal Identifier     Goal Description     Target Date     Date Met      Progress (detail required for progress note):       Goal Identifier     Goal Description     Target Date     Date Met      Progress (detail required for progress note):       Goal Identifier     Goal Description     Target Date     Date Met      Progress (detail required for progress note):       Goal Identifier     Goal Description     Target Date     Date Met      Progress (detail required for progress note):       Goal Identifier     Goal Description     Target Date     Date Met      Progress (detail required for progress note):              Plan:    Discharge from therapy.    Discharge:    Reason for Discharge: Pt is going to see Neuro    Equipment Issued: None    Discharge Plan: Other services: Neuro consult.

## 2023-02-20 RX ORDER — BISACODYL 5 MG
10 TABLET, DELAYED RELEASE (ENTERIC COATED) ORAL ONCE
Qty: 2 TABLET | Refills: 0 | Status: SHIPPED | OUTPATIENT
Start: 2023-02-20 | End: 2023-02-20

## 2023-02-22 ENCOUNTER — MYC MEDICAL ADVICE (OUTPATIENT)
Dept: FAMILY MEDICINE | Facility: OTHER | Age: 53
End: 2023-02-22

## 2023-02-22 DIAGNOSIS — G89.29 CHRONIC BILATERAL LOW BACK PAIN WITHOUT SCIATICA: Primary | ICD-10-CM

## 2023-02-22 DIAGNOSIS — M51.16 LUMBAR DISC HERNIATION WITH RADICULOPATHY: ICD-10-CM

## 2023-02-22 DIAGNOSIS — M51.26 HERNIATED INTERVERTEBRAL DISC OF LUMBAR SPINE: ICD-10-CM

## 2023-02-22 DIAGNOSIS — M54.50 CHRONIC BILATERAL LOW BACK PAIN WITHOUT SCIATICA: Primary | ICD-10-CM

## 2023-02-23 ENCOUNTER — HOSPITAL ENCOUNTER (OUTPATIENT)
Facility: HOSPITAL | Age: 53
Discharge: HOME | End: 2023-02-23
Attending: SURGERY | Admitting: SURGERY
Payer: COMMERCIAL

## 2023-02-23 ENCOUNTER — ANESTHESIA (OUTPATIENT)
Dept: SURGERY | Facility: HOSPITAL | Age: 53
End: 2023-02-23
Payer: COMMERCIAL

## 2023-02-23 VITALS
HEART RATE: 84 BPM | SYSTOLIC BLOOD PRESSURE: 106 MMHG | HEIGHT: 72 IN | WEIGHT: 257.2 LBS | BODY MASS INDEX: 34.84 KG/M2 | OXYGEN SATURATION: 96 % | RESPIRATION RATE: 16 BRPM | DIASTOLIC BLOOD PRESSURE: 77 MMHG | TEMPERATURE: 97.4 F

## 2023-02-23 LAB — GLUCOSE BLDC GLUCOMTR-MCNC: 90 MG/DL (ref 70–99)

## 2023-02-23 PROCEDURE — 88305 TISSUE EXAM BY PATHOLOGIST: CPT | Mod: 26 | Performed by: PATHOLOGY

## 2023-02-23 PROCEDURE — 370N000017 HC ANESTHESIA TECHNICAL FEE, PER MIN: Performed by: SURGERY

## 2023-02-23 PROCEDURE — 45385 COLONOSCOPY W/LESION REMOVAL: CPT | Mod: PT | Performed by: SURGERY

## 2023-02-23 PROCEDURE — 82962 GLUCOSE BLOOD TEST: CPT | Mod: GZ

## 2023-02-23 PROCEDURE — 360N000075 HC SURGERY LEVEL 2, PER MIN: Performed by: SURGERY

## 2023-02-23 PROCEDURE — 250N000011 HC RX IP 250 OP 636

## 2023-02-23 PROCEDURE — 999N000141 HC STATISTIC PRE-PROCEDURE NURSING ASSESSMENT: Performed by: SURGERY

## 2023-02-23 PROCEDURE — 45385 COLONOSCOPY W/LESION REMOVAL: CPT | Performed by: NURSE ANESTHETIST, CERTIFIED REGISTERED

## 2023-02-23 PROCEDURE — 250N000011 HC RX IP 250 OP 636: Performed by: NURSE ANESTHETIST, CERTIFIED REGISTERED

## 2023-02-23 PROCEDURE — 258N000003 HC RX IP 258 OP 636: Performed by: NURSE ANESTHETIST, CERTIFIED REGISTERED

## 2023-02-23 PROCEDURE — 88305 TISSUE EXAM BY PATHOLOGIST: CPT | Mod: TC | Performed by: SURGERY

## 2023-02-23 PROCEDURE — 250N000009 HC RX 250

## 2023-02-23 PROCEDURE — 710N000012 HC RECOVERY PHASE 2, PER MINUTE: Performed by: SURGERY

## 2023-02-23 PROCEDURE — 272N000001 HC OR GENERAL SUPPLY STERILE: Performed by: SURGERY

## 2023-02-23 RX ORDER — SODIUM CHLORIDE, SODIUM LACTATE, POTASSIUM CHLORIDE, CALCIUM CHLORIDE 600; 310; 30; 20 MG/100ML; MG/100ML; MG/100ML; MG/100ML
INJECTION, SOLUTION INTRAVENOUS CONTINUOUS
Status: DISCONTINUED | OUTPATIENT
Start: 2023-02-23 | End: 2023-02-23 | Stop reason: HOSPADM

## 2023-02-23 RX ORDER — PHENYLEPHRINE HYDROCHLORIDE 10 MG/ML
INJECTION INTRAVENOUS PRN
Status: DISCONTINUED | OUTPATIENT
Start: 2023-02-23 | End: 2023-02-23

## 2023-02-23 RX ORDER — LIDOCAINE 40 MG/G
CREAM TOPICAL
Status: DISCONTINUED | OUTPATIENT
Start: 2023-02-23 | End: 2023-02-23 | Stop reason: HOSPADM

## 2023-02-23 RX ORDER — PROPOFOL 10 MG/ML
INJECTION, EMULSION INTRAVENOUS PRN
Status: DISCONTINUED | OUTPATIENT
Start: 2023-02-23 | End: 2023-02-23

## 2023-02-23 RX ORDER — LIDOCAINE HYDROCHLORIDE 20 MG/ML
INJECTION, SOLUTION INFILTRATION; PERINEURAL PRN
Status: DISCONTINUED | OUTPATIENT
Start: 2023-02-23 | End: 2023-02-23

## 2023-02-23 RX ADMIN — PROPOFOL 20 MG: 10 INJECTION, EMULSION INTRAVENOUS at 12:23

## 2023-02-23 RX ADMIN — PROPOFOL 30 MG: 10 INJECTION, EMULSION INTRAVENOUS at 12:15

## 2023-02-23 RX ADMIN — PHENYLEPHRINE HYDROCHLORIDE 150 MCG: 10 INJECTION INTRAVENOUS at 12:21

## 2023-02-23 RX ADMIN — PROPOFOL 100 MG: 10 INJECTION, EMULSION INTRAVENOUS at 12:09

## 2023-02-23 RX ADMIN — PHENYLEPHRINE HYDROCHLORIDE 150 MCG: 10 INJECTION INTRAVENOUS at 12:13

## 2023-02-23 RX ADMIN — LIDOCAINE HYDROCHLORIDE 40 MG: 20 INJECTION, SOLUTION INFILTRATION; PERINEURAL at 12:08

## 2023-02-23 RX ADMIN — PROPOFOL 10 MG: 10 INJECTION, EMULSION INTRAVENOUS at 12:12

## 2023-02-23 RX ADMIN — PROPOFOL 20 MG: 10 INJECTION, EMULSION INTRAVENOUS at 12:21

## 2023-02-23 RX ADMIN — PROPOFOL 30 MG: 10 INJECTION, EMULSION INTRAVENOUS at 12:13

## 2023-02-23 RX ADMIN — PROPOFOL 50 MG: 10 INJECTION, EMULSION INTRAVENOUS at 12:10

## 2023-02-23 RX ADMIN — PROPOFOL 20 MG: 10 INJECTION, EMULSION INTRAVENOUS at 12:19

## 2023-02-23 RX ADMIN — PROPOFOL 20 MG: 10 INJECTION, EMULSION INTRAVENOUS at 12:17

## 2023-02-23 RX ADMIN — PHENYLEPHRINE HYDROCHLORIDE 200 MCG: 10 INJECTION INTRAVENOUS at 12:15

## 2023-02-23 RX ADMIN — PHENYLEPHRINE HYDROCHLORIDE 200 MCG: 10 INJECTION INTRAVENOUS at 12:17

## 2023-02-23 RX ADMIN — PROPOFOL 10 MG: 10 INJECTION, EMULSION INTRAVENOUS at 12:22

## 2023-02-23 RX ADMIN — SODIUM CHLORIDE, POTASSIUM CHLORIDE, SODIUM LACTATE AND CALCIUM CHLORIDE: 600; 310; 30; 20 INJECTION, SOLUTION INTRAVENOUS at 11:46

## 2023-02-23 ASSESSMENT — ACTIVITIES OF DAILY LIVING (ADL)
ADLS_ACUITY_SCORE: 33
ADLS_ACUITY_SCORE: 35

## 2023-02-23 NOTE — OP NOTE
REPORT OF OPERATION  DATE OF PROCEDURE: 2/23/2023    PATIENT: Rojelio Juárez    SURGERY PERFORMED:   Colonoscopy     with biopsy    with use of cauterized snare    without tattooing    with clipping    PREOPERATIVE DIAGNOSIS: Screening colonoscopy    POSTOPERATIVE DIAGNOSIS:    Same   Colon polyps, 70 cm and Colitis, 40 cm   Diverticulosis was identified.   Hemorrhoids  were  identified.    SURGEON: Jimbo Covarrubias MD    ASSISTANTS: None    ANESTHESIA: Monitored Anesthesia Care    COMPLICATIONS: None apparent    TRANSFUSIONS: None     TISSUE TO PATHOLOGY: Polyps from 70 cm and biopsy from colitis at 40 cm were sent to pathology for pathological diagnosis.    FINDINGS: Colon polyps, 70 cm and Colitis, 40 cm.  Diverticulosis was identified.  Hemorrhoids  were  identified.    INDICATIONS: This is a 52 year old male in need of a colonoscopy for Screening colonoscopy.  The patient will be taken to the endoscopy suite for that procedure.    DESCRIPTIONS OF PROCEDURE IN DETAIL: After consent was obtained the patient was taken to the endoscopy suite and placed in the left lateral decubitus position.  The patient was identified and the correct patient was confirmed.  Monitored Anesthesia Care was given.  A time out was performed verifying the correct patient and the correct procedure.  The entire operative team was in agreement.  All necessary equipment and supplies were in the room.    Rectal exam was performed and no lesions of the anal canal were noted.  The colonoscope was inserted into the anus and passed without difficulty to the cecum.  The cecum was identified by the ileocecal valve, the coalescence of the tinea and the appendiceal orifice.  Upon withdrawal all walls of the colon were visualized.  Polyps were identified at 70 cm.  These were removed by cauterized snare.  The mucosa was clipped.  Tattooing their of the location was not done.  Small segment of colitis was noted approximately 40 cm.  Biopsies were  taken.  Large colon masses were not seen.colon  Diverticulosis was seen.  Upon reaching the rectum the scope was retroflexed and internal hemorrhoids  were  seen.  The scope was straightened back out and removed from the patient.  The patient was then taken to the recovery room in stable condition tolerating the procedure well.      Prep: fair    Withdrawal time was 15 minutes.    It is recommended that the patient have another colonoscopy in 2 years.

## 2023-02-23 NOTE — OR NURSING
Patient and responsible adult given discharge instructions with no questions regarding instructions. Shabbir score 18/18. Denies pain.  Discharged from unit via walking. Patient discharged to home with SO. Tolerating PO intake.

## 2023-02-23 NOTE — DISCHARGE INSTRUCTIONS
Post-Anesthesia Patient Instructions    IMMEDIATELY FOLLOWING SURGERY:  Do not drive or operate machinery for the first twenty four hours after surgery.  Do not make any important decisions for twenty four hours after surgery or while taking narcotic pain medications or sedatives.  If you develop intractable nausea and vomiting or a severe headache please notify your doctor immediately.    FOLLOW-UP:  Please make an appointment with your surgeon as instructed. You do not need to follow up with anesthesia unless specifically instructed to do so.    WOUND CARE INSTRUCTIONS (if applicable):  Keep a dry clean dressing on the anesthesia/puncture wound site if there is drainage.  Once the wound has quit draining you may leave it open to air.  Generally you should leave the bandage intact for twenty four hours unless there is drainage.  If the epidural site drains for more than 36-48 hours please call the anesthesia department.    QUESTIONS?:  Please feel free to call your physician or the hospital  if you have any questions, and they will be happy to assist you.           INSTRUCTIONS AFTER COLONOSCOPY    WHEN YOU ARE BACK HOME:  Plan to rest for an hour or two after you get home.  You may have some cramping or pressure until you pass gas.  You may resume your regular medications.  Eat a small, light meal at first, and then gradually return to normal meal sizes.  You will be contacted the next day to see how you are doing.  If you had a polyp removed:  Slight bleeding may occur.  You may have a slight blood stain on the toilet paper after a bowel movement.  To lessen the chance of bleeding, avoid heavy exercise for ONE WEEK.  This includes heavy lifting, vigorous sport activities, and heavy physical labor.  You may resume your normal sexual activity.    Avoid aspirin or aspirin products if instructed by your doctor.  If there is a polyp or biopsy, you will be contacted with results within one week.     WHAT TO  WATCH FOR:  Problems rarely occur after the exam; however, it is important for you to watch for early signs of possible problems.  If you have   Unusual pain in your abdomen  Nausea and vomiting that persists  Excessive bleeding  Black or bloody bowel movements  Fever or temperature above 100.6 F  Please call your doctor ([facility] [phone]) or go to the nearest hospital emergency room.

## 2023-02-23 NOTE — ANESTHESIA CARE TRANSFER NOTE
Patient: Rojelio Juárez    Procedure: Procedure(s):  COLONOSCOPY with polypectomy, biopsy, resolution clipping       Diagnosis: Encounter for screening colonoscopy [Z12.11]  Diagnosis Additional Information: No value filed.    Anesthesia Type:   MAC     Note:    Oropharynx: oropharynx clear of all foreign objects and spontaneously breathing  Level of Consciousness: awake  Oxygen Supplementation: room air    Independent Airway: airway patency satisfactory and stable  Dentition: dentition unchanged  Vital Signs Stable: post-procedure vital signs reviewed and stable  Report to RN Given: handoff report given  Patient transferred to: Phase II    Handoff Report: Identifed the Patient, Identified the Reponsible Provider, Reviewed the pertinent medical history, Discussed the surgical course, Reviewed Intra-OP anesthesia mangement and issues during anesthesia, Set expectations for post-procedure period and Allowed opportunity for questions and acknowledgement of understanding      Vitals:  Vitals Value Taken Time   BP     Temp     Pulse     Resp     SpO2         Electronically Signed By: ANDREW MORALES  February 23, 2023  12:27 PM

## 2023-02-23 NOTE — ANESTHESIA POSTPROCEDURE EVALUATION
Patient: Rojelio Juárez    Procedure: Procedure(s):  COLONOSCOPY with polypectomy, biopsy, resolution clipping       Anesthesia Type:  MAC    Note:  Disposition: Outpatient   Postop Pain Control: Uneventful            Sign Out: Well controlled pain   PONV: No   Neuro/Psych: Uneventful            Sign Out: Acceptable/Baseline neuro status   Airway/Respiratory: Uneventful            Sign Out: Acceptable/Baseline resp. status   CV/Hemodynamics: Uneventful            Sign Out: Acceptable CV status; No obvious hypovolemia; No obvious fluid overload   Other NRE: NONE   DID A NON-ROUTINE EVENT OCCUR? No           Last vitals:  Vitals Value Taken Time   /77 02/23/23 1250   Temp     Pulse 84 02/23/23 1250   Resp     SpO2 96 % 02/23/23 1252   Vitals shown include unvalidated device data.    Electronically Signed By: MITCH MANZANARES CRNA  February 23, 2023  1:00 PM

## 2023-02-23 NOTE — H&P
HISTORY AND PHYSICAL - ENDOSCOPY   2/23/2023    Patient : Rojelio Juárez    Planned Procedures : Colonoscopy    This is a 52 year old male with a need for a colonoscopy for Screening colonoscopy.        Past Medical History:  Past Medical History:   Diagnosis Date     Anxiety 08/15/2017     Benign essential hypertension 01/20/2017     Fatty infiltration of liver 11/21/2018     Herniated intervertebral disc of lumbar spine 01/20/2017     Pseudocholinesterase deficiency      Tobacco dependence syndrome 01/20/2017       Past Surgical History:  Past Surgical History:   Procedure Laterality Date     BACK SURGERY       ESOPHAGEAL BALLOON PROVOCATION STUDY N/A 11/9/2021    Procedure: Esophageal Balloon Provocation Study;  Surgeon: Danial Serrano DO;  Location:  GI     ESOPHAGOSCOPY, GASTROSCOPY, DUODENOSCOPY (EGD), COMBINED N/A 11/9/2021    Procedure: ESOPHAGOGASTRODUODENOSCOPY, WITH BIOPSY;  Surgeon: Danial Serrano DO;  Location:  GI       Family History History:  History reviewed. No pertinent family history.    History of Tobacco Use:  History   Smoking Status     Some Days     Packs/day: 0.75     Years: 38.00     Types: Cigarettes     Start date: 1/1/1984     Last attempt to quit: 12/12/2022   Smokeless Tobacco     Never       Current Medications:  No current outpatient medications on file.       Allergies:  Allergies   Allergen Reactions     Succinylcholine Muscle Pain (Myalgia)     Pt had profound muscle weakness for 4 hours following an intubating dose of succinylcholine requiring prolonged intubation and mechanical ventilation post-operatively. See post-anesthetic evaluation note from surgery 2/25/20 for details.      Prednisone      Weight gain 20+ lbs with short course reported. Use only if absolutely needed.     Metformin GI Disturbance     Ozempic (0.25 Or 0.5 Mg-Dose) [Semaglutide] GI Disturbance     Sertraline Headache and Nausea       ROS:  Pertinent items are noted in HPI.  All other systems are  negative.    PHYSICAL EXAM:     Vital signs: /95    Weight: [unfilled]   BMI: There is no height or weight on file to calculate BMI.   General: Normal, healthy, cooperative, in no acute distress, alert   Skin: no jaundice   HEENT: PERRLA and EOMI   Neck: supple   Lungs: clear to auscultation   CV: Regular rate and rhythm without murmer   Abdominal: abdomen is soft without significant tenderness, masses, organomegaly or guarding   Extremities: No cyanosis, clubbing or edema noted bilaterally in Upper and Lower Extremities   Neurological: without deficit    Assessment:   52 year old male with need of a colonoscopy for Screening colonoscopy:    Plan:   Will plan on doing a colonoscopy.      The risks, benefits, and alternatives to the planned procedure were fully discussed with the patient and/or the patient's representative(s). The risks of bleeding, infection, death, missing pathology, the need for additional procedures intra-operatively, the possible need for intra-operative consults, the possible need for transfusion therapy, cardiopulmonary compromise, the possible need for additional surgery for a complication were discussed with the patient and/or the patient's representative(s). The patient's and/or patient's representative(s) questions were addressed and answered. Informed consent was obtained from the patient and/or the patient's representative(s). The patient and/or the patient's representative(s) consent to proceed.    Specific risks:  Risks include but are not limited to bleeding, perforation, missing lesions, need for additional procedures, reaction to anesthesia.  All the patients questions were answered.  The patient consents to proceed.  The procedures will be scheduled.

## 2023-03-01 LAB
PATH REPORT.COMMENTS IMP SPEC: NORMAL
PATH REPORT.COMMENTS IMP SPEC: NORMAL
PATH REPORT.FINAL DX SPEC: NORMAL
PATH REPORT.GROSS SPEC: NORMAL
PATH REPORT.MICROSCOPIC SPEC OTHER STN: NORMAL
PATH REPORT.RELEVANT HX SPEC: NORMAL
PHOTO IMAGE: NORMAL

## 2023-03-03 ENCOUNTER — TRANSFERRED RECORDS (OUTPATIENT)
Dept: HEALTH INFORMATION MANAGEMENT | Facility: CLINIC | Age: 53
End: 2023-03-03
Payer: COMMERCIAL

## 2023-03-03 LAB — RETINOPATHY: NEGATIVE

## 2023-03-22 ENCOUNTER — OFFICE VISIT (OUTPATIENT)
Dept: SURGERY | Facility: OTHER | Age: 53
End: 2023-03-22
Attending: SURGERY
Payer: COMMERCIAL

## 2023-03-22 VITALS
TEMPERATURE: 97.3 F | OXYGEN SATURATION: 98 % | BODY MASS INDEX: 33.86 KG/M2 | HEIGHT: 72 IN | WEIGHT: 250 LBS | HEART RATE: 102 BPM | SYSTOLIC BLOOD PRESSURE: 124 MMHG | DIASTOLIC BLOOD PRESSURE: 72 MMHG

## 2023-03-22 DIAGNOSIS — K52.9 COLITIS: Primary | ICD-10-CM

## 2023-03-22 PROCEDURE — 99213 OFFICE O/P EST LOW 20 MIN: CPT | Performed by: SURGERY

## 2023-03-22 ASSESSMENT — PAIN SCALES - GENERAL: PAINLEVEL: NO PAIN (0)

## 2023-03-22 NOTE — PATIENT INSTRUCTIONS
Thank you for allowing Dr. Covarrubias and our surgical team to participate in your care. Please call our health unit coordinator at [phone] with scheduling questions or the nurse at [phone] with any other questions or concerns

## 2023-03-22 NOTE — PROGRESS NOTES
CLINIC NOTE - POST-OP ENDOSCOPY  3/22/2023    Patient:Rojelio Juárez    Procedure: Colonoscopy with biopsies    This is a 52 year old male who is 2 weeks s/p Colonoscopy with biopsies.  At the time of endoscopy 2 presumed polyps were identified.  Polyps returned as consistent with acute colitis.  At the time of endoscopy no evidence of colitis was noted.  The patient denies any changes in her bowel habits.  No abdominal pain.  No diarrhea.  No bleeding.  No history of inflammatory bowel disease..     Current Medications:  Current Outpatient Medications   Medication Sig Dispense Refill     albuterol (PROAIR HFA/PROVENTIL HFA/VENTOLIN HFA) 108 (90 Base) MCG/ACT inhaler Inhale 2 puffs into the lungs every 6 hours 18 g 3     aspirin 81 MG EC tablet Take 1 tablet (81 mg) by mouth daily 90 tablet 3     atorvastatin (LIPITOR) 10 MG tablet TAKE 1 TABLET(10 MG) BY MOUTH AT BEDTIME 90 tablet 3     blood glucose (NO BRAND SPECIFIED) lancets standard Use to test blood sugar 3 times daily or as directed. 300 each 3     blood glucose (NO BRAND SPECIFIED) test strip Use to test blood sugar 3 times daily or as directed. 300 strip 3     blood glucose monitoring (NO BRAND SPECIFIED) meter device kit Use to test blood sugar 3 times daily or as directed. 1 kit 0     ibuprofen (ADVIL/MOTRIN) 800 MG tablet TAKE 1 TABLET(800 MG) BY MOUTH EVERY 8 HOURS AS NEEDED FOR MODERATE PAIN 90 tablet 0     JARDIANCE 25 MG TABS tablet TAKE 1 TABLET(25 MG) BY MOUTH DAILY 90 tablet 1     losartan (COZAAR) 25 MG tablet Take 1 tablet (25 mg) by mouth daily 90 tablet 1     pioglitazone (ACTOS) 30 MG tablet Take 1 tablet (30 mg) by mouth daily 90 tablet 1       Allergies:  Allergies   Allergen Reactions     Succinylcholine Muscle Pain (Myalgia)     Pt had profound muscle weakness for 4 hours following an intubating dose of succinylcholine requiring prolonged intubation and mechanical ventilation post-operatively. See post-anesthetic evaluation note from  surgery 2/25/20 for details.      Prednisone      Weight gain 20+ lbs with short course reported. Use only if absolutely needed.     Metformin GI Disturbance     Ozempic (0.25 Or 0.5 Mg-Dose) [Semaglutide] GI Disturbance     Sertraline Headache and Nausea       PHYSICAL EXAM:   Vital signs: /72 (BP Location: Right arm, Patient Position: Sitting, Cuff Size: Adult Large)   Pulse 102   Temp 97.3  F (36.3  C) (Tympanic)   Ht 1.829 m (6')   Wt 113.4 kg (250 lb)   SpO2 98%   BMI 33.91 kg/m     Weight: [unfilled]   BMI: Body mass index is 33.91 kg/m .   General: Normal, healthy, cooperative, in no acute distress, alert   Lungs: clear to auscultation   CV: Regular rate and rhythm without murmer   Abdominal: abdomen is soft without significant tenderness, masses, organomegaly or guarding       PATHOLOGY:  Case Report   Date Value Ref Range Status   02/23/2023   Final    Surgical Pathology Report                         Case: [id number]                                  Authorizing Provider:  Jimbo Covarrubias MD  Collected:           02/23/2023 12:20 PM          Ordering Location:     [facility] Main Operating Room     Received:            02/23/2023 12:39 PM          Pathologist:           Danial Gutierrez DO                                                         Specimens:   A) - Large Intestine, Colon, 70 cm                                                                  B) - Large Intestine, Colon, 40 cm                                                          Final Diagnosis   Date Value Ref Range Status   02/23/2023   Final    A.  Colon, polyp at 70 cm, polypectomy:  -Fibroepithelial-like polyp with prominent submucosal vessels, distal active colitis, superficial lamina propria fibroangioplasia and    congestion (see comment).    B.  Colon 40 cm, biopsy:  -Focal active colitis (see comment).         ASSESSMENT:    52 year old male who is 2 weeks s/p Colonoscopy with biopsies.  Pathology showed focal active  colitis.     PLAN:   I am unsure of the significance of the pathological findings.  It could be secondary to irritation secondary to bowel prep.  This point the patient is completely asymptomatic.  I had a long discussion with him and we will not treat him for the pathological finding given he is completely asymptomatic.  If he starts to develop symptoms he will return.  Otherwise I recommended a colonoscopy in 5 to 10 years.

## 2023-04-29 DIAGNOSIS — Z79.4 TYPE 2 DIABETES MELLITUS WITHOUT COMPLICATION, WITH LONG-TERM CURRENT USE OF INSULIN (H): ICD-10-CM

## 2023-04-29 DIAGNOSIS — E11.9 TYPE 2 DIABETES MELLITUS WITHOUT COMPLICATION, WITH LONG-TERM CURRENT USE OF INSULIN (H): ICD-10-CM

## 2023-05-01 RX ORDER — PIOGLITAZONEHYDROCHLORIDE 30 MG/1
TABLET ORAL
Qty: 90 TABLET | Refills: 1 | Status: SHIPPED | OUTPATIENT
Start: 2023-05-01 | End: 2023-12-09

## 2023-05-01 NOTE — TELEPHONE ENCOUNTER
actos      Last Written Prescription Date:  1/31/23  Last Fill Quantity: 90,   # refills: 1  Last Office Visit: 1/30/23  Future Office visit:    Next 5 appointments (look out 90 days)    May 04, 2023 10:00 AM  (Arrive by 9:45 AM)  SHORT with Marion Davis NP  [facility] ([facility] [address]  [place] [address]  [phone]             Routing refill request to provider for review/approval because:    Thiazolidinedione Agents (TZDs)  Failed 04/29/2023 08:00 AM   Protocol Details  Patient has a normal ALT within the past 12 mos.    Patient has a normal AST within the past 12 mos.      Kimberly Boecker, RN

## 2023-05-01 NOTE — PROGRESS NOTES
Assessment & Plan     1. Type 2 diabetes mellitus without complication, with long-term current use of insulin (H)  Cannot tolerate metformin, continue actos and jardiance.    - Hemoglobin A1c; Future    2. Hyperlipidemia LDL goal <100  - Lipid Profile; Future  - atorvastatin (LIPITOR) 10 MG tablet; Take 1 tablet (10 mg) by mouth At Bedtime  Dispense: 90 tablet; Refill: 1    3. Benign essential hypertension  - TSH with free T4 reflex; Future  - Basic metabolic panel; Future  - losartan (COZAAR) 25 MG tablet; Take 1 tablet (25 mg) by mouth daily  Dispense: 90 tablet; Refill: 1    4. On statin therapy  - Hepatic function panel; Future    5. Chronic bilateral low back pain without sciatica  Had surgery in March, doing well    6. Back muscle spasm  - gabapentin (NEURONTIN) 300 MG capsule; Take 1 capsule (300 mg) by mouth 2 times daily as needed for neuropathic pain (restless legs)  Dispense: 60 capsule; Refill: 3    7. Restless legs syndrome  - gabapentin (NEURONTIN) 300 MG capsule; Take 1 capsule (300 mg) by mouth 2 times daily as needed for neuropathic pain (restless legs)  Dispense: 60 capsule; Refill: 3    8. Morbid obesity (H)  Weight loss encouraged  - phentermine (ADIPEX-P) 15 MG capsule; Take 1 capsule (15 mg) by mouth every morning  Dispense: 30 capsule; Refill: 0    9. Acute non-recurrent maxillary sinusitis  Symptomatic cares reviewed.   - amoxicillin-clavulanate (AUGMENTIN) 875-125 MG tablet; Take 1 tablet by mouth 2 times daily for 10 days  Dispense: 20 tablet; Refill: 0      Review of prior external note(s) from - CareEverywhere information from neurosurgery note reviewed 3/30 and 3/29/2023. reviewed         BMI:   Estimated body mass index is 36.36 kg/m  as calculated from the following:    Height as of this encounter: 1.829 m (6').    Weight as of this encounter: 121.6 kg (268 lb 1.6 oz).   Weight management plan: Discussed healthy diet and exercise guidelines      Follow-up 1 month or as needed      Marion Davis NP  [facility] - [place] CEDRIC Adams is a 52 year old, presenting for the following health issues:  Hypertension, Lipids, and Diabetes         View : No data to display.              HPI     Diabetes Follow-up      How often are you checking your blood sugar? Not at all    What concerns do you have today about your diabetes? None     Do you have any of these symptoms? (Select all that apply)  No numbness or tingling in feet.  No redness, sores or blisters on feet.  No complaints of excessive thirst.  No reports of blurry vision.  No significant changes to weight.      Hyperlipidemia Follow-Up      Are you regularly taking any medication or supplement to lower your cholesterol?   Yes- Lipitor 10 mg    Are you having muscle aches or other side effects that you think could be caused by your cholesterol lowering medication?  Yes Leg Cramps at times     Hypertension Follow-up      Do you check your blood pressure regularly outside of the clinic? No     Are you following a low salt diet? Yes    Are your blood pressures ever more than 140 on the top number (systolic) OR more   than 90 on the bottom number (diastolic), for example 140/90? No    BP Readings from Last 2 Encounters:   05/04/23 102/82   03/22/23 124/72     Hemoglobin A1C (%)   Date Value   01/30/2023 8.0 (H)   07/27/2022 7.2 (H)   04/14/2021 5.7 (H)   11/11/2020 6.8 (H)     LDL Cholesterol Calculated (mg/dL)   Date Value   01/30/2023 79   07/27/2022 66   04/14/2021 91   11/11/2020 57           Sinus issues  Onset/Duration: 1.5 weeks  Symptoms:   Nasal congestion: YES  Sneezing: No  Red, itchy eyes: No  Progression of Symptoms: worse  Accompanying Signs & Symptoms:  Cough: YES  Wheezing: No  Rash: No  Sinus/facial pain: YES  History:   History of Asthma: No   History of recurrent sinus infections.   Precipitating factors:   Started with a cold last week.    Alleviating factors:  None  Therapies tried and outcome:  None      BP Readings from Last 3 Encounters:   05/04/23 102/82   03/22/23 124/72   02/23/23 106/77    Wt Readings from Last 3 Encounters:   05/04/23 121.6 kg (268 lb 1.6 oz)   03/22/23 113.4 kg (250 lb)   02/23/23 116.7 kg (257 lb 3.2 oz)                     Review of Systems   Constitutional, HEENT, cardiovascular, pulmonary, gi and gu systems are negative, except as otherwise noted.      Objective    /82 (BP Location: Right arm, Patient Position: Chair, Cuff Size: Adult Large)   Pulse 108   Temp 97.3  F (36.3  C) (Tympanic)   Resp 16   Ht 1.829 m (6')   Wt 121.6 kg (268 lb 1.6 oz)   SpO2 98%   BMI 36.36 kg/m    Body mass index is 36.36 kg/m .  Physical Exam   GENERAL: healthy, alert and no distress  HENT: normal cephalic/atraumatic, both ears: erythematous, nasal mucosa edematous , rhinorrhea thick and sinuses: maxillary, frontal tenderness on bilateral  NECK: no adenopathy, no asymmetry, masses, or scars and thyroid normal to palpation  RESP: lungs clear to auscultation - no rales, rhonchi or wheezes  CV: regular rate and rhythm, normal S1 S2, no S3 or S4, no murmur, click or rub, no peripheral edema and peripheral pulses strong  MS: no gross musculoskeletal defects noted, no edema  PSYCH: mentation appears normal, affect normal/bright

## 2023-05-04 ENCOUNTER — OFFICE VISIT (OUTPATIENT)
Dept: FAMILY MEDICINE | Facility: OTHER | Age: 53
End: 2023-05-04
Attending: NURSE PRACTITIONER
Payer: COMMERCIAL

## 2023-05-04 VITALS
HEIGHT: 72 IN | BODY MASS INDEX: 36.31 KG/M2 | WEIGHT: 268.1 LBS | HEART RATE: 108 BPM | RESPIRATION RATE: 16 BRPM | OXYGEN SATURATION: 98 % | SYSTOLIC BLOOD PRESSURE: 102 MMHG | DIASTOLIC BLOOD PRESSURE: 82 MMHG | TEMPERATURE: 97.3 F

## 2023-05-04 DIAGNOSIS — I10 BENIGN ESSENTIAL HYPERTENSION: ICD-10-CM

## 2023-05-04 DIAGNOSIS — G25.81 RESTLESS LEGS SYNDROME: ICD-10-CM

## 2023-05-04 DIAGNOSIS — Z79.899 ON STATIN THERAPY: ICD-10-CM

## 2023-05-04 DIAGNOSIS — E78.5 HYPERLIPIDEMIA LDL GOAL <100: ICD-10-CM

## 2023-05-04 DIAGNOSIS — M62.830 BACK MUSCLE SPASM: ICD-10-CM

## 2023-05-04 DIAGNOSIS — Z79.4 TYPE 2 DIABETES MELLITUS WITHOUT COMPLICATION, WITH LONG-TERM CURRENT USE OF INSULIN (H): Primary | ICD-10-CM

## 2023-05-04 DIAGNOSIS — M54.50 CHRONIC BILATERAL LOW BACK PAIN WITHOUT SCIATICA: ICD-10-CM

## 2023-05-04 DIAGNOSIS — G89.29 CHRONIC BILATERAL LOW BACK PAIN WITHOUT SCIATICA: ICD-10-CM

## 2023-05-04 DIAGNOSIS — E11.9 TYPE 2 DIABETES MELLITUS WITHOUT COMPLICATION, WITH LONG-TERM CURRENT USE OF INSULIN (H): Primary | ICD-10-CM

## 2023-05-04 DIAGNOSIS — E66.01 MORBID OBESITY (H): ICD-10-CM

## 2023-05-04 DIAGNOSIS — J01.00 ACUTE NON-RECURRENT MAXILLARY SINUSITIS: ICD-10-CM

## 2023-05-04 LAB
ALBUMIN SERPL BCG-MCNC: 4.3 G/DL (ref 3.5–5.2)
ALP SERPL-CCNC: 84 U/L (ref 40–129)
ALT SERPL W P-5'-P-CCNC: 55 U/L (ref 10–50)
ANION GAP SERPL CALCULATED.3IONS-SCNC: 14 MMOL/L (ref 7–15)
AST SERPL W P-5'-P-CCNC: 37 U/L (ref 10–50)
BILIRUB DIRECT SERPL-MCNC: <0.2 MG/DL (ref 0–0.3)
BILIRUB SERPL-MCNC: 0.7 MG/DL
BUN SERPL-MCNC: 13.1 MG/DL (ref 6–20)
CALCIUM SERPL-MCNC: 10.2 MG/DL (ref 8.6–10)
CHLORIDE SERPL-SCNC: 103 MMOL/L (ref 98–107)
CHOLEST SERPL-MCNC: 149 MG/DL
CREAT SERPL-MCNC: 0.8 MG/DL (ref 0.67–1.17)
DEPRECATED HCO3 PLAS-SCNC: 24 MMOL/L (ref 22–29)
EST. AVERAGE GLUCOSE BLD GHB EST-MCNC: 137 MG/DL
GFR SERPL CREATININE-BSD FRML MDRD: >90 ML/MIN/1.73M2
GLUCOSE SERPL-MCNC: 131 MG/DL (ref 70–99)
HBA1C MFR BLD: 6.4 %
HDLC SERPL-MCNC: 63 MG/DL
LDLC SERPL CALC-MCNC: 57 MG/DL
NONHDLC SERPL-MCNC: 86 MG/DL
POTASSIUM SERPL-SCNC: 4.3 MMOL/L (ref 3.4–5.3)
PROT SERPL-MCNC: 7.4 G/DL (ref 6.4–8.3)
SODIUM SERPL-SCNC: 141 MMOL/L (ref 136–145)
TRIGL SERPL-MCNC: 143 MG/DL
TSH SERPL DL<=0.005 MIU/L-ACNC: 1.68 UIU/ML (ref 0.3–4.2)

## 2023-05-04 PROCEDURE — 84443 ASSAY THYROID STIM HORMONE: CPT | Performed by: NURSE PRACTITIONER

## 2023-05-04 PROCEDURE — 36415 COLL VENOUS BLD VENIPUNCTURE: CPT | Performed by: NURSE PRACTITIONER

## 2023-05-04 PROCEDURE — 83036 HEMOGLOBIN GLYCOSYLATED A1C: CPT | Performed by: NURSE PRACTITIONER

## 2023-05-04 PROCEDURE — 99214 OFFICE O/P EST MOD 30 MIN: CPT | Performed by: NURSE PRACTITIONER

## 2023-05-04 PROCEDURE — 80053 COMPREHEN METABOLIC PANEL: CPT | Performed by: NURSE PRACTITIONER

## 2023-05-04 PROCEDURE — 80061 LIPID PANEL: CPT | Performed by: NURSE PRACTITIONER

## 2023-05-04 PROCEDURE — 82248 BILIRUBIN DIRECT: CPT | Performed by: NURSE PRACTITIONER

## 2023-05-04 RX ORDER — ATORVASTATIN CALCIUM 10 MG/1
10 TABLET, FILM COATED ORAL AT BEDTIME
Qty: 90 TABLET | Refills: 1 | Status: SHIPPED | OUTPATIENT
Start: 2023-05-04 | End: 2023-12-07

## 2023-05-04 RX ORDER — GABAPENTIN 300 MG/1
300 CAPSULE ORAL 2 TIMES DAILY PRN
Qty: 60 CAPSULE | Refills: 3 | Status: SHIPPED | OUTPATIENT
Start: 2023-05-04

## 2023-05-04 RX ORDER — PHENTERMINE HYDROCHLORIDE 15 MG/1
15 CAPSULE ORAL EVERY MORNING
Qty: 30 CAPSULE | Refills: 0 | Status: SHIPPED | OUTPATIENT
Start: 2023-05-04 | End: 2023-12-28

## 2023-05-04 RX ORDER — LOSARTAN POTASSIUM 25 MG/1
25 TABLET ORAL DAILY
Qty: 90 TABLET | Refills: 1 | Status: SHIPPED | OUTPATIENT
Start: 2023-05-04 | End: 2023-08-09

## 2023-05-04 ASSESSMENT — PAIN SCALES - GENERAL: PAINLEVEL: MILD PAIN (2)

## 2023-05-04 ASSESSMENT — PATIENT HEALTH QUESTIONNAIRE - PHQ9: SUM OF ALL RESPONSES TO PHQ QUESTIONS 1-9: 0

## 2023-05-11 ENCOUNTER — ANCILLARY ORDERS (OUTPATIENT)
Dept: MRI IMAGING | Facility: HOSPITAL | Age: 53
End: 2023-05-11

## 2023-05-11 ENCOUNTER — MEDICAL CORRESPONDENCE (OUTPATIENT)
Dept: MRI IMAGING | Facility: HOSPITAL | Age: 53
End: 2023-05-11

## 2023-05-11 ENCOUNTER — HOSPITAL ENCOUNTER (OUTPATIENT)
Dept: MRI IMAGING | Facility: HOSPITAL | Age: 53
Discharge: HOME | End: 2023-05-11
Attending: NEUROLOGICAL SURGERY | Admitting: NEUROLOGICAL SURGERY
Payer: COMMERCIAL

## 2023-05-11 DIAGNOSIS — M54.50 LOW BACK PAIN: ICD-10-CM

## 2023-05-11 DIAGNOSIS — M54.16 RADICULOPATHY, LUMBAR REGION: ICD-10-CM

## 2023-05-11 DIAGNOSIS — M54.9 BACK PAIN: Primary | ICD-10-CM

## 2023-05-11 PROCEDURE — 72158 MRI LUMBAR SPINE W/O & W/DYE: CPT

## 2023-05-11 PROCEDURE — 255N000002 HC RX 255 OP 636: Performed by: RADIOLOGY

## 2023-05-11 PROCEDURE — A9585 GADOBUTROL INJECTION: HCPCS | Performed by: RADIOLOGY

## 2023-05-11 RX ORDER — GADOBUTROL 604.72 MG/ML
10 INJECTION INTRAVENOUS ONCE
Status: COMPLETED | OUTPATIENT
Start: 2023-05-11 | End: 2023-05-11

## 2023-05-11 RX ADMIN — GADOBUTROL 10 ML: 604.72 INJECTION INTRAVENOUS at 16:10

## 2023-05-15 ENCOUNTER — LAB (OUTPATIENT)
Dept: LAB | Facility: OTHER | Age: 53
End: 2023-05-15
Payer: COMMERCIAL

## 2023-05-15 DIAGNOSIS — M54.9 BACK PAIN: ICD-10-CM

## 2023-05-15 LAB
BASOPHILS # BLD AUTO: 0.1 10E3/UL (ref 0–0.2)
BASOPHILS NFR BLD AUTO: 1 %
CRP SERPL-MCNC: <3 MG/L
EOSINOPHIL # BLD AUTO: 0.5 10E3/UL (ref 0–0.7)
EOSINOPHIL NFR BLD AUTO: 8 %
ERYTHROCYTE [DISTWIDTH] IN BLOOD BY AUTOMATED COUNT: 13.1 % (ref 10–15)
ERYTHROCYTE [SEDIMENTATION RATE] IN BLOOD BY WESTERGREN METHOD: 7 MM/HR (ref 0–20)
HCT VFR BLD AUTO: 52.7 % (ref 40–53)
HGB BLD-MCNC: 17.9 G/DL (ref 13.3–17.7)
LYMPHOCYTES # BLD AUTO: 2.4 10E3/UL (ref 0.8–5.3)
LYMPHOCYTES NFR BLD AUTO: 37 %
MCH RBC QN AUTO: 32.4 PG (ref 26.5–33)
MCHC RBC AUTO-ENTMCNC: 34 G/DL (ref 31.5–36.5)
MCV RBC AUTO: 96 FL (ref 78–100)
MONOCYTES # BLD AUTO: 0.4 10E3/UL (ref 0–1.3)
MONOCYTES NFR BLD AUTO: 7 %
NEUTROPHILS # BLD AUTO: 3 10E3/UL (ref 1.6–8.3)
NEUTROPHILS NFR BLD AUTO: 47 %
PLATELET # BLD AUTO: 229 10E3/UL (ref 150–450)
RBC # BLD AUTO: 5.52 10E6/UL (ref 4.4–5.9)
WBC # BLD AUTO: 6.4 10E3/UL (ref 4–11)

## 2023-05-15 PROCEDURE — 85652 RBC SED RATE AUTOMATED: CPT

## 2023-05-15 PROCEDURE — 36415 COLL VENOUS BLD VENIPUNCTURE: CPT

## 2023-05-15 PROCEDURE — 85025 COMPLETE CBC W/AUTO DIFF WBC: CPT

## 2023-05-15 PROCEDURE — 86140 C-REACTIVE PROTEIN: CPT

## 2023-05-18 ENCOUNTER — MYC MEDICAL ADVICE (OUTPATIENT)
Dept: FAMILY MEDICINE | Facility: OTHER | Age: 53
End: 2023-05-18

## 2023-05-18 DIAGNOSIS — M25.521 RIGHT ELBOW PAIN: Primary | ICD-10-CM

## 2023-05-18 NOTE — TELEPHONE ENCOUNTER
"Called pt regarding request for Ortho referral for elbow.    He had an old injury to right elbow from about 5 years ago where he hyperextend it and never healed right.    About two weeks ago he pull started the weed whacker and knew he \"tweeked\" it right away.    Pain at this time 3/10 but pulling door open on truck and lifting arm stabbing pain and pain gets really up there.  Hurts to touch. Only the elbow area. Slight swelling. Has iced and IBU with little relief.    Appointment?  Or referral as requested?    Advised if s/s worsen go to ED.    Please advise.    Pended referral.    Miriam ARAIZA RN    "

## 2023-05-23 ENCOUNTER — MYC MEDICAL ADVICE (OUTPATIENT)
Dept: FAMILY MEDICINE | Facility: OTHER | Age: 53
End: 2023-05-23

## 2023-05-26 NOTE — PROGRESS NOTES
Assessment & Plan     1. Lateral epicondylitis of right elbow/ Chronic elbow pain, right  5-year history of intermittent elbow pain with triggers. Last trigger 3 weeks ago and improving   - Continue to rest the elbow, use ice, and compression as tolerated.  - ibuprofen (ADVIL/MOTRIN) 800 MG tablet; Take 1 tablet (800 mg) by mouth every 8 hours as needed for moderate pain With food  Dispense: 90 tablet; Refill: 1  - future orthopedic referral if not better in one week.       2. Acute right-sided low back pain with right-sided sciatica/ Right-sided low back pain with right-sided sciatica, unspecified chronicity  Chronic with surgery done at [facility]  - Continue with treatment plan from neurosurgery  - ibuprofen (ADVIL/MOTRIN) 800 MG tablet; Take 1 tablet (800 mg) by mouth every 8 hours as needed for moderate pain With food  Dispense: 90 tablet; Refill: 1  - Neurosurgery Referral    3. Radicular pain of right lower extremity  Stable  - Continue with treatment plan from neurosurgery.  - Neurosurgery Referral place for date of surgery service.           Return in about 5 days (around 6/5/2023).     DANIEL Hammond-S  [facility]     Patient is seen in conjunction with PA student.  History is reviewed with patient and pertinent portions of the exam are repeated.  Assessment and plan is reviewed with the patient.      Marion Davis NP  [facility] - [place]JAM[facility] Adams is a 52 year old, presenting for the following health issues:  Elbow Problem          HPI     Pain History:  When did you first notice your pain? X 3 weeks ago (injury to elbow x 5 years ago)    Have you seen anyone else for your pain? No  How has your pain affected your ability to work? Not currently working - unrelated to pain  Where in your body do you have pain? Musculoskeletal problem/pain  Onset/Duration: x 3 weeks ago   Description  Location: elbow - right  Joint Swelling: YES  Redness: No  Pain:  YES  Warmth: No  Intensity:  moderate  Progression of Symptoms:  Improving   Accompanying signs and symptoms:   Fevers: No  Numbness/tingling/weakness: YES- weakness   History  Trauma to the area: YES x 5 years ago overextended arm resulting in RT elbow pain- intermittent since initial injury   Recent illness:  No  Previous similar problem: YES  Previous evaluation:  No  Precipitating or alleviating factors:  Aggravating factors include: lifting, overuse and use of arm, extension of arm   Therapies tried and outcome: rest/inactivity, ice, support wrap, acetaminophen and Ibuprofen    - worse with pain score of 8/10  - pain usually around a pain score of 2/10  - repetitive action of the right arm aggravates it.     BACK PAIN:  He had surgery 3/30/2023 at [facility].  He is receiving bills stating he needs a referral for the surgery.  We did make a referral to neurosurgery in February but not one for his surgery.  Referral is placed today.        Review of Systems   Constitutional: Negative for chills, fatigue and fever.   HENT: Negative.    Eyes: Negative.    Respiratory: Negative for cough, shortness of breath and wheezing.    Cardiovascular: Negative for chest pain and palpitations.   Gastrointestinal: Negative.    Endocrine: Negative for polydipsia, polyphagia and polyuria.   Genitourinary: Negative for frequency and urgency.   Musculoskeletal:        Right elbow pain   Skin: Negative for color change, rash and wound.   Allergic/Immunologic: Negative.    Neurological: Negative for numbness and paresthesias.   Hematological: Negative.    Psychiatric/Behavioral: Negative for mood changes and sleep disturbance.     Objective    /78 (BP Location: Left arm, Patient Position: Sitting, Cuff Size: Adult Large)   Pulse 88   Temp 97.9  F (36.6  C) (Tympanic)   Resp 20   Wt 122 kg (269 lb)   SpO2 97%   BMI 36.48 kg/m    Body mass index is 36.48 kg/m .     Physical Exam  Constitutional:       Appearance: Normal  appearance.   HENT:      Head: Normocephalic and atraumatic.   Cardiovascular:      Rate and Rhythm: Normal rate and regular rhythm.      Pulses: Normal pulses.      Heart sounds: Normal heart sounds.   Pulmonary:      Effort: Pulmonary effort is normal.      Breath sounds: Normal breath sounds.   Abdominal:      General: Bowel sounds are normal.   Musculoskeletal:      Right elbow: No swelling, deformity, effusion or lacerations. Normal range of motion. Tenderness present in lateral epicondyle. No medial epicondyle tenderness.      Left elbow: Normal.      Right forearm: Tenderness (tenderness from lateral epicondyle to the about a 3rd of the lateral forearm) present. No swelling, edema or deformity.      Left forearm: Normal.      Right lower leg: No edema.      Left lower leg: No edema.   Skin:     General: Skin is warm and dry.      Findings: No bruising, erythema or rash.   Neurological:      Mental Status: He is alert.   Psychiatric:         Mood and Affect: Mood normal.         Behavior: Behavior normal.

## 2023-05-31 ENCOUNTER — OFFICE VISIT (OUTPATIENT)
Dept: FAMILY MEDICINE | Facility: OTHER | Age: 53
End: 2023-05-31
Attending: NURSE PRACTITIONER
Payer: COMMERCIAL

## 2023-05-31 VITALS
OXYGEN SATURATION: 97 % | HEART RATE: 88 BPM | RESPIRATION RATE: 20 BRPM | DIASTOLIC BLOOD PRESSURE: 78 MMHG | TEMPERATURE: 97.9 F | SYSTOLIC BLOOD PRESSURE: 110 MMHG | WEIGHT: 269 LBS | BODY MASS INDEX: 36.48 KG/M2

## 2023-05-31 DIAGNOSIS — M25.521 CHRONIC ELBOW PAIN, RIGHT: ICD-10-CM

## 2023-05-31 DIAGNOSIS — M54.41 RIGHT-SIDED LOW BACK PAIN WITH RIGHT-SIDED SCIATICA, UNSPECIFIED CHRONICITY: ICD-10-CM

## 2023-05-31 DIAGNOSIS — M54.10 RADICULAR PAIN OF RIGHT LOWER EXTREMITY: ICD-10-CM

## 2023-05-31 DIAGNOSIS — G89.29 CHRONIC ELBOW PAIN, RIGHT: ICD-10-CM

## 2023-05-31 DIAGNOSIS — M77.11 LATERAL EPICONDYLITIS OF RIGHT ELBOW: Primary | ICD-10-CM

## 2023-05-31 DIAGNOSIS — M54.41 ACUTE RIGHT-SIDED LOW BACK PAIN WITH RIGHT-SIDED SCIATICA: ICD-10-CM

## 2023-05-31 PROCEDURE — 99214 OFFICE O/P EST MOD 30 MIN: CPT | Performed by: NURSE PRACTITIONER

## 2023-05-31 RX ORDER — IBUPROFEN 800 MG/1
800 TABLET, FILM COATED ORAL EVERY 8 HOURS PRN
Qty: 90 TABLET | Refills: 1 | Status: SHIPPED | OUTPATIENT
Start: 2023-05-31

## 2023-05-31 ASSESSMENT — ENCOUNTER SYMPTOMS
POLYPHAGIA: 0
SLEEP DISTURBANCE: 0
ALLERGIC/IMMUNOLOGIC NEGATIVE: 1
HEMATOLOGIC/LYMPHATIC NEGATIVE: 1
PARESTHESIAS: 0
COUGH: 0
FEVER: 0
PALPITATIONS: 0
FATIGUE: 0
EYES NEGATIVE: 1
CHILLS: 0
FREQUENCY: 0
WOUND: 0
GASTROINTESTINAL NEGATIVE: 1
COLOR CHANGE: 0
NUMBNESS: 0
WHEEZING: 0
SHORTNESS OF BREATH: 0
POLYDIPSIA: 0

## 2023-05-31 ASSESSMENT — PAIN SCALES - GENERAL: PAINLEVEL: MILD PAIN (2)

## 2023-06-02 NOTE — PROGRESS NOTES
Assessment & Plan     1. Chronic pain of right elbow/ Lateral epicondylitis of right elbow  Stable, with no improvement in pain and tenderness. Recommend appointment with orthopedic specialist   - MR Elbow Right w/o Contrast; Future  - Orthopedic  Referral; Future    2. Chronic bilateral low back pain without sciatica/ Lumbar disc herniation with radiculopathy  Stable and improved with surgery and massage therapy   - Orthopedic  Referral; Future  - Continue with the treatment plan and management.     3. Other atopic dermatitis  On the right elbow due to elbow strap.   - Decreasing contact with strap as tolerated   - Moisturize the location   - OTC hydrocortisone for itch relief    4. Type 2 diabetes mellitus without complication, with long-term current use of insulin (H)  Stable and improved with goal A1C < 7%  - Continue with treatment plan and recommendations     5. Hyperlipidemia LDL goal <100/ On statin therapy  Stable, improved and within goal  - Continue with treatment plan and recommendations    6. Seasonal allergic rhinitis, unspecified trigger  - Continue to avoid triggers and treat with OTC cetirizine     7. Morbid obesity (H)  Stable and improving   - Continue with treatment plan and recommendations including diet and physical activity     56}       Return in about 6 months (around 12/5/2023) for diabetes, lipids, HTN.     Yassine Astorga, PA-S  [facility]     Patient is seen in conjunction with PA student.  History is reviewed with patient and pertinent portions of the exam are repeated.  Assessment and plan is reviewed with the patient.      Marion Davis NP  [facility] - BENJAMÍN Adams is a 52 year old, presenting for the following health issues:  Pain          HPI     Pain History:  When did you first notice your pain? Elbow pain onset about a month low back pain years   Have you seen this provider for your pain in the past?   Yes    Where in your body do you have pain? Low back and right elbow   Are you seeing anyone else for your pain? Yes - back surgery at [place]         11/5/2021     9:00 AM 10/10/2022     9:00 AM 5/4/2023     9:47 AM   PHQ-9 SCORE   PHQ-9 Total Score 9 3 0           11/11/2020    10:00 AM 11/5/2021     9:00 AM 10/10/2022     9:00 AM   JOE-7 SCORE   Total Score 0 14 10           Chronic Pain Follow Up:    Location of pain: Low back and right elbow   Analgesia/pain control:    - Recent changes:  No change in pain for back or elbow     - Overall control: takes tylenol and ibuprofen for pain  States works pretty well for pain    - Current treatments: Ibuprin and tylenol   Adherence:  Only takes when needed     - myofascial release in [place], Peacefully stronger- Massage therapy  - 3 sessions since 2 months, have improved. Has felt the most improvement yesterday 06/15/23     PDMP Review       Value Time User    State PDMP site checked  Yes 11/9/2021 10:20 AM Marion Davis NP        Last CSA Agreement:   CSA -- Patient Level:    CSA: None found at the patient level.       Last UDS:       Chronic conditions are stable.      Review of Systems   Constitutional: Negative for chills, fatigue, fever and unexpected weight change.   HENT: Negative.    Eyes: Negative.    Respiratory: Negative for cough, shortness of breath and wheezing.    Cardiovascular: Negative for chest pain and palpitations.   Gastrointestinal: Negative.    Endocrine: Negative.    Genitourinary: Negative.    Musculoskeletal: Positive for back pain, joint swelling and myalgias.        Right elbow pain    Allergic/Immunologic: Negative.    Neurological: Positive for numbness (right elbow and arm). Negative for dizziness, light-headedness, headaches and paresthesias.   Hematological: Negative.    Psychiatric/Behavioral: Negative.           Objective    /78 (BP Location: Left arm, Patient Position: Chair, Cuff Size: Adult Large)   Pulse 119   Temp 97.8   F (36.6  C) (Tympanic)   Resp 16   Ht 1.829 m (6')   Wt 122 kg (269 lb)   SpO2 98%   BMI 36.48 kg/m     Body mass index is 36.48 kg/m .     Physical Exam  Constitutional:       Appearance: Normal appearance.   HENT:      Head: Normocephalic and atraumatic.   Cardiovascular:      Rate and Rhythm: Regular rhythm. Tachycardia present.      Pulses: Normal pulses.      Heart sounds: Normal heart sounds.   Pulmonary:      Effort: Pulmonary effort is normal.      Breath sounds: Normal breath sounds.   Musculoskeletal:      Right elbow: Swelling present. Decreased range of motion. Tenderness present in lateral epicondyle (to~1/3 lateral forearm).      Left elbow: Normal.      Right lower leg: No edema.      Left lower leg: No edema.   Skin:     General: Skin is warm.      Findings: Erythema (right elbow ) and rash (right elbow ) present.   Neurological:      Mental Status: He is alert.   Psychiatric:         Mood and Affect: Mood normal.         Behavior: Behavior normal.         No results found for any visits on 06/05/23.

## 2023-06-05 ENCOUNTER — OFFICE VISIT (OUTPATIENT)
Dept: FAMILY MEDICINE | Facility: OTHER | Age: 53
End: 2023-06-05
Attending: NURSE PRACTITIONER
Payer: COMMERCIAL

## 2023-06-05 VITALS
DIASTOLIC BLOOD PRESSURE: 78 MMHG | SYSTOLIC BLOOD PRESSURE: 108 MMHG | WEIGHT: 269 LBS | HEART RATE: 119 BPM | HEIGHT: 72 IN | BODY MASS INDEX: 36.44 KG/M2 | TEMPERATURE: 97.8 F | RESPIRATION RATE: 16 BRPM | OXYGEN SATURATION: 98 %

## 2023-06-05 DIAGNOSIS — E78.5 HYPERLIPIDEMIA LDL GOAL <100: ICD-10-CM

## 2023-06-05 DIAGNOSIS — J30.2 SEASONAL ALLERGIC RHINITIS, UNSPECIFIED TRIGGER: ICD-10-CM

## 2023-06-05 DIAGNOSIS — G89.29 CHRONIC BILATERAL LOW BACK PAIN WITHOUT SCIATICA: ICD-10-CM

## 2023-06-05 DIAGNOSIS — M77.11 LATERAL EPICONDYLITIS OF RIGHT ELBOW: ICD-10-CM

## 2023-06-05 DIAGNOSIS — Z79.4 TYPE 2 DIABETES MELLITUS WITHOUT COMPLICATION, WITH LONG-TERM CURRENT USE OF INSULIN (H): ICD-10-CM

## 2023-06-05 DIAGNOSIS — G89.29 CHRONIC PAIN OF RIGHT ELBOW: Primary | ICD-10-CM

## 2023-06-05 DIAGNOSIS — Z79.899 ON STATIN THERAPY: ICD-10-CM

## 2023-06-05 DIAGNOSIS — E66.01 MORBID OBESITY (H): ICD-10-CM

## 2023-06-05 DIAGNOSIS — M25.521 CHRONIC PAIN OF RIGHT ELBOW: Primary | ICD-10-CM

## 2023-06-05 DIAGNOSIS — M54.50 CHRONIC BILATERAL LOW BACK PAIN WITHOUT SCIATICA: ICD-10-CM

## 2023-06-05 DIAGNOSIS — E11.9 TYPE 2 DIABETES MELLITUS WITHOUT COMPLICATION, WITH LONG-TERM CURRENT USE OF INSULIN (H): ICD-10-CM

## 2023-06-05 DIAGNOSIS — M51.16 LUMBAR DISC HERNIATION WITH RADICULOPATHY: ICD-10-CM

## 2023-06-05 DIAGNOSIS — L20.89 OTHER ATOPIC DERMATITIS: ICD-10-CM

## 2023-06-05 PROCEDURE — 99214 OFFICE O/P EST MOD 30 MIN: CPT | Performed by: NURSE PRACTITIONER

## 2023-06-05 ASSESSMENT — ENCOUNTER SYMPTOMS
HEADACHES: 0
WHEEZING: 0
ALLERGIC/IMMUNOLOGIC NEGATIVE: 1
DIZZINESS: 0
FEVER: 0
PARESTHESIAS: 0
GASTROINTESTINAL NEGATIVE: 1
NUMBNESS: 1
LIGHT-HEADEDNESS: 0
COUGH: 0
ENDOCRINE NEGATIVE: 1
JOINT SWELLING: 1
SHORTNESS OF BREATH: 0
EYES NEGATIVE: 1
BACK PAIN: 1
HEMATOLOGIC/LYMPHATIC NEGATIVE: 1
FATIGUE: 0
PALPITATIONS: 0
UNEXPECTED WEIGHT CHANGE: 0
PSYCHIATRIC NEGATIVE: 1
MYALGIAS: 1
CHILLS: 0

## 2023-06-05 ASSESSMENT — PAIN SCALES - GENERAL: PAINLEVEL: NO PAIN (0)

## 2023-06-06 ENCOUNTER — HOSPITAL ENCOUNTER (OUTPATIENT)
Dept: MRI IMAGING | Facility: HOSPITAL | Age: 53
Discharge: HOME | End: 2023-06-06
Attending: NURSE PRACTITIONER | Admitting: NURSE PRACTITIONER
Payer: COMMERCIAL

## 2023-06-06 DIAGNOSIS — G89.29 CHRONIC PAIN OF RIGHT ELBOW: ICD-10-CM

## 2023-06-06 DIAGNOSIS — M77.11 LATERAL EPICONDYLITIS OF RIGHT ELBOW: ICD-10-CM

## 2023-06-06 DIAGNOSIS — M25.521 CHRONIC PAIN OF RIGHT ELBOW: ICD-10-CM

## 2023-06-06 PROCEDURE — 73221 MRI JOINT UPR EXTREM W/O DYE: CPT | Mod: RT

## 2023-06-14 ENCOUNTER — OFFICE VISIT (OUTPATIENT)
Dept: ORTHOPEDICS | Facility: OTHER | Age: 53
End: 2023-06-14
Attending: SPECIALIST
Payer: COMMERCIAL

## 2023-06-14 VITALS
DIASTOLIC BLOOD PRESSURE: 74 MMHG | BODY MASS INDEX: 36.16 KG/M2 | HEIGHT: 72 IN | HEART RATE: 119 BPM | SYSTOLIC BLOOD PRESSURE: 120 MMHG | OXYGEN SATURATION: 98 % | RESPIRATION RATE: 16 BRPM | WEIGHT: 267 LBS

## 2023-06-14 DIAGNOSIS — M54.50 CHRONIC BILATERAL LOW BACK PAIN WITHOUT SCIATICA: ICD-10-CM

## 2023-06-14 DIAGNOSIS — G89.29 CHRONIC BILATERAL LOW BACK PAIN WITHOUT SCIATICA: ICD-10-CM

## 2023-06-14 DIAGNOSIS — G89.29 CHRONIC PAIN OF RIGHT ELBOW: ICD-10-CM

## 2023-06-14 DIAGNOSIS — M51.16 LUMBAR DISC HERNIATION WITH RADICULOPATHY: ICD-10-CM

## 2023-06-14 DIAGNOSIS — M77.11 LATERAL EPICONDYLITIS OF RIGHT ELBOW: ICD-10-CM

## 2023-06-14 DIAGNOSIS — M25.521 RIGHT ELBOW PAIN: Primary | ICD-10-CM

## 2023-06-14 DIAGNOSIS — M25.521 CHRONIC PAIN OF RIGHT ELBOW: ICD-10-CM

## 2023-06-14 PROCEDURE — 99203 OFFICE O/P NEW LOW 30 MIN: CPT | Mod: 25 | Performed by: SPECIALIST

## 2023-06-14 PROCEDURE — 20551 NJX 1 TENDON ORIGIN/INSJ: CPT | Mod: RT | Performed by: SPECIALIST

## 2023-06-14 RX ORDER — LIDOCAINE HYDROCHLORIDE 10 MG/ML
1 INJECTION, SOLUTION INFILTRATION; PERINEURAL ONCE
Status: COMPLETED | OUTPATIENT
Start: 2023-06-14 | End: 2023-06-14

## 2023-06-14 RX ORDER — METHYLPREDNISOLONE ACETATE 80 MG/ML
40 INJECTION, SUSPENSION INTRA-ARTICULAR; INTRALESIONAL; INTRAMUSCULAR; SOFT TISSUE ONCE
Status: COMPLETED | OUTPATIENT
Start: 2023-06-14 | End: 2023-06-14

## 2023-06-14 RX ADMIN — LIDOCAINE HYDROCHLORIDE 1 ML: 10 INJECTION, SOLUTION INFILTRATION; PERINEURAL at 11:19

## 2023-06-14 RX ADMIN — METHYLPREDNISOLONE ACETATE 40 MG: 80 INJECTION, SUSPENSION INTRA-ARTICULAR; INTRALESIONAL; INTRAMUSCULAR; SOFT TISSUE at 11:20

## 2023-06-14 ASSESSMENT — PAIN SCALES - GENERAL: PAINLEVEL: MODERATE PAIN (5)

## 2023-06-14 NOTE — PROGRESS NOTES
Service Date: 06/14/2023    HISTORY OF PRESENT ILLNESS:  The patient is a 53-year-old right-hand dominant male seen today for evaluation of his right elbow.  For about 5 years, he has been complaining of lateral elbow pain.  He does not really recall any specific trauma or injury that caused the onset of his symptoms.  Over the last 5 years, his symptoms have been intermittent, so he did not really seek medical attention.  Recently, about 5 weeks ago, though he was pull-starting his weed whacker and afterwards he experienced the worse pain laterally in his right elbow.  He complained of a little bit of mild swelling.  His job is fairly physical.  He does a lot of physical work with his right arm and sometimes he has pain.  He has tried a tennis elbow brace and it does help some when he is doing activities.  He has tried some over-the-counter medications.  Today, he rates his pain as 5/10.  He points to the lateral elbow as the pain location.  He denies any radiating pain or paresthesias or numbness in his hand today.    PHYSICAL EXAMINATION:  On today's exam, the patient is awake, alert and oriented, in no acute distress.  Overall, general mood, affect and appearance are normal.  Weight 267 pounds, blood pressure 120/74.  On evaluation of the right elbow, he has full range of motion in flexion, extension, pronation and supination.  He has a normal distal neurovascular exam with normal motor and sensory function of the radial, ulnar, and median nerve distribution.  He has mild swelling laterally in his elbow.  He is tender to palpation over the lateral epicondyle.  He has pain laterally over the epicondyle of the elbow when he does resisted wrist extension or finger extension.  The elbow was stable.    IMAGING:  The patient had an MRI done of his elbow.  This was done on 06/06/2023, it showed findings consistent with lateral epicondylitis and mild tendinosis of the triceps tendon.    ASSESSMENT AND PLAN:  I discussed  the diagnosis with the patient of lateral epicondylitis.  I explained the treatment options.  I would recommend initial conservative management and possible surgery if he does not improve satisfactorily.  Today, we will try an injection and he will continue with his tennis elbow brace, over-the-counter medications and topical remedies. He will try local pain relief modalities and avoidance of aggravating activities, etc.     INJECTION NOTE:  The patient's right elbow was prepped laterally.  We used 20 mg of Depo-Medrol and 1 mL of 1% lidocaine.  We injected this into the area of maximal tenderness over the lateral epicondyle.  The patient tolerated this well.  He will follow up p.r.n.    TIME SPENT:  About 30 minutes spent with the patient.    Martin Willson MD        D: 2023   T: 2023   MT: ROLAND    Name:     ESTVIEN WARE  MRN:      [id number]        Account:      [id number]   :      1970           Service Date: 2023       Document: [id number]

## 2023-08-05 DIAGNOSIS — I10 BENIGN ESSENTIAL HYPERTENSION: ICD-10-CM

## 2023-08-07 NOTE — TELEPHONE ENCOUNTER
Losartan (Cozaar) 25 MG tablet    Last Written Prescription Date:  05/04/2023  Last Fill Quantity: 90,   # refills: 1  Last Office Visit: 06/05/2023

## 2023-08-09 RX ORDER — LOSARTAN POTASSIUM 25 MG/1
25 TABLET ORAL DAILY
Qty: 90 TABLET | Refills: 2 | Status: SHIPPED | OUTPATIENT
Start: 2023-08-09 | End: 2024-06-29

## 2023-08-21 DIAGNOSIS — R06.2 WHEEZING: ICD-10-CM

## 2023-08-23 RX ORDER — ALBUTEROL SULFATE 90 UG/1
2 AEROSOL, METERED RESPIRATORY (INHALATION) EVERY 6 HOURS
Qty: 18 G | Refills: 3 | Status: SHIPPED | OUTPATIENT
Start: 2023-08-23 | End: 2024-06-25

## 2023-09-20 ENCOUNTER — MYC MEDICAL ADVICE (OUTPATIENT)
Dept: FAMILY MEDICINE | Facility: OTHER | Age: 53
End: 2023-09-20

## 2023-09-20 DIAGNOSIS — E11.9 TYPE 2 DIABETES MELLITUS WITHOUT COMPLICATION, WITH LONG-TERM CURRENT USE OF INSULIN (H): ICD-10-CM

## 2023-09-20 DIAGNOSIS — Z79.4 TYPE 2 DIABETES MELLITUS WITHOUT COMPLICATION, WITH LONG-TERM CURRENT USE OF INSULIN (H): ICD-10-CM

## 2023-09-30 DIAGNOSIS — Z79.4 TYPE 2 DIABETES MELLITUS WITHOUT COMPLICATION, WITH LONG-TERM CURRENT USE OF INSULIN (H): ICD-10-CM

## 2023-09-30 DIAGNOSIS — E11.9 TYPE 2 DIABETES MELLITUS WITHOUT COMPLICATION, WITH LONG-TERM CURRENT USE OF INSULIN (H): ICD-10-CM

## 2023-10-02 RX ORDER — EMPAGLIFLOZIN 25 MG/1
25 TABLET, FILM COATED ORAL DAILY
Qty: 90 TABLET | Refills: 0 | OUTPATIENT
Start: 2023-10-02

## 2023-10-10 NOTE — TELEPHONE ENCOUNTER
"DENIAL - 10/09/2018 - received DENIAL from [facility] for contour next test strips.  Denial reason:  \"Contour next test strips are not covered.  The covered monitors and their strips are as follows:  Accu-Chek Radha Connect, Accu-Chek Guide, Accu-Check Monica, True Metrix Air and True Metrix.  Covered glucose testing strips are Accu-Chek Radha Plus, Accu-Chek Comfort Curve, Accu-Chek Compact, Accu-Chek Guide, Accu-Chek Smartview, True Metrix and Truetest.  You are requesting a strip or monitor that is not covered.  Therefore, we are unable to approve this request.\"  Please select an alternative and send in new order for monitor and strips.  Thank you!  Pharmacy advised.  Documentation scanned to Epic. Samantha Melgar, HIS Specialist.  " wouldn't eat breakfast

## 2023-11-02 ENCOUNTER — OFFICE VISIT (OUTPATIENT)
Dept: FAMILY MEDICINE | Facility: OTHER | Age: 53
End: 2023-11-02
Attending: NURSE PRACTITIONER
Payer: COMMERCIAL

## 2023-11-02 ENCOUNTER — TELEPHONE (OUTPATIENT)
Dept: FAMILY MEDICINE | Facility: OTHER | Age: 53
End: 2023-11-02

## 2023-11-02 VITALS
OXYGEN SATURATION: 98 % | HEART RATE: 88 BPM | BODY MASS INDEX: 36.5 KG/M2 | WEIGHT: 269.5 LBS | RESPIRATION RATE: 16 BRPM | DIASTOLIC BLOOD PRESSURE: 78 MMHG | HEIGHT: 72 IN | SYSTOLIC BLOOD PRESSURE: 104 MMHG | TEMPERATURE: 98.1 F

## 2023-11-02 DIAGNOSIS — J01.90 ACUTE SINUSITIS WITH SYMPTOMS GREATER THAN 10 DAYS: Primary | ICD-10-CM

## 2023-11-02 DIAGNOSIS — R05.1 ACUTE COUGH: ICD-10-CM

## 2023-11-02 PROCEDURE — 99213 OFFICE O/P EST LOW 20 MIN: CPT | Performed by: NURSE PRACTITIONER

## 2023-11-02 RX ORDER — AMOXICILLIN 500 MG/1
500 CAPSULE ORAL 3 TIMES DAILY
COMMUNITY
Start: 2023-10-30 | End: 2023-11-02

## 2023-11-02 RX ORDER — FLUTICASONE PROPIONATE 50 MCG
2 SPRAY, SUSPENSION (ML) NASAL
COMMUNITY
Start: 2023-10-30

## 2023-11-02 RX ORDER — BENZONATATE 200 MG/1
200 CAPSULE ORAL 3 TIMES DAILY PRN
Qty: 30 CAPSULE | Refills: 0 | Status: SHIPPED | OUTPATIENT
Start: 2023-11-02

## 2023-11-02 ASSESSMENT — PATIENT HEALTH QUESTIONNAIRE - PHQ9
10. IF YOU CHECKED OFF ANY PROBLEMS, HOW DIFFICULT HAVE THESE PROBLEMS MADE IT FOR YOU TO DO YOUR WORK, TAKE CARE OF THINGS AT HOME, OR GET ALONG WITH OTHER PEOPLE: VERY DIFFICULT
SUM OF ALL RESPONSES TO PHQ QUESTIONS 1-9: 2
SUM OF ALL RESPONSES TO PHQ QUESTIONS 1-9: 2

## 2023-11-02 ASSESSMENT — ANXIETY QUESTIONNAIRES
2. NOT BEING ABLE TO STOP OR CONTROL WORRYING: SEVERAL DAYS
3. WORRYING TOO MUCH ABOUT DIFFERENT THINGS: NOT AT ALL
7. FEELING AFRAID AS IF SOMETHING AWFUL MIGHT HAPPEN: SEVERAL DAYS
5. BEING SO RESTLESS THAT IT IS HARD TO SIT STILL: NOT AT ALL
1. FEELING NERVOUS, ANXIOUS, OR ON EDGE: SEVERAL DAYS
GAD7 TOTAL SCORE: 3
GAD7 TOTAL SCORE: 3
4. TROUBLE RELAXING: NOT AT ALL
IF YOU CHECKED OFF ANY PROBLEMS ON THIS QUESTIONNAIRE, HOW DIFFICULT HAVE THESE PROBLEMS MADE IT FOR YOU TO DO YOUR WORK, TAKE CARE OF THINGS AT HOME, OR GET ALONG WITH OTHER PEOPLE: NOT DIFFICULT AT ALL
6. BECOMING EASILY ANNOYED OR IRRITABLE: NOT AT ALL

## 2023-11-02 ASSESSMENT — PAIN SCALES - GENERAL: PAINLEVEL: MODERATE PAIN (4)

## 2023-11-02 NOTE — PROGRESS NOTES
Assessment & Plan     1. Acute sinusitis with symptoms greater than 10 days  Symptomatic cares reviewed  Work note provided  Fluids rest.   - amoxicillin-clavulanate (AUGMENTIN) 875-125 MG tablet; Take 1 tablet by mouth 2 times daily for 14 days  Dispense: 28 tablet; Refill: 0    2. Acute cough  - benzonatate (TESSALON) 200 MG capsule; Take 1 capsule (200 mg) by mouth 3 times daily as needed for cough  Dispense: 30 capsule; Refill: 0       BMI:   Estimated body mass index is 36.55 kg/m  as calculated from the following:    Height as of this encounter: 1.829 m (6').    Weight as of this encounter: 122.2 kg (269 lb 8 oz).   Weight management plan: Discussed healthy diet and exercise guidelines    Follow-up if no improvement or any worsening.     Marion Davis NP  [facility] - [place] Adams is a 53 year old, presenting for the following health issues:  Cough and Sinus Problem        11/2/2023     3:13 PM   Additional Questions   Roomed by magnus   Accompanied by none       HPI     Acute Illness  Acute illness concerns: congestion chest and sinus issues   Onset/Duration: 2 weeks then Sunday worse   Symptoms:  Fever: YES- a week ago   Chills/Sweats: No  Headache (location?): YES  Sinus Pressure: YES  Conjunctivitis:  No  Ear Pain: YES: bilateral  Rhinorrhea: YES  Congestion: YES  Sore Throat: YES  Cough: YES-productive of yellow sputum  Wheeze: YES  Decreased Appetite: YES  Nausea: YES  Vomiting: No  Diarrhea: No  Dysuria/Freq.: No  Dysuria or Hematuria: No  Fatigue/Achiness: YES  Sick/Strep Exposure: YES- people at work   Therapies tried and outcome: otc Dayquil and virtual visit on Sunday got amoxicillin and flonaze     Amoxicillin has not helped at all. Cannot sleep, missed work all week due to ongoing symptoms.        Review of Systems   Constitutional, HEENT, cardiovascular, pulmonary, gi and gu systems are negative, except as otherwise noted.      Objective    /78 (BP  Location: Left arm, Patient Position: Chair, Cuff Size: Adult Large)   Pulse 88   Temp 98.1  F (36.7  C) (Tympanic)   Resp 16   Ht 1.829 m (6')   Wt 122.2 kg (269 lb 8 oz)   SpO2 98%   BMI 36.55 kg/m    Body mass index is 36.55 kg/m .  Physical Exam   GENERAL: healthy, alert and no distress  HENT: normal cephalic/atraumatic, both ears: erythematous, nasal mucosa edematous , and rhinorrhea thick with thick post nasal drainage.   NECK: no adenopathy, no asymmetry, masses, or scars and thyroid normal to palpation  RESP: mild wheezing in upper lobes.   CV: regular rate and rhythm, normal S1 S2, no S3 or S4, no murmur, click or rub, no peripheral edema and peripheral pulses strong  MS: no gross musculoskeletal defects noted, no edema  PSYCH: mentation appears normal, affect normal/bright                Answers submitted by the patient for this visit:  Patient Health Questionnaire (Submitted on 11/2/2023)  If you checked off any problems, how difficult have these problems made it for you to do your work, take care of things at home, or get along with other people?: Very difficult  PHQ9 TOTAL SCORE: 2  JOE-7 (Submitted on 11/2/2023)  JOE 7 TOTAL SCORE: 3

## 2023-11-02 NOTE — TELEPHONE ENCOUNTER
11:11 AM    Reason for Call: OVERBOOK    Patient is having the following symptoms: sinus infection, chest congestion for 7 weeks.    The patient is requesting an appointment for today with DUSTY Sanchez.    Was an appointment offered for this call? No  If yes : Appointment type              Date    Preferred method for responding to this message: Telephone Call  What is your phone number ? [phone]     If we cannot reach you directly, may we leave a detailed response at the number you provided? Yes    Can this message wait until your PCP/provider returns, if unavailable today? No,     Sirisha Holbrook

## 2023-11-02 NOTE — LETTER
[facility]  [address]  [facility] [address]  Phone: [phone]    November 2, 2023        Rojelio Juárez  [address] [place] [id number]          To whom it may concern:    RE: Rojelio WRIGHT Franck    Patient was seen and treated today at our clinic.    Please excuse from work Monday October 30 -  Thursday November 2, 2023 due to illness.      Please contact me for questions or concerns.      Sincerely,        Marion Davis NP

## 2023-11-17 ENCOUNTER — MYC MEDICAL ADVICE (OUTPATIENT)
Dept: FAMILY MEDICINE | Facility: OTHER | Age: 53
End: 2023-11-17

## 2023-12-02 ENCOUNTER — HEALTH MAINTENANCE LETTER (OUTPATIENT)
Age: 53
End: 2023-12-02

## 2023-12-05 NOTE — PROGRESS NOTES
Assessment & Plan     1. Type 2 diabetes mellitus without complication, with long-term current use of insulin (H)  Continue current plan  - ID FOOT EXAM NO CHARGE  - empagliflozin (JARDIANCE) 25 MG TABS tablet; Take 1 tablet (25 mg) by mouth daily  Dispense: 90 tablet; Refill: 1  - Hemoglobin A1c  - Albumin Random Urine Quantitative with Creat Ratio    2. Hyperlipidemia LDL goal <100  Continue lipitor  - Lipid Profile    3. Benign essential hypertension  - Comprehensive metabolic panel  - TSH with free T4 reflex    4. On statin therapy  CMP today    5. Need for vaccination  routine  - INFLUENZA VACCINE >6 MONTHS (AFLURIA/FLUZONE)  - HEPATITIS B, ADULT 20+ (ENGERIX-B/RECOMBIVAX HB)    6. Personal history of tobacco use  - Prof fee: Shared Decision Making for Lung Cancer Screening  - CT Chest Lung Cancer Scrn Low Dose wo; Future      Return in about 6 months (around 6/6/2024) for diabetes, lipids, HTN.    Marion Davis NP  [facility] - [place] Adams is a 53 year old, presenting for the following health issues:  Hyperlipidemia, Hypertension, and Diabetes      HPI     Diabetes Follow-up    How often are you checking your blood sugar? Not at all  What concerns do you have today about your diabetes? None   Do you have any of these symptoms? (Select all that apply)  No numbness or tingling in feet.  No redness, sores or blisters on feet.  No complaints of excessive thirst.  No reports of blurry vision.  No significant changes to weight.        Diabetic Foot Screen:  Any complaints of increased pain or numbness ? No  Is there a foot ulcer now or a history of foot ulcer? No  Does the foot have an abnormal shape? No  Are the nails thick, too long or ingrown? No  Are there any redness or open areas? No         Sensation Testing done at all points on the diagram with monofilament     Right Foot: Sensation Normal at all points  Left Foot: Sensation Normal at all points     Risk Category:  0- No loss of protective sensation  Performed by PCP      Hyperlipidemia Follow-Up    Are you regularly taking any medication or supplement to lower your cholesterol?   Yes- atorvastatin 10mg  Are you having muscle aches or other side effects that you think could be caused by your cholesterol lowering medication?  No    Hypertension Follow-up    Do you check your blood pressure regularly outside of the clinic? No   Are you following a low salt diet? Yes  Are your blood pressures ever more than 140 on the top number (systolic) OR more   than 90 on the bottom number (diastolic), for example 140/90? No    BP Readings from Last 2 Encounters:   12/06/23 98/76   11/02/23 104/78     Hemoglobin A1C (%)   Date Value   12/06/2023 7.0 (H)   05/04/2023 6.4 (H)   04/14/2021 5.7 (H)   11/11/2020 6.8 (H)     LDL Cholesterol Calculated (mg/dL)   Date Value   12/06/2023 84   05/04/2023 57   04/14/2021 91   11/11/2020 57             Review of Systems   Constitutional, HEENT, cardiovascular, pulmonary, gi and gu systems are negative, except as otherwise noted.      Objective    BP 98/76 (BP Location: Left arm, Patient Position: Sitting, Cuff Size: Adult Large)   Pulse 93   Temp 97.1  F (36.2  C) (Tympanic)   Resp 18   Wt 121.1 kg (267 lb)   SpO2 97%   BMI 36.21 kg/m    Body mass index is 36.21 kg/m .  Physical Exam   GENERAL: healthy, alert and no distress  NECK: no adenopathy, no asymmetry, masses, or scars, thyroid normal to palpation, and no carotid bruits  RESP: lungs clear to auscultation - no rales, rhonchi or wheezes  CV: regular rate and rhythm, normal S1 S2, no S3 or S4, no murmur, click or rub, no peripheral edema and peripheral pulses strong  MS: no gross musculoskeletal defects noted, no edema  PSYCH: mentation appears normal, affect normal/bright            Lung Cancer Screening Shared Decision Making Visit     Rojelio Juárez, a 53 year old male, is eligible for lung cancer screening    History   Smoking Status     Former    Packs/day: 0.75    Years: 38.00    Types: Cigarettes    Start date: 1/1/1984    Quit date: 12/12/2022   Smokeless Tobacco    Never       I have discussed with patient the risks and benefits of screening for lung cancer with low-dose CT.     The risks include:    radiation exposure: one low dose chest CT has as much ionizing radiation as about 15 chest x-rays, or 6 months of background radiation living in [place]      false positives: most findings/nodules are NOT cancer, but some might still require additional diagnostic evaluation, including biopsy    over-diagnosis: some slow growing cancers that might never have been clinically significant will be detected and treated unnecessarily     The benefit of early detection of lung cancer is contingent upon adherence to annual screening or more frequent follow up if indicated.     Furthermore, to benefit from screening, Orjelio must be willing and able to undergo diagnostic procedures, if indicated. Although no specific guide is available for determining severity of comorbidities, it is reasonable to withhold screening in patients who have greater mortality risk from other diseases.     We did discuss that the best way to prevent lung cancer is to not smoke.    Some patients may value a numeric estimation of lung cancer risk when evaluating if lung cancer screening is right for them, here is one calculator:    ShouldIScreen

## 2023-12-06 ENCOUNTER — OFFICE VISIT (OUTPATIENT)
Dept: FAMILY MEDICINE | Facility: OTHER | Age: 53
End: 2023-12-06
Attending: NURSE PRACTITIONER
Payer: COMMERCIAL

## 2023-12-06 VITALS
SYSTOLIC BLOOD PRESSURE: 98 MMHG | RESPIRATION RATE: 18 BRPM | BODY MASS INDEX: 36.21 KG/M2 | OXYGEN SATURATION: 97 % | WEIGHT: 267 LBS | DIASTOLIC BLOOD PRESSURE: 76 MMHG | TEMPERATURE: 97.1 F | HEART RATE: 93 BPM

## 2023-12-06 DIAGNOSIS — Z79.899 ON STATIN THERAPY: ICD-10-CM

## 2023-12-06 DIAGNOSIS — E11.9 TYPE 2 DIABETES MELLITUS WITHOUT COMPLICATION, WITH LONG-TERM CURRENT USE OF INSULIN (H): Primary | ICD-10-CM

## 2023-12-06 DIAGNOSIS — E78.5 HYPERLIPIDEMIA LDL GOAL <100: ICD-10-CM

## 2023-12-06 DIAGNOSIS — I10 BENIGN ESSENTIAL HYPERTENSION: ICD-10-CM

## 2023-12-06 DIAGNOSIS — Z79.4 TYPE 2 DIABETES MELLITUS WITHOUT COMPLICATION, WITH LONG-TERM CURRENT USE OF INSULIN (H): Primary | ICD-10-CM

## 2023-12-06 DIAGNOSIS — Z23 NEED FOR VACCINATION: ICD-10-CM

## 2023-12-06 DIAGNOSIS — Z87.891 PERSONAL HISTORY OF TOBACCO USE: ICD-10-CM

## 2023-12-06 LAB
ALBUMIN SERPL BCG-MCNC: 4.6 G/DL (ref 3.5–5.2)
ALP SERPL-CCNC: 83 U/L (ref 40–150)
ALT SERPL W P-5'-P-CCNC: 108 U/L (ref 0–70)
ANION GAP SERPL CALCULATED.3IONS-SCNC: 15 MMOL/L (ref 7–15)
AST SERPL W P-5'-P-CCNC: 70 U/L (ref 0–45)
BILIRUB SERPL-MCNC: 0.8 MG/DL
BUN SERPL-MCNC: 10.6 MG/DL (ref 6–20)
CALCIUM SERPL-MCNC: 10 MG/DL (ref 8.6–10)
CHLORIDE SERPL-SCNC: 102 MMOL/L (ref 98–107)
CHOLEST SERPL-MCNC: 166 MG/DL
CREAT SERPL-MCNC: 0.78 MG/DL (ref 0.67–1.17)
CREAT UR-MCNC: 82.4 MG/DL
DEPRECATED HCO3 PLAS-SCNC: 24 MMOL/L (ref 22–29)
EGFRCR SERPLBLD CKD-EPI 2021: >90 ML/MIN/1.73M2
EST. AVERAGE GLUCOSE BLD GHB EST-MCNC: 154 MG/DL
FASTING STATUS PATIENT QL REPORTED: YES
GLUCOSE SERPL-MCNC: 124 MG/DL (ref 70–99)
HBA1C MFR BLD: 7 %
HDLC SERPL-MCNC: 56 MG/DL
LDLC SERPL CALC-MCNC: 84 MG/DL
MICROALBUMIN UR-MCNC: <12 MG/L
MICROALBUMIN/CREAT UR: NORMAL MG/G{CREAT}
NONHDLC SERPL-MCNC: 110 MG/DL
POTASSIUM SERPL-SCNC: 4.2 MMOL/L (ref 3.4–5.3)
PROT SERPL-MCNC: 7.5 G/DL (ref 6.4–8.3)
SODIUM SERPL-SCNC: 141 MMOL/L (ref 135–145)
TRIGL SERPL-MCNC: 132 MG/DL
TSH SERPL DL<=0.005 MIU/L-ACNC: 1.38 UIU/ML (ref 0.3–4.2)

## 2023-12-06 PROCEDURE — 99214 OFFICE O/P EST MOD 30 MIN: CPT | Mod: 25 | Performed by: NURSE PRACTITIONER

## 2023-12-06 PROCEDURE — 36415 COLL VENOUS BLD VENIPUNCTURE: CPT | Performed by: NURSE PRACTITIONER

## 2023-12-06 PROCEDURE — 82570 ASSAY OF URINE CREATININE: CPT | Performed by: NURSE PRACTITIONER

## 2023-12-06 PROCEDURE — G0296 VISIT TO DETERM LDCT ELIG: HCPCS | Performed by: NURSE PRACTITIONER

## 2023-12-06 PROCEDURE — 84443 ASSAY THYROID STIM HORMONE: CPT | Performed by: NURSE PRACTITIONER

## 2023-12-06 PROCEDURE — 90472 IMMUNIZATION ADMIN EACH ADD: CPT | Performed by: NURSE PRACTITIONER

## 2023-12-06 PROCEDURE — 90471 IMMUNIZATION ADMIN: CPT | Performed by: NURSE PRACTITIONER

## 2023-12-06 PROCEDURE — 80053 COMPREHEN METABOLIC PANEL: CPT | Performed by: NURSE PRACTITIONER

## 2023-12-06 PROCEDURE — 82043 UR ALBUMIN QUANTITATIVE: CPT | Performed by: NURSE PRACTITIONER

## 2023-12-06 PROCEDURE — 99207 PR FOOT EXAM NO CHARGE: CPT | Performed by: NURSE PRACTITIONER

## 2023-12-06 PROCEDURE — 90746 HEPB VACCINE 3 DOSE ADULT IM: CPT | Performed by: NURSE PRACTITIONER

## 2023-12-06 PROCEDURE — 90686 IIV4 VACC NO PRSV 0.5 ML IM: CPT | Performed by: NURSE PRACTITIONER

## 2023-12-06 PROCEDURE — 80061 LIPID PANEL: CPT | Performed by: NURSE PRACTITIONER

## 2023-12-06 PROCEDURE — 83036 HEMOGLOBIN GLYCOSYLATED A1C: CPT | Performed by: NURSE PRACTITIONER

## 2023-12-06 ASSESSMENT — PAIN SCALES - GENERAL: PAINLEVEL: MODERATE PAIN (4)

## 2023-12-06 NOTE — PATIENT INSTRUCTIONS
Lung Cancer Screening   Frequently Asked Questions  If you are at high-risk for lung cancer, getting screened with low-dose computed tomography (LDCT) every year can help save your life. This handout offers answers to some of the most common questions about lung cancer screening. If you have other questions, please call [phone] ([phone]).     What is it?  Lung cancer screening uses special X-ray technology to create an image of your lung tissue. The exam is quick and easy and takes less than 10 seconds. We don t give you any medicine or use any needles. You can eat before and after the exam. You don t need to change your clothes as long as the clothing on your chest doesn t contain metal. But, you do need to be able to hold your breath for at least 6 seconds during the exam.    What is the goal of lung cancer screening?  The goal of lung cancer screening is to save lives. Many times, lung cancer is not found until a person starts having physical symptoms. Lung cancer screening can help detect lung cancer in the earliest stages when it may be easier to treat.    Who should be screened for lung cancer?  We suggest lung cancer screening for anyone who is at high-risk for lung cancer. You are in the high-risk group if you:      are between the ages of 55 and 79, and    have smoked at least 1 pack of cigarettes a day for 20 or more years, and    still smoke or have quit within the past 15 years.    However, if you have a new cough or shortness of breath, you should talk to your doctor before being screened.    Why does it matter if I have symptoms?  Certain symptoms can be a sign that you have a condition in your lungs that should be checked and treated by your doctor. These symptoms include fever, chest pain, a new or changing cough, shortness of breath that you have never felt before, coughing up blood or unexplained weight loss. Having any of these symptoms can greatly affect the results of lung  cancer screening.       Should all smokers get an LDCT lung cancer screening exam?  It depends. Lung cancer screening is for a very specific group of men and women who have a history of heavy smoking over a long period of time (see  Who should be screened for lung cancer  above).  I am in the high-risk group, but have been diagnosed with cancer in the past. Is LDCT lung cancer screening right for me?  In some cases, you should not have LDCT lung screening, such as when your doctor is already following your cancer with CT scan studies. Your doctor will help you decide if LDCT lung screening is right for you.  Do I need to have a screening exam every year?  Yes. If you are in the high-risk group described earlier, you should get an LDCT lung cancer screening exam every year until you are 79, or are no longer willing or able to undergo screening and possible procedures to diagnose and treat lung cancer.  How effective is LDCT at preventing death from lung cancer?  Studies have shown that LDCT lung cancer screening can lower the risk of death from lung cancer by 20 percent in people who are at high-risk.  What are the risks?  There are some risks and limitations of LDCT lung cancer screening. We want to make sure you understand the risks and benefits, so please let us know if you have any questions. Your doctor may want to talk with you more about these risks.    Radiation exposure: As with any exam that uses radiation, there is a very small increased risk of cancer. The amount of radiation in LDCT is small--about the same amount a person would get from a mammogram. Your doctor orders the exam when he or she feels the potential benefits outweigh the risks.    False negatives: No test is perfect, including LDCT. It is possible that you may have a medical condition, including lung cancer, that is not found during your exam. This is called a false negative result.    False positives and more testing: LDCT very often finds  something in the lung that could be cancer, but in fact is not. This is called a false positive result. False positive tests often cause anxiety. To make sure these findings are not cancer, you may need to have more tests. These tests will be done only if you give us permission. Sometimes patients need a treatment that can have side effects, such as a biopsy. For more information on false positives, see  What can I expect from the results?     Findings not related to lung cancer: Your LDCT exam also takes pictures of areas of your body next to your lungs. In a very small number of cases, the CT scan will show an abnormal finding in one of these areas, such as your kidneys, adrenal glands, liver or thyroid. This finding may not be serious, but you may need more tests. Your doctor can help you decide what other tests you may need, if any.  What can I expect from the results?  About 1 out of 4 LDCT exams will find something that may need more tests. Most of the time, these findings are lung nodules. Lung nodules are very small collections of tissue in the lung. These nodules are very common, and the vast majority--more than 97 percent--are not cancer (benign). Most are normal lymph nodes or small areas of scarring from past infections.  But, if a small lung nodule is found to be cancer, the cancer can be cured more than 90 percent of the time. To know if the nodule is cancer, we may need to get more images before your next yearly screening exam. If the nodule has suspicious features (for example, it is large, has an odd shape or grows over time), we will refer you to a specialist for further testing.  Will my doctor also get the results?  Yes. Your doctor will get a copy of your results.  Is it okay to keep smoking now that there s a cancer screening exam?  No. Tobacco is one of the strongest cancer-causing agents. It causes not only lung cancer, but other cancers and cardiovascular (heart) diseases as well. The damage  caused by smoking builds over time. This means that the longer you smoke, the higher your risk of disease. While it is never too late to quit, the sooner you quit, the better.  Where can I find help to quit smoking?  The best way to prevent lung cancer is to stop smoking. If you have already quit smoking, congratulations and keep it up! For help on quitting smoking, please call [employer] at 8-096-QUITNOW (1-315.112.8494) or the [employer] at 1-295.821.6164 to find local resources near you.  One-on-one health coaching:  If you d prefer to work individually with a health care provider on tobacco cessation, we offer:      Medication Therapy Management:  Our specially trained pharmacists work closely with you and your doctor to help you quit smoking.  Call [phone] or [phone] (toll free).

## 2023-12-07 DIAGNOSIS — E78.5 HYPERLIPIDEMIA LDL GOAL <100: ICD-10-CM

## 2023-12-07 RX ORDER — ATORVASTATIN CALCIUM 10 MG/1
10 TABLET, FILM COATED ORAL AT BEDTIME
Qty: 90 TABLET | Refills: 3 | Status: SHIPPED | OUTPATIENT
Start: 2023-12-07 | End: 2024-06-25

## 2023-12-07 NOTE — TELEPHONE ENCOUNTER
atorvastatin (LIPITOR) 10 MG tablet 90 tablet 1 5/4/2023     Last Office Visit: 12/06/2023  Future Office visit:       Routing refill request to provider for review/approval because:

## 2023-12-09 ENCOUNTER — MYC REFILL (OUTPATIENT)
Dept: FAMILY MEDICINE | Facility: OTHER | Age: 53
End: 2023-12-09

## 2023-12-09 DIAGNOSIS — E11.9 TYPE 2 DIABETES MELLITUS WITHOUT COMPLICATION, WITH LONG-TERM CURRENT USE OF INSULIN (H): ICD-10-CM

## 2023-12-09 DIAGNOSIS — Z79.4 TYPE 2 DIABETES MELLITUS WITHOUT COMPLICATION, WITH LONG-TERM CURRENT USE OF INSULIN (H): ICD-10-CM

## 2023-12-11 RX ORDER — PIOGLITAZONEHYDROCHLORIDE 30 MG/1
30 TABLET ORAL DAILY
Qty: 90 TABLET | Refills: 1 | Status: SHIPPED | OUTPATIENT
Start: 2023-12-11 | End: 2024-03-25

## 2023-12-11 NOTE — TELEPHONE ENCOUNTER
[facility]      Last Written Prescription Date:  05/01/23  Last Fill Quantity: 90,   # refills: 1  Last Office Visit: 12/06/23  Future Office visit:

## 2023-12-21 ENCOUNTER — MYC MEDICAL ADVICE (OUTPATIENT)
Dept: FAMILY MEDICINE | Facility: OTHER | Age: 53
End: 2023-12-21

## 2023-12-21 DIAGNOSIS — M54.50 CHRONIC BILATERAL LOW BACK PAIN WITHOUT SCIATICA: Primary | ICD-10-CM

## 2023-12-21 DIAGNOSIS — G89.29 CHRONIC BILATERAL LOW BACK PAIN WITHOUT SCIATICA: Primary | ICD-10-CM

## 2023-12-21 DIAGNOSIS — M51.16 LUMBAR DISC HERNIATION WITH RADICULOPATHY: ICD-10-CM

## 2023-12-28 ENCOUNTER — OFFICE VISIT (OUTPATIENT)
Dept: FAMILY MEDICINE | Facility: OTHER | Age: 53
End: 2023-12-28
Attending: NURSE PRACTITIONER
Payer: COMMERCIAL

## 2023-12-28 VITALS
RESPIRATION RATE: 16 BRPM | DIASTOLIC BLOOD PRESSURE: 84 MMHG | HEART RATE: 95 BPM | SYSTOLIC BLOOD PRESSURE: 116 MMHG | TEMPERATURE: 97.3 F | HEIGHT: 72 IN | BODY MASS INDEX: 36.75 KG/M2 | WEIGHT: 271.3 LBS | OXYGEN SATURATION: 98 %

## 2023-12-28 DIAGNOSIS — R05.1 ACUTE COUGH: Primary | ICD-10-CM

## 2023-12-28 LAB
FLUAV RNA SPEC QL NAA+PROBE: NEGATIVE
FLUBV RNA RESP QL NAA+PROBE: NEGATIVE
RSV RNA SPEC NAA+PROBE: NEGATIVE
SARS-COV-2 RNA RESP QL NAA+PROBE: NEGATIVE

## 2023-12-28 PROCEDURE — 99213 OFFICE O/P EST LOW 20 MIN: CPT | Performed by: NURSE PRACTITIONER

## 2023-12-28 PROCEDURE — 87637 SARSCOV2&INF A&B&RSV AMP PRB: CPT | Performed by: NURSE PRACTITIONER

## 2023-12-28 ASSESSMENT — PAIN SCALES - GENERAL: PAINLEVEL: NO PAIN (0)

## 2023-12-28 NOTE — PROGRESS NOTES
Assessment & Plan     Acute cough   some delsym take 10 mg twice a day for cough. Start your albuterol inhaler. Try to sleep on side or sitting up slightly.   Start sinus rinses with over the counter bottle kit. Follow package instructions.   - Symptomatic Influenza A/B, RSV, & SARS-CoV2 PCR (COVID-19); Future  - Symptomatic Influenza A/B, RSV, & SARS-CoV2 PCR (COVID-19) Nose    Ordering of each unique test  I spent a total of 21 minutes on the day of the visit.   Time spent by me doing chart review, history and exam, documentation and further activities per the note       Return if symptoms worsen or fail to improve.    Miranda Ernst, CNP  [facility] - BENJAMÍN Adams is a 53 year old, presenting for the following health issues:  Cough and Sinus Problem        12/28/2023     2:23 PM   Additional Questions   Roomed by Sterling Jenkins LPN   Accompanied by Self       HPI     Acute Illness  Acute illness concerns: Sinus drainage, Cough, SOB at rest lying down  Onset/Duration: 12/23/2023  Symptoms:  Fever: Unsure  Chills/Sweats: YES- sweats at rest  Headache (location?): YES  Sinus Pressure: YES  Conjunctivitis:  No  Ear Pain: no, Ears ringing, pressure  Rhinorrhea: YES  Congestion: YES  Sore Throat: No  Cough: YES-productive of clear sputum  Wheeze: No  Decreased Appetite: YES  Nausea: YES  Vomiting: No  Diarrhea: No  Dysuria/Freq.: No  Dysuria or Hematuria: No  Fatigue/Achiness: YES  Sick/Strep Exposure: No  Therapies tried and outcome: Inhaler, APAP, Advil        Review of Systems   Constitutional, HEENT, cardiovascular, pulmonary, gi and gu systems are negative, except as otherwise noted.      Objective    /84   Pulse 95   Temp 97.3  F (36.3  C) (Tympanic)   Resp 16   Ht 1.829 m (6')   Wt 123.1 kg (271 lb 4.8 oz)   SpO2 98%   BMI 36.79 kg/m    Body mass index is 36.79 kg/m .      Physical Exam   GENERAL: healthy, alert and no distress  EYES: Eyes grossly normal to  inspection, PERRL and conjunctivae and sclerae normal  HENT: ear canals and TM's normal, nose and mouth without ulcers or lesions sinus/nasal congestion evident.   NECK: no adenopathy, no asymmetry, masses, or scars and thyroid normal to palpation  RESP: frequent non-productive cough. No rhonchi or rales. Occasional scattered wheezes. Lungs clear with cough.   CV: regular rate and rhythm, normal S1 S2, no S3 or S4, no murmur, click or rub, no peripheral edema and peripheral pulses strong    Results for orders placed or performed in visit on 12/28/23   Symptomatic Influenza A/B, RSV, & SARS-CoV2 PCR (COVID-19) Nose     Status: Normal    Specimen: Nose; Swab   Result Value Ref Range    Influenza A PCR Negative Negative    Influenza B PCR Negative Negative    RSV PCR Negative Negative    SARS CoV2 PCR Negative Negative    Narrative    Testing was performed using the Xpert Xpress CoV2/Flu/RSV Assay on the [employer] GeneXpert Instrument. This test should be ordered for the detection of SARS-CoV-2, influenza, and RSV viruses in individuals who meet clinical and/or epidemiological criteria. Test performance is unknown in asymptomatic patients. This test is for in vitro diagnostic use under the FDA EUA for laboratories certified under CLIA to perform high or moderate complexity testing. This test has not been FDA cleared or approved. A negative result does not rule out the presence of PCR inhibitors in the specimen or target RNA in concentration below the limit of detection for the assay. If only one viral target is positive but coinfection with multiple targets is suspected, the sample should be re-tested with another FDA cleared, approved, or authorized test, if coinfection would change clinical management. This test was validated by the [facility] [employer]. These laboratories are certified under the Clinical Laboratory Improvement Amendments of 1988 (CLIA-88) as qualified to perform high complexity laboratory  testing.

## 2023-12-28 NOTE — PATIENT INSTRUCTIONS
some delsym take 10 mg twice a day for cough. Start your albuterol inhaler. Try to sleep on side or sitting up slightly.   Start sinus rinses with over the counter bottle kit. Follow package instructions.

## 2024-01-08 ENCOUNTER — HOSPITAL ENCOUNTER (OUTPATIENT)
Dept: MRI IMAGING | Facility: HOSPITAL | Age: 54
Discharge: HOME | End: 2024-01-08
Attending: NURSE PRACTITIONER | Admitting: NURSE PRACTITIONER
Payer: COMMERCIAL

## 2024-01-08 DIAGNOSIS — M54.50 CHRONIC BILATERAL LOW BACK PAIN WITHOUT SCIATICA: ICD-10-CM

## 2024-01-08 DIAGNOSIS — G89.29 CHRONIC BILATERAL LOW BACK PAIN WITHOUT SCIATICA: ICD-10-CM

## 2024-01-08 DIAGNOSIS — M51.16 LUMBAR DISC HERNIATION WITH RADICULOPATHY: ICD-10-CM

## 2024-01-08 PROCEDURE — 255N000002 HC RX 255 OP 636: Performed by: RADIOLOGY

## 2024-01-08 PROCEDURE — A9585 GADOBUTROL INJECTION: HCPCS | Performed by: RADIOLOGY

## 2024-01-08 PROCEDURE — 72158 MRI LUMBAR SPINE W/O & W/DYE: CPT

## 2024-01-08 RX ORDER — GADOBUTROL 604.72 MG/ML
10 INJECTION INTRAVENOUS ONCE
Status: COMPLETED | OUTPATIENT
Start: 2024-01-08 | End: 2024-01-08

## 2024-01-08 RX ADMIN — GADOBUTROL 10 ML: 604.72 INJECTION INTRAVENOUS at 18:06

## 2024-02-01 ENCOUNTER — HOSPITAL ENCOUNTER (OUTPATIENT)
Dept: CT IMAGING | Facility: HOSPITAL | Age: 54
Discharge: HOME | End: 2024-02-01
Attending: NURSE PRACTITIONER | Admitting: NURSE PRACTITIONER
Payer: COMMERCIAL

## 2024-02-01 DIAGNOSIS — Z87.891 PERSONAL HISTORY OF TOBACCO USE: ICD-10-CM

## 2024-02-01 PROCEDURE — 71271 CT THORAX LUNG CANCER SCR C-: CPT

## 2024-02-10 ENCOUNTER — HEALTH MAINTENANCE LETTER (OUTPATIENT)
Age: 54
End: 2024-02-10

## 2024-02-15 ENCOUNTER — OFFICE VISIT (OUTPATIENT)
Dept: FAMILY MEDICINE | Facility: OTHER | Age: 54
End: 2024-02-15
Attending: NURSE PRACTITIONER
Payer: COMMERCIAL

## 2024-02-15 ENCOUNTER — TELEPHONE (OUTPATIENT)
Dept: FAMILY MEDICINE | Facility: OTHER | Age: 54
End: 2024-02-15

## 2024-02-15 VITALS
TEMPERATURE: 97.2 F | HEART RATE: 101 BPM | RESPIRATION RATE: 18 BRPM | HEIGHT: 72 IN | OXYGEN SATURATION: 98 % | WEIGHT: 271 LBS | DIASTOLIC BLOOD PRESSURE: 90 MMHG | BODY MASS INDEX: 36.7 KG/M2 | SYSTOLIC BLOOD PRESSURE: 114 MMHG

## 2024-02-15 DIAGNOSIS — J06.9 VIRAL URI WITH COUGH: Primary | ICD-10-CM

## 2024-02-15 DIAGNOSIS — F33.0 MILD RECURRENT MAJOR DEPRESSION (H): ICD-10-CM

## 2024-02-15 DIAGNOSIS — F41.1 GENERALIZED ANXIETY DISORDER: ICD-10-CM

## 2024-02-15 PROCEDURE — 99214 OFFICE O/P EST MOD 30 MIN: CPT | Performed by: NURSE PRACTITIONER

## 2024-02-15 PROCEDURE — 87637 SARSCOV2&INF A&B&RSV AMP PRB: CPT | Performed by: NURSE PRACTITIONER

## 2024-02-15 RX ORDER — VILAZODONE HYDROCHLORIDE 20 MG/1
TABLET ORAL
Qty: 30 TABLET | Refills: 1 | Status: SHIPPED | OUTPATIENT
Start: 2024-02-15 | End: 2024-08-01

## 2024-02-15 RX ORDER — HYDROXYZINE HYDROCHLORIDE 25 MG/1
25 TABLET, FILM COATED ORAL 3 TIMES DAILY PRN
Qty: 60 TABLET | Refills: 1 | Status: SHIPPED | OUTPATIENT
Start: 2024-02-15

## 2024-02-15 ASSESSMENT — PAIN SCALES - GENERAL: PAINLEVEL: MILD PAIN (3)

## 2024-02-15 NOTE — PATIENT INSTRUCTIONS
Assessment & Plan     1. Viral URI with cough  Symptomatic cares   - Symptomatic Influenza A/B, RSV, & SARS-CoV2 PCR (COVID-19); Future    2. Generalized anxiety disorder  Start viibryd - hydroxyzine as needed   - vilazodone (VIIBRYD) 20 MG TABS tablet; Take 10 mg daily for 7 days then increase to 20mg daily.  Dispense: 30 tablet; Refill: 1  - hydrOXYzine HCl (ATARAX) 25 MG tablet; Take 1 tablet (25 mg) by mouth 3 times daily as needed for anxiety  Dispense: 60 tablet; Refill: 1    3. Mild recurrent major depression (H24)  - vilazodone (VIIBRYD) 20 MG TABS tablet; Take 10 mg daily for 7 days then increase to 20mg daily.  Dispense: 30 tablet; Refill: 1      Follow-up in 4 weeks or as needed    Marion Davis,   Certified Adult Nurse Practitioner  [phone]

## 2024-02-15 NOTE — PROGRESS NOTES
Assessment & Plan     1. Viral URI with cough  Symptomatic cares   - Symptomatic Influenza A/B, RSV, & SARS-CoV2 PCR (COVID-19); Future    2. Generalized anxiety disorder  Start viibryd - hydroxyzine as needed   - vilazodone (VIIBRYD) 20 MG TABS tablet; Take 10 mg daily for 7 days then increase to 20mg daily.  Dispense: 30 tablet; Refill: 1  - hydrOXYzine HCl (ATARAX) 25 MG tablet; Take 1 tablet (25 mg) by mouth 3 times daily as needed for anxiety  Dispense: 60 tablet; Refill: 1    3. Mild recurrent major depression (H24)  - vilazodone (VIIBRYD) 20 MG TABS tablet; Take 10 mg daily for 7 days then increase to 20mg daily.  Dispense: 30 tablet; Refill: 1      Follow-up in 4 weeks or as needed    Marion Davis,   Certified Adult Nurse Practitioner  [phone]      Denis Adams is a 53 year old, presenting for the following health issues:  Cough and Headache    HPI     Acute Illness  Acute illness concerns: cough and headache   Onset/Duration: 3 days   Symptoms:  Fever: unknown did not check   Chills/Sweats: YES  Headache (location?): YES  Sinus Pressure: No  Conjunctivitis:  No  Ear Pain: no  Rhinorrhea: No  Congestion: YES  Sore Throat: YES  Cough: YES-productive of clear sputum  Wheeze: YES  Decreased Appetite: YES  Nausea: YES  Vomiting: No  Diarrhea: No  Dysuria/Freq.: No  Dysuria or Hematuria: No  Fatigue/Achiness: YES  Sick/Strep Exposure: YES  Therapies tried and outcome: Tylenol Advil Tessalon, albuterol.      Worsening anxiety and depression.  Increased stress at home, worsening back pain and tired of not feeling well.  Feels it is time to start a medication.      Recent Labs   Lab Test 12/06/23  1228 05/04/23  1030 01/30/23  1124 11/05/21  1022 04/14/21  0900 11/11/20  1108   A1C 7.0* 6.4* 8.0*   < > 5.7* 6.8*   LDL 84 57 79   < > 91 57   HDL 56 63 42   < > 67 39*   TRIG 132 143 214*   < > 89 144   * 55* 92*   < > 154* 92*   CR 0.78 0.80 0.74   < > 0.81 0.89   GFRESTIMATED >90 >90 >90   <  > >90 >90   GFRESTBLACK  --   --   --   --  >90 >90   POTASSIUM 4.2 4.3 4.4   < > 4.0 3.9   TSH 1.38 1.68 0.90   < > 0.98 0.91    < > = values in this interval not displayed.      BP Readings from Last 3 Encounters:   02/15/24 (!) 114/90   12/28/23 116/84   12/06/23 98/76    Wt Readings from Last 3 Encounters:   02/15/24 122.9 kg (271 lb)   12/28/23 123.1 kg (271 lb 4.8 oz)   12/06/23 121.1 kg (267 lb)                        Review of Systems  Constitutional, neuro, ENT, endocrine, pulmonary, cardiac, gastrointestinal, genitourinary, musculoskeletal, integument and psychiatric systems are negative, except as otherwise noted.      Objective    BP (!) 114/90 (BP Location: Right arm, Patient Position: Chair, Cuff Size: Adult Large)   Pulse 101   Temp 97.2  F (36.2  C) (Tympanic)   Resp 18   Ht 1.829 m (6')   Wt 122.9 kg (271 lb)   SpO2 98%   BMI 36.75 kg/m    Body mass index is 36.75 kg/m .  Physical Exam   GENERAL: alert and no distress but ill appearing.   EYES: Eyes grossly normal to inspection but glossy  HENT: normal cephalic/atraumatic, both ears: clear effusion, nasal mucosa edematous , and rhinorrhea clear, throat erythematous without exudate.   NECK: no adenopathy, no asymmetry, masses, or scars  RESP: lungs clear to auscultation - no rales, rhonchi or wheezes  CV: regular rate and rhythm, normal S1 S2, no S3 or S4, no murmur, click or rub, no peripheral edema  MS: no gross musculoskeletal defects noted, no edema  PSYCH: mentation appears normal, affect normal/bright    Results for orders placed or performed in visit on 02/15/24   Symptomatic Influenza A/B, RSV, & SARS-CoV2 PCR (COVID-19) Nose     Status: Normal    Specimen: Nose; Swab   Result Value Ref Range    Influenza A PCR Negative Negative    Influenza B PCR Negative Negative    RSV PCR Negative Negative    SARS CoV2 PCR Negative Negative    Narrative    Testing was performed using the Xpert Xpress CoV2/Flu/RSV Assay on the [employer]  Instrument. This test should be ordered for the detection of SARS-CoV-2, influenza, and RSV viruses in individuals who meet clinical and/or epidemiological criteria. Test performance is unknown in asymptomatic patients. This test is for in vitro diagnostic use under the FDA EUA for laboratories certified under CLIA to perform high or moderate complexity testing. This test has not been FDA cleared or approved. A negative result does not rule out the presence of PCR inhibitors in the specimen or target RNA in concentration below the limit of detection for the assay. If only one viral target is positive but coinfection with multiple targets is suspected, the sample should be re-tested with another FDA cleared, approved, or authorized test, if coinfection would change clinical management. This test was validated by the [facility] [employer]. These laboratories are certified under the Clinical Laboratory Improvement Amendments of 1988 (CLIA-88) as qualified to perform high complexity laboratory testing.               Signed Electronically by: Marion Davis NP

## 2024-02-15 NOTE — TELEPHONE ENCOUNTER
8:15 AM    Reason for Call: OVERBOOK    Patient is having the following symptoms:  for coughing, headache x3 days .    The patient is requesting an appointment for  with Hemant Sanchez.    Was an appointment offered for this call? No  If yes : Appointment type              Date    Preferred method for responding to this message: Telephone Call  What is your phone number ? [phone]    If we cannot reach you directly, may we leave a detailed response at the number you provided? Yes    Can this message wait until your PCP/provider returns, if unavailable today? YES, provider is in today    TULIO WILD

## 2024-03-04 ENCOUNTER — TRANSFERRED RECORDS (OUTPATIENT)
Dept: HEALTH INFORMATION MANAGEMENT | Facility: CLINIC | Age: 54
End: 2024-03-04

## 2024-03-04 LAB — RETINOPATHY: NEGATIVE

## 2024-03-23 DIAGNOSIS — E11.9 TYPE 2 DIABETES MELLITUS WITHOUT COMPLICATION, WITH LONG-TERM CURRENT USE OF INSULIN (H): ICD-10-CM

## 2024-03-23 DIAGNOSIS — Z79.4 TYPE 2 DIABETES MELLITUS WITHOUT COMPLICATION, WITH LONG-TERM CURRENT USE OF INSULIN (H): ICD-10-CM

## 2024-03-25 RX ORDER — PIOGLITAZONEHYDROCHLORIDE 30 MG/1
30 TABLET ORAL DAILY
Qty: 90 TABLET | Refills: 1 | Status: SHIPPED | OUTPATIENT
Start: 2024-03-25

## 2024-03-25 NOTE — TELEPHONE ENCOUNTER
PIOGLITAZONE 30MG TABLETS       Last Written Prescription Date:  12/11/23  Last Fill Quantity: 90,   # refills: 1  Last Office Visit: 2/15/24  Future Office visit:    Next 5 appointments (look out 90 days)      Jun 07, 2024  4:00 PM  (Arrive by 3:45 PM)  SHORT with Marion Davis NP  [facility] ([facility] ) [address]  [facility] [address]  [phone]             Routing refill request to provider for review/approval because:  Thiazolidinedione Agents (TZDs)  [id number]/23/2024 08:00 AM   Protocol Details Patient has a normal ALT within the past 12 mos.    Patient has a normal AST within the past 12 mos.     Lab Results   Component Value Date     12/06/2023     04/14/2021     AST   Date Value Ref Range Status   12/06/2023 70 (H) 0 - 45 U/L Final     Comment:     Reference intervals for this test were updated on 6/12/2023 to more accurately reflect our healthy population. There may be differences in the flagging of prior results with similar values performed with this method. Interpretation of those prior results can be made in the context of the updated reference intervals.   04/14/2021 85 (H) 0 - 45 U/L Final

## 2024-06-25 ENCOUNTER — MYC REFILL (OUTPATIENT)
Dept: FAMILY MEDICINE | Facility: OTHER | Age: 54
End: 2024-06-25

## 2024-06-25 DIAGNOSIS — E78.5 HYPERLIPIDEMIA LDL GOAL <100: ICD-10-CM

## 2024-06-25 DIAGNOSIS — R06.2 WHEEZING: ICD-10-CM

## 2024-06-25 NOTE — TELEPHONE ENCOUNTER
Albuterol      Last Written Prescription Date:  8/23/23  Last Fill Quantity: 18g,   # refills: 3  Last Office Visit: 2/15/24  Future Office visit:       Routing refill request to provider for review/approval because:

## 2024-06-25 NOTE — TELEPHONE ENCOUNTER
Lipitor      Last Written Prescription Date:  12/7/23  Last Fill Quantity: 90,   # refills: 3  Last Office Visit: 2/15/24  Future Office visit:       Routing refill request to provider for review/approval because:

## 2024-06-26 RX ORDER — ALBUTEROL SULFATE 90 UG/1
2 AEROSOL, METERED RESPIRATORY (INHALATION) EVERY 6 HOURS
Qty: 18 G | Refills: 1 | Status: SHIPPED | OUTPATIENT
Start: 2024-06-26

## 2024-06-26 RX ORDER — ATORVASTATIN CALCIUM 10 MG/1
10 TABLET, FILM COATED ORAL AT BEDTIME
Qty: 90 TABLET | Refills: 2 | Status: SHIPPED | OUTPATIENT
Start: 2024-06-26

## 2024-06-29 ENCOUNTER — MYC REFILL (OUTPATIENT)
Dept: FAMILY MEDICINE | Facility: OTHER | Age: 54
End: 2024-06-29

## 2024-06-29 ENCOUNTER — HEALTH MAINTENANCE LETTER (OUTPATIENT)
Age: 54
End: 2024-06-29

## 2024-06-29 DIAGNOSIS — I10 BENIGN ESSENTIAL HYPERTENSION: ICD-10-CM

## 2024-07-01 RX ORDER — LOSARTAN POTASSIUM 25 MG/1
25 TABLET ORAL DAILY
Qty: 90 TABLET | Refills: 2 | Status: SHIPPED | OUTPATIENT
Start: 2024-07-01

## 2024-07-01 NOTE — TELEPHONE ENCOUNTER
losartan (COZAAR) 25 MG tablet         Last Written Prescription Date:  8/9/23  Last Fill Quantity: 90,   # refills: 2  Last Office Visit: 2/15/24  Future Office visit:       Routing refill request to provider for review/approval because:      Angiotensin-II Receptors [id number]/29/2024 12:17 PM   Protocol Details Last blood pressure under 140/90 in past 12 months       BP Readings from Last 3 Encounters:   02/15/24 (!) 114/90   12/28/23 116/84   12/06/23 98/76

## 2024-07-05 ENCOUNTER — TELEPHONE (OUTPATIENT)
Dept: FAMILY MEDICINE | Facility: OTHER | Age: 54
End: 2024-07-05

## 2024-07-08 ENCOUNTER — MYC MEDICAL ADVICE (OUTPATIENT)
Dept: FAMILY MEDICINE | Facility: OTHER | Age: 54
End: 2024-07-08

## 2024-07-08 DIAGNOSIS — G89.29 CHRONIC PAIN OF RIGHT ELBOW: Primary | ICD-10-CM

## 2024-07-08 DIAGNOSIS — M25.521 CHRONIC PAIN OF RIGHT ELBOW: Primary | ICD-10-CM

## 2024-07-12 NOTE — TELEPHONE ENCOUNTER
Pt called and scheduled for CDM on 08/01/2024.    Pended up OA referral to [facility] per pt request.  Diagnosis: Chronic right elbow pain.      Pt informed that he needs to make his upcoming appointment with PCP.   Pt would like referral to go to [facility].   Pt informed that they would be reaching out to him to scheduled that appointment.

## 2024-07-30 NOTE — PROGRESS NOTES
Assessment & Plan     1. Type 2 diabetes mellitus without complication, with long-term current use of insulin (H)  Continue actos and jardiance.   - empagliflozin (JARDIANCE) 25 MG TABS tablet; Take 1 tablet (25 mg) by mouth daily  Dispense: 90 tablet; Refill: 1  - Hemoglobin A1c    2. Hyperlipidemia LDL goal <100  Continue lipitor  - Lipid Profile    3. Benign essential hypertension  stable  - Comprehensive metabolic panel  - TSH with free T4 reflex    4. Mild recurrent major depression (H24)  He stopped Viibryd months ago, will stay off medications for now.      5. Generalized anxiety disorder  As above    6. Chronic pain of right elbow  Stable     7. On statin therapy  - Comprehensive metabolic panel    8. Class 2 severe obesity due to excess calories with serious comorbidity and body mass index (BMI) of 35.0 to 35.9 in adult (H)  Weight loss encouraged.           BMI  Estimated body mass index is 35.86 kg/m  as calculated from the following:    Height as of 2/15/24: 1.829 m (6').    Weight as of this encounter: 119.9 kg (264 lb 6.4 oz).   Weight management plan: Discussed healthy diet and exercise guidelines      Follow-up in 6 months or as needed     The longitudinal plan of care for the diagnosis(es)/condition(s) as documented were addressed during this visit. Due to the added complexity in care, I will continue to support Bryan in the subsequent management and with ongoing continuity of care.    Marion Davis,   Certified Adult Nurse Practitioner  [phone]      Subjective   Bryan is a 54 year old, presenting for the following health issues:  Diabetes, Lipids, Hypertension, Anxiety, and Depression        8/1/2024     3:29 PM   Additional Questions   Roomed by Kaycee MENCHACA   Accompanied by None     HPI     Diabetes Follow-up    How often are you checking your blood sugar? Not at all  What concerns do you have today about your diabetes? None   Do you have any of these symptoms? (Select all that apply)   Numbness in feet and Burning in feet      Hyperlipidemia Follow-Up    Are you regularly taking any medication or supplement to lower your cholesterol?   Yes- Lipitor 10 mg  Are you having muscle aches or other side effects that you think could be caused by your cholesterol lowering medication?  No    Hypertension Follow-up    Do you check your blood pressure regularly outside of the clinic? No   Are you following a low salt diet? No  Are your blood pressures ever more than 140 on the top number (systolic) OR more   than 90 on the bottom number (diastolic), for example 140/90? No Does not check outside of clinic    BP Readings from Last 2 Encounters:   24 120/84   02/15/24 (!) 114/90     Hemoglobin A1C (%)   Date Value   2023 7.0 (H)   2023 6.4 (H)   2021 5.7 (H)   2020 6.8 (H)     LDL Cholesterol Calculated (mg/dL)   Date Value   2023 84   2023 57   2021 91   2020 57         Depression and Anxiety   How are you doing with your depression since your last visit? Improved - stopped Viibryd a few months ago - doing well without it.    How are you doing with your anxiety since your last visit?  Improved   Are you having other symptoms that might be associated with depression or anxiety? No  Have you had a significant life event? No   Do you have any concerns with your use of alcohol or other drugs? No    Social History     Tobacco Use    Smoking status: Former     Current packs/day: 0.00     Average packs/day: 0.8 packs/day for 38.9 years (29.2 ttl pk-yrs)     Types: Cigarettes     Start date: 1984     Quit date: 2022     Years since quittin.6     Passive exposure: Past    Smokeless tobacco: Never   Vaping Use    Vaping status: Never Used   Substance Use Topics    Alcohol use: Yes    Drug use: No         2023     9:47 AM 2023    12:45 PM 2024     2:54 PM   PHQ   PHQ-9 Total Score 0 2 4   Q9: Thoughts of better off dead/self-harm past 2 weeks  Not at all Not at all Not at all         10/10/2022     9:00 AM 11/2/2023    12:46 PM 8/1/2024     2:55 PM   JOE-7 SCORE   Total Score  3 (minimal anxiety) 2 (minimal anxiety)   Total Score 10 3 2         8/1/2024     2:54 PM   Last PHQ-9   1.  Little interest or pleasure in doing things 0   2.  Feeling down, depressed, or hopeless 0   3.  Trouble falling or staying asleep, or sleeping too much 2   4.  Feeling tired or having little energy 2   5.  Poor appetite or overeating 0   6.  Feeling bad about yourself 0   7.  Trouble concentrating 0   8.  Moving slowly or restless 0   Q9: Thoughts of better off dead/self-harm past 2 weeks 0   PHQ-9 Total Score 4         8/1/2024     2:55 PM   JOE-7    1. Feeling nervous, anxious, or on edge 0   2. Not being able to stop or control worrying 0   3. Worrying too much about different things 0   4. Trouble relaxing 1   5. Being so restless that it is hard to sit still 1   6. Becoming easily annoyed or irritable 0   7. Feeling afraid, as if something awful might happen 0   JOE-7 Total Score 2   If you checked any problems, how difficult have they made it for you to do your work, take care of things at home, or get along with other people? Somewhat difficult     Chronic back and elbow pain:      BP Readings from Last 3 Encounters:   08/01/24 120/84   02/15/24 (!) 114/90   12/28/23 116/84    Wt Readings from Last 3 Encounters:   08/01/24 119.9 kg (264 lb 6.4 oz)   02/15/24 122.9 kg (271 lb)   12/28/23 123.1 kg (271 lb 4.8 oz)                  Review of Systems  Constitutional, neuro, ENT, endocrine, pulmonary, cardiac, gastrointestinal, genitourinary, musculoskeletal, integument and psychiatric systems are negative, except as otherwise noted.      Objective    /84   Pulse 116   Temp 97.7  F (36.5  C) (Tympanic)   Resp 18   Wt 119.9 kg (264 lb 6.4 oz)   SpO2 98%   BMI 35.86 kg/m    Body mass index is 35.86 kg/m .  Physical Exam   GENERAL: alert and no distress  NECK: no  adenopathy, no asymmetry, masses, or scars, thyroid normal to palpation, and no carotid bruits  RESP: lungs clear to auscultation - no rales, rhonchi or wheezes  CV: regular rate and rhythm, normal S1 S2, no S3 or S4, no murmur  MS: no gross musculoskeletal defects noted, no edema  PSYCH: mentation appears normal, affect normal/bright            Signed Electronically by: Marion Davis, NP

## 2024-08-01 ENCOUNTER — OFFICE VISIT (OUTPATIENT)
Dept: FAMILY MEDICINE | Facility: OTHER | Age: 54
End: 2024-08-01
Attending: NURSE PRACTITIONER
Payer: COMMERCIAL

## 2024-08-01 VITALS
OXYGEN SATURATION: 98 % | DIASTOLIC BLOOD PRESSURE: 84 MMHG | BODY MASS INDEX: 35.86 KG/M2 | HEART RATE: 116 BPM | TEMPERATURE: 97.7 F | SYSTOLIC BLOOD PRESSURE: 120 MMHG | RESPIRATION RATE: 18 BRPM | WEIGHT: 264.4 LBS

## 2024-08-01 DIAGNOSIS — Z79.4 TYPE 2 DIABETES MELLITUS WITHOUT COMPLICATION, WITH LONG-TERM CURRENT USE OF INSULIN (H): Primary | ICD-10-CM

## 2024-08-01 DIAGNOSIS — Z79.899 ON STATIN THERAPY: ICD-10-CM

## 2024-08-01 DIAGNOSIS — E66.01 CLASS 2 SEVERE OBESITY DUE TO EXCESS CALORIES WITH SERIOUS COMORBIDITY AND BODY MASS INDEX (BMI) OF 35.0 TO 35.9 IN ADULT (H): ICD-10-CM

## 2024-08-01 DIAGNOSIS — F33.0 MILD RECURRENT MAJOR DEPRESSION (H): ICD-10-CM

## 2024-08-01 DIAGNOSIS — G89.29 CHRONIC PAIN OF RIGHT ELBOW: ICD-10-CM

## 2024-08-01 DIAGNOSIS — E11.9 TYPE 2 DIABETES MELLITUS WITHOUT COMPLICATION, WITH LONG-TERM CURRENT USE OF INSULIN (H): Primary | ICD-10-CM

## 2024-08-01 DIAGNOSIS — I10 BENIGN ESSENTIAL HYPERTENSION: ICD-10-CM

## 2024-08-01 DIAGNOSIS — M25.521 CHRONIC PAIN OF RIGHT ELBOW: ICD-10-CM

## 2024-08-01 DIAGNOSIS — E78.5 HYPERLIPIDEMIA LDL GOAL <100: ICD-10-CM

## 2024-08-01 DIAGNOSIS — E66.812 CLASS 2 SEVERE OBESITY DUE TO EXCESS CALORIES WITH SERIOUS COMORBIDITY AND BODY MASS INDEX (BMI) OF 35.0 TO 35.9 IN ADULT (H): ICD-10-CM

## 2024-08-01 DIAGNOSIS — F41.1 GENERALIZED ANXIETY DISORDER: ICD-10-CM

## 2024-08-01 PROCEDURE — 84443 ASSAY THYROID STIM HORMONE: CPT | Performed by: NURSE PRACTITIONER

## 2024-08-01 PROCEDURE — 99214 OFFICE O/P EST MOD 30 MIN: CPT | Performed by: NURSE PRACTITIONER

## 2024-08-01 PROCEDURE — 80061 LIPID PANEL: CPT | Performed by: NURSE PRACTITIONER

## 2024-08-01 PROCEDURE — 36415 COLL VENOUS BLD VENIPUNCTURE: CPT | Performed by: NURSE PRACTITIONER

## 2024-08-01 PROCEDURE — 80053 COMPREHEN METABOLIC PANEL: CPT | Performed by: NURSE PRACTITIONER

## 2024-08-01 PROCEDURE — 83036 HEMOGLOBIN GLYCOSYLATED A1C: CPT | Performed by: NURSE PRACTITIONER

## 2024-08-01 PROCEDURE — G2211 COMPLEX E/M VISIT ADD ON: HCPCS | Performed by: NURSE PRACTITIONER

## 2024-08-01 ASSESSMENT — ANXIETY QUESTIONNAIRES
IF YOU CHECKED OFF ANY PROBLEMS ON THIS QUESTIONNAIRE, HOW DIFFICULT HAVE THESE PROBLEMS MADE IT FOR YOU TO DO YOUR WORK, TAKE CARE OF THINGS AT HOME, OR GET ALONG WITH OTHER PEOPLE: SOMEWHAT DIFFICULT
3. WORRYING TOO MUCH ABOUT DIFFERENT THINGS: NOT AT ALL
GAD7 TOTAL SCORE: 2
4. TROUBLE RELAXING: SEVERAL DAYS
2. NOT BEING ABLE TO STOP OR CONTROL WORRYING: NOT AT ALL
7. FEELING AFRAID AS IF SOMETHING AWFUL MIGHT HAPPEN: NOT AT ALL
7. FEELING AFRAID AS IF SOMETHING AWFUL MIGHT HAPPEN: NOT AT ALL
GAD7 TOTAL SCORE: 2
8. IF YOU CHECKED OFF ANY PROBLEMS, HOW DIFFICULT HAVE THESE MADE IT FOR YOU TO DO YOUR WORK, TAKE CARE OF THINGS AT HOME, OR GET ALONG WITH OTHER PEOPLE?: SOMEWHAT DIFFICULT
5. BEING SO RESTLESS THAT IT IS HARD TO SIT STILL: SEVERAL DAYS
6. BECOMING EASILY ANNOYED OR IRRITABLE: NOT AT ALL
GAD7 TOTAL SCORE: 2
1. FEELING NERVOUS, ANXIOUS, OR ON EDGE: NOT AT ALL

## 2024-08-01 ASSESSMENT — PATIENT HEALTH QUESTIONNAIRE - PHQ9
SUM OF ALL RESPONSES TO PHQ QUESTIONS 1-9: 4
10. IF YOU CHECKED OFF ANY PROBLEMS, HOW DIFFICULT HAVE THESE PROBLEMS MADE IT FOR YOU TO DO YOUR WORK, TAKE CARE OF THINGS AT HOME, OR GET ALONG WITH OTHER PEOPLE: NOT DIFFICULT AT ALL
SUM OF ALL RESPONSES TO PHQ QUESTIONS 1-9: 4

## 2024-08-01 ASSESSMENT — PAIN SCALES - GENERAL: PAINLEVEL: NO PAIN (0)

## 2024-08-02 LAB
ALBUMIN SERPL BCG-MCNC: 4.9 G/DL (ref 3.5–5.2)
ALP SERPL-CCNC: 92 U/L (ref 40–150)
ALT SERPL W P-5'-P-CCNC: 176 U/L (ref 0–70)
ANION GAP SERPL CALCULATED.3IONS-SCNC: 17 MMOL/L (ref 7–15)
AST SERPL W P-5'-P-CCNC: 116 U/L (ref 0–45)
BILIRUB SERPL-MCNC: 0.9 MG/DL
BUN SERPL-MCNC: 16.3 MG/DL (ref 6–20)
CALCIUM SERPL-MCNC: 10.2 MG/DL (ref 8.8–10.4)
CHLORIDE SERPL-SCNC: 103 MMOL/L (ref 98–107)
CHOLEST SERPL-MCNC: 158 MG/DL
CREAT SERPL-MCNC: 0.87 MG/DL (ref 0.67–1.17)
EGFRCR SERPLBLD CKD-EPI 2021: >90 ML/MIN/1.73M2
EST. AVERAGE GLUCOSE BLD GHB EST-MCNC: 177 MG/DL
FASTING STATUS PATIENT QL REPORTED: NO
FASTING STATUS PATIENT QL REPORTED: NO
GLUCOSE SERPL-MCNC: 136 MG/DL (ref 70–99)
HBA1C MFR BLD: 7.8 %
HCO3 SERPL-SCNC: 21 MMOL/L (ref 22–29)
HDLC SERPL-MCNC: 50 MG/DL
LDLC SERPL CALC-MCNC: 71 MG/DL
NONHDLC SERPL-MCNC: 108 MG/DL
POTASSIUM SERPL-SCNC: 4.3 MMOL/L (ref 3.4–5.3)
PROT SERPL-MCNC: 8.3 G/DL (ref 6.4–8.3)
SODIUM SERPL-SCNC: 141 MMOL/L (ref 135–145)
TRIGL SERPL-MCNC: 185 MG/DL
TSH SERPL DL<=0.005 MIU/L-ACNC: 2.03 UIU/ML (ref 0.3–4.2)

## 2024-09-30 ENCOUNTER — OFFICE VISIT (OUTPATIENT)
Dept: FAMILY MEDICINE | Facility: OTHER | Age: 54
End: 2024-09-30
Attending: NURSE PRACTITIONER
Payer: COMMERCIAL

## 2024-09-30 ENCOUNTER — TELEPHONE (OUTPATIENT)
Dept: FAMILY MEDICINE | Facility: OTHER | Age: 54
End: 2024-09-30

## 2024-09-30 VITALS
OXYGEN SATURATION: 98 % | HEART RATE: 98 BPM | BODY MASS INDEX: 36.14 KG/M2 | SYSTOLIC BLOOD PRESSURE: 127 MMHG | TEMPERATURE: 98.1 F | RESPIRATION RATE: 20 BRPM | WEIGHT: 266.5 LBS | DIASTOLIC BLOOD PRESSURE: 89 MMHG

## 2024-09-30 DIAGNOSIS — J20.9 ACUTE BRONCHITIS WITH SYMPTOMS GREATER THAN 10 DAYS: ICD-10-CM

## 2024-09-30 DIAGNOSIS — J01.90 ACUTE SINUSITIS WITH SYMPTOMS GREATER THAN 10 DAYS: Primary | ICD-10-CM

## 2024-09-30 PROCEDURE — 99213 OFFICE O/P EST LOW 20 MIN: CPT | Performed by: NURSE PRACTITIONER

## 2024-09-30 PROCEDURE — G2211 COMPLEX E/M VISIT ADD ON: HCPCS | Performed by: NURSE PRACTITIONER

## 2024-09-30 RX ORDER — AZITHROMYCIN 250 MG/1
TABLET, FILM COATED ORAL
Qty: 11 TABLET | Refills: 0 | Status: SHIPPED | OUTPATIENT
Start: 2024-09-30 | End: 2024-10-10

## 2024-09-30 ASSESSMENT — PAIN SCALES - GENERAL: PAINLEVEL: NO PAIN (1)

## 2024-09-30 NOTE — TELEPHONE ENCOUNTER
8:06 AM    Reason for Call: OVERBOOK    Patient is having the following symptoms: sinus infection  for 2 weeks.    The patient is requesting an appointment for today  with PCP.    Was an appointment offered for this call? No  If yes : Appointment type              Date    Preferred method for responding to this message: Telephone Call  What is your phone number ? [phone]     If we cannot reach you directly, may we leave a detailed response at the number you provided? Yes    Can this message wait until your PCP/provider returns, if unavailable today? YES    Michelet Mcguire

## 2024-09-30 NOTE — PROGRESS NOTES
Assessment & Plan     1. Acute sinusitis with symptoms greater than 10 days  Continue allergy medications  - azithromycin (ZITHROMAX) 250 MG tablet; Take 2 tablets (500 mg) by mouth daily for 1 day, THEN 1 tablet (250 mg) daily for 9 days.  Dispense: 11 tablet; Refill: 0    2. Acute bronchitis with symptoms greater than 10 days  Continue albuterol as needed  Continue tessalon as needed - has at  home.   - azithromycin (ZITHROMAX) 250 MG tablet; Take 2 tablets (500 mg) by mouth daily for 1 day, THEN 1 tablet (250 mg) daily for 9 days.  Dispense: 11 tablet; Refill: 0    Follow-up if no improvement or any worsening    The longitudinal plan of care for the diagnosis(es)/condition(s) as documented were addressed during this visit. Due to the added complexity in care, I will continue to support Bryan in the subsequent management and with ongoing continuity of care.    Marion Davis,   Certified Adult Nurse Practitioner  [phone]      Subjective   Bryan is a 54 year old, presenting for the following health issues:  Sinus Problem    HPI     Acute Illness  Acute illness concerns: sinus issues   Onset/Duration: 3-4 weeks   Symptoms:  Fever: No  Chills/Sweats: No  Headache (location?): YES- temple   Sinus Pressure: YES  Conjunctivitis:  No  Ear Pain: YES: both  Rhinorrhea: YES  Congestion: YES  Sore Throat: No  Cough: YES-productive of clear sputum  Wheeze: No  Decreased Appetite: No  Nausea: No  Vomiting: No  Diarrhea: No  Dysuria/Freq.: No  Dysuria or Hematuria: No  Fatigue/Achiness: YES  Sick/Strep Exposure: No  Therapies tried and outcome: allergy medications, albuterol inhaler.            Recent Labs   Lab Test 08/01/24  1612 12/06/23  1228 05/04/23  1030 11/05/21  1022 04/14/21  0900 11/11/20  1108   A1C 7.8* 7.0* 6.4*   < > 5.7* 6.8*   LDL 71 84 57   < > 91 57   HDL 50 56 63   < > 67 39*   TRIG 185* 132 143   < > 89 144   * 108* 55*   < > 154* 92*   CR 0.87 0.78 0.80   < > 0.81 0.89   GFRESTIMATED >90  >90 >90   < > >90 >90   GFRESTBLACK  --   --   --   --  >90 >90   POTASSIUM 4.3 4.2 4.3   < > 4.0 3.9   TSH 2.03 1.38 1.68   < > 0.98 0.91    < > = values in this interval not displayed.      BP Readings from Last 3 Encounters:   09/30/24 127/89   08/01/24 120/84   02/15/24 (!) 114/90    Wt Readings from Last 3 Encounters:   09/30/24 120.9 kg (266 lb 8 oz)   08/01/24 119.9 kg (264 lb 6.4 oz)   02/15/24 122.9 kg (271 lb)                        Review of Systems  Constitutional, neuro, ENT, endocrine, pulmonary, cardiac, gastrointestinal, genitourinary, musculoskeletal, integument and psychiatric systems are negative, except as otherwise noted.      Objective    /89 (BP Location: Left arm, Patient Position: Sitting, Cuff Size: Adult Large)   Pulse 98   Temp 98.1  F (36.7  C) (Tympanic)   Resp 20   Wt 120.9 kg (266 lb 8 oz)   SpO2 98%   BMI 36.14 kg/m    Body mass index is 36.14 kg/m .  Physical Exam   GENERAL: alert and no distress  HENT: normal cephalic/atraumatic, both ears: erythematous, nasal mucosa edematous , and rhinorrhea yellow and thick with similar post nasal drainage.   NECK: no adenopathy, no asymmetry, masses, or scars  RESP: wheezing throughout with bronchospasm on inspiration.    CV: regular rate and rhythm, normal S1 S2, no S3 or S4, no murmur, click or rub, no peripheral edema  MS: no gross musculoskeletal defects noted, no edema  PSYCH: mentation appears normal, affect normal/bright            Signed Electronically by: Marion Davis NP

## 2024-11-04 ENCOUNTER — MYC REFILL (OUTPATIENT)
Dept: FAMILY MEDICINE | Facility: OTHER | Age: 54
End: 2024-11-04

## 2024-11-04 DIAGNOSIS — E11.9 TYPE 2 DIABETES MELLITUS WITHOUT COMPLICATION, WITH LONG-TERM CURRENT USE OF INSULIN (H): ICD-10-CM

## 2024-11-04 DIAGNOSIS — Z79.4 TYPE 2 DIABETES MELLITUS WITHOUT COMPLICATION, WITH LONG-TERM CURRENT USE OF INSULIN (H): ICD-10-CM

## 2024-12-04 NOTE — PROGRESS NOTES
"  Assessment & Plan     Acute recurrent frontal sinusitis  - amoxicillin-clavulanate (AUGMENTIN) 875-125 MG tablet; Take 1 tablet by mouth 2 times daily for 10 days.              No follow-ups on file.    Denis Adams is a 54 year old, presenting for the following health issues:  Cough    History of Present Illness       Headaches:   Since the patient's last clinic visit, headaches are: no change  The patient is getting headaches:  Daily  He is not able to do normal daily activities when he has a migraine.  The patient is taking the following rescue/relief medications:  Tylenol   Patient states \"I get some relief\" from the rescue/relief medications.   The patient is taking the following medications to prevent migraines:  No medications to prevent migraines  In the past 4 weeks, the patient has gone to an Urgent Care or Emergency Room 0 times times due to headaches.    Reason for visit:  Cough  Symptom onset:  3-4 weeks ago  Symptom intensity:  Moderate  Symptom progression:  Worsening  Had these symptoms before:  Yes  Has tried/received treatment for these symptoms:  Yes  Previous treatment was successful:  Yes He is missing 1 dose(s) of medications per week.  He is not taking prescribed medications regularly due to remembering to take.       Acute Illness  Acute illness concerns: cough  Onset/Duration: over 1 month  Symptoms:  Fever: YES  Chills/Sweats: YES  Headache (location?): YES  Sinus Pressure: YES  Conjunctivitis:  No  Ear Pain: YES: bilateral  Rhinorrhea: YES  Congestion: YES  Sore Throat: YES  Cough: YES-productive of clear sputum  Wheeze: YES  Decreased Appetite: No  Nausea: YES  Vomiting: No  Diarrhea: YES  Dysuria/Freq.: No  Dysuria or Hematuria: No  Fatigue/Achiness: YES  Sick/Strep Exposure: YES  Therapies tried and outcome: OTC cough medicine and tessalon             Objective    BP (!) 128/98 (BP Location: Left arm, Patient Position: Sitting, Cuff Size: Adult Regular)   Pulse 99   Temp 97.7  F " (36.5  C) (Tympanic)   Resp 20   Wt 122.5 kg (270 lb 1.6 oz)   SpO2 99%   BMI 36.63 kg/m    Body mass index is 36.63 kg/m .  Physical Exam   GENERAL: alert and no distress  EYES: Eyes grossly normal to inspection, PERRL and conjunctivae and sclerae normal  HENT: normal cephalic/atraumatic, ear canals and TM's normal, and non-tender sinuses. No erythema or tonsillar hypertrophy or exudates. Postnasal drainage.   RESP: lungs clear to auscultation - no rales, rhonchi or wheezes  CV: regular rate and rhythm, normal S1 S2, no S3 or S4, no murmur, click or rub, no peripheral edema             Signed Electronically by: Miranda Ernst CNP

## 2024-12-05 ENCOUNTER — OFFICE VISIT (OUTPATIENT)
Dept: FAMILY MEDICINE | Facility: OTHER | Age: 54
End: 2024-12-05
Attending: NURSE PRACTITIONER
Payer: COMMERCIAL

## 2024-12-05 VITALS
SYSTOLIC BLOOD PRESSURE: 128 MMHG | DIASTOLIC BLOOD PRESSURE: 98 MMHG | RESPIRATION RATE: 20 BRPM | BODY MASS INDEX: 36.63 KG/M2 | HEART RATE: 99 BPM | WEIGHT: 270.1 LBS | OXYGEN SATURATION: 99 % | TEMPERATURE: 97.7 F

## 2024-12-05 DIAGNOSIS — J01.11 ACUTE RECURRENT FRONTAL SINUSITIS: Primary | ICD-10-CM

## 2024-12-05 PROBLEM — E66.9 OBESITY WITH BODY MASS INDEX 30 OR GREATER: Status: ACTIVE | Noted: 2023-03-15

## 2024-12-05 PROBLEM — E88.09 PSEUDOCHOLINESTERASE DEFICIENCY: Status: ACTIVE | Noted: 2023-03-29

## 2024-12-05 PROBLEM — F17.210 TOBACCO DEPENDENCE DUE TO CIGARETTES: Status: ACTIVE | Noted: 2017-01-20

## 2024-12-05 PROBLEM — K22.4 DIFFUSE SPASM OF ESOPHAGUS: Status: ACTIVE | Noted: 2023-02-02

## 2024-12-05 ASSESSMENT — PAIN SCALES - GENERAL: PAINLEVEL_OUTOF10: NO PAIN (0)

## 2024-12-16 NOTE — PROGRESS NOTES
Assessment & Plan     1. Type 2 diabetes mellitus without complication, with long-term current use of insulin (H) (Primary)  Add ozempic  - Hemoglobin A1c; Future  - IN FOOT EXAM NO CHARGE  - Albumin Random Urine Quantitative with Creat Ratio; Future  - semaglutide (OZEMPIC) 2 MG/3ML pen; Inject 0.25 mg subcutaneously every 7 days.  Dispense: 3 mL; Refill: 1  - pioglitazone (ACTOS) 30 MG tablet; Take 1 tablet (30 mg) by mouth daily.  Dispense: 90 tablet; Refill: 1    2. Hyperlipidemia LDL goal <100  Continue lipitor   - Lipid Profile; Future    3. Benign essential hypertension  Well controlled   - Comprehensive metabolic panel; Future  - CBC with Platelets & Differential; Future    4. Mild recurrent major depression (H)  stable    5. Generalized anxiety disorder  Sable     6. Class 2 severe obesity due to excess calories with serious comorbidity and body mass index (BMI) of 35.0 to 35.9 in adult (H)  Weight loss encouraged   Start - semaglutide (OZEMPIC) 2 MG/3ML pen; Inject 0.25 mg subcutaneously every 7 days.  Dispense: 3 mL; Refill: 1    7. On statin therapy  - Comprehensive metabolic panel; Future        Return in about 1 month (around 1/18/2025) for diabetes, lipids, HTN.    The longitudinal plan of care for the diagnosis(es)/condition(s) as documented were addressed during this visit. Due to the added complexity in care, I will continue to support Bryan in the subsequent management and with ongoing continuity of care.    Marion Davis,   Certified Adult Nurse Practitioner  [phone]      Subjective   Bryan is a 54 year old, presenting for the following health issues:  Hypertension, Lipids, and Diabetes    HPI     Diabetes Follow-up    How often are you checking your blood sugar? Not at all  What concerns do you have today about your diabetes? None   Do you have any of these symptoms? (Select all that apply)  No numbness or tingling in feet.  No redness, sores or blisters on feet.  No complaints of  excessive thirst.  No reports of blurry vision.  No significant changes to weight.      Diabetic Foot Screen:  Any complaints of increased pain or numbness ?  YES sharp pain in arches  Is there a foot ulcer now or a history of foot ulcer? No  Does the foot have an abnormal shape? No  Are the nails thick, too long or ingrown? No  Are there any redness or open areas? No         Sensation Testing done at all points on the diagram with monofilament     Right Foot: Sensation Normal at all points  Left Foot: Sensation Normal at all points     Risk Category: 0- No loss of protective sensation  Performed by Hemant Sanchez NP      Hyperlipidemia Follow-Up    Are you regularly taking any medication or supplement to lower your cholesterol?   Yes- Lipitor 10 mg  Are you having muscle aches or other side effects that you think could be caused by your cholesterol lowering medication?  No    Hypertension Follow-up    Do you check your blood pressure regularly outside of the clinic? No   Are you following a low salt diet? Yes  Are your blood pressures ever more than 140 on the top number (systolic) OR more   than 90 on the bottom number (diastolic), for example 140/90? No Does not check outside of clinic    BP Readings from Last 3 Encounters:   12/18/24 119/84   12/05/24 (!) 128/98   09/30/24 127/89    Wt Readings from Last 3 Encounters:   12/18/24 119.3 kg (263 lb)   12/05/24 122.5 kg (270 lb 1.6 oz)   09/30/24 120.9 kg (266 lb 8 oz)               Hemoglobin A1C (%)   Date Value   08/01/2024 7.8 (H)   12/06/2023 7.0 (H)   04/14/2021 5.7 (H)   11/11/2020 6.8 (H)     LDL Cholesterol Calculated (mg/dL)   Date Value   08/01/2024 71   12/06/2023 84   04/14/2021 91   11/11/2020 57       He was treated for sinus infection a few weeks ago.  He is feeling a bit better.       Review of Systems  Constitutional, neuro, ENT, endocrine, pulmonary, cardiac, gastrointestinal, genitourinary, musculoskeletal, integument and psychiatric systems are  negative, except as otherwise noted.      Objective    /84 (BP Location: Right arm, Patient Position: Sitting, Cuff Size: Adult Large)   Pulse 108   Temp 98.1  F (36.7  C) (Tympanic)   Resp 18   Wt 119.3 kg (263 lb)   SpO2 99%   BMI 35.67 kg/m    Body mass index is 35.67 kg/m .  Physical Exam   GENERAL: alert and no distress  EYES: Eyes grossly normal to inspection, PERRL and conjunctivae and sclerae normal  HENT: ear canals and TM's normal, nose and mouth without ulcers or lesions  NECK: no adenopathy, no asymmetry, masses, or scars, thyroid normal to palpation, and no carotid bruits  RESP: lungs clear to auscultation - no rales, rhonchi or wheezes  CV: regular rate and rhythm, normal S1 S2, no S3 or S4, no murmur  MS: no gross musculoskeletal defects noted, no edema  PSYCH: mentation appears normal, affect normal/bright            Signed Electronically by: Marion Davis, NP

## 2024-12-18 ENCOUNTER — OFFICE VISIT (OUTPATIENT)
Dept: FAMILY MEDICINE | Facility: OTHER | Age: 54
End: 2024-12-18
Attending: NURSE PRACTITIONER
Payer: COMMERCIAL

## 2024-12-18 VITALS
BODY MASS INDEX: 35.67 KG/M2 | SYSTOLIC BLOOD PRESSURE: 119 MMHG | DIASTOLIC BLOOD PRESSURE: 84 MMHG | RESPIRATION RATE: 18 BRPM | OXYGEN SATURATION: 99 % | TEMPERATURE: 98.1 F | HEART RATE: 108 BPM | WEIGHT: 263 LBS

## 2024-12-18 DIAGNOSIS — E66.812 CLASS 2 SEVERE OBESITY DUE TO EXCESS CALORIES WITH SERIOUS COMORBIDITY AND BODY MASS INDEX (BMI) OF 35.0 TO 35.9 IN ADULT (H): ICD-10-CM

## 2024-12-18 DIAGNOSIS — Z79.4 TYPE 2 DIABETES MELLITUS WITHOUT COMPLICATION, WITH LONG-TERM CURRENT USE OF INSULIN (H): Primary | ICD-10-CM

## 2024-12-18 DIAGNOSIS — I10 BENIGN ESSENTIAL HYPERTENSION: ICD-10-CM

## 2024-12-18 DIAGNOSIS — Z79.899 ON STATIN THERAPY: ICD-10-CM

## 2024-12-18 DIAGNOSIS — E11.9 TYPE 2 DIABETES MELLITUS WITHOUT COMPLICATION, WITH LONG-TERM CURRENT USE OF INSULIN (H): Primary | ICD-10-CM

## 2024-12-18 DIAGNOSIS — F33.0 MILD RECURRENT MAJOR DEPRESSION (H): ICD-10-CM

## 2024-12-18 DIAGNOSIS — E66.01 CLASS 2 SEVERE OBESITY DUE TO EXCESS CALORIES WITH SERIOUS COMORBIDITY AND BODY MASS INDEX (BMI) OF 35.0 TO 35.9 IN ADULT (H): ICD-10-CM

## 2024-12-18 DIAGNOSIS — F41.1 GENERALIZED ANXIETY DISORDER: ICD-10-CM

## 2024-12-18 DIAGNOSIS — E78.5 HYPERLIPIDEMIA LDL GOAL <100: ICD-10-CM

## 2024-12-18 LAB
ALBUMIN SERPL BCG-MCNC: 4.7 G/DL (ref 3.5–5.2)
ALP SERPL-CCNC: 98 U/L (ref 40–150)
ALT SERPL W P-5'-P-CCNC: 181 U/L (ref 0–70)
ANION GAP SERPL CALCULATED.3IONS-SCNC: 13 MMOL/L (ref 7–15)
AST SERPL W P-5'-P-CCNC: 130 U/L (ref 0–45)
BASOPHILS # BLD AUTO: 0.1 10E3/UL (ref 0–0.2)
BASOPHILS NFR BLD AUTO: 1 %
BILIRUB SERPL-MCNC: 0.8 MG/DL
BUN SERPL-MCNC: 12.7 MG/DL (ref 6–20)
CALCIUM SERPL-MCNC: 9.7 MG/DL (ref 8.8–10.4)
CHLORIDE SERPL-SCNC: 100 MMOL/L (ref 98–107)
CHOLEST SERPL-MCNC: 186 MG/DL
CREAT SERPL-MCNC: 0.81 MG/DL (ref 0.67–1.17)
CREAT UR-MCNC: 84.9 MG/DL
EGFRCR SERPLBLD CKD-EPI 2021: >90 ML/MIN/1.73M2
EOSINOPHIL # BLD AUTO: 0.5 10E3/UL (ref 0–0.7)
EOSINOPHIL NFR BLD AUTO: 8 %
ERYTHROCYTE [DISTWIDTH] IN BLOOD BY AUTOMATED COUNT: 11.9 % (ref 10–15)
EST. AVERAGE GLUCOSE BLD GHB EST-MCNC: 189 MG/DL
FASTING STATUS PATIENT QL REPORTED: NO
FASTING STATUS PATIENT QL REPORTED: NO
GLUCOSE SERPL-MCNC: 208 MG/DL (ref 70–99)
HBA1C MFR BLD: 8.2 %
HCO3 SERPL-SCNC: 26 MMOL/L (ref 22–29)
HCT VFR BLD AUTO: 53 % (ref 40–53)
HDLC SERPL-MCNC: 50 MG/DL
HGB BLD-MCNC: 18.4 G/DL (ref 13.3–17.7)
IMM GRANULOCYTES # BLD: 0 10E3/UL
IMM GRANULOCYTES NFR BLD: 1 %
LDLC SERPL CALC-MCNC: 106 MG/DL
LYMPHOCYTES # BLD AUTO: 1.7 10E3/UL (ref 0.8–5.3)
LYMPHOCYTES NFR BLD AUTO: 30 %
MCH RBC QN AUTO: 32.6 PG (ref 26.5–33)
MCHC RBC AUTO-ENTMCNC: 34.7 G/DL (ref 31.5–36.5)
MCV RBC AUTO: 94 FL (ref 78–100)
MICROALBUMIN UR-MCNC: 14.1 MG/L
MICROALBUMIN/CREAT UR: 16.61 MG/G CR (ref 0–17)
MONOCYTES # BLD AUTO: 0.5 10E3/UL (ref 0–1.3)
MONOCYTES NFR BLD AUTO: 8 %
NEUTROPHILS # BLD AUTO: 3.1 10E3/UL (ref 1.6–8.3)
NEUTROPHILS NFR BLD AUTO: 53 %
NONHDLC SERPL-MCNC: 136 MG/DL
PLATELET # BLD AUTO: 223 10E3/UL (ref 150–450)
POTASSIUM SERPL-SCNC: 4.3 MMOL/L (ref 3.4–5.3)
PROT SERPL-MCNC: 7.5 G/DL (ref 6.4–8.3)
RBC # BLD AUTO: 5.64 10E6/UL (ref 4.4–5.9)
SODIUM SERPL-SCNC: 139 MMOL/L (ref 135–145)
TRIGL SERPL-MCNC: 152 MG/DL
WBC # BLD AUTO: 5.8 10E3/UL (ref 4–11)

## 2024-12-18 RX ORDER — PIOGLITAZONE 30 MG/1
30 TABLET ORAL DAILY
Qty: 90 TABLET | Refills: 1 | Status: SHIPPED | OUTPATIENT
Start: 2024-12-18

## 2024-12-18 ASSESSMENT — PAIN SCALES - GENERAL: PAINLEVEL_OUTOF10: MILD PAIN (2)

## 2024-12-18 NOTE — PATIENT INSTRUCTIONS
Assessment & Plan     1. Type 2 diabetes mellitus without complication, with long-term current use of insulin (H) (Primary)  Add ozempic  - Hemoglobin A1c; Future  - ID FOOT EXAM NO CHARGE  - Albumin Random Urine Quantitative with Creat Ratio; Future  - semaglutide (OZEMPIC) 2 MG/3ML pen; Inject 0.25 mg subcutaneously every 7 days.  Dispense: 3 mL; Refill: 1  - pioglitazone (ACTOS) 30 MG tablet; Take 1 tablet (30 mg) by mouth daily.  Dispense: 90 tablet; Refill: 1    2. Hyperlipidemia LDL goal <100  Continue lipitor   - Lipid Profile; Future    3. Benign essential hypertension  Well controlled   - Comprehensive metabolic panel; Future  - CBC with Platelets & Differential; Future    4. Mild recurrent major depression (H)  stable    5. Generalized anxiety disorder  Sable     6. Class 2 severe obesity due to excess calories with serious comorbidity and body mass index (BMI) of 35.0 to 35.9 in adult (H)  Weight loss encouraged   - semaglutide (OZEMPIC) 2 MG/3ML pen; Inject 0.25 mg subcutaneously every 7 days.  Dispense: 3 mL; Refill: 1    7. On statin therapy  - Comprehensive metabolic panel; Future        Return in about 1 month (around 1/18/2025) for diabetes, lipids, HTN.      Marion Davis,   Certified Adult Nurse Practitioner  [phone]

## 2025-01-14 ENCOUNTER — TELEPHONE (OUTPATIENT)
Dept: FAMILY MEDICINE | Facility: OTHER | Age: 55
End: 2025-01-14

## 2025-01-14 DIAGNOSIS — F17.200 TOBACCO USE DISORDER: Primary | ICD-10-CM

## 2025-01-20 NOTE — PROGRESS NOTES
Assessment & Plan     1. Type 2 diabetes mellitus without complication, with long-term current use of insulin (H) (Primary)  Continue actos and jardiance.   Continue 0.25mg ozempic weekly - semaglutide (OZEMPIC) 2 MG/3ML pen; Inject 0.25 mg subcutaneously every 7 days.  Dispense: 3 mL; Refill: 1    2. Hyperlipidemia LDL goal <100  Continue lipitor    3. Benign essential hypertension  Restart losartan     4. Mild recurrent major depression  Stable     5. Generalized anxiety disorder  As above    6. Class 2 severe obesity due to excess calories with serious comorbidity and body mass index (BMI) of 35.0 to 35.9 in adult (H)  Continue weight loss efforts.   - semaglutide (OZEMPIC) 2 MG/3ML pen; Inject 0.25 mg subcutaneously every 7 days.  Dispense: 3 mL; Refill: 1    7. On statin therapy          BMI  Estimated body mass index is 35.13 kg/m  as calculated from the following:    Height as of 2/15/24: 1.829 m (6').    Weight as of this encounter: 117.5 kg (259 lb).   Weight management plan: Discussed healthy diet and exercise guidelines      Return in about 2 months (around 3/21/2025) for diabetes, lipids, HTN.    The longitudinal plan of care for the diagnosis(es)/condition(s) as documented were addressed during this visit. Due to the added complexity in care, I will continue to support Bryan in the subsequent management and with ongoing continuity of care.    Marion Davis,   Certified Adult Nurse Practitioner  [phone]      Subjective   Bryan is a 54 year old, presenting for the following health issues:  Diabetes, Lipids, and Hypertension         Diabetes Follow-up    How often are you checking your blood sugar? Not at all  What concerns do you have today about your diabetes? None   Do you have any of these symptoms? (Select all that apply)  Weight gain    Has been taking ozempic, mild nausea the day of injection but is otherwise tolerating well.      Hyperlipidemia Follow-Up    Are you regularly taking any  medication or supplement to lower your cholesterol?   Yes- Lipitor 10 mg  Are you having muscle aches or other side effects that you think could be caused by your cholesterol lowering medication?  Yes- fatigue     Hypertension Follow-up    Do you check your blood pressure regularly outside of the clinic? No   Are you following a low salt diet? Yes  Are your blood pressures ever more than 140 on the top number (systolic) OR more   than 90 on the bottom number (diastolic), for example 140/90? No does not check outside of clinic    Stopped losartan for the past 2 weeks due to fatigue, blood pressure is elevated today on first check but normalized on recheck.  He is willing to restart and encouraged him to take it at night.       Lipids:   The 10-year ASCVD risk score (Michelle CADET, et al., 2019) is: 9.4%    Values used to calculate the score:      Age: 54 years      Sex: Male      Is Non- : No      Diabetic: Yes      Tobacco smoker: No      Systolic Blood Pressure: 128 mmHg      Is BP treated: Yes      HDL Cholesterol: 50 mg/dL      Total Cholesterol: 186 mg/dL      BP Readings from Last 3 Encounters:   01/21/25 128/88   12/18/24 119/84   12/05/24 (!) 128/98    Wt Readings from Last 3 Encounters:   01/21/25 117.5 kg (259 lb)   12/18/24 119.3 kg (263 lb)   12/05/24 122.5 kg (270 lb 1.6 oz)                 Hemoglobin A1C (%)   Date Value   12/18/2024 8.2 (H)   08/01/2024 7.8 (H)   04/14/2021 5.7 (H)   11/11/2020 6.8 (H)     LDL Cholesterol Calculated (mg/dL)   Date Value   12/18/2024 106 (H)   08/01/2024 71   04/14/2021 91   11/11/2020 57             Review of Systems  Constitutional, neuro, ENT, endocrine, pulmonary, cardiac, gastrointestinal, genitourinary, musculoskeletal, integument and psychiatric systems are negative, except as otherwise noted.      Objective    /88 (BP Location: Left arm, Patient Position: Chair, Cuff Size: Adult Large)   Pulse 113   Temp 97.8  F (36.6  C) (Tympanic)    Resp 18   Wt 117.5 kg (259 lb)   SpO2 96%   BMI 35.13 kg/m    Body mass index is 35.13 kg/m .  Physical Exam   GENERAL: alert and no distress  NECK: no adenopathy, no asymmetry, masses, or scars, thyroid normal to palpation, and no carotid bruits  RESP: lungs clear to auscultation - no rales, rhonchi or wheezes  CV: regular rate and rhythm, normal S1 S2, no S3 or S4, no murmur  MS: no gross musculoskeletal defects noted, no edema  PSYCH: mentation appears normal, affect normal/bright            Signed Electronically by: Marion Davis, NP

## 2025-01-21 ENCOUNTER — OFFICE VISIT (OUTPATIENT)
Dept: FAMILY MEDICINE | Facility: OTHER | Age: 55
End: 2025-01-21
Attending: NURSE PRACTITIONER
Payer: COMMERCIAL

## 2025-01-21 VITALS
WEIGHT: 259 LBS | DIASTOLIC BLOOD PRESSURE: 88 MMHG | OXYGEN SATURATION: 96 % | BODY MASS INDEX: 35.13 KG/M2 | RESPIRATION RATE: 18 BRPM | TEMPERATURE: 97.8 F | HEART RATE: 113 BPM | SYSTOLIC BLOOD PRESSURE: 128 MMHG

## 2025-01-21 DIAGNOSIS — F41.1 GENERALIZED ANXIETY DISORDER: ICD-10-CM

## 2025-01-21 DIAGNOSIS — E66.01 CLASS 2 SEVERE OBESITY DUE TO EXCESS CALORIES WITH SERIOUS COMORBIDITY AND BODY MASS INDEX (BMI) OF 35.0 TO 35.9 IN ADULT (H): ICD-10-CM

## 2025-01-21 DIAGNOSIS — F33.0 MILD RECURRENT MAJOR DEPRESSION: ICD-10-CM

## 2025-01-21 DIAGNOSIS — Z79.899 ON STATIN THERAPY: ICD-10-CM

## 2025-01-21 DIAGNOSIS — I10 BENIGN ESSENTIAL HYPERTENSION: ICD-10-CM

## 2025-01-21 DIAGNOSIS — E66.812 CLASS 2 SEVERE OBESITY DUE TO EXCESS CALORIES WITH SERIOUS COMORBIDITY AND BODY MASS INDEX (BMI) OF 35.0 TO 35.9 IN ADULT (H): ICD-10-CM

## 2025-01-21 DIAGNOSIS — E78.5 HYPERLIPIDEMIA LDL GOAL <100: ICD-10-CM

## 2025-01-21 DIAGNOSIS — Z79.4 TYPE 2 DIABETES MELLITUS WITHOUT COMPLICATION, WITH LONG-TERM CURRENT USE OF INSULIN (H): Primary | ICD-10-CM

## 2025-01-21 DIAGNOSIS — E11.9 TYPE 2 DIABETES MELLITUS WITHOUT COMPLICATION, WITH LONG-TERM CURRENT USE OF INSULIN (H): Primary | ICD-10-CM

## 2025-01-21 ASSESSMENT — PAIN SCALES - GENERAL: PAINLEVEL_OUTOF10: NO PAIN (0)

## 2025-01-21 NOTE — PATIENT INSTRUCTIONS
Assessment & Plan     1. Type 2 diabetes mellitus without complication, with long-term current use of insulin (H) (Primary)  Continue actos and jardiance.   - semaglutide (OZEMPIC) 2 MG/3ML pen; Inject 0.25 mg subcutaneously every 7 days.  Dispense: 3 mL; Refill: 1    2. Hyperlipidemia LDL goal <100  Continue lipitor    3. Benign essential hypertension  Restart losartan     4. Mild recurrent major depression  Stable     5. Generalized anxiety disorder  As above    6. Class 2 severe obesity due to excess calories with serious comorbidity and body mass index (BMI) of 35.0 to 35.9 in adult (H)  Continue weight loss efforts.   - semaglutide (OZEMPIC) 2 MG/3ML pen; Inject 0.25 mg subcutaneously every 7 days.  Dispense: 3 mL; Refill: 1    7. On statin therapy          BMI  Estimated body mass index is 35.13 kg/m  as calculated from the following:    Height as of 2/15/24: 1.829 m (6').    Weight as of this encounter: 117.5 kg (259 lb).   Weight management plan: Discussed healthy diet and exercise guidelines      Return in about 2 months (around 3/21/2025) for diabetes, lipids, HTN.      Marion Davis,   Certified Adult Nurse Practitioner  [phone]

## 2025-02-10 ASSESSMENT — PATIENT HEALTH QUESTIONNAIRE - PHQ9
SUM OF ALL RESPONSES TO PHQ QUESTIONS 1-9: 7
10. IF YOU CHECKED OFF ANY PROBLEMS, HOW DIFFICULT HAVE THESE PROBLEMS MADE IT FOR YOU TO DO YOUR WORK, TAKE CARE OF THINGS AT HOME, OR GET ALONG WITH OTHER PEOPLE: SOMEWHAT DIFFICULT
SUM OF ALL RESPONSES TO PHQ QUESTIONS 1-9: 7

## 2025-02-11 ENCOUNTER — OFFICE VISIT (OUTPATIENT)
Dept: FAMILY MEDICINE | Facility: OTHER | Age: 55
End: 2025-02-11
Attending: NURSE PRACTITIONER
Payer: COMMERCIAL

## 2025-02-11 VITALS
WEIGHT: 265.5 LBS | TEMPERATURE: 96.9 F | DIASTOLIC BLOOD PRESSURE: 88 MMHG | BODY MASS INDEX: 36.01 KG/M2 | RESPIRATION RATE: 18 BRPM | OXYGEN SATURATION: 96 % | HEART RATE: 97 BPM | SYSTOLIC BLOOD PRESSURE: 120 MMHG

## 2025-02-11 DIAGNOSIS — J01.00 SUBACUTE MAXILLARY SINUSITIS: Primary | ICD-10-CM

## 2025-02-11 ASSESSMENT — ANXIETY QUESTIONNAIRES
2. NOT BEING ABLE TO STOP OR CONTROL WORRYING: NOT AT ALL
GAD7 TOTAL SCORE: 0
1. FEELING NERVOUS, ANXIOUS, OR ON EDGE: NOT AT ALL
3. WORRYING TOO MUCH ABOUT DIFFERENT THINGS: NOT AT ALL
6. BECOMING EASILY ANNOYED OR IRRITABLE: NOT AT ALL
8. IF YOU CHECKED OFF ANY PROBLEMS, HOW DIFFICULT HAVE THESE MADE IT FOR YOU TO DO YOUR WORK, TAKE CARE OF THINGS AT HOME, OR GET ALONG WITH OTHER PEOPLE?: NOT DIFFICULT AT ALL
5. BEING SO RESTLESS THAT IT IS HARD TO SIT STILL: NOT AT ALL
4. TROUBLE RELAXING: NOT AT ALL
GAD7 TOTAL SCORE: 0
7. FEELING AFRAID AS IF SOMETHING AWFUL MIGHT HAPPEN: NOT AT ALL
GAD7 TOTAL SCORE: 0
7. FEELING AFRAID AS IF SOMETHING AWFUL MIGHT HAPPEN: NOT AT ALL
IF YOU CHECKED OFF ANY PROBLEMS ON THIS QUESTIONNAIRE, HOW DIFFICULT HAVE THESE PROBLEMS MADE IT FOR YOU TO DO YOUR WORK, TAKE CARE OF THINGS AT HOME, OR GET ALONG WITH OTHER PEOPLE: NOT DIFFICULT AT ALL

## 2025-02-11 ASSESSMENT — PAIN SCALES - GENERAL: PAINLEVEL_OUTOF10: MODERATE PAIN (4)

## 2025-02-11 NOTE — PATIENT INSTRUCTIONS
Assessment & Plan           Subacute maxillary sinusitis  - amoxicillin-clavulanate (AUGMENTIN) 875-125 MG tablet; Take 1 tablet by mouth 2 times daily for 10 days.          Insure adequate fluid intake  Get plenty of rest  Monitor for temp at home, treat with OTC Tylenol or Ibuprofen per package instruction.  Humidity at home (add bacteriostatic solution to humidifier)  Please return in you do not improve  To UC or ER with persistent, worsening, or concerning symptoms          [facility]  [phone]

## 2025-02-11 NOTE — PROGRESS NOTES
Assessment & Plan         Subacute maxillary sinusitis  - amoxicillin-clavulanate (AUGMENTIN) 875-125 MG tablet; Take 1 tablet by mouth 2 times daily for 10 days.          Insure adequate fluid intake  Get plenty of rest  Monitor for temp at home, treat with OTC Tylenol or Ibuprofen per package instruction.  Humidity at home (add bacteriostatic solution to humidifier)  Please return in you do not improve  To UC or ER with persistent, worsening, or concerning symptoms          Dania Walter [facility]  [phone]            Bryan is a 54 year old, presenting for the following health issues:  Sinus Problem          Acute Illness  Acute illness concerns: sinus problem  Onset/Duration: 2.5 weeks  Symptoms:  Fever: YES  Chills/Sweats: YES  Headache (location?): YES  Sinus Pressure: YES  Conjunctivitis:  No  Ear Pain: YES: bilateral  Rhinorrhea: No  Congestion: YES  Sore Throat: No  Cough: YES-productive of clear sputum  Wheeze: No  Decreased Appetite: No  Nausea: No  Vomiting: No  Diarrhea: No  Dysuria/Freq.: No  Dysuria or Hematuria: No  Fatigue/Achiness: YES  Sick/Strep Exposure: YES  Therapies tried and outcome: tylenol        Sore teeth  Nasal drainage is thick and yellow        Patient Active Problem List   Diagnosis    ACP (advance care planning)    Herniated intervertebral disc of lumbar spine    Primary hypertension    Tobacco dependence due to cigarettes    Type 2 diabetes mellitus without complication, with long-term current use of insulin (H)    Morbid obesity (H)    Hyperlipidemia LDL goal <100    Status post lumbar microdiscectomy    Major depressive disorder with single episode    Fatty infiltration of liver    Sebaceous hyperplasia    Skin tag    Other seborrheic dermatitis    Intrinsic eczema    Bilateral low back pain without sciatica    Lumbar disc herniation with radiculopathy    Spinal stenosis of lumbar region, unspecified whether neurogenic claudication present    Hypercontractile esophagus    Seasonal  allergic rhinitis    Generalized anxiety disorder    Mild recurrent major depression    Diffuse spasm of esophagus    Obesity with body mass index 30 or greater    Pseudocholinesterase deficiency     Past Surgical History:   Procedure Laterality Date    BACK SURGERY      COLONOSCOPY N/A 2023    Procedure: COLONOSCOPY with polypectomy, biopsy, resolution clipping;  Surgeon: Jimbo Covarrubias MD;  Location: [facility]    ESOPHAGEAL BALLOON PROVOCATION STUDY N/A 2021    Procedure: Esophageal Balloon Provocation Study;  Surgeon: Danial Serrano DO;  Location:  GI    ESOPHAGOSCOPY, GASTROSCOPY, DUODENOSCOPY (EGD), COMBINED N/A 2021    Procedure: ESOPHAGOGASTRODUODENOSCOPY, WITH BIOPSY;  Surgeon: Danial Serrano DO;  Location:  GI       Social History     Tobacco Use    Smoking status: Former     Current packs/day: 0.00     Average packs/day: 0.8 packs/day for 38.9 years (29.2 ttl pk-yrs)     Types: Cigarettes     Start date: 1984     Quit date: 2022     Years since quittin.1     Passive exposure: Past    Smokeless tobacco: Never   Substance Use Topics    Alcohol use: Yes     No family history on file.            Current Outpatient Medications   Medication Sig Dispense Refill    albuterol (PROAIR HFA/PROVENTIL HFA/VENTOLIN HFA) 108 (90 Base) MCG/ACT inhaler Inhale 2 puffs into the lungs every 6 hours 18 g 1    aspirin 81 MG EC tablet Take 1 tablet (81 mg) by mouth daily 90 tablet 3    atorvastatin (LIPITOR) 10 MG tablet Take 1 tablet (10 mg) by mouth at bedtime 90 tablet 2    benzonatate (TESSALON) 200 MG capsule Take 1 capsule (200 mg) by mouth 3 times daily as needed for cough 30 capsule 0    blood glucose (NO BRAND SPECIFIED) lancets standard Use to test blood sugar 3 times daily or as directed. 300 each 3    blood glucose (NO BRAND SPECIFIED) test strip Use to test blood sugar 3 times daily or as directed. 300 strip 3    blood glucose monitoring (NO BRAND SPECIFIED) meter device kit Use  to test blood sugar 3 times daily or as directed. 1 kit 0    empagliflozin (JARDIANCE) 25 MG TABS tablet Take 1 tablet (25 mg) by mouth daily. 90 tablet 1    ibuprofen (ADVIL/MOTRIN) 800 MG tablet Take 1 tablet (800 mg) by mouth every 8 hours as needed for moderate pain With food 90 tablet 1    losartan (COZAAR) 25 MG tablet Take 1 tablet (25 mg) by mouth daily 90 tablet 2    pioglitazone (ACTOS) 30 MG tablet Take 1 tablet (30 mg) by mouth daily. 90 tablet 1    semaglutide (OZEMPIC) 2 MG/3ML pen Inject 0.25 mg subcutaneously every 7 days. 3 mL 1           Allergies   Allergen Reactions    Succinylcholine Muscle Pain (Myalgia)     Pt had profound muscle weakness for 4 hours following an intubating dose of succinylcholine requiring prolonged intubation and mechanical ventilation post-operatively. See post-anesthetic evaluation note from surgery 2/25/20 for details.     Prednisone      Weight gain 20+ lbs with short course reported. Use only if absolutely needed.    Metformin GI Disturbance    Ozempic (0.25 Or 0.5 Mg-Dose) [Semaglutide(0.25 Or 0.5mg-Dos)] GI Disturbance    Sertraline Headache and Nausea           Recent Labs   Lab Test 12/18/24  1004 08/01/24  1612 12/06/23  1228 11/05/21  1022 04/14/21  0900 11/11/20  1108   A1C 8.2* 7.8* 7.0*   < > 5.7* 6.8*   * 71 84   < > 91 57   HDL 50 50 56   < > 67 39*   TRIG 152* 185* 132   < > 89 144   * 176* 108*   < > 154* 92*   CR 0.81 0.87 0.78   < > 0.81 0.89   GFRESTIMATED >90 >90 >90   < > >90 >90   GFRESTBLACK  --   --   --   --  >90 >90   POTASSIUM 4.3 4.3 4.2   < > 4.0 3.9   TSH  --  2.03 1.38   < > 0.98 0.91    < > = values in this interval not displayed.            BP Readings from Last 3 Encounters:   02/11/25 120/88   01/21/25 128/88   12/18/24 119/84    Wt Readings from Last 3 Encounters:   02/11/25 120.4 kg (265 lb 8 oz)   01/21/25 117.5 kg (259 lb)   12/18/24 119.3 kg (263 lb)                       Review of Systems  Constitutional, neuro, ENT,  endocrine, pulmonary, cardiac, gastrointestinal, genitourinary, musculoskeletal, integument and psychiatric systems are negative, except as otherwise noted.          Objective    /88 (BP Location: Right arm, Patient Position: Sitting, Cuff Size: Adult Regular)   Pulse 97   Temp 96.9  F (36.1  C) (Tympanic)   Resp 18   Wt 120.4 kg (265 lb 8 oz)   SpO2 96%   BMI 36.01 kg/m    Body mass index is 36.01 kg/m .          Physical Exam   GENERAL: alert and no distress  EYES: Eyes grossly normal to inspection, PERRL and conjunctivae and sclerae normal  HENT: sinuses: maxillary, frontal tenderness on right, left, thick yellow PND  NECK: no adenopathy, no asymmetry, masses, or scars  RESP: lungs clear to auscultation - no rales, rhonchi or wheezes  CV: regular rate and rhythm, normal S1 S2, no S3 or S4, no murmur, click or rub, no peripheral edema  SKIN: no suspicious lesions or rashes            Signed Electronically by: Dania Watson CNP

## 2025-02-14 ENCOUNTER — HOSPITAL ENCOUNTER (OUTPATIENT)
Dept: CT IMAGING | Facility: HOSPITAL | Age: 55
Discharge: HOME | End: 2025-02-14
Attending: NURSE PRACTITIONER | Admitting: NURSE PRACTITIONER
Payer: COMMERCIAL

## 2025-02-14 DIAGNOSIS — F17.200 TOBACCO USE DISORDER: ICD-10-CM

## 2025-02-14 PROCEDURE — 71271 CT THORAX LUNG CANCER SCR C-: CPT

## 2025-02-18 ENCOUNTER — OFFICE VISIT (OUTPATIENT)
Dept: OTOLARYNGOLOGY | Facility: OTHER | Age: 55
End: 2025-02-18
Attending: NURSE PRACTITIONER
Payer: COMMERCIAL

## 2025-02-18 VITALS
BODY MASS INDEX: 35.21 KG/M2 | SYSTOLIC BLOOD PRESSURE: 102 MMHG | TEMPERATURE: 97.6 F | DIASTOLIC BLOOD PRESSURE: 70 MMHG | WEIGHT: 260 LBS | HEIGHT: 72 IN | HEART RATE: 108 BPM | OXYGEN SATURATION: 98 %

## 2025-02-18 DIAGNOSIS — J01.90 SUBACUTE SINUSITIS, UNSPECIFIED LOCATION: ICD-10-CM

## 2025-02-18 DIAGNOSIS — J32.9 CHRONIC RECURRENT SINUSITIS: Primary | ICD-10-CM

## 2025-02-18 DIAGNOSIS — J30.2 SEASONAL ALLERGIC RHINITIS, UNSPECIFIED TRIGGER: ICD-10-CM

## 2025-02-18 DIAGNOSIS — J39.2 MASS OF NASOPHARYNX: ICD-10-CM

## 2025-02-18 DIAGNOSIS — J01.91 ACUTE RECURRENT SINUSITIS, UNSPECIFIED LOCATION: ICD-10-CM

## 2025-02-18 PROCEDURE — 99214 OFFICE O/P EST MOD 30 MIN: CPT | Mod: 25 | Performed by: NURSE PRACTITIONER

## 2025-02-18 PROCEDURE — 36415 COLL VENOUS BLD VENIPUNCTURE: CPT | Performed by: NURSE PRACTITIONER

## 2025-02-18 PROCEDURE — 82785 ASSAY OF IGE: CPT | Mod: 90 | Performed by: NURSE PRACTITIONER

## 2025-02-18 PROCEDURE — 86003 ALLG SPEC IGE CRUDE XTRC EA: CPT | Mod: 90 | Performed by: NURSE PRACTITIONER

## 2025-02-18 PROCEDURE — 31231 NASAL ENDOSCOPY DX: CPT | Performed by: NURSE PRACTITIONER

## 2025-02-18 RX ORDER — AZELASTINE 1 MG/ML
2 SPRAY, METERED NASAL 2 TIMES DAILY
Qty: 30 ML | Refills: 12 | Status: SHIPPED | OUTPATIENT
Start: 2025-02-18

## 2025-02-18 ASSESSMENT — PAIN SCALES - GENERAL: PAINLEVEL_OUTOF10: MILD PAIN (1)

## 2025-02-18 NOTE — LETTER
6 hours 18 g 1     aspirin 81 MG EC tablet Take 1 tablet (81 mg) by mouth daily 90 tablet 3     atorvastatin (LIPITOR) 10 MG tablet Take 1 tablet (10 mg) by mouth at bedtime 90 tablet 2     azelastine (ASTELIN) 0.1 % nasal spray Spray 2 sprays into both nostrils 2 times daily. 30 mL 12     benzonatate (TESSALON) 200 MG capsule Take 1 capsule (200 mg) by mouth 3 times daily as needed for cough 30 capsule 0     blood glucose (NO BRAND SPECIFIED) lancets standard Use to test blood sugar 3 times daily or as directed. 300 each 3     blood glucose (NO BRAND SPECIFIED) test strip Use to test blood sugar 3 times daily or as directed. 300 strip 3     blood glucose monitoring (NO BRAND SPECIFIED) meter device kit Use to test blood sugar 3 times daily or as directed. 1 kit 0     empagliflozin (JARDIANCE) 25 MG TABS tablet Take 1 tablet (25 mg) by mouth daily. 90 tablet 1     ibuprofen (ADVIL/MOTRIN) 800 MG tablet Take 1 tablet (800 mg) by mouth every 8 hours as needed for moderate pain With food 90 tablet 1     losartan (COZAAR) 25 MG tablet Take 1 tablet (25 mg) by mouth daily 90 tablet 2     pioglitazone (ACTOS) 30 MG tablet Take 1 tablet (30 mg) by mouth daily. 90 tablet 1     semaglutide (OZEMPIC) 2 MG/3ML pen Inject 0.25 mg subcutaneously every 7 days. 3 mL 1            Allergies:     Allergies: Succinylcholine, Prednisone, Metformin, Ozempic (0.25 or 0.5 mg-dose) [semaglutide(0.25 or 0.5mg-dos)], and Sertraline          Past Medical History:     Past Medical History:   Diagnosis Date     Anxiety 08/15/2017     Benign essential hypertension 01/20/2017     Diabetes (H)      Fatty infiltration of liver 11/21/2018     Herniated intervertebral disc of lumbar spine 01/20/2017     Pseudocholinesterase deficiency      Tobacco dependence syndrome 01/20/2017            Past Surgical History:     Past Surgical History:   Procedure Laterality Date     BACK SURGERY       COLONOSCOPY N/A 2/23/2023    Procedure: COLONOSCOPY with  polypectomy, biopsy, resolution clipping;  Surgeon: Jimbo Covarrubias MD;  Location: [facility]     ESOPHAGEAL BALLOON PROVOCATION STUDY N/A 2021    Procedure: Esophageal Balloon Provocation Study;  Surgeon: Danial Serrano DO;  Location:  GI     ESOPHAGOSCOPY, GASTROSCOPY, DUODENOSCOPY (EGD), COMBINED N/A 2021    Procedure: ESOPHAGOGASTRODUODENOSCOPY, WITH BIOPSY;  Surgeon: Danial Serrano DO;  Location:  GI       ENT family history reviewed         Social History:     Social History     Tobacco Use     Smoking status: Former     Current packs/day: 0.00     Average packs/day: 0.8 packs/day for 38.9 years (29.2 ttl pk-yrs)     Types: Cigarettes     Start date: 1984     Quit date: 2022     Years since quittin.2     Passive exposure: Past     Smokeless tobacco: Never   Vaping Use     Vaping status: Never Used   Substance Use Topics     Alcohol use: Yes     Drug use: No            Review of Systems:     ROS: See HPI         Physical Exam:     /70 (BP Location: Left arm, Patient Position: Sitting, Cuff Size: Adult Large)   Pulse 108   Temp 97.6  F (36.4  C) (Tympanic)   Ht 1.829 m (6')   Wt 117.9 kg (260 lb)   SpO2 98%   BMI 35.26 kg/m      General - The patient is well nourished and well developed, and appears to have good nutritional status.  Alert and oriented to person and place, answers questions and cooperates with examination appropriately.   Head and Face - Normocephalic and atraumatic, with no gross asymmetry noted.  The facial nerve is intact, with strong symmetric movements.  Voice and Breathing - The patient was breathing comfortably without the use of accessory muscles. There was no wheezing, stridor. The patients voice was clear and strong, and had appropriate pitch and quality.  Ears - External ear normal. Canals are patent. Right tympanic membrane is intact without effusion, retraction or mass. Left tympanic membrane is intact without effusion, retraction or  mass.  Eyes - Extraocular movements intact, sclera were not icteric or injected.  Mouth - Examination of the oral cavity showed pink, healthy oral mucosa. Dentition in good condition. No lesions or ulcerations noted. The tongue was mobile and midline.  No current lesions noted on the soft or hard palate.  No current lesion noted on the uvula.  Palpation of the floor of the mouth and base of the tongue is soft and symmetric without nelson masses.   Throat - The walls of the oropharynx were smooth, pink, moist, symmetric, and had no lesions or ulcerations.  The tonsillar pillars and soft palate were symmetric. The uvula was midline on elevation.    Neck - Normal midline excursion of the laryngotracheal complex during swallowing.  Full range of motion on passive movement.  Palpation of the occipital, submental, submandibular, internal jugular chain, and supraclavicular nodes did not demonstrate any abnormal lymph nodes or masses.  Palpation of the thyroid was soft and smooth, with no nodules or goiter appreciated.  The trachea was mobile and midline.  Nose - External contour is symmetric, no gross deflection or scars.  Nasal mucosa is pink and moist with no abnormal mucus.  The septum and turbinates were evaluated with nasal speculum, no polyps, masses, or purulence noted on examination.    To evaluate the nose and sinuses, I performed rigid nasal endoscopy.  I sprayed both nares with 2 sprays lidocaine and neosynephrine.     I began with the RIGHT side using a 0 degree rigid nasal endoscope, and then similarly examined the LEFT side     Findings: caudal septal spur left, posterior septal spur right, DNS left caudally  Inferior turbinates:  bilateral hypertrophy  Middle turbinate and middle meatus: left unable viz due to DNS, right normal  The superior meatus is examined and unremarkable  SER patent bilaterally  Nasopharynx with beefy edematous, irrtated tissue and ETS are edematous, see pics  The patient tolerated the  procedure well       Right choanae                                                 Left choanae         Assessment and Plan:       ICD-10-CM    1. Chronic recurrent sinusitis  J32.9       2. Mass of nasopharynx  J39.2 CT Soft Tissue Neck w Contrast      3. Seasonal allergic rhinitis, unspecified trigger  J30.2 Inhalent Panel [place] ([employer])     Inhalent Panel [place] ([employer])        No current oral lesions noted at this time  Flonase nasal spray take as prescribed   Astelin nasal spray take as prescribed     Ordered CT sinus scan and CT Soft Tissue Neck someone will call you to schedule    Ordered Inhalent Allergy panel you can go to lab and have this drawn today.  Nurse will call you with the results and recommendations     Vanessa LONDON  [facility]      Follow up with Dr. Hand     Again, thank you for allowing me to participate in the care of your patient.        Sincerely,        Vanessa Terrell NP    Electronically signed

## 2025-02-18 NOTE — Clinical Note
2/18/2025      Rojelio Juárez  [address] [place] [id number]      Dear Colleague,    Thank you for referring your patient, Rojelio Juárez, to the [facility]. Please see a copy of my visit note below.    No notes on file    Again, thank you for allowing me to participate in the care of your patient.        Sincerely,        Vanessa Terrell NP    Electronically signed

## 2025-02-18 NOTE — PROGRESS NOTES
Otolaryngology Note         Chief Complaint:     No chief complaint on file.           History of Present Illness:     Rojelio Juárez is a 54 year old male seen today for - oral lesion on the roof of the mouth and uvula.    Routine appt for cleaning. See every 6 month    Location - roof of mouth and uvula    Color and quality - tenderness  Duration - 2-3 weeks  Change - he did not feel any issues when the dentist noticed anythign, has jsut noted pain with last sinus infection  Associated symptoms ***  Medication changes ***  Treatments tried - nothing  Tobacco - current smoker 1/4 ppd.  Smoked for 30+ years, has quit in the past, currently cut down   Alcohol consumption - a few times per week - a couple of drinks  History of autoimmune - no  History of malignancy - no  Immunosuppression - no  Diabetes - yes      History of sinus infections - 3 x this past year - symptoms - pressure in the cheeks, temples, behind the ears, nasal dorsum, headache.  Pressure in ears.    No history of nasal injury  Questionable clenching, snoring    Feels like he has water in both ears  Constant tinnitus  Right sided nasal blockage  No previous ENT visits  He uses nasal sprays - fluticasone  Will use izzy med sinus rinses due to hayfever  No previous allergy testing  + eczema - worse in winter  Takes daily AH    Interested in allergy testing  History of TMJ - is able to dislocate jaw   Questions clenching  Lots of stress           Medications:     Current Outpatient Rx   Medication Sig Dispense Refill    albuterol (PROAIR HFA/PROVENTIL HFA/VENTOLIN HFA) 108 (90 Base) MCG/ACT inhaler Inhale 2 puffs into the lungs every 6 hours 18 g 1    amoxicillin-clavulanate (AUGMENTIN) 875-125 MG tablet Take 1 tablet by mouth 2 times daily for 10 days. 20 tablet 0    aspirin 81 MG EC tablet Take 1 tablet (81 mg) by mouth daily 90 tablet 3    atorvastatin (LIPITOR) 10 MG tablet Take 1 tablet (10 mg) by mouth at bedtime 90 tablet 2    benzonatate  (TESSALON) 200 MG capsule Take 1 capsule (200 mg) by mouth 3 times daily as needed for cough 30 capsule 0    blood glucose (NO BRAND SPECIFIED) lancets standard Use to test blood sugar 3 times daily or as directed. 300 each 3    blood glucose (NO BRAND SPECIFIED) test strip Use to test blood sugar 3 times daily or as directed. 300 strip 3    blood glucose monitoring (NO BRAND SPECIFIED) meter device kit Use to test blood sugar 3 times daily or as directed. 1 kit 0    empagliflozin (JARDIANCE) 25 MG TABS tablet Take 1 tablet (25 mg) by mouth daily. 90 tablet 1    ibuprofen (ADVIL/MOTRIN) 800 MG tablet Take 1 tablet (800 mg) by mouth every 8 hours as needed for moderate pain With food 90 tablet 1    losartan (COZAAR) 25 MG tablet Take 1 tablet (25 mg) by mouth daily 90 tablet 2    pioglitazone (ACTOS) 30 MG tablet Take 1 tablet (30 mg) by mouth daily. 90 tablet 1    semaglutide (OZEMPIC) 2 MG/3ML pen Inject 0.25 mg subcutaneously every 7 days. 3 mL 1            Allergies:     Allergies: Succinylcholine, Prednisone, Metformin, Ozempic (0.25 or 0.5 mg-dose) [semaglutide(0.25 or 0.5mg-dos)], and Sertraline          Past Medical History:     Past Medical History:   Diagnosis Date    Anxiety 08/15/2017    Benign essential hypertension 01/20/2017    Diabetes (H)     Fatty infiltration of liver 11/21/2018    Herniated intervertebral disc of lumbar spine 01/20/2017    Pseudocholinesterase deficiency     Tobacco dependence syndrome 01/20/2017            Past Surgical History:     Past Surgical History:   Procedure Laterality Date    BACK SURGERY      COLONOSCOPY N/A 2/23/2023    Procedure: COLONOSCOPY with polypectomy, biopsy, resolution clipping;  Surgeon: Jimbo Covarrubias MD;  Location: [facility]    ESOPHAGEAL BALLOON PROVOCATION STUDY N/A 11/9/2021    Procedure: Esophageal Balloon Provocation Study;  Surgeon: Danial Serrano DO;  Location:  GI    ESOPHAGOSCOPY, GASTROSCOPY, DUODENOSCOPY (EGD), COMBINED N/A 11/9/2021     Procedure: ESOPHAGOGASTRODUODENOSCOPY, WITH BIOPSY;  Surgeon: Danial Serrano DO;  Location:  GI       ENT family history reviewed         Social History:     Social History     Tobacco Use    Smoking status: Former     Current packs/day: 0.00     Average packs/day: 0.8 packs/day for 38.9 years (29.2 ttl pk-yrs)     Types: Cigarettes     Start date: 1984     Quit date: 2022     Years since quittin.1     Passive exposure: Past    Smokeless tobacco: Never   Vaping Use    Vaping status: Never Used   Substance Use Topics    Alcohol use: Yes    Drug use: No            Review of Systems:     ROS: See HPI         Physical Exam:     /70 (BP Location: Left arm, Patient Position: Sitting, Cuff Size: Adult Large)   Pulse 108   Temp 97.6  F (36.4  C) (Tympanic)   Ht 1.829 m (6')   Wt 117.9 kg (260 lb)   SpO2 98%   BMI 35.26 kg/m      General - The patient is well nourished and well developed, and appears to have good nutritional status.  Alert and oriented to person and place, answers questions and cooperates with examination appropriately.   Head and Face - Normocephalic and atraumatic, with no gross asymmetry noted.  The facial nerve is intact, with strong symmetric movements.  Voice and Breathing - The patient was breathing comfortably without the use of accessory muscles. There was no wheezing, stridor. The patients voice was clear and strong, and had appropriate pitch and quality.  Ears - External ear normal. Canals are patent. Right tympanic membrane is intact without effusion, retraction or mass. Left tympanic membrane is intact without effusion, retraction or mass.  Eyes - Extraocular movements intact, sclera were not icteric or injected.  Mouth - Examination of the oral cavity showed pink, healthy oral mucosa. Dentition in good condition. No lesions or ulcerations noted. The tongue was mobile and midline.   Throat - The walls of the oropharynx were smooth, pink, moist, symmetric, and had no  lesions or ulcerations.  The tonsillar pillars and soft palate were symmetric. The uvula was midline on elevation.    Neck - Normal midline excursion of the laryngotracheal complex during swallowing.  Full range of motion on passive movement.  Palpation of the occipital, submental, submandibular, internal jugular chain, and supraclavicular nodes did not demonstrate any abnormal lymph nodes or masses.  Palpation of the thyroid was soft and smooth, with no nodules or goiter appreciated.  The trachea was mobile and midline.  Nose - External contour is symmetric, no gross deflection or scars.  Nasal mucosa is pink and moist with no abnormal mucus.  The septum and turbinates were evaluated: ***.  No polyps, masses, or purulence noted on examination.    To evaluate the nose and sinuses, I performed rigid nasal endoscopy.  I sprayed both nares with 2 sprays lidocaine and neosynephrine.     I began with the RIGHT side using a 0 degree rigid nasal endoscope, and then similarly examined the LEFT side     Findings: bilateral IT hypertrophy, caudal septal spur left, posterior septal spur right, DNS left caudally  Inferior turbinates:    Middle turbinate and middle meatus: left unable viz due to DNS, right normal  The superior meatus is examined and unremarkable  SER patent bilaterally  Nasopharynx with beefy edematous, irrtated tissue and ETS are edematous, see pics  Mucosa is healthy throughout,  no polyps nor polypoid degeneration  Nasopharynx clear, ET patent, no edema  The patient tolerated the procedure well              Assessment and Plan:       ICD-10-CM    1. Subacute sinusitis, unspecified location  J01.90 lidocaine 2%-oxymetazoline 0.025% nasal solution 2 spray           Seasonal allergic rhinitis, unspecified trigger  J30.2 Inhalent Panel [place] ([employer])     Inhalent Panel [place] ([employer])        No current oral lesions noted at this time  Flonase nasal spray take as prescribed   Astelin nasal spray take as prescribed     Ordered CT sinus scan and CT Soft Tissue Neck someone will call you to schedule    Ordered Inhalent Allergy panel you can go to lab and have this drawn today.  Nurse will call you with the results and recommendations     Vanessa LONDON  [facility] ENT      Follow up with Dr. Hand

## 2025-02-18 NOTE — PATIENT INSTRUCTIONS
Thank you for allowing Vanessa Nidhi and our ENT team to participate in your care.  If your medications are too expensive, please give the nurse a call.  We can possibly change this medication.  If you have a scheduling or an appointment question please contact our Health Unit Coordinator at their direct line [phone].   ALL nursing questions or concerns can be directed to your ENT nurse at: [phone] - [facility]     Flonase nasal spray take as prescribed   Astelin nasal spray take as prescribed     Ordered CT sinus scan and CT Soft Tissue Neck someone will call you to schedule    Ordered Inhalent Allergy panel you can go to lab and have this drawn today.  Nurse will call you with the results and recommendations     Follow up with Dr. Hand

## 2025-02-25 ENCOUNTER — HOSPITAL ENCOUNTER (OUTPATIENT)
Dept: CT IMAGING | Facility: HOSPITAL | Age: 55
Discharge: HOME | End: 2025-02-25
Attending: NURSE PRACTITIONER
Payer: COMMERCIAL

## 2025-02-25 DIAGNOSIS — J39.2 MASS OF NASOPHARYNX: ICD-10-CM

## 2025-02-25 PROCEDURE — 250N000011 HC RX IP 250 OP 636: Performed by: RADIOLOGY

## 2025-02-25 PROCEDURE — 70486 CT MAXILLOFACIAL W/O DYE: CPT

## 2025-02-25 PROCEDURE — 70491 CT SOFT TISSUE NECK W/DYE: CPT

## 2025-02-25 RX ORDER — IOPAMIDOL 755 MG/ML
75 INJECTION, SOLUTION INTRAVASCULAR ONCE
Status: COMPLETED | OUTPATIENT
Start: 2025-02-25 | End: 2025-02-25

## 2025-02-25 RX ADMIN — IOPAMIDOL 75 ML: 755 INJECTION, SOLUTION INTRAVENOUS at 15:44

## 2025-02-26 NOTE — RESULT ENCOUNTER NOTE
Moderate sinus disease.  Recommend starting budesonide rinses and schedule surgical consult with Dr Hand.  JS

## 2025-02-26 NOTE — RESULT ENCOUNTER NOTE
No concerning findings on neck CT.  Nasopharyngeal edema and erythema are likely due to chronic sinusitis.  Follow up with Dr Hand for surgical consult.  She may want to biopsy the NP.  DANIAL

## 2025-02-28 DIAGNOSIS — J32.9 CHRONIC RECURRENT SINUSITIS: ICD-10-CM

## 2025-03-04 RX ORDER — BUDESONIDE 0.5 MG/2ML
INHALANT ORAL
Qty: 360 ML | OUTPATIENT
Start: 2025-03-04

## 2025-03-05 ENCOUNTER — TRANSFERRED RECORDS (OUTPATIENT)
Dept: HEALTH INFORMATION MANAGEMENT | Facility: CLINIC | Age: 55
End: 2025-03-05

## 2025-03-08 ENCOUNTER — HEALTH MAINTENANCE LETTER (OUTPATIENT)
Age: 55
End: 2025-03-08

## 2025-03-08 ENCOUNTER — MYC MEDICAL ADVICE (OUTPATIENT)
Dept: FAMILY MEDICINE | Facility: OTHER | Age: 55
End: 2025-03-08

## 2025-03-08 DIAGNOSIS — J39.2 LESION OF THROAT: Primary | ICD-10-CM

## 2025-03-11 ENCOUNTER — TRANSFERRED RECORDS (OUTPATIENT)
Dept: MULTI SPECIALTY CLINIC | Facility: CLINIC | Age: 55
End: 2025-03-11

## 2025-03-11 LAB
RETINOPATHY: NORMAL
SCANNED LAB RESULT: NORMAL

## 2025-03-19 NOTE — PROGRESS NOTES
Assessment & Plan     1. Type 2 diabetes mellitus without complication, with long-term current use of insulin (H) (Primary)  Increase ozempic, continue actos and jardiance.   - Hemoglobin A1c; Future  - semaglutide (OZEMPIC) 2 MG/3ML pen; Inject 0.5 mg subcutaneously every 7 days.  Dispense: 3 mL; Refill: 1    2. Hyperlipidemia LDL goal <100  Continue lipitor  - Lipid Profile; Future    3. Benign essential hypertension  Well controlled   - Comprehensive metabolic panel; Future  - TSH with free T4 reflex; Future    4. Mild recurrent major depression  Resolved     5. Generalized anxiety disorder  Resolved     6. Class 2 severe obesity due to excess calories with serious comorbidity and body mass index (BMI) of 35.0 to 35.9 in adult (H)  - semaglutide (OZEMPIC) 2 MG/3ML pen; Inject 0.5 mg subcutaneously every 7 days.  Dispense: 3 mL; Refill: 1    7. Tobacco use disorder  Cessation encouraged.     8. On statin therapy  - Comprehensive metabolic panel; Future      Return in about 3 months (around 6/24/2025) for diabetes, lipids, HTN.    The longitudinal plan of care for the diagnosis(es)/condition(s) as documented were addressed during this visit. Due to the added complexity in care, I will continue to support Bryan in the subsequent management and with ongoing continuity of care.    Marion Davis,   Certified Adult Nurse Practitioner  [phone]      Subjective   Bryan is a 54 year old, presenting for the following health issues:  Diabetes, Lipids, and Hypertension    HPI      Diabetes Follow-up    How often are you checking your blood sugar? Not at all  What concerns do you have today about your diabetes? None   Do you have any of these symptoms? (Select all that apply)  Burning in feet  Have you had a diabetic eye exam in the last 12 months? Yes- Date of last eye exam: 3/11/25,  Location: [facility]        Hyperlipidemia Follow-Up    Are you regularly taking any medication or supplement to lower your cholesterol?    Yes- Lipitor 10 mg  Are you having muscle aches or other side effects that you think could be caused by your cholesterol lowering medication?  No    Hypertension Follow-up    Do you check your blood pressure regularly outside of the clinic? No   Are you following a low salt diet? Yes  Are your blood pressures ever more than 140 on the top number (systolic) OR more   than 90 on the bottom number (diastolic), for example 140/90? No does not check outside of clinic     BP Readings from Last 3 Encounters:   03/24/25 121/87   02/18/25 102/70   02/11/25 120/88    Wt Readings from Last 3 Encounters:   03/24/25 120.7 kg (266 lb)   02/18/25 117.9 kg (260 lb)   02/11/25 120.4 kg (265 lb 8 oz)               Hemoglobin A1C (%)   Date Value   12/18/2024 8.2 (H)   08/01/2024 7.8 (H)   04/14/2021 5.7 (H)   11/11/2020 6.8 (H)     LDL Cholesterol Calculated (mg/dL)   Date Value   12/18/2024 106 (H)   08/01/2024 71   04/14/2021 91   11/11/2020 57       He is feeling well, no new concerns.  His back pain is very well controlled.       Review of Systems  Constitutional, neuro, ENT, endocrine, pulmonary, cardiac, gastrointestinal, genitourinary, musculoskeletal, integument and psychiatric systems are negative, except as otherwise noted.      Objective    /87 (BP Location: Right arm, Patient Position: Sitting, Cuff Size: Adult Large)   Pulse 100   Temp 98.4  F (36.9  C) (Tympanic)   Resp 18   Ht 1.829 m (6')   Wt 120.7 kg (266 lb)   SpO2 100%   BMI 36.08 kg/m    Body mass index is 36.08 kg/m .  Physical Exam   GENERAL: alert and no distress  NECK: no adenopathy, no asymmetry, masses, or scars, thyroid normal to palpation, and no carotid bruits  RESP: lungs clear to auscultation - no rales, rhonchi or wheezes  CV: regular rate and rhythm, normal S1 S2, no S3 or S4, no murmur  MS: no gross musculoskeletal defects noted, no edema  PSYCH: mentation appears normal, affect normal/bright            Signed Electronically by: Marion  IGOR Davis, NP

## 2025-03-23 ASSESSMENT — PATIENT HEALTH QUESTIONNAIRE - PHQ9
SUM OF ALL RESPONSES TO PHQ QUESTIONS 1-9: 0
SUM OF ALL RESPONSES TO PHQ QUESTIONS 1-9: 0
10. IF YOU CHECKED OFF ANY PROBLEMS, HOW DIFFICULT HAVE THESE PROBLEMS MADE IT FOR YOU TO DO YOUR WORK, TAKE CARE OF THINGS AT HOME, OR GET ALONG WITH OTHER PEOPLE: SOMEWHAT DIFFICULT

## 2025-03-24 ENCOUNTER — OFFICE VISIT (OUTPATIENT)
Dept: FAMILY MEDICINE | Facility: OTHER | Age: 55
End: 2025-03-24
Attending: NURSE PRACTITIONER
Payer: COMMERCIAL

## 2025-03-24 VITALS
RESPIRATION RATE: 18 BRPM | DIASTOLIC BLOOD PRESSURE: 87 MMHG | WEIGHT: 266 LBS | HEART RATE: 100 BPM | SYSTOLIC BLOOD PRESSURE: 121 MMHG | BODY MASS INDEX: 36.03 KG/M2 | OXYGEN SATURATION: 100 % | HEIGHT: 72 IN | TEMPERATURE: 98.4 F

## 2025-03-24 DIAGNOSIS — F41.1 GENERALIZED ANXIETY DISORDER: ICD-10-CM

## 2025-03-24 DIAGNOSIS — E11.9 TYPE 2 DIABETES MELLITUS WITHOUT COMPLICATION, WITH LONG-TERM CURRENT USE OF INSULIN (H): Primary | ICD-10-CM

## 2025-03-24 DIAGNOSIS — F33.0 MILD RECURRENT MAJOR DEPRESSION: ICD-10-CM

## 2025-03-24 DIAGNOSIS — E78.5 HYPERLIPIDEMIA LDL GOAL <100: ICD-10-CM

## 2025-03-24 DIAGNOSIS — F17.200 TOBACCO USE DISORDER: ICD-10-CM

## 2025-03-24 DIAGNOSIS — Z79.4 TYPE 2 DIABETES MELLITUS WITHOUT COMPLICATION, WITH LONG-TERM CURRENT USE OF INSULIN (H): Primary | ICD-10-CM

## 2025-03-24 DIAGNOSIS — I10 BENIGN ESSENTIAL HYPERTENSION: ICD-10-CM

## 2025-03-24 DIAGNOSIS — E66.01 CLASS 2 SEVERE OBESITY DUE TO EXCESS CALORIES WITH SERIOUS COMORBIDITY AND BODY MASS INDEX (BMI) OF 35.0 TO 35.9 IN ADULT (H): ICD-10-CM

## 2025-03-24 DIAGNOSIS — E66.812 CLASS 2 SEVERE OBESITY DUE TO EXCESS CALORIES WITH SERIOUS COMORBIDITY AND BODY MASS INDEX (BMI) OF 35.0 TO 35.9 IN ADULT (H): ICD-10-CM

## 2025-03-24 DIAGNOSIS — Z79.899 ON STATIN THERAPY: ICD-10-CM

## 2025-03-24 LAB
ALBUMIN SERPL BCG-MCNC: 4.7 G/DL (ref 3.5–5.2)
ALP SERPL-CCNC: 89 U/L (ref 40–150)
ALT SERPL W P-5'-P-CCNC: 106 U/L (ref 0–70)
ANION GAP SERPL CALCULATED.3IONS-SCNC: 12 MMOL/L (ref 7–15)
AST SERPL W P-5'-P-CCNC: 71 U/L (ref 0–45)
BILIRUB SERPL-MCNC: 0.7 MG/DL
BUN SERPL-MCNC: 8.4 MG/DL (ref 6–20)
CALCIUM SERPL-MCNC: 9.7 MG/DL (ref 8.8–10.4)
CHLORIDE SERPL-SCNC: 104 MMOL/L (ref 98–107)
CHOLEST SERPL-MCNC: 176 MG/DL
CREAT SERPL-MCNC: 0.82 MG/DL (ref 0.67–1.17)
EGFRCR SERPLBLD CKD-EPI 2021: >90 ML/MIN/1.73M2
EST. AVERAGE GLUCOSE BLD GHB EST-MCNC: 180 MG/DL
FASTING STATUS PATIENT QL REPORTED: NO
FASTING STATUS PATIENT QL REPORTED: NO
GLUCOSE SERPL-MCNC: 195 MG/DL (ref 70–99)
HBA1C MFR BLD: 7.9 %
HCO3 SERPL-SCNC: 25 MMOL/L (ref 22–29)
HDLC SERPL-MCNC: 57 MG/DL
LDLC SERPL CALC-MCNC: 82 MG/DL
NONHDLC SERPL-MCNC: 119 MG/DL
POTASSIUM SERPL-SCNC: 4.4 MMOL/L (ref 3.4–5.3)
PROT SERPL-MCNC: 7.7 G/DL (ref 6.4–8.3)
SODIUM SERPL-SCNC: 141 MMOL/L (ref 135–145)
TRIGL SERPL-MCNC: 184 MG/DL
TSH SERPL DL<=0.005 MIU/L-ACNC: 2.05 UIU/ML (ref 0.3–4.2)

## 2025-03-24 PROCEDURE — 99214 OFFICE O/P EST MOD 30 MIN: CPT | Performed by: NURSE PRACTITIONER

## 2025-03-24 PROCEDURE — 36415 COLL VENOUS BLD VENIPUNCTURE: CPT | Performed by: NURSE PRACTITIONER

## 2025-03-24 PROCEDURE — G2211 COMPLEX E/M VISIT ADD ON: HCPCS | Performed by: NURSE PRACTITIONER

## 2025-03-24 PROCEDURE — 3079F DIAST BP 80-89 MM HG: CPT | Performed by: NURSE PRACTITIONER

## 2025-03-24 PROCEDURE — 80053 COMPREHEN METABOLIC PANEL: CPT | Performed by: NURSE PRACTITIONER

## 2025-03-24 PROCEDURE — 3074F SYST BP LT 130 MM HG: CPT | Performed by: NURSE PRACTITIONER

## 2025-03-24 PROCEDURE — 80061 LIPID PANEL: CPT | Performed by: NURSE PRACTITIONER

## 2025-03-24 PROCEDURE — 83036 HEMOGLOBIN GLYCOSYLATED A1C: CPT | Performed by: NURSE PRACTITIONER

## 2025-03-24 PROCEDURE — 84443 ASSAY THYROID STIM HORMONE: CPT | Performed by: NURSE PRACTITIONER

## 2025-03-24 PROCEDURE — 1126F AMNT PAIN NOTED NONE PRSNT: CPT | Performed by: NURSE PRACTITIONER

## 2025-03-24 ASSESSMENT — ANXIETY QUESTIONNAIRES
GAD7 TOTAL SCORE: 0
6. BECOMING EASILY ANNOYED OR IRRITABLE: NOT AT ALL
5. BEING SO RESTLESS THAT IT IS HARD TO SIT STILL: NOT AT ALL
8. IF YOU CHECKED OFF ANY PROBLEMS, HOW DIFFICULT HAVE THESE MADE IT FOR YOU TO DO YOUR WORK, TAKE CARE OF THINGS AT HOME, OR GET ALONG WITH OTHER PEOPLE?: NOT DIFFICULT AT ALL
3. WORRYING TOO MUCH ABOUT DIFFERENT THINGS: NOT AT ALL
7. FEELING AFRAID AS IF SOMETHING AWFUL MIGHT HAPPEN: NOT AT ALL
GAD7 TOTAL SCORE: 0
2. NOT BEING ABLE TO STOP OR CONTROL WORRYING: NOT AT ALL
7. FEELING AFRAID AS IF SOMETHING AWFUL MIGHT HAPPEN: NOT AT ALL
1. FEELING NERVOUS, ANXIOUS, OR ON EDGE: NOT AT ALL
IF YOU CHECKED OFF ANY PROBLEMS ON THIS QUESTIONNAIRE, HOW DIFFICULT HAVE THESE PROBLEMS MADE IT FOR YOU TO DO YOUR WORK, TAKE CARE OF THINGS AT HOME, OR GET ALONG WITH OTHER PEOPLE: NOT DIFFICULT AT ALL
4. TROUBLE RELAXING: NOT AT ALL
GAD7 TOTAL SCORE: 0

## 2025-03-24 ASSESSMENT — PAIN SCALES - GENERAL: PAINLEVEL_OUTOF10: NO PAIN (0)

## 2025-03-24 NOTE — PATIENT INSTRUCTIONS
Assessment & Plan     1. Type 2 diabetes mellitus without complication, with long-term current use of insulin (H) (Primary)  Increase ozempic, continue actos and jardiance.   - Hemoglobin A1c; Future  - semaglutide (OZEMPIC) 2 MG/3ML pen; Inject 0.5 mg subcutaneously every 7 days.  Dispense: 3 mL; Refill: 1    2. Hyperlipidemia LDL goal <100  Continue lipitor  - Lipid Profile; Future    3. Benign essential hypertension  Well controlled   - Comprehensive metabolic panel; Future  - TSH with free T4 reflex; Future    4. Mild recurrent major depression  Resolved     5. Generalized anxiety disorder  Resolved     6. Class 2 severe obesity due to excess calories with serious comorbidity and body mass index (BMI) of 35.0 to 35.9 in adult (H)  - semaglutide (OZEMPIC) 2 MG/3ML pen; Inject 0.5 mg subcutaneously every 7 days.  Dispense: 3 mL; Refill: 1    7. Tobacco use disorder  Cessation encouraged.     8. On statin therapy  - Comprehensive metabolic panel; Future      Return in about 3 months (around 6/24/2025) for diabetes, lipids, HTN.    Marion Davis,   Certified Adult Nurse Practitioner  [phone]

## 2025-03-31 ENCOUNTER — MYC REFILL (OUTPATIENT)
Dept: FAMILY MEDICINE | Facility: OTHER | Age: 55
End: 2025-03-31

## 2025-03-31 DIAGNOSIS — R06.2 WHEEZING: ICD-10-CM

## 2025-03-31 DIAGNOSIS — E78.5 HYPERLIPIDEMIA LDL GOAL <100: ICD-10-CM

## 2025-03-31 RX ORDER — ALBUTEROL SULFATE 90 UG/1
2 INHALANT RESPIRATORY (INHALATION) EVERY 6 HOURS
Qty: 18 G | Refills: 3 | Status: SHIPPED | OUTPATIENT
Start: 2025-03-31

## 2025-03-31 RX ORDER — ATORVASTATIN CALCIUM 10 MG/1
10 TABLET, FILM COATED ORAL AT BEDTIME
Qty: 90 TABLET | Refills: 3 | Status: SHIPPED | OUTPATIENT
Start: 2025-03-31

## 2025-03-31 NOTE — TELEPHONE ENCOUNTER
Lipitor  Last Written Prescription Date: 6/26/24  Last Fill Quantity: 90 # of Refills: 2  Last Office Visit: 3/24/25    Albuterol  Last Written Prescription Date: 6/26/24  Last Fill Quantity: 18 g # of Refills: 1  Last Office Visit: 3/24/25

## 2025-04-18 NOTE — PROGRESS NOTES
Otolaryngology Progress Note      Rojelio Juárez is a 54 year old male  Follow-up of chronic sinusitis    He has had 8 years of recurrent sinusitis  He had recurrent sinusitis over the winter with multiple abx, frequent ear popping.  He notes right worse than left nasal obstruction    Allergies seem worse in spring, chronic itchy eyes, albuterol prn spring    Inhalant serum specific IgE panel negative 2/18/25, however he notes congestion with dust, cats, lilacs    Endoscopy showed erythematous inflamed adenoids versus a nasopharyngeal mass photos reviewed.    CT neck dated 2/25/2025 was negative for lymphadenopathy CT sinuses 2/25/2025 showed pansinusitis    CT sinus dated 2/25/2025 shows polypoid mucoperiosteal thickening of the frontal anterior and posterior ethmoid sinuses and maxillary sinuses.  The ostiomeatal complexes are occluded the skull base is low and slightly asymmetric Keros 3 the lamina is intact the optic nerve is not dehiscent minimal right sphenoid mucoperiosteal thickening bilateral Onodi cells.  Mid septal deviation which is thick to the left with septal contacts bilateral inferior turbinate hypertrophy and middle turbinate hypertrophy nasopharynx is occluded with symmetric soft tissue    Rx astelin to right nares, budesonide irrigations        SNOT 38 with bilateral nasal blockage severe bilateral ear fullness    No prior allergy testing he takes an antihistamine daily   history of eczema      He also had an oral lesion noted by his dentist.  He has no oral pain and was unaware of the lesion.  He saw Vanessa on 2/18/2025 no lesion noted    Hx Pseudocholinesterase deficiency, no known family history.  Occurred after a back surgery in [place] Dr. Batista.    Hx ANICETO, cpap non-compliant, claustrophobic with cpap mask.  25# weight loss over past year.  Hx OA, causes dentalgia with use  No chronic bruxism      PSG 25 years ago    No audiogram in epic  Chronic bilateral tinnitus  Hx of granite and  cabinet work, wore [employer]  Current office work    History of insulin-dependent diabetes.  Quit tobacco in 2022 29.2-pack-year history        Sino-Nasal Outcome Test (SNOT - 22)    1. Need to Blow Nose: (Patient-Rptd) (P) Moderate  2. Nasal Blockage: (Patient-Rptd) (P) Moderate  3. Sneezing: (Patient-Rptd) (P) Moderate  4. Runny Nose: (Patient-Rptd) (P) Mild or slight  5. Cough: (Patient-Rptd) (P) Mild or slight  6. Post-nasal discharge: (Patient-Rptd) (P) Very mild  7. Thick nasal discharge: (Patient-Rptd) (P) Mild or slight  8. Ear fullness: (Patient-Rptd) (P) Severe  9. Dizziness: (Patient-Rptd) (P) Mild or slight  10. Ear Pain: (Patient-Rptd) (P) Mild or slight  11. Facial pain/pressure: (Patient-Rptd) (P) Moderate  12. Decreased Sense of Smell/Taste: (Patient-Rptd) (P) None  13. Difficulty falling asleep: (Patient-Rptd) (P) Very mild  14. Wake up at night: (Patient-Rptd) (P) Mild or slight  15. Lack of a good night's sleep: (Patient-Rptd) (P) Mild or slight  16. Wake up tired: (Patient-Rptd) (P) Mild or slight  17. Fatigue: (Patient-Rptd) (P) Mild or slight  18. Reduced Productivity: (Patient-Rptd) (P) None  19. Reduced Concentration: (Patient-Rptd) (P) Very mild  20. Frustrated/restless/irritable: (Patient-Rptd) (P) Very mild  21. Sad: (Patient-Rptd) (P) None  22. Embarrassed: (Patient-Rptd) (P) None    Total Score: (Patient-Rptd) (P) 38    COPYRIGHT 1996. [facility] [place] [facility]      Physical Exam  /87 (BP Location: Left arm, Patient Position: Sitting, Cuff Size: Adult Large)   Pulse 72   Temp 99  F (37.2  C) (Tympanic)   Ht 1.829 m (6')   Wt 113.4 kg (250 lb)   SpO2 98%   BMI 33.91 kg/m    General - The patient is well nourished and well developed, and appears to have good nutritional status.  Alert and oriented to person and place, interactive.  Head and Face - Normocephalic and atraumatic, with no gross asymmetry noted of the contour of the facial features.  The facial nerve is  intact, with strong symmetric movements.  Neck-no palpable lymphadenopathy or thyroid mass.  Trachea is midline.  Eyes - Extraocular movements intact.   Ears- External auditory canals are patent, tympanic membranes are intact without effusion or worrisome retractions   TMJ left crepitus and click  Nose - Nasal mucosa is pink and moist with no abnormal mucus.  No polyps, masses, or purulence noted on examination.  Mouth - Examination of the oral cavity shows pink, healthy, moist mucosa.  No lesions or ulceration noted.  The dentition are in good repair.    The tongue is mobile and midline.  Throat - The walls of the oropharynx were smooth, pink, moist, symmetric, and had no lesions or ulcerations.  The tonsillar pillars and soft palate were symmetric.  The uvula was midline on elevation.  Kingsley Palate Position III    To evaluate the nose and sinuses, I performed rigid nasal endoscopy.  I applied topical nasal lidocaine and neosynephrine.    I began with the LEFT side using a 0 degree rigid nasal endoscope, and then similarly examined the RIGHT side    Findings:  Inferior turbinates:  4+ boggy  Right INV collapse  Thick mid septal deviation left with septal contact   Middle turbinate and middle meatus:  No purulence, no polyposis,   Superior meatus was evaluated  Frontal recess clear  Mucosa is mildly edematous throughout,  no nelson polyps nor polypoid degeneration  Sphenoethmoidal recess without purulence   Nasopharynx with grade 3 hypervascular adenoid tissue with viscous secretions no nelson asymmetric nasopharynx mass.  Eustachian tubes are narrowed bilaterally, fossa of Rosenmuller clear  The patient tolerated the procedure well      Impression/Plan  Rojelio Juárez is a 54 year old male    ICD-10-CM    1. Chronic recurrent sinusitis  J32.9 budesonide (PULMICORT) 0.5 MG/2ML neb solution     predniSONE (DELTASONE) 10 MG tablet      2. Deviated nasal septum  J34.2       3. Nasal turbinate hypertrophy  J34.3        4. Hearing loss of left ear, unspecified hearing loss type  H91.92 Adult Audiology  Referral      5. Chronic adenoiditis  J35.02       6. Family history of thyroid cancer  Z80.8 US Thyroid      7. Obstruction of nasal valve  J34.89       8. TMJ (temporomandibular joint syndrome)  M26.609       9. Pseudocholinesterase deficiency  E88.09             Thyroid US (son with thyroid CA)  Audiogram    Pseudocholinesterase deficiency, anesthesia consult pending      The risks and complications of Bilateral Endoscopic Sinus Surgery (ESS), septoplasty, turbinate reduction, bilateral nasal valve repair , adenoidectomy were openly discussed.  The potential risks include bleeding, general anesthesia, infection, scar formation, need for additional surgery, septal perforation, and rarely injury to the eye with the possibility of blindness,  injury to the brain, meningitis or other intracranial complications.  Implants may extrude.  In addition the risks of adenoidectomy were discussed including but not limited to adenoid regrowth, velopharyngeal insufficiency or nasopharyngeal stenosis, injury to oral cavity or teeth.    Total, malcom, nafisa lynne    Recommend TMJ physical therapy he declined at this point Home TMJ precautions provided.        Nonsurgical options were discussed including prolonged antibioitics and/or oral and topical steroids.   Sinus anatomy and sinus disease were reviewed.  CT sinus was reviewed with the patient.  All questions were answered.     The importance of budesonide irrigations postoperatively was reinforced.    The correct use of nasal irrigations as prescribed will prevent synechiae and allow for proper recovery of the sinus mucosa.       Risks of oral steroid use were discussed and include psychiatric/mood changes, insomnia, stomach ulcers and potential GI bleeding, blood sugar elevation/worsening diabetes, hip/bone necrosis called avascular necrosis, or bone demineralization.        Total  exam time spent on the day of the visit including review of the chart, reviewing pertinent prior imaging and notes, obtaining a detailed history and physical exam, education, discussion with the patient and documenting in epic was over 60 minutes .  This did not include procedure time.      Noelle Hand D.O.  Otolaryngology/Head and Neck Surgery  Allergy

## 2025-04-21 ENCOUNTER — PREP FOR PROCEDURE (OUTPATIENT)
Dept: OTOLARYNGOLOGY | Facility: OTHER | Age: 55
End: 2025-04-21

## 2025-04-21 ENCOUNTER — OFFICE VISIT (OUTPATIENT)
Dept: OTOLARYNGOLOGY | Facility: OTHER | Age: 55
End: 2025-04-21
Attending: OTOLARYNGOLOGY
Payer: COMMERCIAL

## 2025-04-21 ENCOUNTER — TELEPHONE (OUTPATIENT)
Dept: FAMILY MEDICINE | Facility: OTHER | Age: 55
End: 2025-04-21

## 2025-04-21 VITALS
BODY MASS INDEX: 33.86 KG/M2 | TEMPERATURE: 99 F | HEIGHT: 72 IN | WEIGHT: 250 LBS | OXYGEN SATURATION: 98 % | HEART RATE: 72 BPM | DIASTOLIC BLOOD PRESSURE: 87 MMHG | SYSTOLIC BLOOD PRESSURE: 122 MMHG

## 2025-04-21 DIAGNOSIS — J35.02 CHRONIC ADENOIDITIS: ICD-10-CM

## 2025-04-21 DIAGNOSIS — H91.92 HEARING LOSS OF LEFT EAR, UNSPECIFIED HEARING LOSS TYPE: ICD-10-CM

## 2025-04-21 DIAGNOSIS — J34.89 OBSTRUCTION OF NASAL VALVE: ICD-10-CM

## 2025-04-21 DIAGNOSIS — J32.9 CHRONIC RECURRENT SINUSITIS: Primary | ICD-10-CM

## 2025-04-21 DIAGNOSIS — E88.09 PSEUDOCHOLINESTERASE DEFICIENCY: ICD-10-CM

## 2025-04-21 DIAGNOSIS — Z80.8 FAMILY HISTORY OF THYROID CANCER: ICD-10-CM

## 2025-04-21 DIAGNOSIS — J35.2 ADENOID HYPERTROPHY: ICD-10-CM

## 2025-04-21 DIAGNOSIS — J34.3 NASAL TURBINATE HYPERTROPHY: ICD-10-CM

## 2025-04-21 DIAGNOSIS — M26.609 TMJ (TEMPOROMANDIBULAR JOINT SYNDROME): ICD-10-CM

## 2025-04-21 DIAGNOSIS — J34.2 DEVIATED NASAL SEPTUM: ICD-10-CM

## 2025-04-21 DIAGNOSIS — J34.829 NASAL VALVE COLLAPSE: ICD-10-CM

## 2025-04-21 DIAGNOSIS — J34.2 DNS (DEVIATED NASAL SEPTUM): ICD-10-CM

## 2025-04-21 ASSESSMENT — PAIN SCALES - GENERAL: PAINLEVEL_OUTOF10: NO PAIN (0)

## 2025-04-21 NOTE — TELEPHONE ENCOUNTER
April 21, 2025    4:29 PM    Reason for Call: OVERBOOK    Patient is having FESS, TR, Septo, NV repair, [place] [facility] [place]    The patient is requesting an appointment for pre-op with Hemant Sanchez.      Preferred method for responding to this message: Telephone Call  What is your phone number? [phone]    If we cannot reach you directly, may we leave a detailed response at the number you provided? Yes    -Radha Hicks on 4/21/2025 at 4:29 PM

## 2025-04-21 NOTE — Clinical Note
Consult for Bilateral endoscopic sinus surgery and turbinate reduction, septoplasty, bilateral nasal valve repair, adenoidectomy with Dr. Hand. Date TBD. Patient has history of pseudocholinesterase deficiency.  Thank you, JOSELUIS Arce Care Coordinator, ENT

## 2025-04-21 NOTE — LETTER
4/21/2025      Rojelio Juárez  [address] [place] [id number]      Dear Colleague,    Thank you for referring your patient, Rojelio Juárez, to the [facility]. Please see a copy of my visit note below.    Otolaryngology Progress Note      Rojelio Juárez is a 54 year old male  Follow-up of chronic sinusitis    He has had 8 years of recurrent sinusitis  He had recurrent sinusitis over the winter with multiple abx, frequent ear popping.  He notes right worse than left nasal obstruction    Allergies seem worse in spring, chronic itchy eyes, albuterol prn spring    Inhalant serum specific IgE panel negative 2/18/25, however he notes congestion with dust, cats, lilacs    Endoscopy showed erythematous inflamed adenoids versus a nasopharyngeal mass photos reviewed.    CT neck dated 2/25/2025 was negative for lymphadenopathy CT sinuses 2/25/2025 showed pansinusitis    CT sinus dated 2/25/2025 shows polypoid mucoperiosteal thickening of the frontal anterior and posterior ethmoid sinuses and maxillary sinuses.  The ostiomeatal complexes are occluded the skull base is low and slightly asymmetric Keros 3 the lamina is intact the optic nerve is not dehiscent minimal right sphenoid mucoperiosteal thickening bilateral Onodi cells.  Mid septal deviation which is thick to the left with septal contacts bilateral inferior turbinate hypertrophy and middle turbinate hypertrophy nasopharynx is occluded with symmetric soft tissue    Rx astelin to right nares, budesonide irrigations        SNOT 38 with bilateral nasal blockage severe bilateral ear fullness    No prior allergy testing he takes an antihistamine daily   history of eczema      He also had an oral lesion noted by his dentist.  He has no oral pain and was unaware of the lesion.  He saw Vanessa on 2/18/2025 no lesion noted    Hx Pseudocholinesterase deficiency, no known family history.  Occurred after a back surgery in [place] Dr. Batista.    Hx ANICETO, cpap  non-compliant, claustrophobic with cpap mask.  25# weight loss over past year.  Hx OA, causes dentalgia with use  No chronic bruxism      PSG 25 years ago    No audiogram in epic  Chronic bilateral tinnitus  Hx of granite and cabinet work, wore [employer]  Current office work    History of insulin-dependent diabetes.  Quit tobacco in 2022 29.2-pack-year history        Sino-Nasal Outcome Test (SNOT - 22)    1. Need to Blow Nose: (Patient-Rptd) (P) Moderate  2. Nasal Blockage: (Patient-Rptd) (P) Moderate  3. Sneezing: (Patient-Rptd) (P) Moderate  4. Runny Nose: (Patient-Rptd) (P) Mild or slight  5. Cough: (Patient-Rptd) (P) Mild or slight  6. Post-nasal discharge: (Patient-Rptd) (P) Very mild  7. Thick nasal discharge: (Patient-Rptd) (P) Mild or slight  8. Ear fullness: (Patient-Rptd) (P) Severe  9. Dizziness: (Patient-Rptd) (P) Mild or slight  10. Ear Pain: (Patient-Rptd) (P) Mild or slight  11. Facial pain/pressure: (Patient-Rptd) (P) Moderate  12. Decreased Sense of Smell/Taste: (Patient-Rptd) (P) None  13. Difficulty falling asleep: (Patient-Rptd) (P) Very mild  14. Wake up at night: (Patient-Rptd) (P) Mild or slight  15. Lack of a good night's sleep: (Patient-Rptd) (P) Mild or slight  16. Wake up tired: (Patient-Rptd) (P) Mild or slight  17. Fatigue: (Patient-Rptd) (P) Mild or slight  18. Reduced Productivity: (Patient-Rptd) (P) None  19. Reduced Concentration: (Patient-Rptd) (P) Very mild  20. Frustrated/restless/irritable: (Patient-Rptd) (P) Very mild  21. Sad: (Patient-Rptd) (P) None  22. Embarrassed: (Patient-Rptd) (P) None    Total Score: (Patient-Rptd) (P) 38    COPYRIGHT 1996. [facility] [place] . [facility],[place]      Physical Exam  /87 (BP Location: Left arm, Patient Position: Sitting, Cuff Size: Adult Large)   Pulse 72   Temp 99  F (37.2  C) (Tympanic)   Ht 1.829 m (6')   Wt 113.4 kg (250 lb)   SpO2 98%   BMI 33.91 kg/m    General - The patient is well nourished and well developed, and appears  to have good nutritional status.  Alert and oriented to person and place, interactive.  Head and Face - Normocephalic and atraumatic, with no gross asymmetry noted of the contour of the facial features.  The facial nerve is intact, with strong symmetric movements.  Neck-no palpable lymphadenopathy or thyroid mass.  Trachea is midline.  Eyes - Extraocular movements intact.   Ears- External auditory canals are patent, tympanic membranes are intact without effusion or worrisome retractions   TMJ left crepitus and click  Nose - Nasal mucosa is pink and moist with no abnormal mucus.  No polyps, masses, or purulence noted on examination.  Mouth - Examination of the oral cavity shows pink, healthy, moist mucosa.  No lesions or ulceration noted.  The dentition are in good repair.    The tongue is mobile and midline.  Throat - The walls of the oropharynx were smooth, pink, moist, symmetric, and had no lesions or ulcerations.  The tonsillar pillars and soft palate were symmetric.  The uvula was midline on elevation.  Kingsley Palate Position III    To evaluate the nose and sinuses, I performed rigid nasal endoscopy.  I applied topical nasal lidocaine and neosynephrine.    I began with the LEFT side using a 0 degree rigid nasal endoscope, and then similarly examined the RIGHT side    Findings:  Inferior turbinates:  4+ boggy  Right INV collapse  Thick mid septal deviation left with septal contact   Middle turbinate and middle meatus:  No purulence, no polyposis,   Superior meatus was evaluated  Frontal recess clear  Mucosa is mildly edematous throughout,  no nelson polyps nor polypoid degeneration  Sphenoethmoidal recess without purulence   Nasopharynx with grade 3 hypervascular adenoid tissue with viscous secretions no nelson asymmetric nasopharynx mass.  Eustachian tubes are narrowed bilaterally, fossa of Rosenmuller clear  The patient tolerated the procedure well      Impression/Plan  Rojelio Juárez is a 54 year old male     ICD-10-CM    1. Chronic recurrent sinusitis  J32.9 budesonide (PULMICORT) 0.5 MG/2ML neb solution     predniSONE (DELTASONE) 10 MG tablet      2. Deviated nasal septum  J34.2       3. Nasal turbinate hypertrophy  J34.3       4. Hearing loss of left ear, unspecified hearing loss type  H91.92 Adult Audiology  Referral      5. Chronic adenoiditis  J35.02       6. Family history of thyroid cancer  Z80.8 US Thyroid      7. Obstruction of nasal valve  J34.89       8. TMJ (temporomandibular joint syndrome)  M26.609       9. Pseudocholinesterase deficiency  E88.09             Thyroid US (son with thyroid CA)  Audiogram    Pseudocholinesterase deficiency, anesthesia consult pending      The risks and complications of Bilateral Endoscopic Sinus Surgery (ESS), septoplasty, turbinate reduction, bilateral nasal valve repair , adenoidectomy were openly discussed.  The potential risks include bleeding, general anesthesia, infection, scar formation, need for additional surgery, septal perforation, and rarely injury to the eye with the possibility of blindness,  injury to the brain, meningitis or other intracranial complications.  Implants may extrude.  In addition the risks of adenoidectomy were discussed including but not limited to adenoid regrowth, velopharyngeal insufficiency or nasopharyngeal stenosis, injury to oral cavity or teeth.    Total, maged العراقي latera    Recommend TMJ physical therapy he declined at this point Home TMJ precautions provided.        Nonsurgical options were discussed including prolonged antibioitics and/or oral and topical steroids.   Sinus anatomy and sinus disease were reviewed.  CT sinus was reviewed with the patient.  All questions were answered.     The importance of budesonide irrigations postoperatively was reinforced.    The correct use of nasal irrigations as prescribed will prevent synechiae and allow for proper recovery of the sinus mucosa.       Risks of oral steroid use were  discussed and include psychiatric/mood changes, insomnia, stomach ulcers and potential GI bleeding, blood sugar elevation/worsening diabetes, hip/bone necrosis called avascular necrosis, or bone demineralization.        Total exam time spent on the day of the visit including review of the chart, reviewing pertinent prior imaging and notes, obtaining a detailed history and physical exam, education, discussion with the patient and documenting in epic was over 60 minutes .  This did not include procedure time.      Noelle Hand D.O.  Otolaryngology/Head and Neck Surgery  Allergy            Again, thank you for allowing me to participate in the care of your patient.        Sincerely,        Noelle Hand MD    Electronically signed

## 2025-04-21 NOTE — PATIENT INSTRUCTIONS
Follow-up for Audiogram  US Thyroid  Anesthesia pseudocholinesterase deficiency  Start Prednisone 2 days preop  Can use Astelin as needed  Start Flonase   Follow TMJ precautions  Nasal valve pictures    Instructions for Sinus Surgery    Recovery - Everyone recovers differently from a general anesthetic.  Symptoms such as fatigue, nausea, light-headedness, and sometimes a low grade fever (up to 100 degrees) are not unusual.  As your body removes the anesthetic drugs from circulation, these symptoms will resolve.  Your nose will be sore after surgery, and you may even have symptoms similar to a sinus infection with headache, congestion, and pressure.  These will resolve with healing.  For several days you may experience bloody drainage from the nose, please use the drip pad as necessary for this.  If there is persistent bleeding, please call the office during business hours or the on call ENT physician after hours.  There are no diet restrictions after sinus surgery, and you can resume your home medications.      Please do not blow your nose until 2 weeks after surgery.       Limit your activity to no strenuous activities until I see you for the first follow-up visit in approximately 2 weeks.      Medications - You were sent home with narcotic pain medication.  If you can tolerate the discomfort during your recovery by using just plain Tylenol or ibuprofen (advil), please do so.  However, do not hesitate to use the stronger pain medication if needed.  If you were sent home with an antibiotic, it is primarily used to help the healing process.  If it causes loose bowel movements or other signs of intolerance, it is appropriate to discontinue it.  By far the most important measure you can take to speed recovery, and maximize the chances of long term success of sinus surgery is using the sinus rinses at least three time per day for the first month after surgery.       Instructions for Nasal Irrigations starting the day  after surgery.  Use Budesonide irrigations 2 times daily for one month and then as directed.  Start Izzy Med saline irrigation the evening of surgery and use at least 2-3 times daily.   Continue twice daily budesonide irrigations and 2-3 times daily NeilMed saline irrigations for 1 month and then as directed by Dr. Hand    Budesonide instructions  Make the saline solution using 2 packages of salt and previously boiled or distilled water.  This will make 240 ml of saline solution.  Mix the entire vial of budesonide into the solution.   Irrigate your nose 2 times a day with the warm budesonide solution using 1 bottle between nostrils in the morning, and one bottle at night.   Use plain izzy med saline without the steroid 2-3 times during the afternoon    Perform gentle irrigation for the first week.  Starting 1 week after surgery, you can start to irrigate a bit more forcefully.  The more often you irrigate with the izzy med saline, the faster you will heal.      At 3 weeks after surgery you may restart nasal steroids if needed (flonase, nasonex, etc).      Complications - Problems related to sinus surgery almost always are detected during the operation, and special instruction will be given in that situation.  However, unexpected things can happen, and are all related to the structures around the sinus cavities.  Symptoms that should alert you to a possible problem include: severe eye pain or eye swelling, persistent heavy bleeding from the nose, and high fevers with headache and neck pain.  Any of these symptoms should be called into my office or to the on call ENT if after hours.  The most common non-emergency complication of sinus surgery is the formation of scar tissue which can re-block the sinuses.  This is addressed below.    Follow-up -  As you have noted, there are quite a few follow-up visits after sinus surgery.  This is done to aggressively manage the most common complication of this technique,  which is scar tissue blocking the sinuses.  These visits will require the examination of your nose and possibly removal of crusts of dry mucous and blood, with possible removal of early scar tissue.  Please prepare for these visits by using your sinus rinses.    If there are any questions or issues with the above, or if there are other issues that concern you, always feel free to call the clinic and I am happy to speak with you as soon as I can.    Noelle Hand D.O.  Otolaryngology/Head and Neck Surgery  Allergy    [phone]     Thank you for allowing Dr. Hand and our ENT team to participate in your care.  If your medications are too expensive, please give the nurse a call.  We can possibly change this medication.  If you have a scheduling or an appointment question please contact our Health Unit Coordinator at their direct line [phone].   ALL nursing questions or concerns can be directed to your ENT nurse, Claude, at: [phone]

## 2025-04-22 DIAGNOSIS — J32.9 CHRONIC RECURRENT SINUSITIS: ICD-10-CM

## 2025-04-22 RX ORDER — PREDNISONE 10 MG/1
TABLET ORAL
Qty: 6 TABLET | Refills: 0 | Status: SHIPPED | OUTPATIENT
Start: 2025-04-22

## 2025-04-22 RX ORDER — BUDESONIDE 0.5 MG/2ML
INHALANT ORAL
Qty: 200 ML | Refills: 11 | Status: SHIPPED | OUTPATIENT
Start: 2025-04-22

## 2025-04-22 NOTE — TELEPHONE ENCOUNTER
budesonide (PULMICORT) 0.5 MG/2ML neb solution     Last Written Prescription Date:  4-22-25  Last Fill Quantity: 200ml,   # refills: 11  Last Office Visit: 4-21-25  Future Office visit:    Next 5 appointments (look out 90 days)      Jun 23, 2025 3:00 PM  (Arrive by 2:45 PM)  Provider Visit with Marion Davis NP  [facility] ([facility] ) [address]  [facility] [address]  [phone]             Routing refill request to provider for review/approval because:  Patient requesting 90 day supply

## 2025-04-23 ENCOUNTER — TELEPHONE (OUTPATIENT)
Dept: FAMILY MEDICINE | Facility: OTHER | Age: 55
End: 2025-04-23

## 2025-04-23 NOTE — TELEPHONE ENCOUNTER
Patient returned call, pre-op appointment scheduled after 5/5/25 per Marion Sanchez CNP.     Next 5 appointments (look out 90 days)      May 08, 2025 9:15 AM  (Arrive by 9:00 AM)  Pre-Operative Physical with Marion Davis NP  [facility] ([facility] ) [address]  [place] [address]  [phone]     Jun 23, 2025 3:00 PM  (Arrive by 2:45 PM)  Provider Visit with Marion Davis NP  [facility] ([facility] ) [address]  [place] [address]  [phone]

## 2025-04-23 NOTE — TELEPHONE ENCOUNTER
9:44 AM    Reason for Call: Phone Call    Description: Patient called stating he is returning a call from a Hailee or Brenda. Patient states that caller had tried calling him today.   Unavailable to connect with RN at time of call back. Please call patient back.        Preferred method for responding to this message: Telephone Call  What is your phone number ? [phone]    If we cannot reach you directly, may we leave a detailed response at the number you provided? Yes    Can this message wait until your PCP/provider returns, if available today? Not applicable    Tana Thompson

## 2025-04-23 NOTE — TELEPHONE ENCOUNTER
Call placed to patient to schedule Pre-Op Visit with Marion Sanchez after 5/5/25 per Jan.     No answer, message left requesting a return call to this writer at [phone] option 6.    Procedure: FESS, TR, Septo, NV repair, adenoidectomy with Dr Hand @ [facility] 5/23/25

## 2025-04-24 ENCOUNTER — TELEPHONE (OUTPATIENT)
Dept: OTOLARYNGOLOGY | Facility: OTHER | Age: 55
End: 2025-04-24

## 2025-04-24 RX ORDER — BUDESONIDE 0.5 MG/2ML
INHALANT ORAL
Qty: 360 ML | OUTPATIENT
Start: 2025-04-24

## 2025-04-24 NOTE — TELEPHONE ENCOUNTER
Inhaled Steroids Protocol [id number]/22/2025 11:24 AM   Protocol Details Medication is active on med list and the sig matches. RN to manually verify dose and sig if red X/fail.    Medication indicated for associated diagnosis

## 2025-04-24 NOTE — TELEPHONE ENCOUNTER
The nasal valve repair (CPT 77947) is considered experimental/investigative and may not be covered by [facility].     All other procedures/codes are good to go without PA.

## 2025-05-07 ENCOUNTER — TRANSFERRED RECORDS (OUTPATIENT)
Dept: MULTI SPECIALTY CLINIC | Facility: CLINIC | Age: 55
End: 2025-05-07

## 2025-05-08 ENCOUNTER — OFFICE VISIT (OUTPATIENT)
Dept: FAMILY MEDICINE | Facility: OTHER | Age: 55
End: 2025-05-08
Attending: NURSE PRACTITIONER
Payer: COMMERCIAL

## 2025-05-08 VITALS
TEMPERATURE: 97.1 F | RESPIRATION RATE: 18 BRPM | SYSTOLIC BLOOD PRESSURE: 120 MMHG | OXYGEN SATURATION: 98 % | HEART RATE: 106 BPM | WEIGHT: 262.8 LBS | HEIGHT: 72 IN | DIASTOLIC BLOOD PRESSURE: 80 MMHG | BODY MASS INDEX: 35.59 KG/M2

## 2025-05-08 DIAGNOSIS — E78.5 HYPERLIPIDEMIA LDL GOAL <100: ICD-10-CM

## 2025-05-08 DIAGNOSIS — F33.0 MILD RECURRENT MAJOR DEPRESSION: ICD-10-CM

## 2025-05-08 DIAGNOSIS — E66.812 CLASS 2 SEVERE OBESITY DUE TO EXCESS CALORIES WITH SERIOUS COMORBIDITY AND BODY MASS INDEX (BMI) OF 35.0 TO 35.9 IN ADULT (H): ICD-10-CM

## 2025-05-08 DIAGNOSIS — F41.1 GENERALIZED ANXIETY DISORDER: ICD-10-CM

## 2025-05-08 DIAGNOSIS — F17.200 TOBACCO USE DISORDER: ICD-10-CM

## 2025-05-08 DIAGNOSIS — Z01.818 PREOP GENERAL PHYSICAL EXAM: Primary | ICD-10-CM

## 2025-05-08 DIAGNOSIS — I10 BENIGN ESSENTIAL HYPERTENSION: ICD-10-CM

## 2025-05-08 DIAGNOSIS — E66.01 CLASS 2 SEVERE OBESITY DUE TO EXCESS CALORIES WITH SERIOUS COMORBIDITY AND BODY MASS INDEX (BMI) OF 35.0 TO 35.9 IN ADULT (H): ICD-10-CM

## 2025-05-08 DIAGNOSIS — Z79.4 TYPE 2 DIABETES MELLITUS WITHOUT COMPLICATION, WITH LONG-TERM CURRENT USE OF INSULIN (H): ICD-10-CM

## 2025-05-08 DIAGNOSIS — J32.9 CHRONIC RECURRENT SINUSITIS: ICD-10-CM

## 2025-05-08 DIAGNOSIS — E11.9 TYPE 2 DIABETES MELLITUS WITHOUT COMPLICATION, WITH LONG-TERM CURRENT USE OF INSULIN (H): ICD-10-CM

## 2025-05-08 LAB
ALBUMIN SERPL BCG-MCNC: 4.4 G/DL (ref 3.5–5.2)
ALP SERPL-CCNC: 89 U/L (ref 40–150)
ALT SERPL W P-5'-P-CCNC: 133 U/L (ref 0–70)
ANION GAP SERPL CALCULATED.3IONS-SCNC: 17 MMOL/L (ref 7–15)
AST SERPL W P-5'-P-CCNC: 90 U/L (ref 0–45)
ATRIAL RATE - MUSE: 102 BPM
BASOPHILS # BLD AUTO: 0.1 10E3/UL (ref 0–0.2)
BASOPHILS NFR BLD AUTO: 1 %
BILIRUB SERPL-MCNC: 1.1 MG/DL
BUN SERPL-MCNC: 9.3 MG/DL (ref 6–20)
CALCIUM SERPL-MCNC: 9.7 MG/DL (ref 8.8–10.4)
CHLORIDE SERPL-SCNC: 106 MMOL/L (ref 98–107)
CREAT SERPL-MCNC: 0.76 MG/DL (ref 0.67–1.17)
DIASTOLIC BLOOD PRESSURE - MUSE: NORMAL MMHG
EGFRCR SERPLBLD CKD-EPI 2021: >90 ML/MIN/1.73M2
EOSINOPHIL # BLD AUTO: 0.4 10E3/UL (ref 0–0.7)
EOSINOPHIL NFR BLD AUTO: 7 %
ERYTHROCYTE [DISTWIDTH] IN BLOOD BY AUTOMATED COUNT: 11.9 % (ref 10–15)
GLUCOSE SERPL-MCNC: 170 MG/DL (ref 70–99)
HCO3 SERPL-SCNC: 21 MMOL/L (ref 22–29)
HCT VFR BLD AUTO: 51.2 % (ref 40–53)
HGB BLD-MCNC: 17.7 G/DL (ref 13.3–17.7)
IMM GRANULOCYTES # BLD: 0 10E3/UL
IMM GRANULOCYTES NFR BLD: 0 %
INTERPRETATION ECG - MUSE: NORMAL
LYMPHOCYTES # BLD AUTO: 1.4 10E3/UL (ref 0.8–5.3)
LYMPHOCYTES NFR BLD AUTO: 27 %
MCH RBC QN AUTO: 32.1 PG (ref 26.5–33)
MCHC RBC AUTO-ENTMCNC: 34.6 G/DL (ref 31.5–36.5)
MCV RBC AUTO: 93 FL (ref 78–100)
MONOCYTES # BLD AUTO: 0.5 10E3/UL (ref 0–1.3)
MONOCYTES NFR BLD AUTO: 10 %
NEUTROPHILS # BLD AUTO: 2.8 10E3/UL (ref 1.6–8.3)
NEUTROPHILS NFR BLD AUTO: 55 %
P AXIS - MUSE: 37 DEGREES
PLATELET # BLD AUTO: 182 10E3/UL (ref 150–450)
POTASSIUM SERPL-SCNC: 4.8 MMOL/L (ref 3.4–5.3)
PR INTERVAL - MUSE: 152 MS
PROT SERPL-MCNC: 7.6 G/DL (ref 6.4–8.3)
QRS DURATION - MUSE: 94 MS
QT - MUSE: 352 MS
QTC - MUSE: 458 MS
R AXIS - MUSE: -10 DEGREES
RBC # BLD AUTO: 5.51 10E6/UL (ref 4.4–5.9)
SODIUM SERPL-SCNC: 144 MMOL/L (ref 135–145)
SYSTOLIC BLOOD PRESSURE - MUSE: NORMAL MMHG
T AXIS - MUSE: 52 DEGREES
VENTRICULAR RATE- MUSE: 102 BPM
WBC # BLD AUTO: 5.1 10E3/UL (ref 4–11)

## 2025-05-08 ASSESSMENT — PAIN SCALES - GENERAL: PAINLEVEL_OUTOF10: NO PAIN (0)

## 2025-05-08 NOTE — PROGRESS NOTES
Preoperative Evaluation  [facility]  [address]  [facility] [address]  Phone: [phone]  Primary Provider: Marion Davis NP  Pre-op Performing Provider: Marion Davis NP  May 8, 2025   {      5/7/2025   Surgical Information   What procedure is being done? Bilateral sinus surgery   Facility or Hospital where procedure/surgery will be performed: [facility]   Who is doing the procedure / surgery? Dr Hand   Date of surgery / procedure: May 23,2025   Time of surgery / procedure: TBD   Where do you plan to recover after surgery? at home with family     Fax number for surgical facility: Note does not need to be faxed, will be available electronically in Epic.    Assessment & Plan     The proposed surgical procedure is considered INTERMEDIATE risk.    1. Preop general physical exam (Primary)  ENT notes reviewed   - EKG 12-lead complete w/read - (Clinic Performed)  - Comprehensive metabolic panel; Future  - CBC with Platelets & Differential; Future    2. Chronic recurrent sinusitis    3. Type 2 diabetes mellitus without complication, with long-term current use of insulin (H)  Medications reviewed  - semaglutide (OZEMPIC) 2 MG/3ML pen; Inject 0.5 mg subcutaneously every 7 days.  Dispense: 3 mL; Refill: 1    4. Hyperlipidemia LDL goal <100    5. Benign essential hypertension    6. Mild recurrent major depression    7. Generalized anxiety disorder    8. Class 2 severe obesity due to excess calories with serious comorbidity and body mass index (BMI) of 35.0 to 35.9 in adult (H)  - semaglutide (OZEMPIC) 2 MG/3ML pen; Inject 0.5 mg subcutaneously every 7 days.  Dispense: 3 mL; Refill: 1    9. Tobacco use disorder  Consider cessation         {Risks and Recommendations :048021}    Antiplatelet or Anticoagulation Medication Instructions   - aspirin: Discontinue aspirin 7 days prior to procedure to reduce bleeding risk. It should be resumed  postoperatively.     Additional Medication Instructions   - Herbal medications and vitamins: DO NOT TAKE 14 days prior to surgery.   - ACE/ARB/ARNI (lisinopril, enalapril, losartan, valsartan, olmesartan, sacubritril/valsartan) : DO NOT TAKE on day of surgery (minimum 11 hours for general anesthesia).   - Statins (atorvastatin, simvastatin, pravastatin) : Continue taking on the day of surgery.    - SGLT2 Inhibitor (canagliflozin, dapagliflozin, or empagliflozin): DO NOT TAKE 3 days before surgery.    - Thiazolidinedione (e.g. rosiglitazone, pioglitazone): DO NOT TAKE day of surgery.   - GLP-1 Injectable (exenitide, liraglutide, semaglutide, dulaglutide, etc.): DO NOT TAKE 7 days before surgery     Recommendation  {IMPORTANT - Approval:640719:}    Follow-up   No follow-ups on file.    The longitudinal plan of care for the diagnosis(es)/condition(s) as documented were addressed during this visit. Due to the added complexity in care, I will continue to support Bryan in the subsequent management and with ongoing continuity of care.    Marion Davis,   Certified Adult Nurse Practitioner  [phone]      Denis Adams is a 54 year old, presenting for the following:  Pre-Op Exam        Via the Health Maintenance questionnaire, the patient has reported the following services have been completed -Colonscopy: [place] 2025-05-07, this information has been sent to the abstraction team.    HPI: chronic recurrent sinusitis           5/7/2025   Pre-Op Questionnaire   Have you ever had a heart attack or stroke? No   Have you ever had surgery on your heart or blood vessels, such as a stent placement, a coronary artery bypass, or surgery on an artery in your head, neck, heart, or legs? No   Do you have chest pain with activity? No   Do you have a history of heart failure? No   Do you currently have a cold, bronchitis or symptoms of other infection? No   Do you have a cough, shortness of breath, or wheezing? No   Do you or  anyone in your family have previous history of blood clots? No   Do you or does anyone in your family have a serious bleeding problem such as prolonged bleeding following surgeries or cuts? No   Have you ever had problems with anemia or been told to take iron pills? No   Have you had any abnormal blood loss such as black, tarry or bloody stools? No   Have you ever had a blood transfusion? No   Are you willing to have a blood transfusion if it is medically needed before, during, or after your surgery? Yes   Have you or any of your relatives ever had problems with anesthesia? (!) YES    Do you have sleep apnea, excessive snoring or daytime drowsiness? (!) YES   Do you have a CPAP machine? (!) NO    Do you have any artifical heart valves or other implanted medical devices like a pacemaker, defibrillator, or continuous glucose monitor? No   Do you have artificial joints? No   Are you allergic to latex? No     Advance Care Planning  Discussed advance care planning with patient; however, patient declined at this time.    Preoperative Review of    reviewed - no record of controlled substances prescribed.      Status of Chronic Conditions:  DEPRESSION - Patient has a long history of Depression of moderate severity requiring medication for control with recent symptoms being stable..Current symptoms of depression include none.     DIABETES - Patient has a longstanding history of DiabetesType Type II . Patient is being treated with diet, oral agents, and GLP-1 and denies significant side effects. Control has been good. Complicating factors include but are not limited to: hypertension and hyperlipidemia.     Lab Results   Component Value Date    A1C 7.9 03/24/2025    A1C 8.2 12/18/2024    A1C 7.8 08/01/2024    A1C 7.0 12/06/2023    A1C 6.4 05/04/2023    A1C 5.7 04/14/2021    A1C 6.8 11/11/2020    A1C 7.8 07/15/2020    A1C 6.8 03/09/2020    A1C 7.4 10/25/2019       HYPERLIPIDEMIA - Patient has a long history of significant  Hyperlipidemia requiring medication for treatment with recent good control. Patient reports no problems or side effects with the medication.     The 10-year ASCVD risk score (Michelle CADET, et al., 2019) is: 6.9%    Values used to calculate the score:      Age: 54 years      Sex: Male      Is Non- : No      Diabetic: Yes      Tobacco smoker: No      Systolic Blood Pressure: 120 mmHg      Is BP treated: Yes      HDL Cholesterol: 57 mg/dL      Total Cholesterol: 176 mg/dL      HYPERTENSION - Patient has longstanding history of HTN , currently denies any symptoms referable to elevated blood pressure. Specifically denies chest pain, palpitations, dyspnea, orthopnea, PND or peripheral edema. Blood pressure readings have been in normal range. Current medication regimen is as listed below. Patient denies any side effects of medication.       BP Readings from Last 3 Encounters:   05/08/25 120/80   04/21/25 122/87   03/24/25 121/87    Wt Readings from Last 3 Encounters:   05/08/25 119.2 kg (262 lb 12.8 oz)   04/21/25 113.4 kg (250 lb)   03/24/25 120.7 kg (266 lb)                 Patient Active Problem List    Diagnosis Date Noted    Generalized anxiety disorder 02/15/2024     Priority: Medium    Mild recurrent major depression 02/15/2024     Priority: Medium    Seasonal allergic rhinitis 06/05/2023     Priority: Medium    Pseudocholinesterase deficiency 03/29/2023     Priority: Medium    Obesity with body mass index 30 or greater 03/15/2023     Priority: Medium    Hypercontractile esophagus 02/02/2023     Priority: Medium    Diffuse spasm of esophagus 02/02/2023     Priority: Medium    Spinal stenosis of lumbar region, unspecified whether neurogenic claudication present 02/24/2020     Priority: Medium     Added automatically from request for surgery [id number]      Bilateral low back pain without sciatica 02/21/2020     Priority: Medium    Lumbar disc herniation with radiculopathy 02/21/2020     Priority:  Medium    Sebaceous hyperplasia 01/21/2020     Priority: Medium    Skin tag 01/21/2020     Priority: Medium    Other seborrheic dermatitis 01/21/2020     Priority: Medium    Intrinsic eczema 01/21/2020     Priority: Medium    Fatty infiltration of liver 11/21/2018     Priority: Medium    Hyperlipidemia LDL goal <100 06/25/2018     Priority: Medium    Type 2 diabetes mellitus without complication, with long-term current use of insulin (H) 06/22/2018     Priority: Medium    Morbid obesity (H) 06/22/2018     Priority: Medium    Status post lumbar microdiscectomy 03/13/2017     Priority: Medium    ACP (advance care planning) 01/20/2017     Priority: Medium     Advance Care Planning 1/20/2017: ACP Review of Chart / Resources Provided:  Reviewed chart for advance care plan.  Rojelio Juárez has no plan or code status on file. Discussed available resources and provided with information. Confirmed code status reflects current choices pending further ACP discussions.  Confirmed/documented legally designated decision makers.  Added by LUCIANO OLIVAS              Herniated intervertebral disc of lumbar spine 01/20/2017     Priority: Medium    Primary hypertension 01/20/2017     Priority: Medium    Tobacco dependence due to cigarettes 01/20/2017     Priority: Medium    Major depressive disorder with single episode 12/12/2014     Priority: Medium      Past Medical History:   Diagnosis Date    Anxiety 08/15/2017    Benign essential hypertension 01/20/2017    Diabetes (H)     Fatty infiltration of liver 11/21/2018    Herniated intervertebral disc of lumbar spine 01/20/2017    Pseudocholinesterase deficiency     Tobacco dependence syndrome 01/20/2017     Past Surgical History:   Procedure Laterality Date    BACK SURGERY      COLONOSCOPY N/A 2/23/2023    Procedure: COLONOSCOPY with polypectomy, biopsy, resolution clipping;  Surgeon: Jimbo Covarrubias MD;  Location: [facility]    ESOPHAGEAL BALLOON PROVOCATION STUDY N/A 11/9/2021     Procedure: Esophageal Balloon Provocation Study;  Surgeon: Danial Serrano DO;  Location:  GI    ESOPHAGOSCOPY, GASTROSCOPY, DUODENOSCOPY (EGD), COMBINED N/A 11/9/2021    Procedure: ESOPHAGOGASTRODUODENOSCOPY, WITH BIOPSY;  Surgeon: Danial Serrano DO;  Location:  GI     Current Outpatient Medications   Medication Sig Dispense Refill    albuterol (PROAIR HFA/PROVENTIL HFA/VENTOLIN HFA) 108 (90 Base) MCG/ACT inhaler Inhale 2 puffs into the lungs every 6 hours. 18 g 3    aspirin 81 MG EC tablet Take 1 tablet (81 mg) by mouth daily 90 tablet 3    atorvastatin (LIPITOR) 10 MG tablet Take 1 tablet (10 mg) by mouth at bedtime. 90 tablet 3    azelastine (ASTELIN) 0.1 % nasal spray Spray 2 sprays into both nostrils 2 times daily. 30 mL 12    benzonatate (TESSALON) 200 MG capsule Take 1 capsule (200 mg) by mouth 3 times daily as needed for cough 30 capsule 0    blood glucose (NO BRAND SPECIFIED) lancets standard Use to test blood sugar 3 times daily or as directed. 300 each 3    blood glucose (NO BRAND SPECIFIED) test strip Use to test blood sugar 3 times daily or as directed. 300 strip 3    blood glucose monitoring (NO BRAND SPECIFIED) meter device kit Use to test blood sugar 3 times daily or as directed. 1 kit 0    budesonide (PULMICORT) 0.5 MG/2ML neb solution Squirt entire vial into izzy med saline solution, mix, and irrigate each nostril until entire bottle empty.  Do this twice daily. 200 mL 11    budesonide (PULMICORT) 0.5 MG/2ML neb solution Mix 240 ml Izzy Med Sinus rinse, add 0.5 mg budesonide vial, rinse 1/2 bottle in each nostril twice daily 180 mL 4    empagliflozin (JARDIANCE) 25 MG TABS tablet Take 1 tablet (25 mg) by mouth daily. 90 tablet 1    ibuprofen (ADVIL/MOTRIN) 800 MG tablet Take 1 tablet (800 mg) by mouth every 8 hours as needed for moderate pain With food 90 tablet 1    losartan (COZAAR) 25 MG tablet Take 1 tablet (25 mg) by mouth daily 90 tablet 2    pioglitazone (ACTOS) 30 MG tablet Take 1  tablet (30 mg) by mouth daily. 90 tablet 1    predniSONE (DELTASONE) 10 MG tablet Take 3 tabs after breakfast starting 2 days prior to sinus surgery 6 tablet 0    semaglutide (OZEMPIC) 2 MG/3ML pen Inject 0.5 mg subcutaneously every 7 days. 3 mL 1       Allergies   Allergen Reactions    Succinylcholine Muscle Pain (Myalgia)     Pt had profound muscle weakness for 4 hours following an intubating dose of succinylcholine requiring prolonged intubation and mechanical ventilation post-operatively. See post-anesthetic evaluation note from surgery 20 for details.     Prednisone      Weight gain 20+ lbs with short course reported. Use only if absolutely needed.    Metformin GI Disturbance    Ozempic (0.25 Or 0.5 Mg-Dose) [Semaglutide(0.25 Or 0.5mg-Dos)] GI Disturbance    Sertraline Headache and Nausea        Social History     Tobacco Use    Smoking status: Former     Current packs/day: 0.00     Average packs/day: 0.8 packs/day for 38.9 years (29.2 ttl pk-yrs)     Types: Cigarettes     Start date: 1984     Quit date: 2022     Years since quittin.4     Passive exposure: Past    Smokeless tobacco: Never   Substance Use Topics    Alcohol use: Yes       History   Drug Use No             Review of Systems  CONSTITUTIONAL: NEGATIVE for fever, chills, change in weight  INTEGUMENTARY/SKIN: NEGATIVE for worrisome rashes, moles or lesions  EYES: NEGATIVE for vision changes or irritation  ENT/MOUTH: NEGATIVE for ear, mouth and throat problems.  POSITIVE chronic sinusitis.   RESP: NEGATIVE for significant cough or SOB  BREAST: NEGATIVE for masses, tenderness or discharge  CV: NEGATIVE for chest pain, palpitations or peripheral edema  GI: NEGATIVE for nausea, abdominal pain, heartburn, or change in bowel habits  : NEGATIVE for frequency, dysuria, or hematuria  MUSCULOSKELETAL: NEGATIVE for significant arthralgias or myalgia  NEURO: NEGATIVE for weakness, dizziness or paresthesias  ENDOCRINE: NEGATIVE for temperature  intolerance, skin/hair changes  HEME: NEGATIVE for bleeding problems  PSYCHIATRIC: NEGATIVE for changes in mood or affect    Objective    /80 (BP Location: Left arm, Patient Position: Sitting, Cuff Size: Adult Regular)   Pulse 106   Temp 97.1  F (36.2  C) (Tympanic)   Resp 18   Ht 1.829 m (6')   Wt 119.2 kg (262 lb 12.8 oz)   SpO2 98%   BMI 35.64 kg/m     Estimated body mass index is 35.64 kg/m  as calculated from the following:    Height as of this encounter: 1.829 m (6').    Weight as of this encounter: 119.2 kg (262 lb 12.8 oz).  Physical Exam  {Exam List :641461}    Recent Labs   Lab Test 25  1521 24  1004   HGB  --  18.4*   PLT  --  223    139   POTASSIUM 4.4 4.3   CR 0.82 0.81   A1C 7.9* 8.2*        Diagnostics  {LABS:165273}   {EK}    Revised Cardiac Risk Index (RCRI)  The patient has the following serious cardiovascular risks for perioperative complications:  {PREOP REVISED CARDIAC RISK INDEX (RCRI) :654516}     RCRI Interpretation: {REVISED CARDIAC RISK INTERPRETATION :841855}         Signed Electronically by: Marion Davis NP  A copy of this evaluation report is provided to the requesting physician.    {Provider Resources  Preop [facility] Preop Guidelines  Revised Cardiac Risk Index :505655}   {Email feedback regarding this note to [email or web address]   :[id number]}

## 2025-05-08 NOTE — PATIENT INSTRUCTIONS
Assessment & Plan     The proposed surgical procedure is considered INTERMEDIATE risk.    1. Preop general physical exam (Primary)  ENT notes reviewed   - EKG 12-lead complete w/read - (Clinic Performed)  - Comprehensive metabolic panel; Future  - CBC with Platelets & Differential; Future    2. Chronic recurrent sinusitis    3. Type 2 diabetes mellitus without complication, with long-term current use of insulin (H)  Medications reviewed  - semaglutide (OZEMPIC) 2 MG/3ML pen; Inject 0.5 mg subcutaneously every 7 days.  Dispense: 3 mL; Refill: 1    4. Hyperlipidemia LDL goal <100    5. Benign essential hypertension    6. Mild recurrent major depression    7. Generalized anxiety disorder    8. Class 2 severe obesity due to excess calories with serious comorbidity and body mass index (BMI) of 35.0 to 35.9 in adult (H)  - semaglutide (OZEMPIC) 2 MG/3ML pen; Inject 0.5 mg subcutaneously every 7 days.  Dispense: 3 mL; Refill: 1    9. Tobacco use disorder  Consider cessation        Antiplatelet or Anticoagulation Medication Instructions   - aspirin: Discontinue aspirin 7 days prior to procedure to reduce bleeding risk. It should be resumed postoperatively.     Additional Medication Instructions   - Herbal medications and vitamins: DO NOT TAKE 14 days prior to surgery.   - ACE/ARB/ARNI (lisinopril, enalapril, losartan, valsartan, olmesartan, sacubritril/valsartan) : DO NOT TAKE on day of surgery (minimum 11 hours for general anesthesia).   - Statins (atorvastatin, simvastatin, pravastatin) : Continue taking on the day of surgery.    - SGLT2 Inhibitor (canagliflozin, dapagliflozin, or empagliflozin): DO NOT TAKE 3 days before surgery.    - Thiazolidinedione (e.g. rosiglitazone, pioglitazone): DO NOT TAKE day of surgery.   - GLP-1 Injectable (exenitide, liraglutide, semaglutide, dulaglutide, etc.): DO NOT TAKE 7 days before surgery     Marion Davis,   Certified Adult Nurse Practitioner  [phone]

## 2025-05-09 ENCOUNTER — RESULTS FOLLOW-UP (OUTPATIENT)
Dept: FAMILY MEDICINE | Facility: OTHER | Age: 55
End: 2025-05-09

## 2025-05-21 NOTE — PROGRESS NOTES
Lorena Alfaro is a 85 y.o. female    had concerns including Hypertension (chol) and Cholesterol Problem.    Vitals:    05/21/25 1101   BP: 130/70   BP Site: Left Upper Arm   Patient Position: Sitting   BP Cuff Size: Medium Adult   Pulse: 55   SpO2: 98%   Weight: 62.3 kg (137 lb 6.4 oz)   Height: 1.529 m (5' 0.2\")        Chest pain No        1. Have you been to the ER, urgent care clinic since your last visit?  Hospitalized since your last visit? no    2. Have you seen or consulted any other health care providers outside of the [facility] since your last visit?  Include any pap smears or colon screening. no     Pt here for session 2.     Blood pressure 133/98, pulse 108, resp. rate 18, height 1.829 m (6'), weight 123.3 kg (271 lb 12.8 oz), SpO2 97 %.  No weight loss since last visit.  Goal weight is 220#.      Current diabetes medications: Metformin  mg bid.  Pt was able to tolerate 1000 mg bid.     Current glucose levels:   [id number]  2 hours post [id number]  Two week average is 266.     Readiness to learn: very willing.     Barriers to learning: none.      Pt reports he is doing well with following low carbohydrate meal plan.  Verbal diet recall notes he is doing well.  Pt is somewhat frustrated with continued high glucose levels.  He is doing some walking 3-4x/week for 20-40 minutes - states he could do more.  He still is not working and this is a stress to him.  Pt is willing to try additional medication to lower glucose levels.  Discussed Victoza as pt desires weight loss also.  Reviewed action, contraindications, dosing, possible side effects and injection technique.  Request sent to provider and provider was agreeable.  Sample provided and order placed to pharmacy.     Pt actively participated and demonstrated adequate understanding of topics discussed.     Topics covered: evaluating BG self-test results & improving self-testing skills, meter maintenance & , causes & treatment for high & low blood sugar, sick day management skills, diabetes success plan - behavior change goals set, carbohydrate counting review, strategies for food selection when eating away from home and alcohol and diabetes.     Goals:      08/08/18 1600   OTHER   Date of initial Diabetes Education visit 07/25/18   Education Status Active   BEHAVIORAL OUTCOMES / GOALS   GOAL: Reducing Risks (Get glucose levels to target.  Pt thinks he can. )   EXAMS   Dilated eye exam completed within past 12 months No   Foot exam completed within past 12 months No   Dental exam completed within past 12 months Unknown   Additional  Therapies   Do you smoke? Yes   Were you counseled on smoking cessation? Yes   Are you on ASA therapy? Yes       Plan: Continue current meal planning efforts.  Try to increase walking for exercise.  Test glucose 2x/day - fasting and 2 hours post supper.  Continue current dose of Metformin XR.  Begin 0.6 mg Victoza daily.  If no side effects after one week increase to 1.2 mg daily.      Follow up: 1 month.      Total time spent with patient: 45 minutes.      Lela Mejia, MARGARETH, CDE

## 2025-05-23 ENCOUNTER — HOSPITAL ENCOUNTER (OUTPATIENT)
Facility: HOSPITAL | Age: 55
Discharge: HOME | End: 2025-05-23
Attending: OTOLARYNGOLOGY | Admitting: OTOLARYNGOLOGY
Payer: COMMERCIAL

## 2025-05-23 VITALS
RESPIRATION RATE: 16 BRPM | WEIGHT: 261.4 LBS | OXYGEN SATURATION: 97 % | DIASTOLIC BLOOD PRESSURE: 89 MMHG | HEART RATE: 86 BPM | HEIGHT: 72 IN | TEMPERATURE: 97.4 F | BODY MASS INDEX: 35.41 KG/M2 | SYSTOLIC BLOOD PRESSURE: 129 MMHG

## 2025-05-23 DIAGNOSIS — Z98.890 S/P FESS (FUNCTIONAL ENDOSCOPIC SINUS SURGERY): Primary | ICD-10-CM

## 2025-05-23 LAB
GLUCOSE BLDC GLUCOMTR-MCNC: 148 MG/DL (ref 70–99)
GLUCOSE BLDC GLUCOMTR-MCNC: 151 MG/DL (ref 70–99)

## 2025-05-23 PROCEDURE — 258N000003 HC RX IP 258 OP 636: Performed by: NURSE PRACTITIONER

## 2025-05-23 PROCEDURE — 30520 REPAIR OF NASAL SEPTUM: CPT | Performed by: OTOLARYNGOLOGY

## 2025-05-23 PROCEDURE — 710N000012 HC RECOVERY PHASE 2, PER MINUTE: Performed by: OTOLARYNGOLOGY

## 2025-05-23 PROCEDURE — 31259 NSL/SINS NDSC SPHN TISS RMVL: CPT | Mod: 50 | Performed by: OTOLARYNGOLOGY

## 2025-05-23 PROCEDURE — 710N000010 HC RECOVERY PHASE 1, LEVEL 2, PER MIN: Performed by: OTOLARYNGOLOGY

## 2025-05-23 PROCEDURE — 61782 SCAN PROC CRANIAL EXTRA: CPT | Performed by: OTOLARYNGOLOGY

## 2025-05-23 PROCEDURE — 31256 EXPLORATION MAXILLARY SINUS: CPT | Mod: 50 | Performed by: OTOLARYNGOLOGY

## 2025-05-23 PROCEDURE — C2625 STENT, NON-COR, TEM W/DEL SY: HCPCS | Performed by: OTOLARYNGOLOGY

## 2025-05-23 PROCEDURE — 31276 NSL/SINS NDSC FRNT TISS RMVL: CPT | Mod: 50 | Performed by: OTOLARYNGOLOGY

## 2025-05-23 PROCEDURE — C1726 CATH, BAL DIL, NON-VASCULAR: HCPCS | Performed by: OTOLARYNGOLOGY

## 2025-05-23 PROCEDURE — 370N000017 HC ANESTHESIA TECHNICAL FEE, PER MIN: Performed by: OTOLARYNGOLOGY

## 2025-05-23 PROCEDURE — 250N000024 HC ISOFLURANE, PER MIN: Performed by: OTOLARYNGOLOGY

## 2025-05-23 PROCEDURE — 250N000011 HC RX IP 250 OP 636: Performed by: OTOLARYNGOLOGY

## 2025-05-23 PROCEDURE — 250N000009 HC RX 250: Performed by: OTOLARYNGOLOGY

## 2025-05-23 PROCEDURE — 30930 THER FX NASAL INF TURBINATE: CPT | Performed by: OTOLARYNGOLOGY

## 2025-05-23 PROCEDURE — C9046 COCAINE HCL NASAL SOLUTION: HCPCS | Performed by: OTOLARYNGOLOGY

## 2025-05-23 PROCEDURE — 250N000011 HC RX IP 250 OP 636: Performed by: NURSE PRACTITIONER

## 2025-05-23 PROCEDURE — 999N000141 HC STATISTIC PRE-PROCEDURE NURSING ASSESSMENT: Performed by: OTOLARYNGOLOGY

## 2025-05-23 PROCEDURE — 82962 GLUCOSE BLOOD TEST: CPT

## 2025-05-23 PROCEDURE — 88305 TISSUE EXAM BY PATHOLOGIST: CPT | Mod: 26 | Performed by: PATHOLOGY

## 2025-05-23 PROCEDURE — 250N000011 HC RX IP 250 OP 636: Mod: JZ | Performed by: NURSE ANESTHETIST, CERTIFIED REGISTERED

## 2025-05-23 PROCEDURE — 250N000013 HC RX MED GY IP 250 OP 250 PS 637: Performed by: OTOLARYNGOLOGY

## 2025-05-23 PROCEDURE — 272N000001 HC OR GENERAL SUPPLY STERILE: Performed by: OTOLARYNGOLOGY

## 2025-05-23 PROCEDURE — 42831 REMOVAL OF ADENOIDS: CPT | Performed by: OTOLARYNGOLOGY

## 2025-05-23 PROCEDURE — 360N000076 HC SURGERY LEVEL 3, PER MIN: Performed by: OTOLARYNGOLOGY

## 2025-05-23 DEVICE — IMPLANT 50011- PROPEL CONTOUR
Type: IMPLANTABLE DEVICE | Site: NOSE | Status: FUNCTIONAL
Brand: PROPEL

## 2025-05-23 RX ORDER — PROCHLORPERAZINE MALEATE 10 MG
10 TABLET ORAL EVERY 6 HOURS PRN
Status: DISCONTINUED | OUTPATIENT
Start: 2025-05-23 | End: 2025-05-23 | Stop reason: HOSPADM

## 2025-05-23 RX ORDER — OXYCODONE HYDROCHLORIDE 5 MG/1
10 TABLET ORAL EVERY 4 HOURS PRN
Status: DISCONTINUED | OUTPATIENT
Start: 2025-05-23 | End: 2025-05-23 | Stop reason: HOSPADM

## 2025-05-23 RX ORDER — NALOXONE HYDROCHLORIDE 0.4 MG/ML
0.2 INJECTION, SOLUTION INTRAMUSCULAR; INTRAVENOUS; SUBCUTANEOUS
Status: DISCONTINUED | OUTPATIENT
Start: 2025-05-23 | End: 2025-05-23 | Stop reason: HOSPADM

## 2025-05-23 RX ORDER — LIDOCAINE 40 MG/G
CREAM TOPICAL
Status: DISCONTINUED | OUTPATIENT
Start: 2025-05-23 | End: 2025-05-23 | Stop reason: HOSPADM

## 2025-05-23 RX ORDER — AMOXICILLIN 250 MG
1 CAPSULE ORAL 2 TIMES DAILY
Status: DISCONTINUED | OUTPATIENT
Start: 2025-05-23 | End: 2025-05-23 | Stop reason: HOSPADM

## 2025-05-23 RX ORDER — NALOXONE HYDROCHLORIDE 0.4 MG/ML
0.4 INJECTION, SOLUTION INTRAMUSCULAR; INTRAVENOUS; SUBCUTANEOUS
Status: DISCONTINUED | OUTPATIENT
Start: 2025-05-23 | End: 2025-05-23 | Stop reason: HOSPADM

## 2025-05-23 RX ORDER — BISACODYL 10 MG
10 SUPPOSITORY, RECTAL RECTAL DAILY PRN
Status: DISCONTINUED | OUTPATIENT
Start: 2025-05-26 | End: 2025-05-23 | Stop reason: HOSPADM

## 2025-05-23 RX ORDER — LIDOCAINE HYDROCHLORIDE AND EPINEPHRINE 10; 10 MG/ML; UG/ML
INJECTION, SOLUTION INFILTRATION; PERINEURAL
Status: DISCONTINUED
Start: 2025-05-23 | End: 2025-05-23 | Stop reason: HOSPADM

## 2025-05-23 RX ORDER — COCAINE HYDROCHLORIDE 40 MG/ML
SOLUTION NASAL
Status: DISCONTINUED
Start: 2025-05-23 | End: 2025-05-23 | Stop reason: HOSPADM

## 2025-05-23 RX ORDER — ONDANSETRON 2 MG/ML
4 INJECTION INTRAMUSCULAR; INTRAVENOUS EVERY 30 MIN PRN
Status: DISCONTINUED | OUTPATIENT
Start: 2025-05-23 | End: 2025-05-23 | Stop reason: HOSPADM

## 2025-05-23 RX ORDER — TRIAMCINOLONE ACETONIDE 40 MG/ML
INJECTION, SUSPENSION INTRA-ARTICULAR; INTRAMUSCULAR PRN
Status: DISCONTINUED | OUTPATIENT
Start: 2025-05-23 | End: 2025-05-23 | Stop reason: HOSPADM

## 2025-05-23 RX ORDER — ONDANSETRON 4 MG/1
4 TABLET, ORALLY DISINTEGRATING ORAL EVERY 30 MIN PRN
Status: DISCONTINUED | OUTPATIENT
Start: 2025-05-23 | End: 2025-05-23 | Stop reason: HOSPADM

## 2025-05-23 RX ORDER — TRIAMCINOLONE ACETONIDE 40 MG/ML
INJECTION, SUSPENSION INTRA-ARTICULAR; INTRAMUSCULAR
Status: DISCONTINUED
Start: 2025-05-23 | End: 2025-05-23 | Stop reason: HOSPADM

## 2025-05-23 RX ORDER — HALOPERIDOL 5 MG/ML
2 INJECTION INTRAMUSCULAR
Status: DISCONTINUED | OUTPATIENT
Start: 2025-05-23 | End: 2025-05-23 | Stop reason: HOSPADM

## 2025-05-23 RX ORDER — NALOXONE HYDROCHLORIDE 0.4 MG/ML
0.1 INJECTION, SOLUTION INTRAMUSCULAR; INTRAVENOUS; SUBCUTANEOUS
Status: DISCONTINUED | OUTPATIENT
Start: 2025-05-23 | End: 2025-05-23 | Stop reason: HOSPADM

## 2025-05-23 RX ORDER — ONDANSETRON 2 MG/ML
4 INJECTION INTRAMUSCULAR; INTRAVENOUS EVERY 6 HOURS PRN
Status: DISCONTINUED | OUTPATIENT
Start: 2025-05-23 | End: 2025-05-23 | Stop reason: HOSPADM

## 2025-05-23 RX ORDER — LIDOCAINE HYDROCHLORIDE AND EPINEPHRINE 10; 10 MG/ML; UG/ML
INJECTION, SOLUTION INFILTRATION; PERINEURAL PRN
Status: DISCONTINUED | OUTPATIENT
Start: 2025-05-23 | End: 2025-05-23 | Stop reason: HOSPADM

## 2025-05-23 RX ORDER — CEFAZOLIN SODIUM 1 G/3ML
INJECTION, POWDER, FOR SOLUTION INTRAMUSCULAR; INTRAVENOUS
Status: DISCONTINUED
Start: 2025-05-23 | End: 2025-05-23 | Stop reason: HOSPADM

## 2025-05-23 RX ORDER — SODIUM CHLORIDE, SODIUM LACTATE, POTASSIUM CHLORIDE, CALCIUM CHLORIDE 600; 310; 30; 20 MG/100ML; MG/100ML; MG/100ML; MG/100ML
INJECTION, SOLUTION INTRAVENOUS CONTINUOUS
Status: DISCONTINUED | OUTPATIENT
Start: 2025-05-23 | End: 2025-05-23 | Stop reason: HOSPADM

## 2025-05-23 RX ORDER — ALBUTEROL SULFATE 0.83 MG/ML
2.5 SOLUTION RESPIRATORY (INHALATION) EVERY 4 HOURS PRN
Status: DISCONTINUED | OUTPATIENT
Start: 2025-05-23 | End: 2025-05-23 | Stop reason: HOSPADM

## 2025-05-23 RX ORDER — HYDROMORPHONE HYDROCHLORIDE 1 MG/ML
0.5 INJECTION, SOLUTION INTRAMUSCULAR; INTRAVENOUS; SUBCUTANEOUS EVERY 5 MIN PRN
Status: DISCONTINUED | OUTPATIENT
Start: 2025-05-23 | End: 2025-05-23 | Stop reason: HOSPADM

## 2025-05-23 RX ORDER — ONDANSETRON 4 MG/1
4 TABLET, ORALLY DISINTEGRATING ORAL EVERY 6 HOURS PRN
Status: DISCONTINUED | OUTPATIENT
Start: 2025-05-23 | End: 2025-05-23 | Stop reason: HOSPADM

## 2025-05-23 RX ORDER — PREDNISONE 20 MG/1
TABLET ORAL
Qty: 4 TABLET | Refills: 0 | Status: SHIPPED | OUTPATIENT
Start: 2025-05-24

## 2025-05-23 RX ORDER — TRANEXAMIC ACID 10 MG/ML
1 INJECTION, SOLUTION INTRAVENOUS ONCE
Status: COMPLETED | OUTPATIENT
Start: 2025-05-23 | End: 2025-05-23

## 2025-05-23 RX ORDER — HYDRALAZINE HYDROCHLORIDE 20 MG/ML
2.5-5 INJECTION INTRAMUSCULAR; INTRAVENOUS EVERY 10 MIN PRN
Status: DISCONTINUED | OUTPATIENT
Start: 2025-05-23 | End: 2025-05-23 | Stop reason: HOSPADM

## 2025-05-23 RX ORDER — POLYETHYLENE GLYCOL 3350 17 G/17G
17 POWDER, FOR SOLUTION ORAL DAILY
Status: DISCONTINUED | OUTPATIENT
Start: 2025-05-24 | End: 2025-05-23 | Stop reason: HOSPADM

## 2025-05-23 RX ORDER — FENTANYL CITRATE 50 UG/ML
50 INJECTION, SOLUTION INTRAMUSCULAR; INTRAVENOUS EVERY 5 MIN PRN
Status: DISCONTINUED | OUTPATIENT
Start: 2025-05-23 | End: 2025-05-23 | Stop reason: HOSPADM

## 2025-05-23 RX ORDER — DIAZEPAM 10 MG/2ML
2.5 INJECTION, SOLUTION INTRAMUSCULAR; INTRAVENOUS
Status: DISCONTINUED | OUTPATIENT
Start: 2025-05-23 | End: 2025-05-23 | Stop reason: HOSPADM

## 2025-05-23 RX ORDER — HYDROMORPHONE HYDROCHLORIDE 1 MG/ML
0.25 INJECTION, SOLUTION INTRAMUSCULAR; INTRAVENOUS; SUBCUTANEOUS EVERY 5 MIN PRN
Status: DISCONTINUED | OUTPATIENT
Start: 2025-05-23 | End: 2025-05-23 | Stop reason: HOSPADM

## 2025-05-23 RX ORDER — COCAINE HYDROCHLORIDE 40 MG/ML
SOLUTION NASAL PRN
Status: DISCONTINUED | OUTPATIENT
Start: 2025-05-23 | End: 2025-05-23 | Stop reason: HOSPADM

## 2025-05-23 RX ORDER — ACETAMINOPHEN 325 MG/1
975 TABLET ORAL EVERY 8 HOURS
Status: DISCONTINUED | OUTPATIENT
Start: 2025-05-23 | End: 2025-05-23 | Stop reason: HOSPADM

## 2025-05-23 RX ORDER — OXYMETAZOLINE HYDROCHLORIDE 0.05 G/100ML
2 SPRAY NASAL
Status: COMPLETED | OUTPATIENT
Start: 2025-05-23 | End: 2025-05-23

## 2025-05-23 RX ORDER — OXYCODONE HYDROCHLORIDE 5 MG/1
5 TABLET ORAL EVERY 4 HOURS PRN
Status: DISCONTINUED | OUTPATIENT
Start: 2025-05-23 | End: 2025-05-23 | Stop reason: HOSPADM

## 2025-05-23 RX ORDER — OXYCODONE HYDROCHLORIDE 5 MG/1
5 TABLET ORAL EVERY 6 HOURS PRN
Qty: 5 TABLET | Refills: 0 | Status: SHIPPED | OUTPATIENT
Start: 2025-05-23 | End: 2025-05-26

## 2025-05-23 RX ORDER — FENTANYL CITRATE 50 UG/ML
25 INJECTION, SOLUTION INTRAMUSCULAR; INTRAVENOUS EVERY 5 MIN PRN
Status: DISCONTINUED | OUTPATIENT
Start: 2025-05-23 | End: 2025-05-23 | Stop reason: HOSPADM

## 2025-05-23 RX ADMIN — FENTANYL CITRATE 25 MCG: 50 INJECTION INTRAMUSCULAR; INTRAVENOUS at 15:08

## 2025-05-23 RX ADMIN — AMISULPRIDE 10 MG: 2.5 INJECTION, SOLUTION INTRAVENOUS at 15:18

## 2025-05-23 RX ADMIN — OXYMETAZOLINE HYDROCHLORIDE 2 SPRAY: 0.05 SOLUTION NASAL at 10:21

## 2025-05-23 RX ADMIN — ACETAMINOPHEN 975 MG: 325 TABLET, FILM COATED ORAL at 15:55

## 2025-05-23 RX ADMIN — ONDANSETRON 4 MG: 2 INJECTION INTRAMUSCULAR; INTRAVENOUS at 14:54

## 2025-05-23 RX ADMIN — OXYMETAZOLINE HYDROCHLORIDE 2 SPRAY: 0.05 SOLUTION NASAL at 10:31

## 2025-05-23 RX ADMIN — OXYMETAZOLINE HYDROCHLORIDE 2 SPRAY: 0.05 SOLUTION NASAL at 10:11

## 2025-05-23 RX ADMIN — SODIUM CHLORIDE, SODIUM LACTATE, POTASSIUM CHLORIDE, AND CALCIUM CHLORIDE: .6; .31; .03; .02 INJECTION, SOLUTION INTRAVENOUS at 10:03

## 2025-05-23 RX ADMIN — OXYCODONE HYDROCHLORIDE 5 MG: 5 TABLET ORAL at 15:55

## 2025-05-23 ASSESSMENT — ACTIVITIES OF DAILY LIVING (ADL)
ADLS_ACUITY_SCORE: 25

## 2025-05-23 NOTE — DISCHARGE INSTRUCTIONS
Instructions for Sinus Surgery    Recovery - Everyone recovers differently from a general anesthetic.  Symptoms such as fatigue, nausea, light-headedness, and sometimes a low grade fever (up to 100 degrees) are not unusual.  As your body removes the anesthetic drugs from circulation, these symptoms will resolve.  Your nose will be sore after surgery, and you may even have symptoms similar to a sinus infection with headache, congestion, and pressure.  These will resolve with healing.  For several days you may experience bloody drainage from the nose, please use the drip pad as necessary for this.  If there is persistent bleeding, please call the office during business hours or the on call ENT physician after hours.  There are no diet restrictions after sinus surgery, and you can resume your home medications.      Please do not blow your nose until 2 weeks after surgery.       Limit your activity to no strenuous activities until I see you for the first follow-up visit in approximately 2 weeks.      Medications - You were sent home with narcotic pain medication.  If you can tolerate the discomfort during your recovery by using just plain Tylenol or ibuprofen (advil), please do so.  However, do not hesitate to use the stronger pain medication if needed.  If you were sent home with an antibiotic, it is primarily used to help the healing process.  If it causes loose bowel movements or other signs of intolerance, it is appropriate to discontinue it.  By far the most important measure you can take to speed recovery, and maximize the chances of long term success of sinus surgery is using the sinus rinses at least three time per day for the first month after surgery.       Start budesonide irrigations tomorrow.  Use 2 times daily for one month and then as directed.  Start Shiraz Med saline irrigation tonight and use at least 3 times daily.   Continue twice daily budesonide irrigations and 2-3 times daily NeilMed saline  irrigations for 1 month and then as directed by Dr. Hand    Budesonide instructions  Make the saline solution using 2 packages of salt and previously boiled or distilled water.  This will make 240 ml of saline solution.  Mix the entire vial of budesonide into the solution.   Irrigate your nose 2 times a day with the warm budesonide solution using 1 bottle between nostrils in the morning, and one bottle at night.   Use plain izzy med saline without the steroid 2-3 times during the afternoon    Perform gentle irrigation for the first week.  Starting 1 week after surgery, you can start to irrigate a bit more forcefully.  The more often you irrigate with the izzy med saline, the faster you will heal.      At 3 weeks after surgery you may restart nasal steroids if needed (flonase, nasonex, etc).      Complications - Problems related to sinus surgery almost always are detected during the operation, and special instruction will be given in that situation.  However, unexpected things can happen, and are all related to the structures around the sinus cavities.  Symptoms that should alert you to a possible problem include: severe eye pain or eye swelling, persistent heavy bleeding from the nose, and high fevers with headache and neck pain.  Any of these symptoms should be called into my office or to the on call ENT if after hours.  The most common non-emergency complication of sinus surgery is the formation of scar tissue which can re-block the sinuses.  This is addressed below.    Follow-up - see Martita Mckee in 2 weeks.  See Dr. Hand in 4-6 weeks.   Please prepare for these visits by using your sinus rinses.    If there are any questions or issues with the above, or if there are other issues that concern you, always feel free to call the clinic and I am happy to speak with you as soon as I can.    Noelle Hand D.O.  Otolaryngology/Head and Neck Surgery  Allergy    [phone]   extension  [phone]

## 2025-05-23 NOTE — OR NURSING
Patient and responsible adult given discharge instructions with no questions regarding instructions. Shabbir score 20/20. Pain level 2/10.  Discharged from unit via wheelchair. Patient discharged to home with family.

## 2025-05-23 NOTE — OP NOTE
Otolaryngology Operative Note     Pre-op Diagnosis: Chronic pansinusitis, bilateral inferior turbinate hypertrophy, deviated nasal septum, chronic adenoiditis, adenoid hypertrophy  Post-op Diagnosis:  same    Procedures:    1.  Bilateral draf 2a frontal sinusotomy  2.  Bilateral total ethmoidectomy  3.  Bilateral maxillary antrostomy with tissue removal  4.  Septoplasty  5.  Adenoidectomy   6.  Bilateral submucous reduction inferior turbinates  7.  Bilateral excision pritesh bullosa    Sinus procedures performed with [employer] navigation  Surgeon:  Noelle Hand D.O.  Anesthesia:  General endotracheal  EBL:  25 ml  Findings: small polyps and diffuse moderate polypoid degeneration throughout the ethmoid cavity and frontal recess.  Purulence right frontal recess and scattered microabscesses right anterior ethmoid and frontal recess  Complications:  none  Condition:  stable     Description of the Procedure  After surgical consent was obtained the patient was brought back to the operating room and laid in a comfortable and supine position.  The patient was administered a general anesthetic by a member of anesthesia.  The table was turned 180 degrees.  The patient was draped in the normal clean fashion and a timeout was taken.  The bed was placed into reverse Trendelenburg positioning.  A timeout was taken.  Cocaine pledgets were placed into the nares for several minutes and removed.  The lateral nasal wall, middle turbinates, inferior turbinates and septum were anesthetized with 1% lidocaine with 1-100,000 epinephrine.  Surgical loopes were worn throughout the adenoidectomy.  I suspended the patient from the [place] stand using a Gato-Shoaib mouthgag.  The palate was visualized and palpated.  There is no bifid uvula, submucous cleft or cleft palate.  slipped two small soft catheter through the nares out of the mouth to retract the soft palate forward. After I did this, I inspected the nasopharynx with a mirror.  The patient had adenoid tissue filling the nasopharynx.  Coblation adenenoidectomy was performed at a setting of high coblation.  I slowly made my way up the back wall of the nasopharynx until I reached the posterior nasal choanae bilaterally. Adenoid tissue was cleared to the posterior nasal choanae bilaterally and had an unobstructed view of the posterior nasal cavity, and the adenoidectomy was complete.  Passavants ridge was preserved, the eustachian tube mucosa was preserved bilaterally.  Hemostasis was achieved with scant use of coagulation. I removed the catheters from the nares.  The nares were irrigated and gently suctioned.  The mouth gag was carefully removed and the stomach was suctioned with a [place] sump.  The patient was handed back over to anesthesia, awakened and brought to the recovery room in stable condition having tolerated the procedure well.  I proceeded with the submucosal reduction of the inferior turbinates. Under endoscopy, I used a 2mm inferior turbinate blade and started on the left side. I made a stab incision at the anterior insertion of the left inferior turbinate and raised a submucoperiosteal tunnel along the medial surface of the left inferior turbinate bone. I then slowly withdrew the shaver blade as I ran the shaver to perform my submucous resection.  Careful attention was turned to the area of the internal nasal valve for complete reduction.  The turbinate was then outfractured with a [place].  I then performed the same procedure on the right side in a similar fashion.    I used a 30 degree endoscope and entered the left nares.  I made a hemitransfixion incision with a 15 blade and elevated a mucoperichondrial and periosteal flap with a suction [place].  The contralateral cartilage was incised leaving over 2 cm of an L strut and I dissected the contralateral mucoperichondrial and periosteal flap.  I used a V chisel and Blakesley forceps to remove the bony obstruction at the level of  the vomer.  The septum was bluntly medialized and its intact and midline.  There is an obstructive left pritesh bullosa removed with a sickle blade and consul scissors.  Small polyps were removed at the lateral nasal wall and at the superior attachment of the middle turbinate with Blakesley forceps.  Next I identified the uncinate which was reflected anterior I used a backbiting Blakesley to remove the left uncinate.  The natural ostia was identified and enlarged with Ryan-Cut forceps.  The mucosa has mild polypoid degeneration.  The ethmoid bulla was entered inferior and medial it is polypoid.  The bulla was removed with the microdebrider.  The lamina was identified and preserved throughout and I removed polyps and bony septations with Ryan-Cut Blakesley forceps completing the anterior ethmoidectomy.  The ground lamella was entered inferior and medial to the skull base was identified and operated in a posterior to anterior fashion similarly removing polyps and bony septations.  I turned my attention to the frontal recess and use giraffe forceps to remove polyps.  The frontal sinus was identified with a curved suction and entered with the balloon.  Bony septations and polyps were removed with giraffe forceps opening the frontal sinus from the lamina to the septum.  The sinus was dilated the balloon was deflated and the sinuses were irrigated with Ancef saline and suctioned clear.  Hemostasis is achieved using scant use of suction cautery and is adequate.    Next I turned my attention to the right side and similarly performed excision of the right pritesh bullosa, right maxillary antrostomy with tissue removal, right total ethmoidectomy and draf 2a frontal sinusotomy.  Similar findings of polyps and moderate polypoid degeneration throughout.  In addition on the right there are several small microabscesses at the right anterior ethmoid cavity and frontal recess.  The sinuses were irrigated throughout with Ancef saline and  are clear.  Hemostasis was achieved with scant use of silver nitrate and is adequate.  NasoPore soaked with Kenalog was inserted into the lateral nasal walls.  He was handed back over to anesthesia was awakened and brought to the recovery in stable condition having tolerated the procedure well.     0.21

## 2025-05-28 ENCOUNTER — TELEPHONE (OUTPATIENT)
Dept: OTOLARYNGOLOGY | Facility: OTHER | Age: 55
End: 2025-05-28

## 2025-05-28 NOTE — TELEPHONE ENCOUNTER
Patient left a voicemail that he has gotten a cold and is wondering what he can take due to having surgery last week.  Please call him back [phone]

## 2025-05-30 ENCOUNTER — OFFICE VISIT (OUTPATIENT)
Dept: OTOLARYNGOLOGY | Facility: OTHER | Age: 55
End: 2025-05-30
Attending: NURSE PRACTITIONER
Payer: COMMERCIAL

## 2025-05-30 VITALS
RESPIRATION RATE: 16 BRPM | HEART RATE: 112 BPM | TEMPERATURE: 98.1 F | SYSTOLIC BLOOD PRESSURE: 119 MMHG | WEIGHT: 261.47 LBS | BODY MASS INDEX: 35.41 KG/M2 | HEIGHT: 72 IN | OXYGEN SATURATION: 98 % | DIASTOLIC BLOOD PRESSURE: 72 MMHG

## 2025-05-30 DIAGNOSIS — G89.18 ACUTE POST-OPERATIVE PAIN: ICD-10-CM

## 2025-05-30 DIAGNOSIS — Z98.890 S/P FESS (FUNCTIONAL ENDOSCOPIC SINUS SURGERY): Primary | ICD-10-CM

## 2025-05-30 PROCEDURE — 3078F DIAST BP <80 MM HG: CPT | Performed by: NURSE PRACTITIONER

## 2025-05-30 PROCEDURE — 3074F SYST BP LT 130 MM HG: CPT | Performed by: NURSE PRACTITIONER

## 2025-05-30 PROCEDURE — 1125F AMNT PAIN NOTED PAIN PRSNT: CPT | Performed by: NURSE PRACTITIONER

## 2025-05-30 PROCEDURE — 99024 POSTOP FOLLOW-UP VISIT: CPT | Performed by: NURSE PRACTITIONER

## 2025-05-30 RX ORDER — LIDOCAINE 40 MG/G
CREAM TOPICAL
Qty: 5 G | Refills: 1 | Status: SHIPPED | OUTPATIENT
Start: 2025-05-30

## 2025-05-30 RX ORDER — OXYCODONE HYDROCHLORIDE 5 MG/1
5 TABLET ORAL EVERY 6 HOURS PRN
Qty: 10 TABLET | Refills: 0 | Status: SHIPPED | OUTPATIENT
Start: 2025-05-30

## 2025-05-30 ASSESSMENT — PAIN SCALES - GENERAL: PAINLEVEL_OUTOF10: SEVERE PAIN (7)

## 2025-05-30 NOTE — PROGRESS NOTES
Otolaryngology Note         Chief Complaint:     Patient presents with:  Surgical Followup: S/P 05/23 FESS/ Turbinate reduction/ septoplasty/ NVR/ Adenoidectomy           History of Present Illness:     Rojelio Juárez is a 54 year old male who presents today for his first post op FESS appointment.  He has been having significant headache and facial pressure with burning sensation in the anterior nasal cavity.   There has been no fever, chills, large amounts of bleeding, visual changes or neck pain.   Has been rinsing as indicated    He has been taking oxycontin and tylenol for pain.      He had a fever in the middle of the night on Wednesday, no fever since.   There is a viral URI going through the house with similar symptoms.          Surgical Details:      Otolaryngology Operative Note      Pre-op Diagnosis: Chronic pansinusitis, bilateral inferior turbinate hypertrophy, deviated nasal septum, chronic adenoiditis, adenoid hypertrophy  Post-op Diagnosis:  same     Procedures:    1.  Bilateral draf 2a frontal sinusotomy  2.  Bilateral total ethmoidectomy  3.  Bilateral maxillary antrostomy with tissue removal  4.  Septoplasty  5.  Adenoidectomy   6.  Bilateral submucous reduction inferior turbinates  7.  Bilateral excision pritesh bullosa     Sinus procedures performed with [employer] navigation  Surgeon:  Noelle Hand D.O.  Anesthesia:  General endotracheal  EBL:  25 ml  Findings: small polyps and diffuse moderate polypoid degeneration throughout the ethmoid cavity and frontal recess.  Purulence right frontal recess and scattered microabscesses right anterior ethmoid and frontal recess  Complications:  none  Condition:  stable          Medications:     Current Outpatient Rx   Medication Sig Dispense Refill    albuterol (PROAIR HFA/PROVENTIL HFA/VENTOLIN HFA) 108 (90 Base) MCG/ACT inhaler Inhale 2 puffs into the lungs every 6 hours. 18 g 3    aspirin 81 MG EC tablet Take 1 tablet (81 mg) by mouth daily 90  tablet 3    atorvastatin (LIPITOR) 10 MG tablet Take 1 tablet (10 mg) by mouth at bedtime. 90 tablet 3    azelastine (ASTELIN) 0.1 % nasal spray Spray 2 sprays into both nostrils 2 times daily. 30 mL 12    benzonatate (TESSALON) 200 MG capsule Take 1 capsule (200 mg) by mouth 3 times daily as needed for cough 30 capsule 0    blood glucose (NO BRAND SPECIFIED) lancets standard Use to test blood sugar 3 times daily or as directed. 300 each 3    blood glucose (NO BRAND SPECIFIED) test strip Use to test blood sugar 3 times daily or as directed. 300 strip 3    blood glucose monitoring (NO BRAND SPECIFIED) meter device kit Use to test blood sugar 3 times daily or as directed. 1 kit 0    budesonide (PULMICORT) 0.5 MG/2ML neb solution Squirt entire vial into izzy med saline solution, mix, and irrigate each nostril until entire bottle empty.  Do this twice daily. 200 mL 11    budesonide (PULMICORT) 0.5 MG/2ML neb solution Mix 240 ml Izzy Med Sinus rinse, add 0.5 mg budesonide vial, rinse 1/2 bottle in each nostril twice daily 180 mL 4    empagliflozin (JARDIANCE) 25 MG TABS tablet Take 1 tablet (25 mg) by mouth daily. 90 tablet 1    ibuprofen (ADVIL/MOTRIN) 800 MG tablet Take 1 tablet (800 mg) by mouth every 8 hours as needed for moderate pain With food 90 tablet 1    lidocaine (LMX4) 4 % external cream Apply a thin layer to bilateral nostrils every 2-3 hours as needed for pain 5 g 1    losartan (COZAAR) 25 MG tablet Take 1 tablet (25 mg) by mouth daily 90 tablet 2    oxyCODONE (ROXICODONE) 5 MG tablet Take 1 tablet (5 mg) by mouth every 6 hours as needed for pain. 10 tablet 0    pioglitazone (ACTOS) 30 MG tablet Take 1 tablet (30 mg) by mouth daily. 90 tablet 1    predniSONE (DELTASONE) 10 MG tablet Take 3 tabs after breakfast starting 2 days prior to sinus surgery 6 tablet 0    predniSONE (DELTASONE) 20 MG tablet 20 mg in the morning for days 1-3, then 10 mg (half tab) days 4 and 5 4 tablet 0    semaglutide (OZEMPIC) 2  MG/3ML pen Inject 0.5 mg subcutaneously every 7 days. 3 mL 1            Allergies:     Allergies: Succinylcholine, Prednisone, Metformin, Ozempic (0.25 or 0.5 mg-dose) [semaglutide(0.25 or 0.5mg-dos)], and Sertraline          Past Medical History:     Past Medical History:   Diagnosis Date    Anxiety 08/15/2017    Benign essential hypertension 2017    Diabetes (H)     Fatty infiltration of liver 2018    Herniated intervertebral disc of lumbar spine 2017    Pseudocholinesterase deficiency     Tobacco dependence syndrome 2017            Past Surgical History:     Past Surgical History:   Procedure Laterality Date    ADENOIDECTOMY N/A 2025    Procedure: Adenoidectomy;  Surgeon: Noelle Hand MD;  Location: [facility] OR    BACK SURGERY      COLONOSCOPY N/A 2023    Procedure: COLONOSCOPY with polypectomy, biopsy, resolution clipping;  Surgeon: Jimbo Covarrubias MD;  Location: [facility] OR    ENDOSCOPIC SINUS SURGERY Bilateral 2025    Procedure: BILATERAL ENDOSCOPIC SINUS SURGERY;  Surgeon: Noelle Hand MD;  Location: [facility]    ESOPHAGEAL BALLOON PROVOCATION STUDY N/A 2021    Procedure: Esophageal Balloon Provocation Study;  Surgeon: Danial Serrano DO;  Location:  GI    ESOPHAGOSCOPY, GASTROSCOPY, DUODENOSCOPY (EGD), COMBINED N/A 2021    Procedure: ESOPHAGOGASTRODUODENOSCOPY, WITH BIOPSY;  Surgeon: Danial Serrano DO;  [facility]: [facility]    SEPTOPLASTY, TURBINOPLASTY, COMBINED Bilateral 2025    Procedure: septoplasty and bilateral Turbinate Reducion, Excision Bilateral Mini Bullosa;  Surgeon: Noelle Hand MD;  Location: [facility] OR            Social History:     Social History     Tobacco Use    Smoking status: Former     Current packs/day: 0.00     Average packs/day: 0.8 packs/day for 38.9 years (29.2 ttl pk-yrs)     Types: Cigarettes     Start date: 1984     Quit date: 2022     Years since quittin.4     Passive exposure: Past    Smokeless  "tobacco: Never   Vaping Use    Vaping status: Never Used   Substance Use Topics    Alcohol use: Yes     Comment: socail    Drug use: No            Review of Systems:     ROS: See HPI         Physical Exam:     /72 (BP Location: Right arm, Patient Position: Sitting, Cuff Size: Adult Large)   Pulse 112   Temp 98.1  F (36.7  C) (Tympanic)   Resp 16   Ht 1.829 m (6' 0.01\")   Wt 118.6 kg (261 lb 7.5 oz)   SpO2 98%   BMI 35.45 kg/m      General - The patient is well nourished and well developed, and appears to have good nutritional status.  Alert and oriented to person and place, answers questions and cooperates with examination appropriately.   Head and Face - Normocephalic and atraumatic, with no gross asymmetry noted.  The facial nerve is intact, with strong symmetric movements.  Voice and Breathing - The patient was breathing comfortably without the use of accessory muscles. There was no wheezing, stridor. The patients voice was clear and strong, and had appropriate pitch and quality.  Ears - External ear normal. Canals are patent. Right tympanic membrane is intact without effusion, retraction or mass. Left tympanic membrane is intact without effusion, retraction or mass.  Eyes - Extraocular movements intact, and the pupils were reactive to light. Sclera were not icteric or injected, conjunctiva were pink and moist.   Nose - External contour is symmetric, no gross deflection or scars.  Nasal mucosa is pink and moist with no abnormal mucus.      To evaluate the nose and sinuses in the post operative state, I performed rigid nasal endoscopy.  I sprayed both nares with 2 sprays lidocaine and neosynephrine.     I began with the RIGHT side using a 0 degree rigid nasal endoscope, and then similarly examined the LEFT side     Findings: septum grossly midline and intact  Inferior turbinates:  lateralized, normal post operative secretions suction debrided with #8 shay NIETO - josh post operative secretions " minimally suction debrided, no purulence or polypoid change.  Antrostomies are not visualized due to packing that was not debrided.   The patient tolerated the procedure well            Assessment and Plan:       ICD-10-CM    1. S/P FESS (functional endoscopic sinus surgery)  Z98.890 lidocaine 2%-oxymetazoline 0.025% nasal solution 2 spray      2. Acute post-operative pain  G89.18 oxyCODONE (ROXICODONE) 5 MG tablet     lidocaine (LMX4) 4 % external cream        I sent a refill of oxycodone   Use lidocaine cream to the anterior septum as needed for burning sensation, use as prescribed  Ok to use ibuprofen as directed  Continue post operative instructions  Keep scheduled follow up with Dr Hand for second post operative exam  Continue izzy med saline rinses 5 times per day    Vanessa Terrell NP-C  [facility]

## 2025-05-30 NOTE — PATIENT INSTRUCTIONS
More Oxycodone sent to pharmacy  Can start taking ibuprofen  Follow-up as scheduled  Continue to rinse    Thank you for allowing Vanessa Terrell and our ENT team to participate in your care.  If your medications are too expensive, please give the nurse a call.  We can possibly change this medication.  If you have a scheduling or an appointment question please contact our Health Unit Coordinator at their direct line [phone]  ALL nursing questions or concerns can be directed to your ENT nurse at: [phone] - [place]

## 2025-05-30 NOTE — LETTER
5/30/2025      Rojelio Juárez  [address] [place] [id number]      Dear Colleague,    Thank you for referring your patient, Rojelio Juárez, to the [facility]. Please see a copy of my visit note below.      Otolaryngology Note         Chief Complaint:     Patient presents with:  Surgical Followup: S/P 05/23 FESS/ Turbinate reduction/ septoplasty/ NVR/ Adenoidectomy           History of Present Illness:     Rojelio Juárez is a 54 year old male who presents today for his first post op FESS appointment.  He has been having significant headache and facial pressure with burning sensation in the anterior nasal cavity.   There has been no fever, chills, large amounts of bleeding, visual changes or neck pain.   Has been rinsing as indicated    He has been taking oxycontin and tylenol for pain.      He had a fever in the middle of the night on Wednesday, no fever since.   There is a viral URI going through the house with similar symptoms.          Surgical Details:      Otolaryngology Operative Note      Pre-op Diagnosis: Chronic pansinusitis, bilateral inferior turbinate hypertrophy, deviated nasal septum, chronic adenoiditis, adenoid hypertrophy  Post-op Diagnosis:  same     Procedures:    1.  Bilateral draf 2a frontal sinusotomy  2.  Bilateral total ethmoidectomy  3.  Bilateral maxillary antrostomy with tissue removal  4.  Septoplasty  5.  Adenoidectomy   6.  Bilateral submucous reduction inferior turbinates  7.  Bilateral excision pritesh bullosa     Sinus procedures performed with [employer] navigation  Surgeon:  Noelle Hand D.O.  Anesthesia:  General endotracheal  EBL:  25 ml  Findings: small polyps and diffuse moderate polypoid degeneration throughout the ethmoid cavity and frontal recess.  Purulence right frontal recess and scattered microabscesses right anterior ethmoid and frontal recess  Complications:  none  Condition:  stable          Medications:     Current Outpatient Rx    Medication Sig Dispense Refill     albuterol (PROAIR HFA/PROVENTIL HFA/VENTOLIN HFA) 108 (90 Base) MCG/ACT inhaler Inhale 2 puffs into the lungs every 6 hours. 18 g 3     aspirin 81 MG EC tablet Take 1 tablet (81 mg) by mouth daily 90 tablet 3     atorvastatin (LIPITOR) 10 MG tablet Take 1 tablet (10 mg) by mouth at bedtime. 90 tablet 3     azelastine (ASTELIN) 0.1 % nasal spray Spray 2 sprays into both nostrils 2 times daily. 30 mL 12     benzonatate (TESSALON) 200 MG capsule Take 1 capsule (200 mg) by mouth 3 times daily as needed for cough 30 capsule 0     blood glucose (NO BRAND SPECIFIED) lancets standard Use to test blood sugar 3 times daily or as directed. 300 each 3     blood glucose (NO BRAND SPECIFIED) test strip Use to test blood sugar 3 times daily or as directed. 300 strip 3     blood glucose monitoring (NO BRAND SPECIFIED) meter device kit Use to test blood sugar 3 times daily or as directed. 1 kit 0     budesonide (PULMICORT) 0.5 MG/2ML neb solution Squirt entire vial into izzy med saline solution, mix, and irrigate each nostril until entire bottle empty.  Do this twice daily. 200 mL 11     budesonide (PULMICORT) 0.5 MG/2ML neb solution Mix 240 ml Izzy Med Sinus rinse, add 0.5 mg budesonide vial, rinse 1/2 bottle in each nostril twice daily 180 mL 4     empagliflozin (JARDIANCE) 25 MG TABS tablet Take 1 tablet (25 mg) by mouth daily. 90 tablet 1     ibuprofen (ADVIL/MOTRIN) 800 MG tablet Take 1 tablet (800 mg) by mouth every 8 hours as needed for moderate pain With food 90 tablet 1     lidocaine (LMX4) 4 % external cream Apply a thin layer to bilateral nostrils every 2-3 hours as needed for pain 5 g 1     losartan (COZAAR) 25 MG tablet Take 1 tablet (25 mg) by mouth daily 90 tablet 2     oxyCODONE (ROXICODONE) 5 MG tablet Take 1 tablet (5 mg) by mouth every 6 hours as needed for pain. 10 tablet 0     pioglitazone (ACTOS) 30 MG tablet Take 1 tablet (30 mg) by mouth daily. 90 tablet 1     predniSONE  (DELTASONE) 10 MG tablet Take 3 tabs after breakfast starting 2 days prior to sinus surgery 6 tablet 0     predniSONE (DELTASONE) 20 MG tablet 20 mg in the morning for days 1-3, then 10 mg (half tab) days 4 and 5 4 tablet 0     semaglutide (OZEMPIC) 2 MG/3ML pen Inject 0.5 mg subcutaneously every 7 days. 3 mL 1            Allergies:     Allergies: Succinylcholine, Prednisone, Metformin, Ozempic (0.25 or 0.5 mg-dose) [semaglutide(0.25 or 0.5mg-dos)], and Sertraline          Past Medical History:     Past Medical History:   Diagnosis Date     Anxiety 08/15/2017     Benign essential hypertension 01/20/2017     Diabetes (H)      Fatty infiltration of liver 11/21/2018     Herniated intervertebral disc of lumbar spine 01/20/2017     Pseudocholinesterase deficiency      Tobacco dependence syndrome 01/20/2017            Past Surgical History:     Past Surgical History:   Procedure Laterality Date     ADENOIDECTOMY N/A 5/23/2025    Procedure: Adenoidectomy;  Surgeon: Noelle Hand MD;  Location: [facility]     BACK SURGERY       COLONOSCOPY N/A 2/23/2023    Procedure: COLONOSCOPY with polypectomy, biopsy, resolution clipping;  Surgeon: Jimbo Covarrubias MD;  Location: [facility]     ENDOSCOPIC SINUS SURGERY Bilateral 5/23/2025    Procedure: BILATERAL ENDOSCOPIC SINUS SURGERY;  Surgeon: Noelle Hand MD;  Location: [facility]     ESOPHAGEAL BALLOON PROVOCATION STUDY N/A 11/9/2021    Procedure: Esophageal Balloon Provocation Study;  Surgeon: Danial Serrano DO;  Location:  GI     ESOPHAGOSCOPY, GASTROSCOPY, DUODENOSCOPY (EGD), COMBINED N/A 11/9/2021    Procedure: ESOPHAGOGASTRODUODENOSCOPY, WITH BIOPSY;  Surgeon: Danial Serrano DO;  Location: [facility]     SEPTOPLASTY, TURBINOPLASTY, COMBINED Bilateral 5/23/2025    Procedure: septoplasty and bilateral Turbinate Reducion, Excision Bilateral Mini Bullosa;  Surgeon: Noelle Hand MD;  Location: [facility]            Social History:     Social History     Tobacco Use      "Smoking status: Former     Current packs/day: 0.00     Average packs/day: 0.8 packs/day for 38.9 years (29.2 ttl pk-yrs)     Types: Cigarettes     Start date: 1984     Quit date: 2022     Years since quittin.4     Passive exposure: Past     Smokeless tobacco: Never   Vaping Use     Vaping status: Never Used   Substance Use Topics     Alcohol use: Yes     Comment: socail     Drug use: No            Review of Systems:     ROS: See HPI         Physical Exam:     /72 (BP Location: Right arm, Patient Position: Sitting, Cuff Size: Adult Large)   Pulse 112   Temp 98.1  F (36.7  C) (Tympanic)   Resp 16   Ht 1.829 m (6' 0.01\")   Wt 118.6 kg (261 lb 7.5 oz)   SpO2 98%   BMI 35.45 kg/m      General - The patient is well nourished and well developed, and appears to have good nutritional status.  Alert and oriented to person and place, answers questions and cooperates with examination appropriately.   Head and Face - Normocephalic and atraumatic, with no gross asymmetry noted.  The facial nerve is intact, with strong symmetric movements.  Voice and Breathing - The patient was breathing comfortably without the use of accessory muscles. There was no wheezing, stridor. The patients voice was clear and strong, and had appropriate pitch and quality.  Ears - External ear normal. Canals are patent. Right tympanic membrane is intact without effusion, retraction or mass. Left tympanic membrane is intact without effusion, retraction or mass.  Eyes - Extraocular movements intact, and the pupils were reactive to light. Sclera were not icteric or injected, conjunctiva were pink and moist.   Nose - External contour is symmetric, no gross deflection or scars.  Nasal mucosa is pink and moist with no abnormal mucus.      To evaluate the nose and sinuses in the post operative state, I performed rigid nasal endoscopy.  I sprayed both nares with 2 sprays lidocaine and neosynephrine.     I began with the RIGHT side using a " 0 degree rigid nasal endoscope, and then similarly examined the LEFT side     Findings: septum grossly midline and intact  Inferior turbinates:  lateralized, normal post operative secretions suction debrided with #8 day  MM - josh post operative secretions minimally suction debrided, no purulence or polypoid change.  Antrostomies are not visualized due to packing that was not debrided.   The patient tolerated the procedure well            Assessment and Plan:       ICD-10-CM    1. S/P FESS (functional endoscopic sinus surgery)  Z98.890 lidocaine 2%-oxymetazoline 0.025% nasal solution 2 spray      2. Acute post-operative pain  G89.18 oxyCODONE (ROXICODONE) 5 MG tablet     lidocaine (LMX4) 4 % external cream        I sent a refill of oxycodone   Use lidocaine cream to the anterior septum as needed for burning sensation, use as prescribed  Ok to use ibuprofen as directed  Continue post operative instructions  Keep scheduled follow up with Dr Hand for second post operative exam  Continue izzy med saline rinses 5 times per day    Vanessa Terrell NP-C  [facility]    Again, thank you for allowing me to participate in the care of your patient.        Sincerely,        Vanessa Terrell NP    Electronically signed

## 2025-06-08 ENCOUNTER — MYC REFILL (OUTPATIENT)
Dept: FAMILY MEDICINE | Facility: OTHER | Age: 55
End: 2025-06-08

## 2025-06-08 DIAGNOSIS — E66.01 CLASS 2 SEVERE OBESITY DUE TO EXCESS CALORIES WITH SERIOUS COMORBIDITY AND BODY MASS INDEX (BMI) OF 35.0 TO 35.9 IN ADULT (H): ICD-10-CM

## 2025-06-08 DIAGNOSIS — Z79.4 TYPE 2 DIABETES MELLITUS WITHOUT COMPLICATION, WITH LONG-TERM CURRENT USE OF INSULIN (H): ICD-10-CM

## 2025-06-08 DIAGNOSIS — E11.9 TYPE 2 DIABETES MELLITUS WITHOUT COMPLICATION, WITH LONG-TERM CURRENT USE OF INSULIN (H): ICD-10-CM

## 2025-06-08 DIAGNOSIS — E66.812 CLASS 2 SEVERE OBESITY DUE TO EXCESS CALORIES WITH SERIOUS COMORBIDITY AND BODY MASS INDEX (BMI) OF 35.0 TO 35.9 IN ADULT (H): ICD-10-CM

## 2025-06-10 ENCOUNTER — OFFICE VISIT (OUTPATIENT)
Dept: OTOLARYNGOLOGY | Facility: OTHER | Age: 55
End: 2025-06-10
Attending: NURSE PRACTITIONER
Payer: COMMERCIAL

## 2025-06-10 VITALS
SYSTOLIC BLOOD PRESSURE: 110 MMHG | BODY MASS INDEX: 35.35 KG/M2 | WEIGHT: 261 LBS | DIASTOLIC BLOOD PRESSURE: 70 MMHG | TEMPERATURE: 98.6 F | OXYGEN SATURATION: 99 % | HEART RATE: 113 BPM | HEIGHT: 72 IN

## 2025-06-10 DIAGNOSIS — Z98.890 S/P FESS (FUNCTIONAL ENDOSCOPIC SINUS SURGERY): Primary | ICD-10-CM

## 2025-06-10 PROCEDURE — 3078F DIAST BP <80 MM HG: CPT | Performed by: NURSE PRACTITIONER

## 2025-06-10 PROCEDURE — 99024 POSTOP FOLLOW-UP VISIT: CPT | Performed by: NURSE PRACTITIONER

## 2025-06-10 PROCEDURE — 1126F AMNT PAIN NOTED NONE PRSNT: CPT | Performed by: NURSE PRACTITIONER

## 2025-06-10 PROCEDURE — 3074F SYST BP LT 130 MM HG: CPT | Performed by: NURSE PRACTITIONER

## 2025-06-10 ASSESSMENT — PAIN SCALES - GENERAL: PAINLEVEL_OUTOF10: NO PAIN (0)

## 2025-06-10 NOTE — PROGRESS NOTES
Otolaryngology Note         Chief Complaint:     Patient presents with:  Surgical Followup: S/P 05/23 FESS / TR/ Septoplasty/ NVR/  Adenoidectomy            History of Present Illness:     Rojelio Juárez is a 55 year old male who presents today for second post op FESS appointment.  Symptoms are improving.  Headache is subsiding.   He has been rinsing as indicated.         Surgical Details:      Otolaryngology Operative Note      Pre-op Diagnosis: Chronic pansinusitis, bilateral inferior turbinate hypertrophy, deviated nasal septum, chronic adenoiditis, adenoid hypertrophy  Post-op Diagnosis:  same     Procedures:    1.  Bilateral draf 2a frontal sinusotomy  2.  Bilateral total ethmoidectomy  3.  Bilateral maxillary antrostomy with tissue removal  4.  Septoplasty  5.  Adenoidectomy   6.  Bilateral submucous reduction inferior turbinates  7.  Bilateral excision pritesh bullosa     Sinus procedures performed with [employer] navigation  Surgeon:  Noelle Hand D.O.  Anesthesia:  General endotracheal  EBL:  25 ml  Findings: small polyps and diffuse moderate polypoid degeneration throughout the ethmoid cavity and frontal recess.  Purulence right frontal recess and scattered microabscesses right anterior ethmoid and frontal recess  Complications:  none         Medications:     Current Outpatient Rx   Medication Sig Dispense Refill    albuterol (PROAIR HFA/PROVENTIL HFA/VENTOLIN HFA) 108 (90 Base) MCG/ACT inhaler Inhale 2 puffs into the lungs every 6 hours. 18 g 3    aspirin 81 MG EC tablet Take 1 tablet (81 mg) by mouth daily 90 tablet 3    atorvastatin (LIPITOR) 10 MG tablet Take 1 tablet (10 mg) by mouth at bedtime. 90 tablet 3    azelastine (ASTELIN) 0.1 % nasal spray Spray 2 sprays into both nostrils 2 times daily. 30 mL 12    benzonatate (TESSALON) 200 MG capsule Take 1 capsule (200 mg) by mouth 3 times daily as needed for cough 30 capsule 0    blood glucose (NO BRAND SPECIFIED) lancets standard Use to test  blood sugar 3 times daily or as directed. 300 each 3    blood glucose (NO BRAND SPECIFIED) test strip Use to test blood sugar 3 times daily or as directed. 300 strip 3    blood glucose monitoring (NO BRAND SPECIFIED) meter device kit Use to test blood sugar 3 times daily or as directed. 1 kit 0    budesonide (PULMICORT) 0.5 MG/2ML neb solution Squirt entire vial into izzy med saline solution, mix, and irrigate each nostril until entire bottle empty.  Do this twice daily. 200 mL 11    budesonide (PULMICORT) 0.5 MG/2ML neb solution Mix 240 ml Izzy Med Sinus rinse, add 0.5 mg budesonide vial, rinse 1/2 bottle in each nostril twice daily 180 mL 4    empagliflozin (JARDIANCE) 25 MG TABS tablet Take 1 tablet (25 mg) by mouth daily. 90 tablet 1    ibuprofen (ADVIL/MOTRIN) 800 MG tablet Take 1 tablet (800 mg) by mouth every 8 hours as needed for moderate pain With food 90 tablet 1    lidocaine (LMX4) 4 % external cream Apply a thin layer to bilateral nostrils every 2-3 hours as needed for pain 5 g 1    losartan (COZAAR) 25 MG tablet Take 1 tablet (25 mg) by mouth daily 90 tablet 2    oxyCODONE (ROXICODONE) 5 MG tablet Take 1 tablet (5 mg) by mouth every 6 hours as needed for pain. 10 tablet 0    pioglitazone (ACTOS) 30 MG tablet Take 1 tablet (30 mg) by mouth daily. 90 tablet 1    predniSONE (DELTASONE) 10 MG tablet Take 3 tabs after breakfast starting 2 days prior to sinus surgery 6 tablet 0    predniSONE (DELTASONE) 20 MG tablet 20 mg in the morning for days 1-3, then 10 mg (half tab) days 4 and 5 4 tablet 0    semaglutide (OZEMPIC) 2 MG/3ML pen Inject 0.5 mg subcutaneously every 7 days. 3 mL 1            Allergies:     Allergies: Succinylcholine, Prednisone, Metformin, Ozempic (0.25 or 0.5 mg-dose) [semaglutide(0.25 or 0.5mg-dos)], and Sertraline          Past Medical History:     Past Medical History:   Diagnosis Date    Anxiety 08/15/2017    Benign essential hypertension 01/20/2017    Diabetes (H)     Fatty  infiltration of liver 2018    Herniated intervertebral disc of lumbar spine 2017    Pseudocholinesterase deficiency     Tobacco dependence syndrome 2017            Past Surgical History:     Past Surgical History:   Procedure Laterality Date    ADENOIDECTOMY N/A 2025    Procedure: Adenoidectomy;  Surgeon: Noelle Hand MD;  Location: [facility]    BACK SURGERY      COLONOSCOPY N/A 2023    Procedure: COLONOSCOPY with polypectomy, biopsy, resolution clipping;  Surgeon: Jimbo Covarrubias MD;  Location: [facility]    ENDOSCOPIC SINUS SURGERY Bilateral 2025    Procedure: BILATERAL ENDOSCOPIC SINUS SURGERY;  Surgeon: Noelle Hand MD;  Location: [facility]    ESOPHAGEAL BALLOON PROVOCATION STUDY N/A 2021    Procedure: Esophageal Balloon Provocation Study;  Surgeon: Danial Serrano DO;  Location:  GI    ESOPHAGOSCOPY, GASTROSCOPY, DUODENOSCOPY (EGD), COMBINED N/A 2021    Procedure: ESOPHAGOGASTRODUODENOSCOPY, WITH BIOPSY;  Surgeon: Danial Serrano DO;  [facility]    SEPTOPLASTY, TURBINOPLASTY, COMBINED Bilateral 2025    Procedure: septoplasty and bilateral Turbinate Reducion, Excision Bilateral Mini Bullosa;  Surgeon: Noelle Hand MD;  Location: [facility]       ENT family history reviewed         Social History:     Social History     Tobacco Use    Smoking status: Former     Current packs/day: 0.00     Average packs/day: 0.8 packs/day for 38.9 years (29.2 ttl pk-yrs)     Types: Cigarettes     Start date: 1984     Quit date: 2022     Years since quittin.5     Passive exposure: Past    Smokeless tobacco: Never   Vaping Use    Vaping status: Never Used   Substance Use Topics    Alcohol use: Yes     Comment: socail    Drug use: No            Review of Systems:     ROS: See HPI         Physical Exam:     /70 (BP Location: Right arm, Patient Position: Sitting, Cuff Size: Adult Large)   Pulse 113   Temp 98.6  F (37  C) (Tympanic)   Ht 1.829  m (6')   Wt 118.4 kg (261 lb)   SpO2 99%   BMI 35.40 kg/m      General - The patient is well nourished and well developed, and appears to have good nutritional status.  Alert and oriented to person and place, answers questions and cooperates with examination appropriately.   Head and Face - Normocephalic and atraumatic, with no gross asymmetry noted.  The facial nerve is intact, with strong symmetric movements.  Voice and Breathing - The patient was breathing comfortably without the use of accessory muscles. There was no wheezing, stridor. The patients voice was clear and strong, and had appropriate pitch and quality.  Neck - Normal ROM, no palpable lymphadenopathy.   Nose - External contour is symmetric, no gross deflection or scars.  Nasal mucosa is pink and moist with no abnormal mucus.     To evaluate the nose and sinuses in the post operative state, I performed rigid nasal endoscopy.  I sprayed both nares with 2 sprays lidocaine and neosynephrine.     I began with the RIGHT side using a 0 degree rigid nasal endoscope, and then similarly examined the LEFT side     Findings: septum grossly midline and intact  Inferior turbinates:  lateralized, normal post operative secretions suction debrided with #8 Jorgensen  Antrostomy patent  Ethmoid cavity patent with remaining mometasone implants in the left.  Frontal recess clear  Adenoid pad is healing  The patient tolerated the procedure well            Assessment and Plan:       ICD-10-CM    1. S/P FESS (functional endoscopic sinus surgery)  Z98.890         Continue post operative instructions  Keep scheduled follow up with Dr Hand for second post operative exam  Continue izzy med saline rinses 5 times per day    Vanessa Terrell NP-C  [facility]

## 2025-06-10 NOTE — LETTER
6/10/2025      Rojelio Juárez  [address] [place] [id number]      Dear Colleague,    Thank you for referring your patient, Rojelio Juárez, to the [facility]. Please see a copy of my visit note below.      Otolaryngology Note         Chief Complaint:     Patient presents with:  Surgical Followup: S/P 05/23 FESS / TR/ Septoplasty/ NVR/  Adenoidectomy            History of Present Illness:     Rojelio Juárez is a 55 year old male who presents today for second post op FESS appointment.  Symptoms are improving.  Headache is subsiding.   He has been rinsing as indicated.         Surgical Details:      Otolaryngology Operative Note      Pre-op Diagnosis: Chronic pansinusitis, bilateral inferior turbinate hypertrophy, deviated nasal septum, chronic adenoiditis, adenoid hypertrophy  Post-op Diagnosis:  same     Procedures:    1.  Bilateral draf 2a frontal sinusotomy  2.  Bilateral total ethmoidectomy  3.  Bilateral maxillary antrostomy with tissue removal  4.  Septoplasty  5.  Adenoidectomy   6.  Bilateral submucous reduction inferior turbinates  7.  Bilateral excision pritesh bullosa     Sinus procedures performed with [employer] navigation  Surgeon:  Noelle Hand D.O.  Anesthesia:  General endotracheal  EBL:  25 ml  Findings: small polyps and diffuse moderate polypoid degeneration throughout the ethmoid cavity and frontal recess.  Purulence right frontal recess and scattered microabscesses right anterior ethmoid and frontal recess  Complications:  none         Medications:     Current Outpatient Rx   Medication Sig Dispense Refill     albuterol (PROAIR HFA/PROVENTIL HFA/VENTOLIN HFA) 108 (90 Base) MCG/ACT inhaler Inhale 2 puffs into the lungs every 6 hours. 18 g 3     aspirin 81 MG EC tablet Take 1 tablet (81 mg) by mouth daily 90 tablet 3     atorvastatin (LIPITOR) 10 MG tablet Take 1 tablet (10 mg) by mouth at bedtime. 90 tablet 3     azelastine (ASTELIN) 0.1 % nasal spray Spray 2 sprays  into both nostrils 2 times daily. 30 mL 12     benzonatate (TESSALON) 200 MG capsule Take 1 capsule (200 mg) by mouth 3 times daily as needed for cough 30 capsule 0     blood glucose (NO BRAND SPECIFIED) lancets standard Use to test blood sugar 3 times daily or as directed. 300 each 3     blood glucose (NO BRAND SPECIFIED) test strip Use to test blood sugar 3 times daily or as directed. 300 strip 3     blood glucose monitoring (NO BRAND SPECIFIED) meter device kit Use to test blood sugar 3 times daily or as directed. 1 kit 0     budesonide (PULMICORT) 0.5 MG/2ML neb solution Squirt entire vial into izzy med saline solution, mix, and irrigate each nostril until entire bottle empty.  Do this twice daily. 200 mL 11     budesonide (PULMICORT) 0.5 MG/2ML neb solution Mix 240 ml Izzy Med Sinus rinse, add 0.5 mg budesonide vial, rinse 1/2 bottle in each nostril twice daily 180 mL 4     empagliflozin (JARDIANCE) 25 MG TABS tablet Take 1 tablet (25 mg) by mouth daily. 90 tablet 1     ibuprofen (ADVIL/MOTRIN) 800 MG tablet Take 1 tablet (800 mg) by mouth every 8 hours as needed for moderate pain With food 90 tablet 1     lidocaine (LMX4) 4 % external cream Apply a thin layer to bilateral nostrils every 2-3 hours as needed for pain 5 g 1     losartan (COZAAR) 25 MG tablet Take 1 tablet (25 mg) by mouth daily 90 tablet 2     oxyCODONE (ROXICODONE) 5 MG tablet Take 1 tablet (5 mg) by mouth every 6 hours as needed for pain. 10 tablet 0     pioglitazone (ACTOS) 30 MG tablet Take 1 tablet (30 mg) by mouth daily. 90 tablet 1     predniSONE (DELTASONE) 10 MG tablet Take 3 tabs after breakfast starting 2 days prior to sinus surgery 6 tablet 0     predniSONE (DELTASONE) 20 MG tablet 20 mg in the morning for days 1-3, then 10 mg (half tab) days 4 and 5 4 tablet 0     semaglutide (OZEMPIC) 2 MG/3ML pen Inject 0.5 mg subcutaneously every 7 days. 3 mL 1            Allergies:     Allergies: Succinylcholine, Prednisone, Metformin, Ozempic  (0.25 or 0.5 mg-dose) [semaglutide(0.25 or 0.5mg-dos)], and Sertraline          Past Medical History:     Past Medical History:   Diagnosis Date     Anxiety 08/15/2017     Benign essential hypertension 2017     Diabetes (H)      Fatty infiltration of liver 2018     Herniated intervertebral disc of lumbar spine 2017     Pseudocholinesterase deficiency      Tobacco dependence syndrome 2017            Past Surgical History:     Past Surgical History:   Procedure Laterality Date     ADENOIDECTOMY N/A 2025    Procedure: Adenoidectomy;  Surgeon: Noelle Hand MD;  [facility]: [facility]     BACK SURGERY       COLONOSCOPY N/A 2023    Procedure: COLONOSCOPY with polypectomy, biopsy, resolution clipping;  Surgeon: Jimbo Covarrubias MD;  [facility]     ENDOSCOPIC SINUS SURGERY Bilateral 2025    Procedure: BILATERAL ENDOSCOPIC SINUS SURGERY;  Surgeon: Noelle Hand MD;  [facility]     ESOPHAGEAL BALLOON PROVOCATION STUDY N/A 2021    Procedure: Esophageal Balloon Provocation Study;  Surgeon: Danial Serrano DO;  Location:  GI     ESOPHAGOSCOPY, GASTROSCOPY, DUODENOSCOPY (EGD), COMBINED N/A 2021    Procedure: ESOPHAGOGASTRODUODENOSCOPY, WITH BIOPSY;  Surgeon: Danial Serrano DO;  [facility]     SEPTOPLASTY, TURBINOPLASTY, COMBINED Bilateral 2025    Procedure: septoplasty and bilateral Turbinate Reducion, Excision Bilateral Mini Bullosa;  Surgeon: Noelle Hand MD;  [facility]: [facility]       ENT family history reviewed         Social History:     Social History     Tobacco Use     Smoking status: Former     Current packs/day: 0.00     Average packs/day: 0.8 packs/day for 38.9 years (29.2 ttl pk-yrs)     Types: Cigarettes     Start date: 1984     Quit date: 2022     Years since quittin.5     Passive exposure: Past     Smokeless tobacco: Never   Vaping Use     Vaping status: Never Used   Substance Use Topics     Alcohol use: Yes      Comment: socail     Drug use: No            Review of Systems:     ROS: See HPI         Physical Exam:     /70 (BP Location: Right arm, Patient Position: Sitting, Cuff Size: Adult Large)   Pulse 113   Temp 98.6  F (37  C) (Tympanic)   Ht 1.829 m (6')   Wt 118.4 kg (261 lb)   SpO2 99%   BMI 35.40 kg/m      General - The patient is well nourished and well developed, and appears to have good nutritional status.  Alert and oriented to person and place, answers questions and cooperates with examination appropriately.   Head and Face - Normocephalic and atraumatic, with no gross asymmetry noted.  The facial nerve is intact, with strong symmetric movements.  Voice and Breathing - The patient was breathing comfortably without the use of accessory muscles. There was no wheezing, stridor. The patients voice was clear and strong, and had appropriate pitch and quality.  Neck - Normal ROM, no palpable lymphadenopathy.   Nose - External contour is symmetric, no gross deflection or scars.  Nasal mucosa is pink and moist with no abnormal mucus.     To evaluate the nose and sinuses in the post operative state, I performed rigid nasal endoscopy.  I sprayed both nares with 2 sprays lidocaine and neosynephrine.     I began with the RIGHT side using a 0 degree rigid nasal endoscope, and then similarly examined the LEFT side     Findings: septum grossly midline and intact  Inferior turbinates:  lateralized, normal post operative secretions suction debrided with #8 Jorgensen  Antrostomy patent  Ethmoid cavity patent with remaining mometasone implants in the left.  Frontal recess clear  Adenoid pad is healing  The patient tolerated the procedure well            Assessment and Plan:       ICD-10-CM    1. S/P FESS (functional endoscopic sinus surgery)  Z98.890         Continue post operative instructions  Keep scheduled follow up with Dr Hand for second post operative exam  Continue izzy med saline rinses 5 times per  day    Vanessa LONDON  [facility]NT            Again, thank you for allowing me to participate in the care of your patient.        Sincerely,        Vanessa Terrell NP    Electronically signed

## 2025-07-09 DIAGNOSIS — I10 BENIGN ESSENTIAL HYPERTENSION: ICD-10-CM

## 2025-07-10 ENCOUNTER — OFFICE VISIT (OUTPATIENT)
Dept: OTOLARYNGOLOGY | Facility: OTHER | Age: 55
End: 2025-07-10
Attending: NURSE PRACTITIONER
Payer: COMMERCIAL

## 2025-07-10 VITALS
WEIGHT: 261.02 LBS | HEART RATE: 94 BPM | OXYGEN SATURATION: 98 % | BODY MASS INDEX: 35.35 KG/M2 | DIASTOLIC BLOOD PRESSURE: 81 MMHG | RESPIRATION RATE: 16 BRPM | HEIGHT: 72 IN | TEMPERATURE: 98.4 F | SYSTOLIC BLOOD PRESSURE: 113 MMHG

## 2025-07-10 DIAGNOSIS — Z98.890 S/P FESS (FUNCTIONAL ENDOSCOPIC SINUS SURGERY): Primary | ICD-10-CM

## 2025-07-10 PROCEDURE — 1125F AMNT PAIN NOTED PAIN PRSNT: CPT | Performed by: NURSE PRACTITIONER

## 2025-07-10 PROCEDURE — 3074F SYST BP LT 130 MM HG: CPT | Performed by: NURSE PRACTITIONER

## 2025-07-10 PROCEDURE — 99024 POSTOP FOLLOW-UP VISIT: CPT | Performed by: NURSE PRACTITIONER

## 2025-07-10 PROCEDURE — 3079F DIAST BP 80-89 MM HG: CPT | Performed by: NURSE PRACTITIONER

## 2025-07-10 RX ORDER — LOSARTAN POTASSIUM 25 MG/1
25 TABLET ORAL DAILY
Qty: 90 TABLET | Refills: 2 | Status: SHIPPED | OUTPATIENT
Start: 2025-07-10

## 2025-07-10 ASSESSMENT — PAIN SCALES - GENERAL: PAINLEVEL_OUTOF10: MILD PAIN (3)

## 2025-07-10 NOTE — LETTER
irrigate each nostril until entire bottle empty.  Do this twice daily. 200 mL 11     budesonide (PULMICORT) 0.5 MG/2ML neb solution Mix 240 ml Shiraz Med Sinus rinse, add 0.5 mg budesonide vial, rinse 1/2 bottle in each nostril twice daily 180 mL 4     empagliflozin (JARDIANCE) 25 MG TABS tablet Take 1 tablet (25 mg) by mouth daily. 90 tablet 1     ibuprofen (ADVIL/MOTRIN) 800 MG tablet Take 1 tablet (800 mg) by mouth every 8 hours as needed for moderate pain With food 90 tablet 1     lidocaine (LMX4) 4 % external cream Apply a thin layer to bilateral nostrils every 2-3 hours as needed for pain 5 g 1     losartan (COZAAR) 25 MG tablet TAKE 1 TABLET(25 MG) BY MOUTH DAILY 90 tablet 2     oxyCODONE (ROXICODONE) 5 MG tablet Take 1 tablet (5 mg) by mouth every 6 hours as needed for pain. 10 tablet 0     pioglitazone (ACTOS) 30 MG tablet Take 1 tablet (30 mg) by mouth daily. 90 tablet 1     predniSONE (DELTASONE) 10 MG tablet Take 3 tabs after breakfast starting 2 days prior to sinus surgery 6 tablet 0     predniSONE (DELTASONE) 20 MG tablet 20 mg in the morning for days 1-3, then 10 mg (half tab) days 4 and 5 4 tablet 0     semaglutide (OZEMPIC) 2 MG/3ML pen Inject 0.5 mg subcutaneously every 7 days. 3 mL 1            Allergies:     Allergies: Succinylcholine, Prednisone, Metformin, Ozempic (0.25 or 0.5 mg-dose) [semaglutide(0.25 or 0.5mg-dos)], and Sertraline          Past Medical History:     Past Medical History:   Diagnosis Date     Anxiety 08/15/2017     Benign essential hypertension 01/20/2017     Diabetes (H)      Fatty infiltration of liver 11/21/2018     Herniated intervertebral disc of lumbar spine 01/20/2017     Pseudocholinesterase deficiency      Tobacco dependence syndrome 01/20/2017            Past Surgical History:     Past Surgical History:   Procedure Laterality Date     ADENOIDECTOMY N/A 5/23/2025    Procedure: Adenoidectomy;  Surgeon: Noelle Hand MD;  [facility]     BACK SURGERY        "COLONOSCOPY N/A 2023    Procedure: COLONOSCOPY with polypectomy, biopsy, resolution clipping;  Surgeon: Jimbo Covarrubias MD;  [facility]     ENDOSCOPIC SINUS SURGERY Bilateral 2025    Procedure: BILATERAL ENDOSCOPIC SINUS SURGERY;  Surgeon: Noelle Hand MD;  [facility]     ESOPHAGEAL BALLOON PROVOCATION STUDY N/A 2021    Procedure: Esophageal Balloon Provocation Study;  Surgeon: Danial Serrano DO;  [facility]     ESOPHAGOSCOPY, GASTROSCOPY, DUODENOSCOPY (EGD), COMBINED N/A 2021    Procedure: ESOPHAGOGASTRODUODENOSCOPY, WITH BIOPSY;  Surgeon: Danial Serrano DO;  [facility]     SEPTOPLASTY, TURBINOPLASTY, COMBINED Bilateral 2025    Procedure: septoplasty and bilateral Turbinate Reducion, Excision Bilateral Mini Bullosa;  Surgeon: Noelle Hand MD;  [facility]       ENT family history reviewed         Social History:     Social History     Tobacco Use     Smoking status: Former     Current packs/day: 0.00     Average packs/day: 0.8 packs/day for 38.9 years (29.2 ttl pk-yrs)     Types: Cigarettes     Start date: 1984     Quit date: 2022     Years since quittin.6     Passive exposure: Past     Smokeless tobacco: Never   Vaping Use     Vaping status: Never Used   Substance Use Topics     Alcohol use: Yes     Comment: socail     Drug use: No            Review of Systems:     ROS: See HPI         Physical Exam:     /81 (BP Location: Right arm, Patient Position: Sitting, Cuff Size: Adult Large)   Pulse 94   Temp 98.4  F (36.9  C) (Tympanic)   Resp 16   Ht 1.829 m (6' 0.01\")   Wt 118.4 kg (261 lb 0.4 oz)   SpO2 98%   BMI 35.39 kg/m      General - The patient is well nourished and well developed, and appears to have good nutritional status.  Alert and oriented to person and place, answers questions and cooperates with examination appropriately.   Head and Face - Normocephalic and atraumatic, with no gross asymmetry noted.  The " facial nerve is intact, with strong symmetric movements.  Voice and Breathing - The patient was breathing comfortably without the use of accessory muscles. There was no wheezing, stridor. The patients voice was clear and strong, and had appropriate pitch and quality.  Ears - External ear normal. Canals are patent. Right tympanic membrane is intact without effusion, retraction or mass. Left tympanic membrane is intact without effusion, retraction or mass.  Neck - Normal range of motion, no worrisome palpable lymphadenopathy.  Bilateral trap muscles are tight.  Nose - External contour is symmetric, no gross deflection or scars.  Nasal mucosa is pink and moist with no abnormal mucus.      To evaluate the nose and sinuses in the post operative state, I performed rigid nasal endoscopy.  I sprayed both nares with 2 sprays lidocaine and neosynephrine.     I began with the RIGHT side using a 0 degree rigid nasal endoscope, and then similarly examined the LEFT side     Findings:  septum is grossly midline and intact  Inferior turbinates:  reduced and lateralized  Right antrostomy with scant mucopurulence, suction debrided with curved suction, very scant,  Left antrostomy patent, left ethmoid cavity patent  Frontal recess is clear  Superior meatus clear  Sphenoethmoid recess is clear  Nasopharynx clear, ET patent, no edema  The patient tolerated the procedure well            Assessment and Plan:       ICD-10-CM    1. S/P FESS (functional endoscopic sinus surgery)  Z98.890 lidocaine 2%-oxymetazoline 0.025% nasal solution 2 spray        Continue rinsing as needed  Follow-up as needed  Reassured no evidence of infection on exam.  He is healed up well    Vanessa Terrell NP-C  [facility]NT    Again, thank you for allowing me to participate in the care of your patient.        Sincerely,        Vanessa Terrell NP    Electronically signed

## 2025-07-10 NOTE — PATIENT INSTRUCTIONS
Thank you for allowing Vanessa Terrell and our ENT team to participate in your care.  If your medications are too expensive, please give the nurse a call.  We can possibly change this medication.  If you have a scheduling or an appointment question please contact our Health Unit Coordinator at their direct line [phone]  ALL nursing questions or concerns can be directed to your ENT nurse at: [phone] - Dms

## 2025-07-10 NOTE — Clinical Note
7/10/2025      Rojelio Juárez  [address] [place] [id number]      Dear Colleague,    Thank you for referring your patient, Rojelio Juárez, to the [facility]. Please see a copy of my visit note below.    No notes on file    Again, thank you for allowing me to participate in the care of your patient.        Sincerely,        Vanessa Terrell NP    Electronically signed

## 2025-07-10 NOTE — PROGRESS NOTES
Otolaryngology Note         Chief Complaint:     Patient presents with:  Surgical Followup: Post op FESS/TR/Septo/NV Repair/Adenoidectomy 5/23           History of Present Illness:     Rojelio Juárez is a 55 year old male who presents today for *** first post op FESS appointment.  The patient has done well this week.  There has been some serosanginous drainage from each nare.  There has been a complaints in regards to occasional headaches and crusting.  There has been no fever, chills, large amounts of bleeding, visual changes or neck pain.   Has been using *** rinses *** times per day and tolerating well.      He continues with pressure in his head that can be worse with bending over  He continues to rinse   He is breathing well through his nose well  He has occasional burning sensation in the nose   He is currently using saline rinses and budesonide  Rinsing a couple times per day   Improved after rinsing  He is rinsing with 1 packet of saline  He has been under a lot of stress  Not sleeping well  Had to take         Surgical Details:               Medications:     Current Outpatient Rx   Medication Sig Dispense Refill    albuterol (PROAIR HFA/PROVENTIL HFA/VENTOLIN HFA) 108 (90 Base) MCG/ACT inhaler Inhale 2 puffs into the lungs every 6 hours. 18 g 3    aspirin 81 MG EC tablet Take 1 tablet (81 mg) by mouth daily 90 tablet 3    atorvastatin (LIPITOR) 10 MG tablet Take 1 tablet (10 mg) by mouth at bedtime. 90 tablet 3    azelastine (ASTELIN) 0.1 % nasal spray Spray 2 sprays into both nostrils 2 times daily. 30 mL 12    benzonatate (TESSALON) 200 MG capsule Take 1 capsule (200 mg) by mouth 3 times daily as needed for cough 30 capsule 0    blood glucose (NO BRAND SPECIFIED) lancets standard Use to test blood sugar 3 times daily or as directed. 300 each 3    blood glucose (NO BRAND SPECIFIED) test strip Use to test blood sugar 3 times daily or as directed. 300 strip 3    blood glucose monitoring (NO BRAND SPECIFIED)  meter device kit Use to test blood sugar 3 times daily or as directed. 1 kit 0    budesonide (PULMICORT) 0.5 MG/2ML neb solution Squirt entire vial into izzy med saline solution, mix, and irrigate each nostril until entire bottle empty.  Do this twice daily. 200 mL 11    budesonide (PULMICORT) 0.5 MG/2ML neb solution Mix 240 ml Izzy Med Sinus rinse, add 0.5 mg budesonide vial, rinse 1/2 bottle in each nostril twice daily 180 mL 4    empagliflozin (JARDIANCE) 25 MG TABS tablet Take 1 tablet (25 mg) by mouth daily. 90 tablet 1    ibuprofen (ADVIL/MOTRIN) 800 MG tablet Take 1 tablet (800 mg) by mouth every 8 hours as needed for moderate pain With food 90 tablet 1    lidocaine (LMX4) 4 % external cream Apply a thin layer to bilateral nostrils every 2-3 hours as needed for pain 5 g 1    losartan (COZAAR) 25 MG tablet TAKE 1 TABLET(25 MG) BY MOUTH DAILY 90 tablet 2    oxyCODONE (ROXICODONE) 5 MG tablet Take 1 tablet (5 mg) by mouth every 6 hours as needed for pain. 10 tablet 0    pioglitazone (ACTOS) 30 MG tablet Take 1 tablet (30 mg) by mouth daily. 90 tablet 1    predniSONE (DELTASONE) 10 MG tablet Take 3 tabs after breakfast starting 2 days prior to sinus surgery 6 tablet 0    predniSONE (DELTASONE) 20 MG tablet 20 mg in the morning for days 1-3, then 10 mg (half tab) days 4 and 5 4 tablet 0    semaglutide (OZEMPIC) 2 MG/3ML pen Inject 0.5 mg subcutaneously every 7 days. 3 mL 1            Allergies:     Allergies: Succinylcholine, Prednisone, Metformin, Ozempic (0.25 or 0.5 mg-dose) [semaglutide(0.25 or 0.5mg-dos)], and Sertraline          Past Medical History:     Past Medical History:   Diagnosis Date    Anxiety 08/15/2017    Benign essential hypertension 01/20/2017    Diabetes (H)     Fatty infiltration of liver 11/21/2018    Herniated intervertebral disc of lumbar spine 01/20/2017    Pseudocholinesterase deficiency     Tobacco dependence syndrome 01/20/2017            Past Surgical History:     Past Surgical  "History:   Procedure Laterality Date    ADENOIDECTOMY N/A 2025    Procedure: Adenoidectomy;  Surgeon: Noelle Hand MD;  [facility]    BACK SURGERY      COLONOSCOPY N/A 2023    Procedure: COLONOSCOPY with polypectomy, biopsy, resolution clipping;  Surgeon: Jimbo Covarrubias MD;  [facility]    ENDOSCOPIC SINUS SURGERY Bilateral 2025    Procedure: BILATERAL ENDOSCOPIC SINUS SURGERY;  Surgeon: Noelle Hand MD;  [facility]    ESOPHAGEAL BALLOON PROVOCATION STUDY N/A 2021    Procedure: Esophageal Balloon Provocation Study;  Surgeon: Danial Serrano DO;  Location:  GI    ESOPHAGOSCOPY, GASTROSCOPY, DUODENOSCOPY (EGD), COMBINED N/A 2021    Procedure: ESOPHAGOGASTRODUODENOSCOPY, WITH BIOPSY;  Surgeon: Danial Serrano DO;  Location:  GI    SEPTOPLASTY, TURBINOPLASTY, COMBINED Bilateral 2025    Procedure: septoplasty and bilateral Turbinate Reducion, Excision Bilateral Mini Bullosa;  Surgeon: Noelle Hand MD;  [facility]: [facility]       ENT family history reviewed         Social History:     Social History     Tobacco Use    Smoking status: Former     Current packs/day: 0.00     Average packs/day: 0.8 packs/day for 38.9 years (29.2 ttl pk-yrs)     Types: Cigarettes     Start date: 1984     Quit date: 2022     Years since quittin.5     Passive exposure: Past    Smokeless tobacco: Never   Vaping Use    Vaping status: Never Used   Substance Use Topics    Alcohol use: Yes     Comment: socail    Drug use: No            Review of Systems:     ROS: See HPI         Physical Exam:     /81 (BP Location: Right arm, Patient Position: Sitting, Cuff Size: Adult Large)   Pulse 94   Temp 98.4  F (36.9  C) (Tympanic)   Resp 16   Ht 1.829 m (6' 0.01\")   Wt 118.4 kg (261 lb 0.4 oz)   SpO2 98%   BMI 35.39 kg/m    General - The patient is well nourished and well developed, and appears to have good nutritional status.  Alert and oriented to person " and place, answers questions and cooperates with examination appropriately.   Head and Face - Normocephalic and atraumatic, with no gross asymmetry noted.  The facial nerve is intact, with strong symmetric movements.  Voice and Breathing - The patient was breathing comfortably without the use of accessory muscles. There was no wheezing, stridor. The patients voice was clear and strong, and had appropriate pitch and quality.  Ears - External ear normal. Canals are patent. Right tympanic membrane is intact without effusion, retraction or mass. Left tympanic membrane is intact without effusion, retraction or mass.  Eyes - Extraocular movements intact, and the pupils were reactive to light. Sclera were not icteric or injected, conjunctiva were pink and moist.   Mouth - Examination of the oral cavity showed pink, healthy oral mucosa. Dentition in good condition. No lesions or ulcerations noted. The tongue was mobile and midline.   Throat - The walls of the oropharynx were smooth, pink, moist, symmetric, and had no lesions or ulcerations.  The tonsillar pillars and soft palate were symmetric. The uvula was midline on elevation.    Neck - Normal midline excursion of the laryngotracheal complex during swallowing.  Full range of motion on passive movement.  Palpation of the occipital, submental, submandibular, internal jugular chain, and supraclavicular nodes did not demonstrate any abnormal lymph nodes or masses.  Palpation of the thyroid was soft and smooth, with no nodules or goiter appreciated.  The trachea was mobile and midline.  Nose - External contour is symmetric, no gross deflection or scars.  Nasal mucosa is pink and moist with no abnormal mucus.  The septum and turbinates were evaluated: ***.  No polyps, masses, or purulence noted on examination.    To evaluate the nose and sinuses in the post operative state, I performed rigid nasal endoscopy.  I sprayed both nares with 2 sprays lidocaine and neosynephrine.      I began with the RIGHT side using a 0 degree rigid nasal endoscope, and then similarly examined the LEFT side     Findings:  septum is grossly midline and intact  Inferior turbinates:  reduced and lateralized  Right antrostomy with scant mucopurulence, suction debrided with curved suction, very scant,     Middle turbinate and middle meatus:  No purulence, no polyposis  Mucosa is healthy throughout,  no polyps nor polypoid degeneration  Nasopharynx clear, ET patent, no edema  The patient tolerated the procedure well            Assessment and Plan:       ICD-10-CM    1. S/P FESS (functional endoscopic sinus surgery)  Z98.890 lidocaine 2%-oxymetazoline 0.025% nasal solution 2 spray          Continue post operative instructions  Keep scheduled follow up with Dr Hand for second post operative exam  Continue izzy med saline rinses 5 times per day

## 2025-07-28 ENCOUNTER — MYC REFILL (OUTPATIENT)
Dept: FAMILY MEDICINE | Facility: OTHER | Age: 55
End: 2025-07-28

## 2025-07-28 DIAGNOSIS — E66.01 CLASS 2 SEVERE OBESITY DUE TO EXCESS CALORIES WITH SERIOUS COMORBIDITY AND BODY MASS INDEX (BMI) OF 35.0 TO 35.9 IN ADULT (H): ICD-10-CM

## 2025-07-28 DIAGNOSIS — E66.812 CLASS 2 SEVERE OBESITY DUE TO EXCESS CALORIES WITH SERIOUS COMORBIDITY AND BODY MASS INDEX (BMI) OF 35.0 TO 35.9 IN ADULT (H): ICD-10-CM

## 2025-07-28 DIAGNOSIS — Z79.4 TYPE 2 DIABETES MELLITUS WITHOUT COMPLICATION, WITH LONG-TERM CURRENT USE OF INSULIN (H): ICD-10-CM

## 2025-07-28 DIAGNOSIS — E11.9 TYPE 2 DIABETES MELLITUS WITHOUT COMPLICATION, WITH LONG-TERM CURRENT USE OF INSULIN (H): ICD-10-CM

## 2025-09-01 ENCOUNTER — MYC REFILL (OUTPATIENT)
Dept: OTOLARYNGOLOGY | Facility: OTHER | Age: 55
End: 2025-09-01

## 2025-09-01 ENCOUNTER — MYC REFILL (OUTPATIENT)
Dept: FAMILY MEDICINE | Facility: OTHER | Age: 55
End: 2025-09-01

## 2025-09-01 DIAGNOSIS — E66.812 CLASS 2 SEVERE OBESITY DUE TO EXCESS CALORIES WITH SERIOUS COMORBIDITY AND BODY MASS INDEX (BMI) OF 35.0 TO 35.9 IN ADULT (H): ICD-10-CM

## 2025-09-01 DIAGNOSIS — E66.01 CLASS 2 SEVERE OBESITY DUE TO EXCESS CALORIES WITH SERIOUS COMORBIDITY AND BODY MASS INDEX (BMI) OF 35.0 TO 35.9 IN ADULT (H): ICD-10-CM

## 2025-09-01 DIAGNOSIS — R06.2 WHEEZING: ICD-10-CM

## 2025-09-01 DIAGNOSIS — Z79.4 TYPE 2 DIABETES MELLITUS WITHOUT COMPLICATION, WITH LONG-TERM CURRENT USE OF INSULIN (H): ICD-10-CM

## 2025-09-01 DIAGNOSIS — E11.9 TYPE 2 DIABETES MELLITUS WITHOUT COMPLICATION, WITH LONG-TERM CURRENT USE OF INSULIN (H): ICD-10-CM

## 2025-09-03 RX ORDER — ALBUTEROL SULFATE 90 UG/1
2 INHALANT RESPIRATORY (INHALATION) EVERY 6 HOURS
Qty: 18 G | Refills: 1 | Status: SHIPPED | OUTPATIENT
Start: 2025-09-03

## 2025-09-03 RX ORDER — AZELASTINE 1 MG/ML
2 SPRAY, METERED NASAL 2 TIMES DAILY
Qty: 30 ML | Refills: 12 | OUTPATIENT
Start: 2025-09-03

## 2025-09-04 ENCOUNTER — MYC REFILL (OUTPATIENT)
Dept: FAMILY MEDICINE | Facility: OTHER | Age: 55
End: 2025-09-04

## 2025-09-04 DIAGNOSIS — R05.1 ACUTE COUGH: ICD-10-CM

## 2025-09-04 RX ORDER — BENZONATATE 200 MG/1
200 CAPSULE ORAL 3 TIMES DAILY PRN
Qty: 30 CAPSULE | Refills: 0 | Status: SHIPPED | OUTPATIENT
Start: 2025-09-04

## (undated) DEVICE — CATHETER URETHRAL 8FR 16IN 2 EYE FUNNEL RED DYND13508

## (undated) DEVICE — COVER LT HANDLE 2/PK 5160-2FG

## (undated) DEVICE — Device

## (undated) DEVICE — SLEEVE SCD EXPRESS KNEE LENGTH MED 9529

## (undated) DEVICE — SOL NACL 0.9% INJ 1000ML BAG 2B1324X

## (undated) DEVICE — LABEL STERILE PREPRINTED FOR OR FRRH01-2M

## (undated) DEVICE — WAND ESURG HALO STRL LF DISP 72290134

## (undated) DEVICE — BLANKET BAIR HUGGER LOWER BODY 42568

## (undated) DEVICE — BLADE TURBINATE INFERIOR 2MM ROTATE  1882040HR

## (undated) DEVICE — ESU GROUND PAD ADULT W/CORD E7507

## (undated) DEVICE — CANISTER SUCTION MEDI-VAC GUARDIAN 2000ML 90D 65651-220

## (undated) DEVICE — DRSG TONSIL SPONGE 7202

## (undated) DEVICE — SYR 30ML LL W/O NDL 302832

## (undated) DEVICE — SU VICRYL 4-0 P-3 18" UND J494G

## (undated) DEVICE — SOL WATER IRRIG 1000ML BOTTLE 2F7114

## (undated) DEVICE — BIN-ENT BIN BN07

## (undated) DEVICE — PACK ENT CUSTOM SEN32ENMBI

## (undated) DEVICE — COAG SUCTION VALLEYLAB 8FRX6IN  E2608-6

## (undated) DEVICE — SPECIMEN BAG MEDIVAC SUCTION WHITE SOCK 65652-122

## (undated) DEVICE — BLADE 15 RB BK SS STRL LF DISPLF DISP 371215

## (undated) DEVICE — TUBING IRRIGATOR STRAIGHTSHOT XPS 1895522

## (undated) DEVICE — TUBING SUCTION 20FT N620A

## (undated) DEVICE — SU CHROMIC 4-0 RB-1 27" U203H

## (undated) DEVICE — SINUS CATH BALLOON RELIEVA SPINPLUS FRONTAL RSP0616MFS

## (undated) DEVICE — SOL NACL 0.9% IRRIG 1000ML BOTTLE 2F7124

## (undated) DEVICE — TRACKER ENT OTS INSTRUMENT FUSION 9733533

## (undated) DEVICE — PACK BASIN SET UP SUTCNBSBBA

## (undated) DEVICE — ENDO SNARE EXACTO COLD 9MM LOOP 2.4MMX230CM 00711115

## (undated) DEVICE — TUBE NASOGASTRIC 18FR 48" 2 LUMEN 8888266148

## (undated) DEVICE — FORCEP-COLON BIOPSY LARGE W/NEEDLE 240CM

## (undated) DEVICE — GLOVE 6.5 PROTEXIS PI CLASSIC 2D72PL65X

## (undated) DEVICE — SPONGE COTTONOID 1/2X1" 80-1402

## (undated) DEVICE — ENDO TRAP POLYP E-TRAP 00711099

## (undated) DEVICE — ANTIFOG SOLUTION W/FOAM PAD CF-1002

## (undated) DEVICE — TRACKER PATIENT NON-INVASIVE AXIEM 9734887XOM

## (undated) DEVICE — SU SILK 0 SH 30" K834H

## (undated) DEVICE — PACK SET UP CUSTOM SBA32SUMBF

## (undated) DEVICE — CONNECTOR ERBEFLO 2 PORT 20325-215

## (undated) DEVICE — CLIP-RESOLUTION CLIPPING DEVICE

## (undated) DEVICE — SNARE-ROTATABLE 20MM MINI OVAL

## (undated) DEVICE — INSTRUMENT WIPE VISIWIPE 581047

## (undated) DEVICE — SUCTION TUBE YANKAUR K61

## (undated) DEVICE — BLADE QUADCUT ROTATABLE FUSION 4.3MMX13CM M4 1884380EM

## (undated) DEVICE — GLOVE BIOGEL 6.5 LATEX

## (undated) RX ORDER — LABETALOL HYDROCHLORIDE 5 MG/ML
INJECTION, SOLUTION INTRAVENOUS
Status: DISPENSED
Start: 2025-05-23

## (undated) RX ORDER — HYDROMORPHONE HYDROCHLORIDE 1 MG/ML
INJECTION, SOLUTION INTRAMUSCULAR; INTRAVENOUS; SUBCUTANEOUS
Status: DISPENSED
Start: 2025-05-23

## (undated) RX ORDER — FENTANYL CITRATE 50 UG/ML
INJECTION, SOLUTION INTRAMUSCULAR; INTRAVENOUS
Status: DISPENSED
Start: 2025-05-23

## (undated) RX ORDER — LIDOCAINE HYDROCHLORIDE 20 MG/ML
SOLUTION OROPHARYNGEAL
Status: DISPENSED
Start: 2021-11-09

## (undated) RX ORDER — DEXAMETHASONE SODIUM PHOSPHATE 10 MG/ML
INJECTION, SOLUTION INTRAMUSCULAR; INTRAVENOUS
Status: DISPENSED
Start: 2025-05-23

## (undated) RX ORDER — ONDANSETRON 2 MG/ML
INJECTION INTRAMUSCULAR; INTRAVENOUS
Status: DISPENSED
Start: 2025-05-23